# Patient Record
Sex: FEMALE | NOT HISPANIC OR LATINO
[De-identification: names, ages, dates, MRNs, and addresses within clinical notes are randomized per-mention and may not be internally consistent; named-entity substitution may affect disease eponyms.]

---

## 2019-06-06 PROBLEM — Z00.00 ENCOUNTER FOR PREVENTIVE HEALTH EXAMINATION: Status: ACTIVE | Noted: 2019-06-06

## 2019-06-26 ENCOUNTER — APPOINTMENT (OUTPATIENT)
Dept: INTERVENTIONAL RADIOLOGY/VASCULAR | Facility: HOSPITAL | Age: 39
End: 2019-06-26
Payer: COMMERCIAL

## 2019-06-26 VITALS
DIASTOLIC BLOOD PRESSURE: 97 MMHG | OXYGEN SATURATION: 95 % | SYSTOLIC BLOOD PRESSURE: 157 MMHG | HEART RATE: 88 BPM | RESPIRATION RATE: 18 BRPM

## 2019-06-26 DIAGNOSIS — Z86.39 PERSONAL HISTORY OF OTHER ENDOCRINE, NUTRITIONAL AND METABOLIC DISEASE: ICD-10-CM

## 2019-06-26 DIAGNOSIS — Z86.79 PERSONAL HISTORY OF OTHER DISEASES OF THE CIRCULATORY SYSTEM: ICD-10-CM

## 2019-06-26 DIAGNOSIS — Z87.09 PERSONAL HISTORY OF OTHER DISEASES OF THE RESPIRATORY SYSTEM: ICD-10-CM

## 2019-06-26 DIAGNOSIS — F17.200 NICOTINE DEPENDENCE, UNSPECIFIED, UNCOMPLICATED: ICD-10-CM

## 2019-06-26 PROCEDURE — 99204 OFFICE O/P NEW MOD 45 MIN: CPT

## 2019-06-26 RX ORDER — METFORMIN HYDROCHLORIDE 625 MG/1
TABLET ORAL
Refills: 0 | Status: ACTIVE | COMMUNITY

## 2019-06-26 RX ORDER — MONTELUKAST SODIUM 10 MG/1
TABLET, FILM COATED ORAL
Refills: 0 | Status: ACTIVE | COMMUNITY

## 2019-06-26 RX ORDER — ATORVASTATIN CALCIUM 80 MG/1
TABLET, FILM COATED ORAL
Refills: 0 | Status: ACTIVE | COMMUNITY

## 2019-06-26 RX ORDER — ALBUTEROL 90 MCG
AEROSOL (GRAM) INHALATION
Refills: 0 | Status: ACTIVE | COMMUNITY

## 2019-06-26 RX ORDER — IRON/IRON ASP GLY/FA/MV-MIN 38 125-25-1MG
TABLET ORAL
Refills: 0 | Status: ACTIVE | COMMUNITY

## 2019-06-26 RX ORDER — METOPROLOL TARTRATE 75 MG/1
TABLET, FILM COATED ORAL
Refills: 0 | Status: ACTIVE | COMMUNITY

## 2019-06-26 NOTE — ASSESSMENT
[FreeTextEntry1] : 39F with menorrhagia and bulk symptoms due to large uterine fibroids, interested in having UAE procedure, which was explained to her in detail, including risks, benefits, and alternatives. The patient will undergo an MRI of the pelvis in July to see if she is a good candidate for the procedure, after which we can schedule her for the UAE. All questions were answered.

## 2019-06-26 NOTE — HISTORY OF PRESENT ILLNESS
[FreeTextEntry1] : 39 year old woman with menorrhagia and pelvic pain due to enlarging uterine fibroids. Patient reports having heavy menstrual bleeding on and off for years with significant increase in heaviness of bleeding in the past few months. Her periods last 7-15 days and are otherwise regular. She occasionally has bleeding/spotting between periods. She reports having pelvic pain 2 days before period starts, which started happening a few months ago. She also reports frequency of urination and abdominal fullness. She has anemia for which she takes iron. She does not have children but wants to have kids in the future. She has been pregnant twice in the past and had abortions both times.  [Menorrhagia] : ~T menorrhagia [Pelvic Pain] : pelvic pain [Urinary Frequency] : urinary frequency [Pressure] : pressure [Monthly Cycles Regular] : monthly cycles are regular [Bleeding In Between Periods] : bleeding in between periods [G ___] : G [unfilled] [P___] : P [unfilled] [M___] : M [unfilled] [A___] : A [unfilled]

## 2019-06-26 NOTE — CONSULT LETTER
[Dear  ___] : Dear  [unfilled], [Consult Letter:] : I had the pleasure of evaluating your patient, [unfilled]. [Please see my note below.] : Please see my note below. [Consult Closing:] : Thank you very much for allowing me to participate in the care of this patient.  If you have any questions, please do not hesitate to contact me. [Sincerely,] : Sincerely, [FreeTextEntry3] : Shailesh Parks MD\par Interventional Radiology\par [facility]

## 2019-06-26 NOTE — PHYSICAL EXAM
[Alert] : alert [No Acute Distress] : no acute distress [No Neck Mass] : no neck mass was observed [No Respiratory Distress] : no respiratory distress [Normal Rate and Effort] : normal respiratory rhythm and effort [Clear to Auscultation] : lungs were clear to auscultation bilaterally [Normal Rate] : heart rate was normal  [Regular Rhythm] : with a regular rhythm [Not Tender] : non-tender [Soft] : abdomen soft [Not Distended] : not distended [Oriented x3] : oriented to person, place, and time [de-identified] : palpable fibroid upper pelvis/lower abdomen [Fully active, able to carry on all pre-disease performance without restriction] : Fully active, able to carry on all pre-disease performance without restriction

## 2019-06-26 NOTE — DATA REVIEWED
[FreeTextEntry1] : Pap smear (2/19/19) - negative for intraepithelial lesion or malignancy, HPV mRNA not detected\par \par Pelvic ultrasound (2/26/29) - enlarged uterus with multiple fibroids, largest 10.1 cm

## 2019-07-17 ENCOUNTER — FORM ENCOUNTER (OUTPATIENT)
Age: 39
End: 2019-07-17

## 2019-07-18 ENCOUNTER — APPOINTMENT (OUTPATIENT)
Dept: MRI IMAGING | Facility: HOSPITAL | Age: 39
End: 2019-07-18
Payer: COMMERCIAL

## 2019-07-18 ENCOUNTER — OUTPATIENT (OUTPATIENT)
Dept: OUTPATIENT SERVICES | Facility: HOSPITAL | Age: 39
LOS: 1 days | End: 2019-07-18
Payer: COMMERCIAL

## 2019-07-18 PROCEDURE — 72197 MRI PELVIS W/O & W/DYE: CPT | Mod: 26

## 2019-07-18 PROCEDURE — A9585: CPT

## 2019-07-18 PROCEDURE — 72197 MRI PELVIS W/O & W/DYE: CPT

## 2019-08-22 ENCOUNTER — FORM ENCOUNTER (OUTPATIENT)
Age: 39
End: 2019-08-22

## 2019-08-23 ENCOUNTER — APPOINTMENT (OUTPATIENT)
Dept: INTERVENTIONAL RADIOLOGY/VASCULAR | Facility: HOSPITAL | Age: 39
End: 2019-08-23
Payer: COMMERCIAL

## 2019-08-23 ENCOUNTER — OUTPATIENT (OUTPATIENT)
Dept: OUTPATIENT SERVICES | Facility: HOSPITAL | Age: 39
LOS: 1 days | End: 2019-08-23
Payer: MEDICAID

## 2019-08-23 LAB — GLUCOSE BLDC GLUCOMTR-MCNC: 176 MG/DL — HIGH (ref 70–99)

## 2019-08-23 PROCEDURE — 37243 VASC EMBOLIZE/OCCLUDE ORGAN: CPT

## 2019-08-23 PROCEDURE — C1769: CPT

## 2019-08-23 PROCEDURE — C1889: CPT

## 2019-08-23 PROCEDURE — 36247 INS CATH ABD/L-EXT ART 3RD: CPT | Mod: 59

## 2019-08-23 PROCEDURE — 71045 X-RAY EXAM CHEST 1 VIEW: CPT | Mod: 26

## 2019-08-23 PROCEDURE — 82962 GLUCOSE BLOOD TEST: CPT

## 2019-08-23 PROCEDURE — 36247 INS CATH ABD/L-EXT ART 3RD: CPT

## 2019-08-23 PROCEDURE — C1887: CPT

## 2019-08-23 PROCEDURE — C1894: CPT

## 2019-08-23 PROCEDURE — 71045 X-RAY EXAM CHEST 1 VIEW: CPT

## 2019-08-23 RX ORDER — IBUPROFEN 200 MG
1 TABLET ORAL
Qty: 28 | Refills: 0
Start: 2019-08-23 | End: 2019-08-29

## 2019-08-23 RX ORDER — OXYCODONE HYDROCHLORIDE 5 MG/1
2 TABLET ORAL
Qty: 20 | Refills: 0
Start: 2019-08-23

## 2019-09-13 ENCOUNTER — APPOINTMENT (OUTPATIENT)
Dept: INTERVENTIONAL RADIOLOGY/VASCULAR | Facility: HOSPITAL | Age: 39
End: 2019-09-13
Payer: COMMERCIAL

## 2019-09-13 VITALS — HEART RATE: 86 BPM | SYSTOLIC BLOOD PRESSURE: 140 MMHG | DIASTOLIC BLOOD PRESSURE: 90 MMHG | RESPIRATION RATE: 16 BRPM

## 2019-09-13 DIAGNOSIS — D21.9 BENIGN NEOPLASM OF CONNECTIVE AND OTHER SOFT TISSUE, UNSPECIFIED: ICD-10-CM

## 2019-09-13 PROCEDURE — 99213 OFFICE O/P EST LOW 20 MIN: CPT

## 2019-09-13 NOTE — ASSESSMENT
[FreeTextEntry1] : 39F 3 weeks s/p UAE for symptomatic fibroids, doing very well. Pt to follow up with me on 12/23/19 following a repeat MRI on the same day. She will return to her regular gyn follow-ups with Dr. Dee.

## 2019-09-13 NOTE — PHYSICAL EXAM
[Alert] : alert [No Acute Distress] : no acute distress [Soft] : abdomen soft [Not Tender] : non-tender [Not Distended] : not distended [Fully active, able to carry on all pre-disease performance without restriction] : Fully active, able to carry on all pre-disease performance without restriction

## 2019-09-13 NOTE — HISTORY OF PRESENT ILLNESS
[FreeTextEntry1] : 39F 3 weeks s/p UAE for symptomatic fibroids. Feels very well, reports occasional spotting but resolution of post-procedural pain and nausea. Denies fever/chills.

## 2019-10-14 ENCOUNTER — EMERGENCY (EMERGENCY)
Facility: HOSPITAL | Age: 39
LOS: 1 days | Discharge: HOME | End: 2019-10-14
Attending: EMERGENCY MEDICINE | Admitting: EMERGENCY MEDICINE
Payer: COMMERCIAL

## 2019-10-14 VITALS
WEIGHT: 220.02 LBS | RESPIRATION RATE: 18 BRPM | OXYGEN SATURATION: 98 % | TEMPERATURE: 99 F | HEIGHT: 64 IN | SYSTOLIC BLOOD PRESSURE: 116 MMHG | DIASTOLIC BLOOD PRESSURE: 84 MMHG | HEART RATE: 104 BPM

## 2019-10-14 VITALS
TEMPERATURE: 99 F | DIASTOLIC BLOOD PRESSURE: 90 MMHG | RESPIRATION RATE: 16 BRPM | HEART RATE: 89 BPM | OXYGEN SATURATION: 100 % | SYSTOLIC BLOOD PRESSURE: 139 MMHG

## 2019-10-14 DIAGNOSIS — Z79.899 OTHER LONG TERM (CURRENT) DRUG THERAPY: ICD-10-CM

## 2019-10-14 DIAGNOSIS — R79.9 ABNORMAL FINDING OF BLOOD CHEMISTRY, UNSPECIFIED: ICD-10-CM

## 2019-10-14 DIAGNOSIS — Z79.84 LONG TERM (CURRENT) USE OF ORAL HYPOGLYCEMIC DRUGS: ICD-10-CM

## 2019-10-14 DIAGNOSIS — R53.83 OTHER FATIGUE: ICD-10-CM

## 2019-10-14 DIAGNOSIS — E11.9 TYPE 2 DIABETES MELLITUS WITHOUT COMPLICATIONS: ICD-10-CM

## 2019-10-14 DIAGNOSIS — I10 ESSENTIAL (PRIMARY) HYPERTENSION: ICD-10-CM

## 2019-10-14 DIAGNOSIS — R42 DIZZINESS AND GIDDINESS: ICD-10-CM

## 2019-10-14 DIAGNOSIS — R53.1 WEAKNESS: ICD-10-CM

## 2019-10-14 LAB
ALBUMIN SERPL ELPH-MCNC: 4.2 G/DL — SIGNIFICANT CHANGE UP (ref 3.3–5)
ALP SERPL-CCNC: 103 U/L — SIGNIFICANT CHANGE UP (ref 40–120)
ALT FLD-CCNC: 17 U/L — SIGNIFICANT CHANGE UP (ref 10–45)
ANION GAP SERPL CALC-SCNC: 13 MMOL/L — SIGNIFICANT CHANGE UP (ref 5–17)
APTT BLD: 32.1 SEC — SIGNIFICANT CHANGE UP (ref 27.5–36.3)
AST SERPL-CCNC: 14 U/L — SIGNIFICANT CHANGE UP (ref 10–40)
BASOPHILS # BLD AUTO: 0.09 K/UL — SIGNIFICANT CHANGE UP (ref 0–0.2)
BASOPHILS NFR BLD AUTO: 1.2 % — SIGNIFICANT CHANGE UP (ref 0–2)
BILIRUB SERPL-MCNC: 0.4 MG/DL — SIGNIFICANT CHANGE UP (ref 0.2–1.2)
BLD GP AB SCN SERPL QL: NEGATIVE — SIGNIFICANT CHANGE UP
BUN SERPL-MCNC: 11 MG/DL — SIGNIFICANT CHANGE UP (ref 7–23)
CALCIUM SERPL-MCNC: 9.8 MG/DL — SIGNIFICANT CHANGE UP (ref 8.4–10.5)
CHLORIDE SERPL-SCNC: 101 MMOL/L — SIGNIFICANT CHANGE UP (ref 96–108)
CO2 SERPL-SCNC: 28 MMOL/L — SIGNIFICANT CHANGE UP (ref 22–31)
CREAT SERPL-MCNC: 0.71 MG/DL — SIGNIFICANT CHANGE UP (ref 0.5–1.3)
EOSINOPHIL # BLD AUTO: 0.13 K/UL — SIGNIFICANT CHANGE UP (ref 0–0.5)
EOSINOPHIL NFR BLD AUTO: 1.7 % — SIGNIFICANT CHANGE UP (ref 0–6)
GLUCOSE SERPL-MCNC: 163 MG/DL — HIGH (ref 70–99)
HCT VFR BLD CALC: 30 % — LOW (ref 34.5–45)
HGB BLD-MCNC: 9.1 G/DL — LOW (ref 11.5–15.5)
IMM GRANULOCYTES NFR BLD AUTO: 0.3 % — SIGNIFICANT CHANGE UP (ref 0–1.5)
INR BLD: 1.2 — HIGH (ref 0.88–1.16)
LYMPHOCYTES # BLD AUTO: 2.08 K/UL — SIGNIFICANT CHANGE UP (ref 1–3.3)
LYMPHOCYTES # BLD AUTO: 27.4 % — SIGNIFICANT CHANGE UP (ref 13–44)
MCHC RBC-ENTMCNC: 21.5 PG — LOW (ref 27–34)
MCHC RBC-ENTMCNC: 30.3 GM/DL — LOW (ref 32–36)
MCV RBC AUTO: 70.9 FL — LOW (ref 80–100)
MONOCYTES # BLD AUTO: 0.51 K/UL — SIGNIFICANT CHANGE UP (ref 0–0.9)
MONOCYTES NFR BLD AUTO: 6.7 % — SIGNIFICANT CHANGE UP (ref 2–14)
NEUTROPHILS # BLD AUTO: 4.75 K/UL — SIGNIFICANT CHANGE UP (ref 1.8–7.4)
NEUTROPHILS NFR BLD AUTO: 62.7 % — SIGNIFICANT CHANGE UP (ref 43–77)
NRBC # BLD: 0 /100 WBCS — SIGNIFICANT CHANGE UP (ref 0–0)
PLATELET # BLD AUTO: 393 K/UL — SIGNIFICANT CHANGE UP (ref 150–400)
POTASSIUM SERPL-MCNC: 3.6 MMOL/L — SIGNIFICANT CHANGE UP (ref 3.5–5.3)
POTASSIUM SERPL-SCNC: 3.6 MMOL/L — SIGNIFICANT CHANGE UP (ref 3.5–5.3)
PROT SERPL-MCNC: 8.1 G/DL — SIGNIFICANT CHANGE UP (ref 6–8.3)
PROTHROM AB SERPL-ACNC: 13.6 SEC — HIGH (ref 10–12.9)
RBC # BLD: 4.23 M/UL — SIGNIFICANT CHANGE UP (ref 3.8–5.2)
RBC # FLD: 16.2 % — HIGH (ref 10.3–14.5)
RH IG SCN BLD-IMP: POSITIVE — SIGNIFICANT CHANGE UP
SODIUM SERPL-SCNC: 142 MMOL/L — SIGNIFICANT CHANGE UP (ref 135–145)
WBC # BLD: 7.58 K/UL — SIGNIFICANT CHANGE UP (ref 3.8–10.5)
WBC # FLD AUTO: 7.58 K/UL — SIGNIFICANT CHANGE UP (ref 3.8–10.5)

## 2019-10-14 PROCEDURE — 85730 THROMBOPLASTIN TIME PARTIAL: CPT

## 2019-10-14 PROCEDURE — 80053 COMPREHEN METABOLIC PANEL: CPT

## 2019-10-14 PROCEDURE — 85025 COMPLETE CBC W/AUTO DIFF WBC: CPT

## 2019-10-14 PROCEDURE — 85610 PROTHROMBIN TIME: CPT

## 2019-10-14 PROCEDURE — 86850 RBC ANTIBODY SCREEN: CPT

## 2019-10-14 PROCEDURE — 99283 EMERGENCY DEPT VISIT LOW MDM: CPT

## 2019-10-14 PROCEDURE — 82728 ASSAY OF FERRITIN: CPT

## 2019-10-14 PROCEDURE — 84443 ASSAY THYROID STIM HORMONE: CPT

## 2019-10-14 PROCEDURE — 93005 ELECTROCARDIOGRAM TRACING: CPT

## 2019-10-14 PROCEDURE — 93010 ELECTROCARDIOGRAM REPORT: CPT

## 2019-10-14 PROCEDURE — 99284 EMERGENCY DEPT VISIT MOD MDM: CPT

## 2019-10-14 PROCEDURE — 86900 BLOOD TYPING SEROLOGIC ABO: CPT

## 2019-10-14 PROCEDURE — 86901 BLOOD TYPING SEROLOGIC RH(D): CPT

## 2019-10-14 PROCEDURE — 36415 COLL VENOUS BLD VENIPUNCTURE: CPT

## 2019-10-14 NOTE — ED PROVIDER NOTE - CLINICAL SUMMARY MEDICAL DECISION MAKING FREE TEXT BOX
avss. nontoxic. NAD. no active cp. no acute resp distress. no active vaginal bleeding. found to have hb 8.3 (outpatient >2w ago) now 9.2. after d/w pt, given no comorbidities or cardiac risk factors, will defer prbc transfusion at this time. will dc home w/ outpatient pcp fu, continued iron supp, strict return precautions. pt/family agrees w/ plan. questions answered. avss. nontoxic. NAD. no active cp. no acute resp distress. no active vaginal bleeding. found to have hb 8.3 (outpatient >2w ago) now 9.1. no coagulopathy. ekg w/o acute abnl. after d/w pt, given no comorbidities or cardiac risk factors, will defer prbc transfusion at this time. will dc home w/ outpatient pcp fu, continued iron supp, strict return precautions. pt/family agrees w/ plan. questions answered.

## 2019-10-14 NOTE — ED PROVIDER NOTE - PHYSICAL EXAMINATION
CONST: overweight nontoxic NAD speaking in full sentences  HEAD: atraumatic  EYES: conjunctivae clear  ENT: mmm  NECK: supple  CARD: rrr no murmurs  CHEST: ctab no r/r/w, no stridor/retractions/tripoding  ABD: soft, nd, nttp, no rebound/guarding  EXT: FROM, symmetric distal pulses intact  SKIN: warm, dry, no rash, no pedal edema, cap refill <2sec  NEURO: a+ox3, 5/5 strength x4, gross sensation intact x4, normal gait

## 2019-10-14 NOTE — ED ADULT NURSE NOTE - NSIMPLEMENTINTERV_GEN_ALL_ED
Implemented All Universal Safety Interventions:  [place] to call system. Call bell, personal items and telephone within reach. Instruct patient to call for assistance. Room bathroom lighting operational. Non-slip footwear when patient is off stretcher. Physically safe environment: no spills, clutter or unnecessary equipment. Stretcher in lowest position, wheels locked, appropriate side rails in place.

## 2019-10-14 NOTE — ED PROVIDER NOTE - OBJECTIVE STATEMENT
39F iron-def anemia 2/2 fibroids/vaginal bleeding since 7/2019 (no prior transfusions) only minimally improved s/p uterine ablation (8/2019), otherwise healthy, reportedly referred in by pcp for prbc transfusion given hb 8.3 on routine blood work >2w ago, however pt did not want to come to ED or get transfusion at that time and now requesting it given persistent fatigue and intermittent positional lightheadedness. +vaginal spotting only when wiping since 8/2019. no blood clots or significant vaginal bleeding. has been taking iron supplements. no fever/chills, no cp/sob, no n/v, no phx/fhx coagulopathy, no trauma.

## 2019-10-14 NOTE — ED ADULT NURSE NOTE - OBJECTIVE STATEMENT
Pt presents to ED c/o SOB and abnormal labs. Pt reports vaginal spotting since July, had uterine artery ablation end of august which temporarily stopped bleeding per pt but has now returned, mostly spotting when she wipes, no associated abd cramping. Pt reports she saw her PMD for regular appt, had bloodwork showing hgb 8.3 per pt but pt did not want transfusion at that time. Pt reports for the last two weeks exertional SOB can't walk more than a block, also with dizziness and lightheadedness on exertion. Pt denies CP associated with SOB. No syncope, no abd pain, no blood in urine/stool. Pt presents in NAD speaking full sentences ambulatory through triage.

## 2019-10-14 NOTE — ED PROVIDER NOTE - PATIENT PORTAL LINK FT
You can access the FollowMyHealth Patient Portal offered by [facility] by registering at the following website: [email or web address]. By joining [facility]’s FollowMyHealth portal, you will also be able to view your health information using other applications (apps) compatible with our system.

## 2019-10-14 NOTE — ED PROVIDER NOTE - NSFOLLOWUPINSTRUCTIONS_ED_ALL_ED_FT
PLEASE CONTINUE IRON SUPPLEMENTS. PLEASE FOLLOW-UP WITH YOUR PCP FOR FURTHER EVALUATION    PLEASE RETURN IF LIGHTHEADEDNESS, CHEST PAIN, SHORTNESS OF BREATH, OTHER CONCERNING SYMPTOMS.            Weakness  Weakness is a lack of strength. You may feel weak all over your body (generalized), or you may feel weak in one specific part of your body (focal). Common causes of weakness include:  Infection and immune system disorders.Physical exhaustion.Internal bleeding or other blood loss that results in a lack of red blood cells (anemia).Dehydration.An imbalance in mineral (electrolyte) levels, such as potassium.Heart disease, circulation problems, or stroke.Other causes include:  Some medicines or cancer treatment.Stress, anxiety, or depression.Nervous system disorders.Thyroid disorders.Loss of muscle strength because of age or inactivity.Poor sleep quality or sleep disorders.The cause of your weakness may not be known. Some causes of weakness can be serious, so it is important to see your health care provider.  Follow these instructions at home:  Activity     Rest as needed.Try to get enough sleep. Most adults need 7–8 hours of quality sleep each night. Talk to your health care provider about how much sleep you need each night.Do exercises, such as arm curls and leg raises, for 30 minutes at least 2 days a week or as told by your health care provider. This helps build muscle strength.Consider working with a physical therapist or  who can develop an exercise plan to help you gain muscle strength.General instructions     Image   Take over-the-counter and prescription medicines only as told by your health care provider.Eat a healthy, well-balanced diet. This includes:  Proteins to build muscles, such as lean meats and fish.Fresh fruits and vegetables.Carbohydrates to boost energy, such as whole grains.Drink enough fluid to keep your urine pale yellow.Keep all follow-up visits as told by your health care provider. This is important.Contact a health care provider if your weakness:  Does not improve or gets worse.Affects your ability to think clearly.Affects your ability to do your normal daily activities.Get help right away if you:  Develop sudden weakness, especially on one side of your face or body.Have chest pain.Have trouble breathing or shortness of breath.Have problems with your vision.Have trouble talking or swallowing.Have trouble standing or walking.Are light-headed or lose consciousness.Summary  Weakness is a lack of strength. You may feel weak all over your body or just in one specific part of your body.Weakness can be caused by a variety of things. In some cases, the cause may be unknown.Rest as needed, and try to get enough sleep. Most adults need 7–8 hours of quality sleep each night.Eat a healthy, well-balanced diet.This information is not intended to replace advice given to you by your health care provider. Make sure you discuss any questions you have with your health care provider.    Document Released: 12/18/2006 Document Revised: 07/24/2019 Document Reviewed: 07/24/2019  Elsevier Interactive Patient Education © 2019 [employer]

## 2019-10-14 NOTE — ED ADULT TRIAGE NOTE - CHIEF COMPLAINT QUOTE
pt c/o low iron and low hemoglobin two weeks ago, was told she needed a transfusion but did not want one. pt states she is now more SOB, tired, dizzy on standing and wants a transfusion.

## 2019-12-01 ENCOUNTER — OUTPATIENT (OUTPATIENT)
Dept: OUTPATIENT SERVICES | Facility: HOSPITAL | Age: 39
LOS: 1 days | End: 2019-12-01
Payer: MEDICAID

## 2019-12-01 PROCEDURE — G9001: CPT

## 2019-12-03 VITALS
OXYGEN SATURATION: 100 % | DIASTOLIC BLOOD PRESSURE: 94 MMHG | HEART RATE: 107 BPM | HEIGHT: 65 IN | TEMPERATURE: 98 F | RESPIRATION RATE: 19 BRPM | WEIGHT: 220.02 LBS | SYSTOLIC BLOOD PRESSURE: 134 MMHG

## 2019-12-03 RX ORDER — KETOROLAC TROMETHAMINE 30 MG/ML
30 SYRINGE (ML) INJECTION ONCE
Refills: 0 | Status: DISCONTINUED | OUTPATIENT
Start: 2019-12-03 | End: 2019-12-03

## 2019-12-03 RX ORDER — CYCLOBENZAPRINE HYDROCHLORIDE 10 MG/1
5 TABLET, FILM COATED ORAL ONCE
Refills: 0 | Status: COMPLETED | OUTPATIENT
Start: 2019-12-03 | End: 2019-12-03

## 2019-12-03 RX ADMIN — CYCLOBENZAPRINE HYDROCHLORIDE 5 MILLIGRAM(S): 10 TABLET, FILM COATED ORAL at 23:36

## 2019-12-03 RX ADMIN — Medication 30 MILLIGRAM(S): at 23:37

## 2019-12-03 RX ADMIN — Medication 30 MILLIGRAM(S): at 23:50

## 2019-12-03 NOTE — ED ADULT NURSE NOTE - OBJECTIVE STATEMENT
Pt. w/ Hx of asthma, HTN, HLD, DM, uterine fibroids c/o constant SOB and on/off CP x one week. CP is L-sided when pt. lies on L side, other times, pain occurs under R breast. Speaking in clear complete sentences on RA, breath sounds clear bilaterally. Daily smoker, 1-10 cigarettes/day. Also c/o on/off R flank pain x 3 days, denies any urinary Sx. Last took ibuprofen at 1800 w/ some pain relief. Hx of uterine fibroids w/ vaginal bleeding, reports lightheadedness and fatigue x months, had blood transfusion at [facility] on 11/8 for hgb 7.8 (8.1 after transfusion).

## 2019-12-03 NOTE — ED ADULT TRIAGE NOTE - CHIEF COMPLAINT QUOTE
Pt states "I have SOB when I walk and when I lay down, like something is sitting on my chest. I don't know if it's because my hbg is low. I have fibroids that have been bleeding since july, even when I am not having a period. I am currently bleeding now. I had a blood transfusion on 11/8/19. I also have right flank pain".

## 2019-12-04 ENCOUNTER — INPATIENT (INPATIENT)
Facility: HOSPITAL | Age: 39
LOS: 7 days | Discharge: HOME | DRG: 356 | End: 2019-12-12
Attending: SURGERY | Admitting: SURGERY
Payer: MEDICAID

## 2019-12-04 ENCOUNTER — RESULT REVIEW (OUTPATIENT)
Age: 39
End: 2019-12-04

## 2019-12-04 DIAGNOSIS — R19.01 RIGHT UPPER QUADRANT ABDOMINAL SWELLING, MASS AND LUMP: ICD-10-CM

## 2019-12-04 DIAGNOSIS — E11.9 TYPE 2 DIABETES MELLITUS WITHOUT COMPLICATIONS: ICD-10-CM

## 2019-12-04 DIAGNOSIS — E78.5 HYPERLIPIDEMIA, UNSPECIFIED: ICD-10-CM

## 2019-12-04 DIAGNOSIS — Z91.89 OTHER SPECIFIED PERSONAL RISK FACTORS, NOT ELSEWHERE CLASSIFIED: ICD-10-CM

## 2019-12-04 DIAGNOSIS — I10 ESSENTIAL (PRIMARY) HYPERTENSION: ICD-10-CM

## 2019-12-04 DIAGNOSIS — R63.8 OTHER SYMPTOMS AND SIGNS CONCERNING FOOD AND FLUID INTAKE: ICD-10-CM

## 2019-12-04 DIAGNOSIS — R06.02 SHORTNESS OF BREATH: ICD-10-CM

## 2019-12-04 DIAGNOSIS — R74.8 ABNORMAL LEVELS OF OTHER SERUM ENZYMES: ICD-10-CM

## 2019-12-04 DIAGNOSIS — J98.11 ATELECTASIS: ICD-10-CM

## 2019-12-04 DIAGNOSIS — J45.909 UNSPECIFIED ASTHMA, UNCOMPLICATED: ICD-10-CM

## 2019-12-04 DIAGNOSIS — K86.89 OTHER SPECIFIED DISEASES OF PANCREAS: ICD-10-CM

## 2019-12-04 DIAGNOSIS — D25.9 LEIOMYOMA OF UTERUS, UNSPECIFIED: ICD-10-CM

## 2019-12-04 DIAGNOSIS — J90 PLEURAL EFFUSION, NOT ELSEWHERE CLASSIFIED: ICD-10-CM

## 2019-12-04 LAB
AFP-TM SERPL-MCNC: 1.9 NG/ML — SIGNIFICANT CHANGE UP
ALBUMIN SERPL ELPH-MCNC: 3.1 G/DL — LOW (ref 3.3–5)
ALP SERPL-CCNC: 159 U/L — HIGH (ref 40–120)
ALT FLD-CCNC: 241 U/L — HIGH (ref 10–45)
ANION GAP SERPL CALC-SCNC: 11 MMOL/L — SIGNIFICANT CHANGE UP (ref 5–17)
APPEARANCE UR: CLEAR — SIGNIFICANT CHANGE UP
APTT BLD: 32.5 SEC — SIGNIFICANT CHANGE UP (ref 27.5–36.3)
AST SERPL-CCNC: 213 U/L — HIGH (ref 10–40)
BCA 255 TISS QL IMSTN: 7.4 U/ML — SIGNIFICANT CHANGE UP
BILIRUB DIRECT SERPL-MCNC: 0.2 MG/DL — SIGNIFICANT CHANGE UP (ref 0–0.2)
BILIRUB INDIRECT FLD-MCNC: 0.5 MG/DL — SIGNIFICANT CHANGE UP (ref 0.2–1)
BILIRUB SERPL-MCNC: 0.7 MG/DL — SIGNIFICANT CHANGE UP (ref 0.2–1.2)
BILIRUB UR-MCNC: NEGATIVE — SIGNIFICANT CHANGE UP
BUN SERPL-MCNC: 15 MG/DL — SIGNIFICANT CHANGE UP (ref 7–23)
CALCIUM SERPL-MCNC: 8.9 MG/DL — SIGNIFICANT CHANGE UP (ref 8.4–10.5)
CANCER AG19-9 SERPL-ACNC: <2 U/ML — SIGNIFICANT CHANGE UP
CEA SERPL-MCNC: 1.5 NG/ML — SIGNIFICANT CHANGE UP (ref 0–3.8)
CHLORIDE SERPL-SCNC: 104 MMOL/L — SIGNIFICANT CHANGE UP (ref 96–108)
CO2 SERPL-SCNC: 23 MMOL/L — SIGNIFICANT CHANGE UP (ref 22–31)
COLOR SPEC: YELLOW — SIGNIFICANT CHANGE UP
CREAT SERPL-MCNC: 0.87 MG/DL — SIGNIFICANT CHANGE UP (ref 0.5–1.3)
DIFF PNL FLD: NEGATIVE — SIGNIFICANT CHANGE UP
FERRITIN SERPL-MCNC: 137 NG/ML — SIGNIFICANT CHANGE UP (ref 15–150)
GLUCOSE SERPL-MCNC: 85 MG/DL — SIGNIFICANT CHANGE UP (ref 70–99)
GLUCOSE UR QL: NEGATIVE — SIGNIFICANT CHANGE UP
HAV IGM SER-ACNC: SIGNIFICANT CHANGE UP
HBV CORE IGM SER-ACNC: SIGNIFICANT CHANGE UP
HBV SURFACE AG SER-ACNC: SIGNIFICANT CHANGE UP
HCG UR QL: NEGATIVE — SIGNIFICANT CHANGE UP
HCT VFR BLD CALC: 35.2 % — SIGNIFICANT CHANGE UP (ref 34.5–45)
HCV AB S/CO SERPL IA: 0.09 S/CO — SIGNIFICANT CHANGE UP
HCV AB SERPL-IMP: SIGNIFICANT CHANGE UP
HGB BLD-MCNC: 10.1 G/DL — LOW (ref 11.5–15.5)
HIV 1+2 AB+HIV1 P24 AG SERPL QL IA: SIGNIFICANT CHANGE UP
INR BLD: 1.81 — HIGH (ref 0.88–1.16)
IRON SATN MFR SERPL: 23 UG/DL — LOW (ref 30–160)
IRON SATN MFR SERPL: 8 % — LOW (ref 14–50)
KETONES UR-MCNC: NEGATIVE — SIGNIFICANT CHANGE UP
LDH SERPL L TO P-CCNC: 753 U/L — HIGH (ref 50–242)
LEUKOCYTE ESTERASE UR-ACNC: NEGATIVE — SIGNIFICANT CHANGE UP
MAGNESIUM SERPL-MCNC: 1.7 MG/DL — SIGNIFICANT CHANGE UP (ref 1.6–2.6)
MCHC RBC-ENTMCNC: 20.1 PG — LOW (ref 27–34)
MCHC RBC-ENTMCNC: 28.7 GM/DL — LOW (ref 32–36)
MCV RBC AUTO: 70 FL — LOW (ref 80–100)
NITRITE UR-MCNC: NEGATIVE — SIGNIFICANT CHANGE UP
NRBC # BLD: 0 /100 WBCS — SIGNIFICANT CHANGE UP (ref 0–0)
PH UR: 6 — SIGNIFICANT CHANGE UP (ref 5–8)
PHOSPHATE SERPL-MCNC: 3.6 MG/DL — SIGNIFICANT CHANGE UP (ref 2.5–4.5)
PLATELET # BLD AUTO: 479 K/UL — HIGH (ref 150–400)
POTASSIUM SERPL-MCNC: 3.6 MMOL/L — SIGNIFICANT CHANGE UP (ref 3.5–5.3)
POTASSIUM SERPL-SCNC: 3.6 MMOL/L — SIGNIFICANT CHANGE UP (ref 3.5–5.3)
PROT SERPL-MCNC: 6.1 G/DL — SIGNIFICANT CHANGE UP (ref 6–8.3)
PROT UR-MCNC: 100 MG/DL
PROTHROM AB SERPL-ACNC: 20.8 SEC — HIGH (ref 10–12.9)
RBC # BLD: 5.03 M/UL — SIGNIFICANT CHANGE UP (ref 3.8–5.2)
RBC # FLD: 19.3 % — HIGH (ref 10.3–14.5)
RHEUMATOID FACT SERPL-ACNC: <10 IU/ML — SIGNIFICANT CHANGE UP (ref 0–13)
SODIUM SERPL-SCNC: 138 MMOL/L — SIGNIFICANT CHANGE UP (ref 135–145)
SP GR SPEC: >=1.03 — SIGNIFICANT CHANGE UP (ref 1–1.03)
TIBC SERPL-MCNC: 303 UG/DL — SIGNIFICANT CHANGE UP (ref 220–430)
TRANSFERRIN SERPL-MCNC: 230 MG/DL — SIGNIFICANT CHANGE UP (ref 200–360)
UIBC SERPL-MCNC: 280 UG/DL — SIGNIFICANT CHANGE UP (ref 110–370)
UROBILINOGEN FLD QL: 0.2 E.U./DL — SIGNIFICANT CHANGE UP
WBC # BLD: 11.5 K/UL — HIGH (ref 3.8–10.5)
WBC # FLD AUTO: 11.5 K/UL — HIGH (ref 3.8–10.5)

## 2019-12-04 PROCEDURE — 99223 1ST HOSP IP/OBS HIGH 75: CPT | Mod: GC

## 2019-12-04 PROCEDURE — 74177 CT ABD & PELVIS W/CONTRAST: CPT | Mod: 26

## 2019-12-04 PROCEDURE — 71045 X-RAY EXAM CHEST 1 VIEW: CPT | Mod: 26

## 2019-12-04 PROCEDURE — 88112 CYTOPATH CELL ENHANCE TECH: CPT | Mod: 26

## 2019-12-04 PROCEDURE — 86077 PHYS BLOOD BANK SERV XMATCH: CPT

## 2019-12-04 PROCEDURE — 71250 CT THORAX DX C-: CPT | Mod: 26

## 2019-12-04 PROCEDURE — 99285 EMERGENCY DEPT VISIT HI MDM: CPT

## 2019-12-04 PROCEDURE — 99223 1ST HOSP IP/OBS HIGH 75: CPT

## 2019-12-04 PROCEDURE — 74176 CT ABD & PELVIS W/O CONTRAST: CPT | Mod: 26,59

## 2019-12-04 PROCEDURE — 88305 TISSUE EXAM BY PATHOLOGIST: CPT | Mod: 26

## 2019-12-04 RX ORDER — NICOTINE POLACRILEX 2 MG
1 GUM BUCCAL DAILY
Refills: 0 | Status: DISCONTINUED | OUTPATIENT
Start: 2019-12-04 | End: 2019-12-10

## 2019-12-04 RX ORDER — MONTELUKAST 4 MG/1
1 TABLET, CHEWABLE ORAL
Qty: 0 | Refills: 0 | DISCHARGE

## 2019-12-04 RX ORDER — ALBUTEROL 90 UG/1
0 AEROSOL, METERED ORAL
Qty: 0 | Refills: 0 | DISCHARGE

## 2019-12-04 RX ORDER — MAGNESIUM SULFATE 500 MG/ML
1 VIAL (ML) INJECTION ONCE
Refills: 0 | Status: COMPLETED | OUTPATIENT
Start: 2019-12-04 | End: 2019-12-04

## 2019-12-04 RX ORDER — MONTELUKAST 4 MG/1
10 TABLET, CHEWABLE ORAL DAILY
Refills: 0 | Status: DISCONTINUED | OUTPATIENT
Start: 2019-12-04 | End: 2019-12-10

## 2019-12-04 RX ORDER — METOPROLOL TARTRATE 50 MG
25 TABLET ORAL
Refills: 0 | Status: DISCONTINUED | OUTPATIENT
Start: 2019-12-04 | End: 2019-12-05

## 2019-12-04 RX ORDER — HEPARIN SODIUM 5000 [USP'U]/ML
INJECTION INTRAVENOUS; SUBCUTANEOUS
Qty: 25000 | Refills: 0 | Status: DISCONTINUED | OUTPATIENT
Start: 2019-12-04 | End: 2019-12-05

## 2019-12-04 RX ORDER — ACETAMINOPHEN 500 MG
650 TABLET ORAL EVERY 4 HOURS
Refills: 0 | Status: DISCONTINUED | OUTPATIENT
Start: 2019-12-04 | End: 2019-12-08

## 2019-12-04 RX ORDER — ONDANSETRON 8 MG/1
4 TABLET, FILM COATED ORAL EVERY 8 HOURS
Refills: 0 | Status: DISCONTINUED | OUTPATIENT
Start: 2019-12-04 | End: 2019-12-09

## 2019-12-04 RX ORDER — ATORVASTATIN CALCIUM 80 MG/1
20 TABLET, FILM COATED ORAL AT BEDTIME
Refills: 0 | Status: DISCONTINUED | OUTPATIENT
Start: 2019-12-04 | End: 2019-12-04

## 2019-12-04 RX ORDER — IOHEXOL 300 MG/ML
30 INJECTION, SOLUTION INTRAVENOUS ONCE
Refills: 0 | Status: COMPLETED | OUTPATIENT
Start: 2019-12-04 | End: 2019-12-04

## 2019-12-04 RX ORDER — KETOROLAC TROMETHAMINE 30 MG/ML
30 SYRINGE (ML) INJECTION EVERY 6 HOURS
Refills: 0 | Status: DISCONTINUED | OUTPATIENT
Start: 2019-12-04 | End: 2019-12-09

## 2019-12-04 RX ORDER — MORPHINE SULFATE 50 MG/1
2 CAPSULE, EXTENDED RELEASE ORAL ONCE
Refills: 0 | Status: DISCONTINUED | OUTPATIENT
Start: 2019-12-04 | End: 2019-12-04

## 2019-12-04 RX ORDER — OXYCODONE HYDROCHLORIDE 5 MG/1
10 TABLET ORAL EVERY 4 HOURS
Refills: 0 | Status: DISCONTINUED | OUTPATIENT
Start: 2019-12-04 | End: 2019-12-04

## 2019-12-04 RX ORDER — OXYCODONE HYDROCHLORIDE 5 MG/1
5 TABLET ORAL EVERY 4 HOURS
Refills: 0 | Status: DISCONTINUED | OUTPATIENT
Start: 2019-12-04 | End: 2019-12-09

## 2019-12-04 RX ORDER — IPRATROPIUM/ALBUTEROL SULFATE 18-103MCG
3 AEROSOL WITH ADAPTER (GRAM) INHALATION EVERY 6 HOURS
Refills: 0 | Status: DISCONTINUED | OUTPATIENT
Start: 2019-12-04 | End: 2019-12-10

## 2019-12-04 RX ORDER — LIDOCAINE 4 G/100G
1 CREAM TOPICAL DAILY
Refills: 0 | Status: DISCONTINUED | OUTPATIENT
Start: 2019-12-04 | End: 2019-12-10

## 2019-12-04 RX ORDER — AMLODIPINE BESYLATE 2.5 MG/1
1 TABLET ORAL
Qty: 0 | Refills: 0 | DISCHARGE

## 2019-12-04 RX ORDER — METOPROLOL TARTRATE 50 MG
25 TABLET ORAL
Refills: 0 | Status: DISCONTINUED | OUTPATIENT
Start: 2019-12-04 | End: 2019-12-04

## 2019-12-04 RX ORDER — POTASSIUM CHLORIDE 20 MEQ
40 PACKET (EA) ORAL ONCE
Refills: 0 | Status: COMPLETED | OUTPATIENT
Start: 2019-12-04 | End: 2019-12-04

## 2019-12-04 RX ORDER — BUDESONIDE AND FORMOTEROL FUMARATE DIHYDRATE 160; 4.5 UG/1; UG/1
2 AEROSOL RESPIRATORY (INHALATION)
Refills: 0 | Status: DISCONTINUED | OUTPATIENT
Start: 2019-12-04 | End: 2019-12-10

## 2019-12-04 RX ORDER — LANOLIN ALCOHOL/MO/W.PET/CERES
5 CREAM (GRAM) TOPICAL AT BEDTIME
Refills: 0 | Status: DISCONTINUED | OUTPATIENT
Start: 2019-12-04 | End: 2019-12-05

## 2019-12-04 RX ADMIN — LIDOCAINE 1 PATCH: 4 CREAM TOPICAL at 22:00

## 2019-12-04 RX ADMIN — Medication 30 MILLIGRAM(S): at 16:50

## 2019-12-04 RX ADMIN — OXYCODONE HYDROCHLORIDE 5 MILLIGRAM(S): 5 TABLET ORAL at 22:20

## 2019-12-04 RX ADMIN — MONTELUKAST 10 MILLIGRAM(S): 4 TABLET, CHEWABLE ORAL at 10:51

## 2019-12-04 RX ADMIN — OXYCODONE HYDROCHLORIDE 5 MILLIGRAM(S): 5 TABLET ORAL at 23:20

## 2019-12-04 RX ADMIN — Medication 1 PATCH: at 16:35

## 2019-12-04 RX ADMIN — IOHEXOL 30 MILLILITER(S): 300 INJECTION, SOLUTION INTRAVENOUS at 14:07

## 2019-12-04 RX ADMIN — Medication 25 MILLIGRAM(S): at 16:36

## 2019-12-04 RX ADMIN — OXYCODONE HYDROCHLORIDE 10 MILLIGRAM(S): 5 TABLET ORAL at 08:15

## 2019-12-04 RX ADMIN — Medication 40 MILLIEQUIVALENT(S): at 07:17

## 2019-12-04 RX ADMIN — LIDOCAINE 1 PATCH: 4 CREAM TOPICAL at 10:51

## 2019-12-04 RX ADMIN — Medication 30 MILLIGRAM(S): at 16:35

## 2019-12-04 RX ADMIN — BUDESONIDE AND FORMOTEROL FUMARATE DIHYDRATE 2 PUFF(S): 160; 4.5 AEROSOL RESPIRATORY (INHALATION) at 22:41

## 2019-12-04 RX ADMIN — Medication 100 GRAM(S): at 09:42

## 2019-12-04 RX ADMIN — BUDESONIDE AND FORMOTEROL FUMARATE DIHYDRATE 2 PUFF(S): 160; 4.5 AEROSOL RESPIRATORY (INHALATION) at 09:42

## 2019-12-04 RX ADMIN — Medication 1 PATCH: at 19:13

## 2019-12-04 RX ADMIN — OXYCODONE HYDROCHLORIDE 10 MILLIGRAM(S): 5 TABLET ORAL at 07:19

## 2019-12-04 RX ADMIN — MORPHINE SULFATE 2 MILLIGRAM(S): 50 CAPSULE, EXTENDED RELEASE ORAL at 03:23

## 2019-12-04 RX ADMIN — LIDOCAINE 1 PATCH: 4 CREAM TOPICAL at 19:13

## 2019-12-04 NOTE — ED PROVIDER NOTE - CLINICAL SUMMARY MEDICAL DECISION MAKING FREE TEXT BOX
38 y/o F with PMHx of uterine fibroids s/p b/l uterine artery embolization on Aug 2019, DM, HTN, and HLD p/w intermittent SOB, L chest pain, and intermittent R flank pain. EKG results show NSR with borderline tachycardia and nonspecific T wave inversion no stemi. Will check labs including troponin and consider CT scan to r/o stones or hydronephrosis and eval known fibroids uterus. Give Toradol for pain and dispo pending. 38 y/o F with PMHx of uterine fibroids s/p b/l uterine artery embolization on Aug 2019, DM, HTN, and HLD p/w intermittent SOB, L chest pain, and intermittent R flank pain. EKG results show NSR with borderline tachycardia and nonspecific T wave inversion no stemi. Will check labs including troponin and consider CT scan to r/o stones or hydronephrosis and eval known fibroids uterus. Give Toradol for pain and dispo pending.    CT chest added as CT a/p shows large R-sided effusion, CT a/p shows pancreatic mass, unclear etiology of findings. Patient was informed of findings and admitted for further mgmt. Small dose of morphine added for pain control. Pt remain with Stable VS, no resp distress.

## 2019-12-04 NOTE — H&P ADULT - PROBLEM SELECTOR PLAN 8
Currently stable, not in exacerbation, not wheezing.  - no active intervention Pt with Hx uterine fibroids, s/p uterine artery embolization with intermittent bleeding since embolization. Not complaining of active bleeding.  - consider ob/gyn consult

## 2019-12-04 NOTE — PROGRESS NOTE ADULT - PROBLEM SELECTOR PLAN 7
On home metformin 500 BID  - hold home metformin  - mISS  - monitor FSG Pt with Hx uterine fibroids, s/p uterine artery embolization with intermittent bleeding since embolization. Currently with active bleed, but like menstrual   - consider ob/gyn consult if worsens

## 2019-12-04 NOTE — ED PROVIDER NOTE - CARE PLAN
Principal Discharge DX:	Pleural effusion  Secondary Diagnosis:	Pancreatic mass  Secondary Diagnosis:	Shortness of breath

## 2019-12-04 NOTE — PROGRESS NOTE ADULT - PROBLEM SELECTOR PLAN 4
Pt with elevated alk phos, AST, ALT. Possible etiologies include malignancy, infection, hepatitis, autoimmune condition. CT A/P showing hepatic steatosis without focal lesion, normal gallbladder, no biliary ductal dilatation.  - f/u hepatitis panel  - Given abdominal pain, enzyme abnormalities, CT findings - GI consult in AM  - pain control    #Leukocytosis  Mildly elevated WBC at 12.18. May be reactive in setting of acute active process causing pleural effusion vs malignancy. Pt afebrile and non-toxic appearing  - trend WBC    #Thrombocytosis  Pt with elevated plts. Likely reactive in setting of acute process vs bleed vs cancer vs autoimmune process  - trend CBC Home Meds: Lisinopril 20mg, Metoprolol tartrate 50mg BID  -will hold Lisinopril in setting of normotension and upcoming IV contrast  -restart Meto @ 25mg BID will increase if patient tolerates

## 2019-12-04 NOTE — PROGRESS NOTE ADULT - PROBLEM SELECTOR PROBLEM 7
Type 2 diabetes mellitus without complication, without long-term current use of insulin Uterine leiomyoma, unspecified location

## 2019-12-04 NOTE — PROGRESS NOTE ADULT - SUBJECTIVE AND OBJECTIVE BOX
OVERNIGHT EVENTS: No acute events overnight reported by patient or staff    SUBJECTIVE / INTERVAL HPI: Patient seen and examined at bedside. Feeling anxious with all of the unknowns, but otherwise feeling well. Still with R. flank pain that is well controlled with the medications. Patient denies headache, fever, chills, abdominal pain, Nausea/Vomiting, or numbness/tingling.    Hx Update: 3 months progressive SOB and orthopnea.   .  VITAL SIGNS:  T(C): 36.6 (12-04-19 @ 09:43), Max: 36.8 (12-04-19 @ 06:13)  T(F): 97.8 (12-04-19 @ 09:43), Max: 98.2 (12-04-19 @ 06:13)  HR: 80 (12-04-19 @ 09:43) (80 - 107)  BP: 112/75 (12-04-19 @ 09:43) (111/80 - 139/94)  BP(mean): --  RR: 18 (12-04-19 @ 09:43) (16 - 19)  SpO2: 98% (12-04-19 @ 09:43) (97% - 100%)  Wt(kg): --    PHYSICAL EXAM:    Constitutional: WDWN resting comfortably in bed; NAD  Head: NC/AT  Eyes: EOMI, anicteric sclera  ENT: no nasal discharge  Neck: supple  Respiratory: CTA b/l, no increased work of breathing  Cardiac: +S1/S2, regular rate  Gastrointestinal: soft, NT/ND; no rebound or guarding; +BS  Extremities: WWP, no peripheral edema, 2+ pulses Radial and DP  Musculoskeletal: NROM x4  Dermatologic: skin warm, dry  Neurologic: Alert and oriented, no focal deficits appreciated  Psychiatric: affect and characteristics of appearance, verbalizations, behaviors are appropriate        MEDICATIONS:  MEDICATIONS  (STANDING):  budesonide  80 MICROgram(s)/formoterol 4.5 MICROgram(s) Inhaler 2 Puff(s) Inhalation two times a day  lidocaine   Patch 1 Patch Transdermal daily  montelukast 10 milliGRAM(s) Oral daily    MEDICATIONS  (PRN):  acetaminophen  Suppository .. 650 milliGRAM(s) Rectal every 4 hours PRN Mild Pain (1 - 3)  albuterol/ipratropium for Nebulization 3 milliLiter(s) Nebulizer every 6 hours PRN Shortness of Breath and/or Wheezing  ketorolac   Injectable 30 milliGRAM(s) IV Push every 6 hours PRN Moderate Pain (4 - 6)  oxyCODONE    IR 5 milliGRAM(s) Oral every 4 hours PRN Severe Pain (7 - 10)      ALLERGIES:  Allergies    No Known Drug Allergies  shellfish (Hives)    Intolerances        Pertinent LABS, RADIOLOGY, MICROBIOLOGY, and ADDITIONAL TESTS reviewed:   .  LABS:                         10.1   11.50 )-----------( 479      ( 04 Dec 2019 06:17 )             35.2     12-04    138  |  104  |  15  ----------------------------<  85  3.6   |  23  |  0.87    Ca    8.9      04 Dec 2019 06:17  Phos  3.6     12-04  Mg     1.7     12-04    TPro  6.1  /  Alb  3.1<L>  /  TBili  0.7  /  DBili  0.2  /  AST  213<H>  /  ALT  241<H>  /  AlkPhos  159<H>  12-04    PT/INR - ( 04 Dec 2019 10:48 )   PT: 20.8 sec;   INR: 1.81          PTT - ( 04 Dec 2019 10:48 )  PTT:32.5 sec    CARDIAC MARKERS ( 03 Dec 2019 22:56 )  x     / <0.01 ng/mL / x     / x     / x                RADIOLOGY, EKG & ADDITIONAL TESTS:   No New Imaging OVERNIGHT EVENTS: No acute events overnight reported by patient or staff    SUBJECTIVE / INTERVAL HPI: Patient seen and examined at bedside. Feeling anxious with all of the unknowns, but otherwise feeling well. Still with R. flank pain that is well controlled with the medications. Patient denies headache, fever, chills, abdominal pain, Nausea/Vomiting, or numbness/tingling.    Hx Update: 3 months progressive SOB and orthopnea.   .  VITAL SIGNS:  T(C): 36.6 (12-04-19 @ 09:43), Max: 36.8 (12-04-19 @ 06:13)  T(F): 97.8 (12-04-19 @ 09:43), Max: 98.2 (12-04-19 @ 06:13)  HR: 80 (12-04-19 @ 09:43) (80 - 107)  BP: 112/75 (12-04-19 @ 09:43) (111/80 - 139/94)  BP(mean): --  RR: 18 (12-04-19 @ 09:43) (16 - 19)  SpO2: 98% (12-04-19 @ 09:43) (97% - 100%)  Wt(kg): --    PHYSICAL EXAM:    Constitutional: WDWN resting comfortably in bed- sitting up right; NAD  Head: NC/AT  Eyes: EOMI, anicteric sclera  ENT: no nasal discharge  Neck: supple  Respiratory: Left side CTA. R side with absent breath sounds and decreased fremitus, no increased work of breathing  Cardiac: +S1/S2, regular rate  Gastrointestinal: soft, NT/ND; no rebound or guarding; +BS; Epigastric region with mild guarding and abdominal tightness  Extremities: WWP, no peripheral edema, 2+ pulses Radial and DP, bandages on BLE to cover sites of herpes breakouts  Musculoskeletal: NROM x4  Dermatologic: skin warm, dry  Neurologic: Alert and oriented, no focal deficits appreciated  Psychiatric: affect and characteristics of appearance, verbalizations, behaviors are appropriate- mildly anxious        MEDICATIONS:  MEDICATIONS  (STANDING):  budesonide  80 MICROgram(s)/formoterol 4.5 MICROgram(s) Inhaler 2 Puff(s) Inhalation two times a day  lidocaine   Patch 1 Patch Transdermal daily  montelukast 10 milliGRAM(s) Oral daily    MEDICATIONS  (PRN):  acetaminophen  Suppository .. 650 milliGRAM(s) Rectal every 4 hours PRN Mild Pain (1 - 3)  albuterol/ipratropium for Nebulization 3 milliLiter(s) Nebulizer every 6 hours PRN Shortness of Breath and/or Wheezing  ketorolac   Injectable 30 milliGRAM(s) IV Push every 6 hours PRN Moderate Pain (4 - 6)  oxyCODONE    IR 5 milliGRAM(s) Oral every 4 hours PRN Severe Pain (7 - 10)      ALLERGIES:  Allergies    No Known Drug Allergies  shellfish (Hives)    Intolerances        Pertinent LABS, RADIOLOGY, MICROBIOLOGY, and ADDITIONAL TESTS reviewed:   .  LABS:                         10.1   11.50 )-----------( 479      ( 04 Dec 2019 06:17 )             35.2     12-04    138  |  104  |  15  ----------------------------<  85  3.6   |  23  |  0.87    Ca    8.9      04 Dec 2019 06:17  Phos  3.6     12-04  Mg     1.7     12-04    TPro  6.1  /  Alb  3.1<L>  /  TBili  0.7  /  DBili  0.2  /  AST  213<H>  /  ALT  241<H>  /  AlkPhos  159<H>  12-04    PT/INR - ( 04 Dec 2019 10:48 )   PT: 20.8 sec;   INR: 1.81          PTT - ( 04 Dec 2019 10:48 )  PTT:32.5 sec    CARDIAC MARKERS ( 03 Dec 2019 22:56 )  x     / <0.01 ng/mL / x     / x     / x                RADIOLOGY, EKG & ADDITIONAL TESTS:   < from: CT Chest No Cont (12.04.19 @ 00:52) >  Impression: 1. Since 8/23/2019, there is a new large right pleural   effusion causing compressive atelectasis of the entire right lower lobe   and mild shift of the heart and mediastinum to the left.    2. There are a few nodules in the left lung, the largest measuring 5 mm   in the left upper lobe. These nodules have nonspecific appearances. They   could be benign or malignant.    3. Large right upper quadrant mass identified on CT scan of abdomen and   pelvis of 12/4/2019 is partially visualized. It is consistent with   neoplasm.    4. There is a 1.2 cm thyroid nodule.    < from: CT Abdomen and Pelvis No Cont (12.04.19 @ 00:31) >  IMPRESSION:   1. There is a 9.4 cm mass in the right upper quadrant, consistent with   neoplasm. The exact origin of this mass is uncertain. It could be arising   from the inferior vena cava, such as a sarcoma. Alternatively, it could   be arising from the liver or duodenum. Recommend CT scan with oral and   intravenous contrast forfurther evaluation.    2. New large right pleural effusion.    3. Small to moderate ascites.    < end of copied text >

## 2019-12-04 NOTE — ED ADULT NURSE REASSESSMENT NOTE - NS ED NURSE REASSESS COMMENT FT1
pt. remains in the ED, axox3, pt. was informed by MD that she is pending admission for further follow up.  Pt. is waiting for an admitted bed.  Pt. states that the pain to the right flank area has returned, MD notified, medication to be ordered.

## 2019-12-04 NOTE — CONSULT NOTE ADULT - SUBJECTIVE AND OBJECTIVE BOX
INCOMPLETE NOTE  Patient is a 39y old  Female who presents with a chief complaint of Shortness of breath (04 Dec 2019 03:54)      HPI:  Ms. Etienne 39F with a PMHx of DM, HTN, HLD, uterine fibroids s/p bilateral uterine artery embolization in 8/2019, pityriasis rosea, herpes who presents with shortness of breath. Pt states her SOB has been progressive, and she can now only takes a few steps before she needs to rest. She has had associated substernal chest pressure that is non-radiating and varies in intensity. The chest pressure is worst when she lays on her left side. She has also had intermittent R sided flank/lower back pain that is intermittent, pounding in nature, and worse with food. She has experienced intermittent vaginal bleeding since her uterine artery embolization, and received 1 unit of blood at [facility] last month, but denies current heavy bleeding. She has experienced some mild night sweats and decreased appetite along with constipation, increased dizziness and lightheadedness, especially with ambulation, but denies fevers, chills, abdominal pain, n/v/d. She has a family history of bullous pemphigoid and colon cancer in her mother, and pancreatic and breast cancer on her father's side. She drinks alcohol socially, is an active tobacco user, and denies illicit drug use. She reports that she had a pap smear done earlier this year that was normal. States she had a mammogram done earlier this year, but is unaware of the results.  Her SOB at rest has been progressively worsening since Sept but became acutely worse 1 week prior to admission. The pulmonary team was consulted after large, R sided pleural effusion was found on CT chest 12/3 on evening of admission.     On arrival to ED, VS: T 97.9, , /94, RR 19, SpO2 100% on room air  Labs significant for: WBC 12.18, Hb 10.3, Plt 562, Alk phos 186, ,   CT A/P negative for renal/ureteral calculi/hydronephrosis. Positive for large soft tissue density in area of pancreatic head/duodenum. May represent pancreatitis vs mass lesion.  CT chest showing large Right pleural effusion with adjacent compressive atelectasis.  In ED, pt received: Flexeril 5mg PO x1, Toradol 30mg IVP x1, morphine 2mg IVP x1 (04 Dec 2019 03:54)      PAST MEDICAL & SURGICAL HISTORY:  Uterine fibroid  Hyperlipidemia  Asthma  Diabetes  Hypertension  No significant past surgical history      FAMILY HISTORY:  Mother: bullous pemphigoid and colon cancer  Father:  pancreatic and breast cancer     SOCIAL HISTORY:  Smoking Status: [ x] Current, currently smoking fewer than 10 cigarettes since Sept 2019, had smoked 1 pack daily prior  Pack Years: 320    MEDICATIONS:  Pulmonary:  budesonide  80 MICROgram(s)/formoterol 4.5 MICROgram(s) Inhaler 2 Puff(s) Inhalation two times a day  montelukast 10 milliGRAM(s) Oral daily    Antimicrobials:    Anticoagulants:    Onc:    GI/:    Endocrine:    Cardiac:    Other Medications:  acetaminophen  Suppository .. 650 milliGRAM(s) Rectal every 4 hours PRN  ketorolac   Injectable 30 milliGRAM(s) IV Push every 6 hours PRN  lidocaine   Patch 1 Patch Transdermal daily  oxyCODONE    IR 10 milliGRAM(s) Oral every 4 hours PRN      Allergies    No Known Drug Allergies  shellfish (Hives)    Intolerances        Vital Signs Last 24 Hrs  T(C): 36.6 (04 Dec 2019 09:43), Max: 36.8 (04 Dec 2019 06:13)  T(F): 97.8 (04 Dec 2019 09:43), Max: 98.2 (04 Dec 2019 06:13)  HR: 80 (04 Dec 2019 09:43) (80 - 107)  BP: 112/75 (04 Dec 2019 09:43) (111/80 - 139/94)  BP(mean): --  RR: 18 (04 Dec 2019 09:43) (16 - 19)  SpO2: 98% (04 Dec 2019 09:43) (97% - 100%)      .  VITAL SIGNS:  T(C): 36.6 (12-04-19 @ 09:43), Max: 36.8 (12-04-19 @ 06:13)  T(F): 97.8 (12-04-19 @ 09:43), Max: 98.2 (12-04-19 @ 06:13)  HR: 80 (12-04-19 @ 09:43) (80 - 107)  BP: 112/75 (12-04-19 @ 09:43) (111/80 - 139/94)  BP(mean): --  RR: 18 (12-04-19 @ 09:43) (16 - 19)  SpO2: 98% (12-04-19 @ 09:43) (97% - 100%)  Wt(kg): --    PHYSICAL EXAM:    Constitutional: young, pleasnt woman, WDWN resting comfortably in bed; NAD  Head: NC/AT  Eyes: PERRL, EOMI, anicteric sclera  ENT: no nasal discharge; uvula midline, no oropharyngeal erythema or exudates; MMM  Neck: supple; no JVD or thyromegaly  Respiratory: decreased breath sounds throughout R side, L side clear without rhonchi, rales; speaking in full sentences without use of accessory muscles  Cardiac: +S1/S2; RRR; no M/R/G; PMI non-displaced  Gastrointestinal: soft, NT/ND; no rebound or guarding; +BSx4  Extremities: WWP, no clubbing or cyanosis; no peripheral edema; RLE healed herpetic lesion covered with bandage  Musculoskeletal: NROM x4; no joint swelling, tenderness or erythema  Dermatologic: skin warm, dry and intact; no rashes, wounds, or scars  Neurologic: AAOx3; CNII-XII grossly intact; no focal deficits    LABS:      CBC Full  -  ( 04 Dec 2019 06:17 )  WBC Count : 11.50 K/uL  RBC Count : 5.03 M/uL  Hemoglobin : 10.1 g/dL  Hematocrit : 35.2 %  Platelet Count - Automated : 479 K/uL  Mean Cell Volume : 70.0 fl  Mean Cell Hemoglobin : 20.1 pg  Mean Cell Hemoglobin Concentration : 28.7 gm/dL  Auto Neutrophil # : x  Auto Lymphocyte # : x  Auto Monocyte # : x  Auto Eosinophil # : x  Auto Basophil # : x  Auto Neutrophil % : x  Auto Lymphocyte % : x  Auto Monocyte % : x  Auto Eosinophil % : x  Auto Basophil % : x    12-04    138  |  104  |  15  ----------------------------<  85  3.6   |  23  |  0.87    Ca    8.9      04 Dec 2019 06:17  Phos  3.6     12-04  Mg     1.7     12-04    TPro  6.1  /  Alb  3.1<L>  /  TBili  0.7  /  DBili  0.2  /  AST  213<H>  /  ALT  241<H>  /  AlkPhos  159<H>  12-04                      RADIOLOGY & ADDITIONAL STUDIES (The following images were personally reviewed): CR chest 12/4, CT chest 12/4, CT abdomen 12/4 PULMONARY CONSULT SERVICE INITIAL EVALUATION    Patient is a 39y old  Female who presents with a chief complaint of Shortness of breath (04 Dec 2019 03:54)    HPI:  Ms. Etienne 39F with a PMHx of DM, HTN, HLD, uterine fibroids s/p bilateral uterine artery embolization in 8/2019, pityriasis rosea, herpes who presents with shortness of breath. Pt states her SOB has been progressive, and she can now only takes a few steps before she needs to rest. She has had associated substernal chest pressure that is non-radiating and varies in intensity. The chest pressure is worst when she lays on her left side. She has also had intermittent R sided flank/lower back pain that is intermittent, pounding in nature, and worse with food. She has experienced intermittent vaginal bleeding since her uterine artery embolization, and received 1 unit of blood at [facility] last month, but denies current heavy bleeding. She has experienced some mild night sweats and decreased appetite along with constipation, increased dizziness and lightheadedness, especially with ambulation, but denies fevers, chills, abdominal pain, n/v/d. She has a family history of bullous pemphigoid and colon cancer in her mother, and pancreatic and breast cancer on her father's side. She drinks alcohol socially, is an active tobacco user, and denies illicit drug use. She reports that she had a pap smear done earlier this year that was normal. States she had a mammogram done earlier this year, but is unaware of the results.  Her SOB at rest has been progressively worsening since Sept but became acutely worse 1 week prior to admission. The pulmonary team was consulted after large, R sided pleural effusion was found on CT chest 12/3 on evening of admission.     On arrival to ED, VS: T 97.9, , /94, RR 19, SpO2 100% on room air  Labs significant for: WBC 12.18, Hb 10.3, Plt 562, Alk phos 186, ,   CT A/P negative for renal/ureteral calculi/hydronephrosis. Positive for large soft tissue density in area of pancreatic head/duodenum. May represent pancreatitis vs mass lesion.  CT chest showing large Right pleural effusion with adjacent compressive atelectasis.  In ED, pt received: Flexeril 5mg PO x1, Toradol 30mg IVP x1, morphine 2mg IVP x1 (04 Dec 2019 03:54)    PAST MEDICAL & SURGICAL HISTORY:  Uterine fibroid  Hyperlipidemia  Asthma  Diabetes  Hypertension  No significant past surgical history    FAMILY HISTORY:  Mother: bullous pemphigoid and colon cancer  Father:  pancreatic and breast cancer     SOCIAL HISTORY:  Smoking Status: [ x] Current, currently smoking fewer than 10 cigarettes since Sept 2019, had smoked 1 pack daily prior  Pack Years:     MEDICATIONS:  Pulmonary:  budesonide  80 MICROgram(s)/formoterol 4.5 MICROgram(s) Inhaler 2 Puff(s) Inhalation two times a day  montelukast 10 milliGRAM(s) Oral daily    Antimicrobials:    Anticoagulants:    Onc:    GI/:    Endocrine:    Cardiac:    Other Medications:  acetaminophen  Suppository .. 650 milliGRAM(s) Rectal every 4 hours PRN  ketorolac   Injectable 30 milliGRAM(s) IV Push every 6 hours PRN  lidocaine   Patch 1 Patch Transdermal daily  oxyCODONE    IR 10 milliGRAM(s) Oral every 4 hours PRN    Allergies    No Known Drug Allergies  shellfish (Hives)    Intolerances    Vital Signs Last 24 Hrs  T(C): 36.6 (04 Dec 2019 09:43), Max: 36.8 (04 Dec 2019 06:13)  T(F): 97.8 (04 Dec 2019 09:43), Max: 98.2 (04 Dec 2019 06:13)  HR: 80 (04 Dec 2019 09:43) (80 - 107)  BP: 112/75 (04 Dec 2019 09:43) (111/80 - 139/94)  BP(mean): --  RR: 18 (04 Dec 2019 09:43) (16 - 19)  SpO2: 98% (04 Dec 2019 09:43) (97% - 100%)    VITAL SIGNS:  T(C): 36.6 (12-04-19 @ 09:43), Max: 36.8 (12-04-19 @ 06:13)  T(F): 97.8 (12-04-19 @ 09:43), Max: 98.2 (12-04-19 @ 06:13)  HR: 80 (12-04-19 @ 09:43) (80 - 107)  BP: 112/75 (12-04-19 @ 09:43) (111/80 - 139/94)  BP(mean): --  RR: 18 (12-04-19 @ 09:43) (16 - 19)  SpO2: 98% (12-04-19 @ 09:43) (97% - 100%)  Wt(kg): --    PHYSICAL EXAM:  Constitutional: young, pleasant woman, WDWN resting comfortably in bed; NAD  Head: NC/AT  Eyes: PERRL, EOMI, anicteric sclera  ENT: no nasal discharge; uvula midline, no oropharyngeal erythema or exudates; MMM  Neck: supple; no JVD or thyromegaly  Respiratory: decreased breath sounds throughout R side, L side clear without rhonchi, rales; speaking in full sentences without use of accessory muscles  Cardiac: +S1/S2; RRR  Gastrointestinal: soft, NT/ND  Extremities: WWP, no clubbing or cyanosis; no peripheral edema; RLE healed herpetic lesion covered with bandage  Neurologic: awake and alert; SWANSON    LABS:  CBC Full  -  ( 04 Dec 2019 06:17 )  WBC Count : 11.50 K/uL  RBC Count : 5.03 M/uL  Hemoglobin : 10.1 g/dL  Hematocrit : 35.2 %  Platelet Count - Automated : 479 K/uL  Mean Cell Volume : 70.0 fl  Mean Cell Hemoglobin : 20.1 pg  Mean Cell Hemoglobin Concentration : 28.7 gm/dL  Auto Neutrophil # : x  Auto Lymphocyte # : x  Auto Monocyte # : x  Auto Eosinophil # : x  Auto Basophil # : x  Auto Neutrophil % : x  Auto Lymphocyte % : x  Auto Monocyte % : x  Auto Eosinophil % : x  Auto Basophil % : x    12-04    138  |  104  |  15  ----------------------------<  85  3.6   |  23  |  0.87    Ca    8.9      04 Dec 2019 06:17  Phos  3.6     12-04  Mg     1.7     12-04    TPro  6.1  /  Alb  3.1<L>  /  TBili  0.7  /  DBili  0.2  /  AST  213<H>  /  ALT  241<H>  /  AlkPhos  159<H>  12-04    RADIOLOGY & ADDITIONAL STUDIES (The following images were personally reviewed): CXR chest 12/4, CT chest 12/4, CT abdomen 12/4 PULMONARY CONSULT SERVICE INITIAL EVALUATION    Patient is a 39y old  Female who presents with a chief complaint of Shortness of breath (04 Dec 2019 03:54)    HPI:  Ms. Etienne 39F with a PMHx of DM, HTN, HLD, uterine fibroids s/p bilateral uterine artery embolization in 8/2019, pityriasis rosea, herpes who presents with shortness of breath. Pt states her SOB has been progressive, and she can now only takes a few steps before she needs to rest. She has had associated substernal chest pressure that is non-radiating and varies in intensity. The chest pressure is worst when she lays on her left side. She has also had intermittent R sided flank/lower back pain that is intermittent, pounding in nature, and worse with food. She has experienced intermittent vaginal bleeding since her uterine artery embolization, and received 1 unit of blood at [facility] last month, but denies current heavy bleeding. She has experienced some mild night sweats and decreased appetite along with constipation, increased dizziness and lightheadedness, especially with ambulation, but denies fevers, chills, abdominal pain, n/v/d. She has a family history of bullous pemphigoid and colon cancer in her mother, and pancreatic and breast cancer on her father's side. She drinks alcohol socially, is an active tobacco user, and denies illicit drug use. She reports that she had a pap smear done earlier this year that was normal. States she had a mammogram done earlier this year, but is unaware of the results.  Her SOB at rest has been progressively worsening since Sept but became acutely worse 1 week prior to admission. The pulmonary team was consulted after large, R sided pleural effusion was found on CT chest 12/3 on evening of admission.     On arrival to ED, VS: T 97.9, , /94, RR 19, SpO2 100% on room air  Labs significant for: WBC 12.18, Hb 10.3, Plt 562, Alk phos 186, ,   CT A/P negative for renal/ureteral calculi/hydronephrosis. Positive for large soft tissue density in area of pancreatic head/duodenum. May represent pancreatitis vs mass lesion.  CT chest showing large Right pleural effusion with adjacent compressive atelectasis.  In ED, pt received: Flexeril 5mg PO x1, Toradol 30mg IVP x1, morphine 2mg IVP x1 (04 Dec 2019 03:54)    ROS negative except as noted in the HPI    PAST MEDICAL & SURGICAL HISTORY:  Uterine fibroid  Hyperlipidemia  Asthma  Diabetes  Hypertension  No significant past surgical history    FAMILY HISTORY:  Mother: bullous pemphigoid and colon cancer  Father:  pancreatic and breast cancer     SOCIAL HISTORY:  Smoking Status: [ x] Current, currently smoking fewer than 10 cigarettes since Sept 2019, had smoked 1 pack daily prior  Pack Years:     MEDICATIONS:  Pulmonary:  budesonide  80 MICROgram(s)/formoterol 4.5 MICROgram(s) Inhaler 2 Puff(s) Inhalation two times a day  montelukast 10 milliGRAM(s) Oral daily    Antimicrobials:    Anticoagulants:    Onc:    GI/:    Endocrine:    Cardiac:    Other Medications:  acetaminophen  Suppository .. 650 milliGRAM(s) Rectal every 4 hours PRN  ketorolac   Injectable 30 milliGRAM(s) IV Push every 6 hours PRN  lidocaine   Patch 1 Patch Transdermal daily  oxyCODONE    IR 10 milliGRAM(s) Oral every 4 hours PRN    Allergies    No Known Drug Allergies  shellfish (Hives)    Intolerances    Vital Signs Last 24 Hrs  T(C): 36.6 (04 Dec 2019 09:43), Max: 36.8 (04 Dec 2019 06:13)  T(F): 97.8 (04 Dec 2019 09:43), Max: 98.2 (04 Dec 2019 06:13)  HR: 80 (04 Dec 2019 09:43) (80 - 107)  BP: 112/75 (04 Dec 2019 09:43) (111/80 - 139/94)  BP(mean): --  RR: 18 (04 Dec 2019 09:43) (16 - 19)  SpO2: 98% (04 Dec 2019 09:43) (97% - 100%)    VITAL SIGNS:  T(C): 36.6 (12-04-19 @ 09:43), Max: 36.8 (12-04-19 @ 06:13)  T(F): 97.8 (12-04-19 @ 09:43), Max: 98.2 (12-04-19 @ 06:13)  HR: 80 (12-04-19 @ 09:43) (80 - 107)  BP: 112/75 (12-04-19 @ 09:43) (111/80 - 139/94)  BP(mean): --  RR: 18 (12-04-19 @ 09:43) (16 - 19)  SpO2: 98% (12-04-19 @ 09:43) (97% - 100%)  Wt(kg): --    PHYSICAL EXAM:  Constitutional: young, pleasant woman, WDWN resting comfortably in bed; NAD  Head: NC/AT  Eyes: PERRL, EOMI, anicteric sclera  ENT: no nasal discharge; uvula midline, no oropharyngeal erythema or exudates; MMM  Neck: supple; no JVD or thyromegaly  Respiratory: decreased breath sounds throughout R side, L side clear without rhonchi, rales; speaking in full sentences without use of accessory muscles  Cardiac: +S1/S2; RRR  Gastrointestinal: soft, NT/ND  Extremities: WWP, no clubbing or cyanosis; no peripheral edema; RLE healed herpetic lesion covered with bandage  Neurologic: awake and alert; SWANSON    LABS:  CBC Full  -  ( 04 Dec 2019 06:17 )  WBC Count : 11.50 K/uL  RBC Count : 5.03 M/uL  Hemoglobin : 10.1 g/dL  Hematocrit : 35.2 %  Platelet Count - Automated : 479 K/uL  Mean Cell Volume : 70.0 fl  Mean Cell Hemoglobin : 20.1 pg  Mean Cell Hemoglobin Concentration : 28.7 gm/dL  Auto Neutrophil # : x  Auto Lymphocyte # : x  Auto Monocyte # : x  Auto Eosinophil # : x  Auto Basophil # : x  Auto Neutrophil % : x  Auto Lymphocyte % : x  Auto Monocyte % : x  Auto Eosinophil % : x  Auto Basophil % : x    12-04    138  |  104  |  15  ----------------------------<  85  3.6   |  23  |  0.87    Ca    8.9      04 Dec 2019 06:17  Phos  3.6     12-04  Mg     1.7     12-04    TPro  6.1  /  Alb  3.1<L>  /  TBili  0.7  /  DBili  0.2  /  AST  213<H>  /  ALT  241<H>  /  AlkPhos  159<H>  12-04    RADIOLOGY & ADDITIONAL STUDIES (The following images were personally reviewed): CXR chest 12/4, CT chest 12/4, CT abdomen 12/4

## 2019-12-04 NOTE — PROGRESS NOTE ADULT - ASSESSMENT
Ms. Etienne 39F with a PMHx of DM, HTN, HLD, uterine fibroids s/p bilateral uterine artery embolization in 8/2019 who presents with shortness of breath, found to have large R pleural effusion and incidental pancreatic/duodenal soft tissue mass. Ms. Etienne 39F with DM, HTN, HLD MHx of uterine fibroids s/p bilateral uterine artery embolization (8/2019) who presented with 3 months progressive shortness of breath, found to have large R pleural effusion and incidental abdominal mass being admitted for evaluation.

## 2019-12-04 NOTE — PROGRESS NOTE ADULT - PROBLEM SELECTOR PLAN 3
Pancreatic mass seen on CT abdomen with read pancreatic head/duodenal mass vs pancreatitis. Lipase sent in ED, still pending at the time of this writing. Pancreatitis a possibility given epigastric firmness and tenderness on exam, though lipase negative. Pancreatic mass may be a cancer.  - f/u lipase  - f/u   - consult GI in AM Worsening: Pt with elevated alk phos, AST, ALT. Most likely mass effect from abdominal mass other possible etiologies include malignancy, infection, hepatitis, autoimmune condition.   - Hep A, B, C negative  -GI Consult- likely mass effect, continue to trend CMP    #Leukocytosis  Improved: Mildly elevated WBC at 12.18. Likely reactive in setting of acute active process causing pleural effusion vs malignancy. Pt afebrile and non-toxic appearing  - trend WBC    #Thrombocytosis  Improved: Pt with elevated plts and large platelets on smear. Likely reactive in setting of acute process vs cancer   - trend CBC    #Anemia  Hb 10.1, MCV 70, RDW 19.3. Ferritin 137, Iron 23, %Sat 8 suggestive of SUSANNA likely 2/2 chronic blood loss. Patient on Ferrous sulfate supplement at home  -trend CBC  -Active type and screen 12/4  -Complaints of constipation, will hold iron at this time

## 2019-12-04 NOTE — H&P ADULT - NSHPPHYSICALEXAM_GEN_ALL_CORE
VITAL SIGNS:  T(C): 36.7 (12-04-19 @ 03:09), Max: 36.7 (12-04-19 @ 03:09)  T(F): 98.1 (12-04-19 @ 03:09), Max: 98.1 (12-04-19 @ 03:09)  HR: 93 (12-04-19 @ 03:09) (93 - 107)  BP: 111/80 (12-04-19 @ 03:09) (111/80 - 139/94)  BP(mean): --  RR: 16 (12-04-19 @ 03:09) (16 - 19)  SpO2: 100% (12-04-19 @ 03:09) (100% - 100%)  Wt(kg): --    PHYSICAL EXAM:    Constitutional: WDWN resting comfortably in bed; NAD  Head: NC/AT  Eyes: PERRL, EOMI, anicteric sclera  ENT: no nasal discharge; uvula midline, no oropharyngeal erythema or exudates; MMM  Neck: supple; no JVD or thyromegaly  Respiratory: CTA B/L; no W/R/R, no retractions  Cardiac: +S1/S2; RRR; no M/R/G; PMI non-displaced  Gastrointestinal: abdomen soft, NT/ND; no rebound or guarding; +BSx4  Genitourinary: normal external genitalia  Back: spine midline, no bony tenderness or step-offs; no CVAT B/L  Extremities: WWP, no clubbing or cyanosis; no peripheral edema  Musculoskeletal: NROM x4; no joint swelling, tenderness or erythema  Vascular: 2+ radial, femoral, DP/PT pulses B/L  Dermatologic: skin warm, dry and intact; no rashes, wounds, or scars  Lymphatic: no submandibular or cervical LAD  Neurologic: AAOx3; CNII-XII grossly intact; no focal deficits  Psychiatric: affect and characteristics of appearance, verbalizations, behaviors are appropriate VITAL SIGNS:  T(C): 36.7 (12-04-19 @ 03:09), Max: 36.7 (12-04-19 @ 03:09)  T(F): 98.1 (12-04-19 @ 03:09), Max: 98.1 (12-04-19 @ 03:09)  HR: 93 (12-04-19 @ 03:09) (93 - 107)  BP: 111/80 (12-04-19 @ 03:09) (111/80 - 139/94)  BP(mean): --  RR: 16 (12-04-19 @ 03:09) (16 - 19)  SpO2: 100% (12-04-19 @ 03:09) (100% - 100%)  Wt(kg): --    PHYSICAL EXAM:    Constitutional: Adult female, overweight, resting comfortably in bed; NAD  Head: NC/AT  Eyes: PERRL, EOMI, anicteric sclera, hyperpigmented spots on sclera of both eyes (chronic per patient)  ENT: no nasal discharge; uvula midline, no oropharyngeal erythema or exudates; dry MM  Neck: supple; no JVD or thyromegaly  Respiratory: Absent breath sounds R lung field below apex, left lung field clear to auscultation  Cardiac: +S1/S2; RRR; no M/R/G; PMI non-displaced  Gastrointestinal: abdomen obese, firm and tender in epigastric region, no rebound or guarding; +BSx4  Genitourinary: normal external genitalia  Back: spine midline, no bony tenderness or step-offs; no CVAT B/L  Extremities: Lower extremities with scattered bandages, no pitting edema  Musculoskeletal: NROM x4; no joint swelling, tenderness or erythema  Vascular: 2+ radial, femoral, DP/PT pulses B/L  Dermatologic: skin warm, dry and intact; skin on lower extremities noted to be lighter than that on upper extremities, scattered hyperpigmented patches in various areas of body including R arm and L back  Lymphatic: no submandibular or cervical LAD  Neurologic: AAOx3; CNII-XII grossly intact; no focal deficits  Psychiatric: affect and characteristics of appearance, verbalizations, behaviors are appropriate

## 2019-12-04 NOTE — ED PROVIDER NOTE - OBJECTIVE STATEMENT
40 y/o F with PMHx of uterine fibroids s/p b/l uterine artery embolization on Aug 2019, DM, HTN, and HLD p/w intermittent vaginal bleeding since procedure associated with exertion,  intermittent L chest pain for weeks, and intermittent R flank pain (currently does not have). Denies fever, chills, trauma/fall, dizziness, syncope. Pt missed her appointment with PCP last week so decided to go to ED. Denies Hx of CAD. 40 y/o F with PMHx of uterine fibroids s/p b/l uterine artery embolization on Aug 2019, DM, HTN, and HLD p/w NIETO, SOB, L chest pain/pressure and intermittent R flank pain x weeks. Pt states she has had small amount of bleeding "like a period off and on" since her embolization and was concerned she might be anemic. She denies heaving bleeding or pelvic pain currently. No fever, chills, trauma/fall, dizziness, syncope. Pt missed her appointment with PCP last week so decided to go to ED. Denies Hx of CAD.

## 2019-12-04 NOTE — H&P ADULT - ATTENDING COMMENTS
Pt. seen and examined this morning; I have read Dr. Soria's H&P, I agree w/ his findings and plan of care as documented; CT findings d/w Pt.; cont. supportive care, work-up and mgmt per Pulm, GI, Heme-Onc recs

## 2019-12-04 NOTE — H&P ADULT - PROBLEM SELECTOR PROBLEM 7
Uterine leiomyoma, unspecified location Type 2 diabetes mellitus without complication, without long-term current use of insulin

## 2019-12-04 NOTE — H&P ADULT - PROBLEM SELECTOR PLAN 7
Pt with Hx uterine fibroids, s/p uterine artery embolization with intermittent bleeding since embolization.   - consider ob/gyn consult On home metformin 500 BID  - hold home metformin  - mISS  - monitor FSG

## 2019-12-04 NOTE — PROGRESS NOTE ADULT - PROBLEM SELECTOR PLAN 8
Pt with Hx uterine fibroids, s/p uterine artery embolization with intermittent bleeding since embolization. Not complaining of active bleeding.  - consider ob/gyn consult Currently stable, not in exacerbation, not wheezing.  - c/w home advair and montelukast    #Smoking Cessation  16 pack year Hx.   -Counseled on cessation  -nicotine patch 14mg qd    #HIV Screening  -negative

## 2019-12-04 NOTE — PROGRESS NOTE ADULT - PROBLEM SELECTOR PLAN 5
Pt currently with softer pressures - 111/80 at the time of this writing  - will hold off restarting home antihypertensives  - can restart if BP becomes elevated - hold home lipitor 20mg QHS in setting of transaminitis

## 2019-12-04 NOTE — H&P ADULT - PROBLEM SELECTOR PLAN 1
Pt presenting with shortness of breath XXX. CT chest noted with large R sided pleural effusion. Etiology of effusion unclear at this time, will likely require thoracentesis for diagnosis. Possible etiologies include HF (BNP wnl), hepatic hydrothorax (no overt ascites), malignancy (given new unknown pancreatic mass), PE, infection (nontoxic appearing, no fever, pancreatitis. Pt currently oxygenating well, satting well on room air.  - consult pulm in AM  - monitor respiratory status  - f/u lipase Pt presenting with progressive shortness of breath x2 months. SOB worsened with exertion, now present at rest, is associated with substernal chest pressure and R sided flank pain. CT chest noted with large R sided pleural effusion. Etiology of effusion unclear at this time, will likely require thoracentesis for diagnosis. Possible etiologies include HF (BNP wnl), hepatic hydrothorax (no overt ascites), malignancy (given new unknown pancreatic mass), PE, infection (nontoxic appearing, no fever), pancreatitis, autoimmune condition. Pt currently oxygenating well, saturating well on room air.  - consult pulm in AM  - monitor respiratory status  - f/u lipase  - f/u MARGUERITE and RF  - f/u , CEA, CA-125

## 2019-12-04 NOTE — H&P ADULT - PROBLEM SELECTOR PLAN 3
Pancreatic mass seen on CT abdomen with read pancreatic head/duodenal mass vs pancreatitis. Lipase sent in ED, still pending at the time of this writing. Pancreatitis less likely given intermittent nature of pain, location of the pain (not epigastric).  - f/u lipase Pancreatic mass seen on CT abdomen with read pancreatic head/duodenal mass vs pancreatitis. Lipase sent in ED, still pending at the time of this writing. Pancreatitis a possibility given epigastric firmness and tenderness on exam, though lipase negative. Pancreatic mass may be a cancer.  - f/u lipase  - f/u   - consult GI in AM

## 2019-12-04 NOTE — PROGRESS NOTE ADULT - PROBLEM SELECTOR PLAN 9
Currently stable, not in exacerbation, not wheezing.  - c/w home advair and montelukast F: none  E: replete K <4, Mg <2  N: DASH diet consistent carb, NPO for imaging  Last BM: 12/3  VTE: Hold Lovenox 40mg qd before procedures, SCDs ordered  GI PPx: not indicated  Sleep Aid: melatonin 5mg PRN  Dispo: [facility]  Code: Full

## 2019-12-04 NOTE — CONSULT NOTE ADULT - PROBLEM SELECTOR RECOMMENDATION 2
Plan as above Likely as a consequence of problem #1 above; plan as above, as well as encourage use of incentive spirometry 10x/hr and ensure out of bed to chair daily

## 2019-12-04 NOTE — CONSULT NOTE ADULT - PROBLEM SELECTOR RECOMMENDATION 9
Pt presenting with 3 months of progressive SOB which became acutely worse 1 week prior to admission. 12/3 CT chest non contrast showed large, R sided pleural effusion. SpO2 97% on RA, HR 80s at rest. Upon ambulation on 15 feet, SpO2 95%, HR mid 120s. Pt unable to ambulated greater than 15 feet 2/2 palpitations at subjective SOB. HR returned to baseline at rest.  SOB likely 2/2 pleural effusion.  - POCUS showed large, R sided pleural effusion. No effusion present on L side.    Recommend:  - thoracentesis Pt presenting with 3 months of progressive SOB which became acutely worse 1 week prior to admission. 12/3 CT chest non contrast showed large, R sided pleural effusion. SpO2 97% on RA, HR 80s at rest. Upon ambulation on 15 feet, SpO2 95%, HR mid 120s. Pt unable to ambulated greater than 15 feet 2/2 palpitations at subjective SOB. HR returned to baseline at rest.  SOB likely 2/2 pleural effusion.  - POCUS showed large, R sided pleural effusion. No effusion present on L side. Of significant concern is the concomitant finding of pancreatic head mass on CT scan, and her effusion may be malignant in nature and/or represent consequence of underlying ascites due to the mass    Recommendations:  - patient would benefit from therapeutic thoracentesis on the right and tentatively plan to do this tomorrow afternoon as she is currently comfortable and not hypoxemic at rest/not requiring supplemental O2; pleural fluid appears simple on ultrasound as well. Our plan for thoracentesis drainage was discussed with the patient and she is agreeable.  (**patient does NOT need to be NPO for this procedure**)  - will send cytology in addition to usual pleural fluid studies given finding of pancreatic head mass

## 2019-12-04 NOTE — CONSULT NOTE ADULT - SUBJECTIVE AND OBJECTIVE BOX
Vascular Attending:        HPI:  39F smoker with a Hx of DM, HTN, HLD, uterine fibroids s/p bilateral uterine artery embolization in 8/2019, who presents with shortness of breath and back pain which started in September but has worsened in past few weeks exacerbated by laying flat.  She also noted early satiety, decrease appetite, night sweat.  Denies unintentional weight loss, fever, abd pain.  She has experienced some mild night sweats and decreased appetite along with constipation, increased dizziness and lightheadedness, especially with ambulation, but denies fevers, chills, abdominal pain, n/v/d. She has a family history of maternal colon cancer, and paternal pancreatic and breast cancer.  She drinks alcohol socially, is an active tobacco user, and denies illicit drug use.       On arrival to ED, VS: T 97.9, , /94, RR 19, SpO2 100% on room air    Labs significant for: WBC 12.18, Hb 10.3, Plt 562, Alk phos 186, ,     CT A/P negative for renal/ureteral calculi/hydronephrosis. Positive for large soft tissue density in area of pancreatic head/duodenum. May represent pancreatitis vs mass lesion.    CT chest showing large Right pleural effusion with adjacent compressive atelectasis.    In ED, pt received: Flexeril 5mg PO x1, Toradol 30mg IVP x1, morphine 2mg IVP x1 (04 Dec 2019 03:54)      PAST MEDICAL & SURGICAL HISTORY:  Uterine fibroid  Hyperlipidemia  Asthma  Diabetes  Hypertension  No significant past surgical history      REVIEW OF SYSTEMS  Neg except as in HPI    Allergic/Immunologic:	    MEDICATIONS  (STANDING):  budesonide  80 MICROgram(s)/formoterol 4.5 MICROgram(s) Inhaler 2 Puff(s) Inhalation two times a day  heparin  Infusion.  Unit(s)/Hr (18 mL/Hr) IV Continuous <Continuous>  lidocaine   Patch 1 Patch Transdermal daily  metoprolol tartrate 25 milliGRAM(s) Oral two times a day  montelukast 10 milliGRAM(s) Oral daily  nicotine -  14 mG/24Hr(s) Patch 1 patch Transdermal daily    MEDICATIONS  (PRN):  acetaminophen  Suppository .. 650 milliGRAM(s) Rectal every 4 hours PRN Mild Pain (1 - 3)  albuterol/ipratropium for Nebulization 3 milliLiter(s) Nebulizer every 6 hours PRN Shortness of Breath and/or Wheezing  ketorolac   Injectable 30 milliGRAM(s) IV Push every 6 hours PRN Moderate Pain (4 - 6)  melatonin 5 milliGRAM(s) Oral at bedtime PRN Sleep  ondansetron   Disintegrating Tablet 4 milliGRAM(s) Oral every 8 hours PRN Nausea and/or Vomiting  oxyCODONE    IR 5 milliGRAM(s) Oral every 4 hours PRN Severe Pain (7 - 10)      Allergies  No Known Drug Allergies  shellfish (Hives)    Vital Signs Last 24 Hrs  T(C): 36.8 (04 Dec 2019 20:00), Max: 36.9 (04 Dec 2019 19:10)  T(F): 98.2 (04 Dec 2019 20:00), Max: 98.5 (04 Dec 2019 19:10)  HR: 93 (04 Dec 2019 20:00) (80 - 113)  BP: 127/90 (04 Dec 2019 20:00) (111/80 - 128/87)  BP(mean): --  RR: 17 (04 Dec 2019 20:00) (16 - 18)  SpO2: 96% (04 Dec 2019 20:00) (96% - 100%)    PHYSICAL EXAM:  Constitutional: Pt AXOX3 in NAD  Respiratory: Unlabored on RA  Cardiovascular: S1S2  Gastrointestinal: soft, obese, nontender  Extremities: no edema or discoloration  Vascular: 1+ DP    LABS:                        10.1   11.50 )-----------( 479      ( 04 Dec 2019 06:17 )             35.2     12-04    138  |  104  |  15  ----------------------------<  85  3.6   |  23  |  0.87    Ca    8.9      04 Dec 2019 06:17  Phos  3.6     12-04  Mg     1.7     12-04    TPro  6.1  /  Alb  3.1<L>  /  TBili  0.7  /  DBili  0.2  /  AST  213<H>  /  ALT  241<H>  /  AlkPhos  159<H>  12-04    PT/INR - ( 04 Dec 2019 10:48 )   PT: 20.8 sec;   INR: 1.81          PTT - ( 04 Dec 2019 10:48 )  PTT:32.5 sec  Urinalysis Basic - ( 04 Dec 2019 16:04 )    Color: Yellow / Appearance: Clear / SG: >=1.030 / pH: x  Gluc: x / Ketone: NEGATIVE  / Bili: Negative / Urobili: 0.2 E.U./dL   Blood: x / Protein: 100 mg/dL / Nitrite: NEGATIVE   Leuk Esterase: NEGATIVE / RBC: < 5 /HPF / WBC 5-10 /HPF   Sq Epi: x / Non Sq Epi: 5-10 /HPF / Bacteria: Present /HPF        RADIOLOGY & ADDITIONAL STUDIES Vascular Attending:  ALDA Shea    HPI:  39F smoker with a Hx of DM, HTN, HLD, uterine fibroids s/p bilateral uterine artery embolization in 8/2019, who presents with shortness of breath and back pain which started in September but has worsened in past few weeks exacerbated by laying flat.    Denies unintentional weight loss, fever, abd pain.  She has experienced some mild night sweats and decreased appetite along with constipation, increased dizziness and lightheadedness, especially with ambulation, but denies fevers, chills, abdominal pain, n/v/d. She has a family history of maternal colon cancer, and paternal pancreatic and breast cancer.  She drinks alcohol socially, is an active tobacco user, and denies illicit drug use.         PAST MEDICAL & SURGICAL HISTORY:  Uterine fibroid  Hyperlipidemia  Asthma  Diabetes  Hypertension  No significant past surgical history      REVIEW OF SYSTEMS  Neg except as in HPI    Allergic/Immunologic:	    MEDICATIONS  (STANDING):  budesonide  80 MICROgram(s)/formoterol 4.5 MICROgram(s) Inhaler 2 Puff(s) Inhalation two times a day  heparin  Infusion.  Unit(s)/Hr (18 mL/Hr) IV Continuous <Continuous>  lidocaine   Patch 1 Patch Transdermal daily  metoprolol tartrate 25 milliGRAM(s) Oral two times a day  montelukast 10 milliGRAM(s) Oral daily  nicotine -  14 mG/24Hr(s) Patch 1 patch Transdermal daily    MEDICATIONS  (PRN):  acetaminophen  Suppository .. 650 milliGRAM(s) Rectal every 4 hours PRN Mild Pain (1 - 3)  albuterol/ipratropium for Nebulization 3 milliLiter(s) Nebulizer every 6 hours PRN Shortness of Breath and/or Wheezing  ketorolac   Injectable 30 milliGRAM(s) IV Push every 6 hours PRN Moderate Pain (4 - 6)  melatonin 5 milliGRAM(s) Oral at bedtime PRN Sleep  ondansetron   Disintegrating Tablet 4 milliGRAM(s) Oral every 8 hours PRN Nausea and/or Vomiting  oxyCODONE    IR 5 milliGRAM(s) Oral every 4 hours PRN Severe Pain (7 - 10)      Allergies  No Known Drug Allergies  shellfish (Hives)    Vital Signs Last 24 Hrs  T(C): 36.8 (04 Dec 2019 20:00), Max: 36.9 (04 Dec 2019 19:10)  T(F): 98.2 (04 Dec 2019 20:00), Max: 98.5 (04 Dec 2019 19:10)  HR: 93 (04 Dec 2019 20:00) (80 - 113)  BP: 127/90 (04 Dec 2019 20:00) (111/80 - 128/87)  BP(mean): --  RR: 17 (04 Dec 2019 20:00) (16 - 18)  SpO2: 96% (04 Dec 2019 20:00) (96% - 100%)    PHYSICAL EXAM:  Constitutional: Pt AXOX3 in NAD  Respiratory: Unlabored on RA  Cardiovascular: S1S2  Gastrointestinal: soft, obese, nontender  Extremities: no edema or discoloration  Vascular: 1+ DP    LABS:                        10.1   11.50 )-----------( 479      ( 04 Dec 2019 06:17 )             35.2     12-04    138  |  104  |  15  ----------------------------<  85  3.6   |  23  |  0.87    Ca    8.9      04 Dec 2019 06:17  Phos  3.6     12-04  Mg     1.7     12-04    TPro  6.1  /  Alb  3.1<L>  /  TBili  0.7  /  DBili  0.2  /  AST  213<H>  /  ALT  241<H>  /  AlkPhos  159<H>  12-04    PT/INR - ( 04 Dec 2019 10:48 )   PT: 20.8 sec;   INR: 1.81          PTT - ( 04 Dec 2019 10:48 )  PTT:32.5 sec  Urinalysis Basic - ( 04 Dec 2019 16:04 )    Color: Yellow / Appearance: Clear / SG: >=1.030 / pH: x  Gluc: x / Ketone: NEGATIVE  / Bili: Negative / Urobili: 0.2 E.U./dL   Blood: x / Protein: 100 mg/dL / Nitrite: NEGATIVE   Leuk Esterase: NEGATIVE / RBC: < 5 /HPF / WBC 5-10 /HPF   Sq Epi: x / Non Sq Epi: 5-10 /HPF / Bacteria: Present /HPF        RADIOLOGY & ADDITIONAL STUDIES

## 2019-12-04 NOTE — CONSULT NOTE ADULT - ASSESSMENT
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    -Continue Hepgtt  -WIll review scans with Attending in am for potential surgical intervention

## 2019-12-04 NOTE — H&P ADULT - NSHPLABSRESULTS_GEN_ALL_CORE
LABS:                         10.3   12.18 )-----------( 562      ( 03 Dec 2019 22:56 )             34.9     12-03    141  |  105  |  13  ----------------------------<  98  3.9   |  23  |  0.80    Ca    9.3      03 Dec 2019 22:56    TPro  6.3  /  Alb  3.4  /  TBili  0.8  /  DBili  x   /  AST  131<H>  /  ALT  191<H>  /  AlkPhos  186<H>  12-03        CARDIAC MARKERS ( 03 Dec 2019 22:56 )  x     / <0.01 ng/mL / x     / x     / x          Serum Pro-Brain Natriuretic Peptide: 93 pg/mL (12-03 @ 22:56)        RADIOLOGY, EKG & ADDITIONAL TESTS: Reviewed. LABS:                         10.3   12.18 )-----------( 562      ( 03 Dec 2019 22:56 )             34.9     12-03    141  |  105  |  13  ----------------------------<  98  3.9   |  23  |  0.80    Ca    9.3      03 Dec 2019 22:56    TPro  6.3  /  Alb  3.4  /  TBili  0.8  /  DBili  x   /  AST  131<H>  /  ALT  191<H>  /  AlkPhos  186<H>  12-03        CARDIAC MARKERS ( 03 Dec 2019 22:56 )  x     / <0.01 ng/mL / x     / x     / x          Serum Pro-Brain Natriuretic Peptide: 93 pg/mL (12-03 @ 22:56)        RADIOLOGY, EKG & ADDITIONAL TESTS:     < from: CT Chest No Cont (12.04.19 @ 00:52) >    Impression:   1.  Large right pleural effusion with adjacent compressive atelectasis.    2.  Large soft tissue density in the region of the pancreatic   head/duodenum. Please see dedicated CT the abdomen and pelvis.    < end of copied text >    < from: CT Abdomen and Pelvis No Cont (12.04.19 @ 00:31) >    IMPRESSION:   1.  No renal or ureteral calculi.No hydronephrosis.    2.   Evaluation is limited due to noncontrast technique. Large soft   tissue density in the region of the pancreatic head/duodenum.   Mild-moderate ascites. May represent pancreatitis, however mass lesion is   not excluded.    < end of copied text >

## 2019-12-04 NOTE — H&P ADULT - ASSESSMENT
Ms. Lowell 39F with a PMHx of DM, HTN, HLD, uterine fibroids s/p bilateral uterine artery embolization in 8/2019 who presents with shortness of breath. Ms. Etienne 39F with a PMHx of DM, HTN, HLD, uterine fibroids s/p bilateral uterine artery embolization in 8/2019 who presents with shortness of breath, found to have large R pleural effusion and incidental pancreatic/duodenal soft tissue mass.

## 2019-12-04 NOTE — CONSULT NOTE ADULT - ASSESSMENT
Ms. Etienne 39F with a PMHx of DM, HTN, HLD, uterine fibroids s/p bilateral uterine artery embolization in 8/2019 who presents with shortness of breath, found to have large R pleural effusion and incidental pancreatic/duodenal soft tissue mass. 38yo F w/ PMH DM, HTN, HLD, uterine fibroids s/p bilateral uterine artery embolization in 8/2019 who presents with shortness of breath, found to have large R pleural effusion and incidental pancreatic/duodenal soft tissue mass. Pulmonary consulted for R effusion.

## 2019-12-04 NOTE — PROGRESS NOTE ADULT - PROBLEM SELECTOR PLAN 1
Pt presenting with progressive shortness of breath x2 months. SOB worsened with exertion, now present at rest, is associated with substernal chest pressure and R sided flank pain. CT chest noted with large R sided pleural effusion. Etiology of effusion unclear at this time, will likely require thoracentesis for diagnosis. Possible etiologies include HF (BNP wnl), hepatic hydrothorax (no overt ascites), malignancy (given new unknown pancreatic mass), PE, infection (nontoxic appearing, no fever), pancreatitis, autoimmune condition. Pt currently oxygenating well, saturating well on room air.  - consult pulm in AM  - monitor respiratory status  - f/u lipase  - f/u MARGUERITE and RF  - f/u , CEA, CA-125 Stable: Pt presenting with progressive shortness of breath x3 months. SOB worsened with exertion, now present at rest, is associated with substernal chest pressure and R sided flank pain. CT chest noted with large R sided pleural effusion. Etiology of effusion unclear at this time, will likely require thoracentesis for diagnosis. Most likely 2/2 to malignancy (given new unknown mass), but differential also includes HF (BNP wnl and unilateral), Rheumatic, hepatic hydrothorax (ascites present), MI (trop WNL)   -Pt currently oxygenating well, saturating well on room air.  -Pulm Consulted f/u recs- thoracentesis planned  -monitor respiratory status  - f/u MARGUERITE and RF WNL  -, Protein 6  -Duonebs PRN q6    #Lung Lesion  CT Chest with 5mm eccentric calcification  -continue to monitor  -evaluate further pending mass diagnostics    #Right flank pain  Likely referred pain from pleural effusion/Acites  - management as above  - tylenol 650mg q4 prn for mild pain, toradol 30mg q6 for mod pain, oxycodone 5mg q4 PRN for severe pain and Lidocaine patch q24

## 2019-12-04 NOTE — CONSULT NOTE ADULT - ASSESSMENT
39F smoker with a PMHx of DM, HTN, HLD, uterine fibroids s/p bilateral uterine artery embolization in 8/2019, pityriasis rosea, herpes who presents with worsening shortness of breath found to have pleural effusion with incidental image finding of RUQ abdominal mass.      #Abdominal Mass  Large 9 cm abdominal mass that extends from IVC with possible extension of the duodenum and liver.  Imaging without contrast and limited evaluation.  Patient otherwise with fam hx of cancer and concerning for neoplasm.   -Obtain CT abd/pel w/ iv and po contrast  -Obtain AFP, CEA, and   -Will continue to evaluate for role of endoscopic evaluation when clinically optimized    #Elevated liver biochemistries  LFT uptrending since admission with examination notable for anterior displacement of liver by abdominal mass.  Suspect mass effect and would continue to trend closely.  Patient is alert and oriented, lowering concern for acute liver failure.    -Trend LFT and Coag  -Obtain GGT    Case d/w svc attg

## 2019-12-04 NOTE — H&P ADULT - PROBLEM SELECTOR PLAN 2
Management as above in problem #1 Management as above in problem #1    #Right flank pain  Likely referred pain from pleural effusion  - management as above  - tylenol prn for mild pain, toradol for mod pain, percocet for severe pain Management as above in problem #1    #Right flank pain  Likely referred pain from pleural effusion  - management as above  - tylenol prn for mild pain, toradol for mod pain, oxycodone for severe pain

## 2019-12-04 NOTE — CONSULT NOTE ADULT - PROBLEM SELECTOR RECOMMENDATION 3
Pt with PMH of asthma. Home meds include advair and montelukast. Pt with PMH of asthma. Home meds include advair and montelukast. Not in exacerbation; can continue home inhalers.

## 2019-12-04 NOTE — H&P ADULT - PROBLEM SELECTOR PLAN 5
- c/w home lipitor 20mg QHS Pt currently with softer pressures - 111/80 at the time of this writing  - will hold off restarting home antihypertensives  - can restart if BP becomes elevated

## 2019-12-04 NOTE — H&P ADULT - HISTORY OF PRESENT ILLNESS
Ms. Etienne 39F with a PMHx of DM, HTN, HLD, uterine fibroids s/p bilateral uterine artery embolization in 8/2019 who presents with shortness of breath. Pt states she has had shortness of breath x2 weeks associated with intermittent L sided chest pain and intermittent R flank pain. Pt endorses intermittent vaginal bleeding intermittently since her embolization, however not currently endorsing significant bleeding or pelvic pain. Pt denies recent fevers, chills, abdominal pain, n/v/d/c, numbness, weakness, tingling.    On arrival to ED, VS: T 97.9, , /94, RR 19, SpO2 100% on room air    Labs significant for: WBC 12.18, Hb 10.3, Plt 562, Alk phos 186, ,     CT A/P negative for renal/ureteral calculi/hydronephrosis. Positive for large soft tissue density in area of pancreatic head/duodenum. May represent pancreatitis vs mass lesion.    CT chest showing large Right pleural effusion with adjacent compressive atelectasis.    In ED, pt received: Flexeril 5mg PO x1, Toradol 30mg IVP x1, morphine 2mg IVP x1 Ms. Etienne 39F with a PMHx of DM, HTN, HLD, uterine fibroids s/p bilateral uterine artery embolization in 8/2019, pityriasis rosea, herpes who presents with shortness of breath.  Pt states her SOB has been progressive, and she can now only takes a few steps before she needs to rest. She has had associated substernal chest pressure that is non-radiating and varies in intensity. The chest pressure is worst when she lays on her left side. She has also had intermittent R sided flank/lower back pain that is intermittent, pounding in nature, and worse with food. She has experienced intermittent vaginal bleeding since her uterine artery embolization, and received 1 unit of blood at [facility] last month, but denies current heavy bleeding. She has experienced some mild night sweats and decreased appetite along with constipation, increased dizziness and lightheadedness, especially with ambulation, but denies fevers, chills, abdominal pain, n/v/d. She has a family history of bullous pemphigoid and colon cancer in her mother, and pancreatic and breast cancer on her father's side. She drinks alcohol socially, is an active tobacco user, and denies illicit drug use. She reports that she had a pap smear done earlier this year that was normal. States she had a mammogram done earlier this year, but is unaware of the results.     On arrival to ED, VS: T 97.9, , /94, RR 19, SpO2 100% on room air    Labs significant for: WBC 12.18, Hb 10.3, Plt 562, Alk phos 186, ,     CT A/P negative for renal/ureteral calculi/hydronephrosis. Positive for large soft tissue density in area of pancreatic head/duodenum. May represent pancreatitis vs mass lesion.    CT chest showing large Right pleural effusion with adjacent compressive atelectasis.    In ED, pt received: Flexeril 5mg PO x1, Toradol 30mg IVP x1, morphine 2mg IVP x1

## 2019-12-04 NOTE — H&P ADULT - PROBLEM SELECTOR PLAN 9
Pt with elevated alk phos, AST, ALT. Possible etiologies include malignancy, infection, hepatitis, autoimmune condition. CT A/P showing hepatic steatosis without focal lesion.  - f/u hepatitis panel  - Given abdominal pain, enzyme abnormalities, CT findings, consider GI consult in AM  - pain control Currently stable, not in exacerbation, not wheezing.  - c/w home advair and montelukast

## 2019-12-04 NOTE — PROGRESS NOTE ADULT - PROBLEM SELECTOR PLAN 2
Management as above in problem #1    #Right flank pain  Likely referred pain from pleural effusion  - management as above  - tylenol prn for mild pain, toradol for mod pain, oxycodone for severe pain 9.4 cm mass RUQ involving liver, pancreas, duodenum, and IVC concerning for malignancy (Pancreatitis less likely Lipase WNL)  - f/u , CEA, CA-125, AFP, Breast 15.3  -Direct Nicolette negative, Antibody ID [id number]- Anti E  -f/u CT abdomen pelvis w/ oral and IV contrast  -f/u GI Recs- IR guided Ascites drainage  -INR 1.8, PTT 32.5  -zofran 4mg q8 PRN- EKG if persistent use

## 2019-12-04 NOTE — CONSULT NOTE ADULT - SUBJECTIVE AND OBJECTIVE BOX
HPI:  39F smoker with a PMHx of DM, HTN, HLD, uterine fibroids s/p bilateral uterine artery embolization in 8/2019, pityriasis rosea, herpes who presents with worsening shortness of breath.  Patient reported SOB since the summer but noted worsening symptoms in the past few weeks with decrease exercise tolerance and associated positional substernal CP.  Had an episode of nausea and NBNB vomiting.  She also noted early satiety, decrease appetite, night sweat.  Denies unintentional weight loss, fever, abd pain.  Denies hx of endoscopy or colonoscopy.      Allergies    No Known Drug Allergies  shellfish (Hives)    Intolerances    Home Medications:  Advair HFA 45 mcg-21 mcg/inh inhalation aerosol: 2 puff(s) inhaled 2 times a day (04 Dec 2019 05:41)  atorvastatin 20 mg oral tablet: 1 tab(s) orally once a day (14 Oct 2019 16:20)  ferrous sulfate 325 mg (65 mg elemental iron) oral tablet: 1 tab(s) orally once a day (14 Oct 2019 16:20)  lisinopril 20 mg oral tablet: 1 tab(s) orally 2 times a day (04 Dec 2019 05:40)  metFORMIN 500 mg oral tablet: 1 tab(s) orally 2 times a day (14 Oct 2019 16:20)  metoprolol tartrate 50 mg oral tablet: 1 tab(s) orally 2 times a day (14 Oct 2019 16:20)  Vitamin D3 1000 intl units oral tablet: 1 tab(s) orally once a day (14 Oct 2019 16:20)    MEDICATIONS:  MEDICATIONS  (STANDING):  budesonide  80 MICROgram(s)/formoterol 4.5 MICROgram(s) Inhaler 2 Puff(s) Inhalation two times a day  lidocaine   Patch 1 Patch Transdermal daily  metoprolol tartrate 25 milliGRAM(s) Oral two times a day  montelukast 10 milliGRAM(s) Oral daily  nicotine -  14 mG/24Hr(s) Patch 1 patch Transdermal daily    MEDICATIONS  (PRN):  acetaminophen  Suppository .. 650 milliGRAM(s) Rectal every 4 hours PRN Mild Pain (1 - 3)  albuterol/ipratropium for Nebulization 3 milliLiter(s) Nebulizer every 6 hours PRN Shortness of Breath and/or Wheezing  ketorolac   Injectable 30 milliGRAM(s) IV Push every 6 hours PRN Moderate Pain (4 - 6)  melatonin 5 milliGRAM(s) Oral at bedtime PRN Sleep  ondansetron   Disintegrating Tablet 4 milliGRAM(s) Oral every 8 hours PRN Nausea and/or Vomiting  oxyCODONE    IR 5 milliGRAM(s) Oral every 4 hours PRN Severe Pain (7 - 10)    PAST MEDICAL & SURGICAL HISTORY:  Uterine fibroid  Hyperlipidemia  Asthma  Diabetes  Hypertension  No significant past surgical history    FAMILY HISTORY:  Mother with CRC  Father with pancreatic ca    SOCIAL HISTORY:  Tobacco: current smoker  Alcohol: occasional  Illicit Drugs:denies    REVIEW OF SYSTEMS:  All other 10 review of systems is negative except as indicated in HPI    Vital Signs Last 24 Hrs  T(C): 36.1 (04 Dec 2019 14:43), Max: 36.8 (04 Dec 2019 06:13)  T(F): 97 (04 Dec 2019 14:43), Max: 98.2 (04 Dec 2019 06:13)  HR: 113 (04 Dec 2019 14:43) (80 - 113)  BP: 125/89 (04 Dec 2019 14:43) (111/80 - 139/94)  BP(mean): --  RR: 18 (04 Dec 2019 14:43) (16 - 19)  SpO2: 97% (04 Dec 2019 14:43) (97% - 100%)      PHYSICAL EXAM:    General: Well developed; well nourished; in no acute distress  Eyes: moist conjunctivae, sclerae anicteric  HENT: Moist mucous membranes, tongue midline  Neck: Trachea midline, supple  Lungs: Diminished BS on the right  Cardiovascular: RRR, S1S2  Abdomen: Soft, non-tender non-distended; Normal bowel sounds; No rebound or guarding  Extremities: Normal range of motion, No clubbing, cyanosis or edema  Neurological: Alert and oriented x3, nonfocal exam  Skin: Warm and dry. No obvious rash    LABS:                        10.1   11.50 )-----------( 479      ( 04 Dec 2019 06:17 )             35.2     12-04    138  |  104  |  15  ----------------------------<  85  3.6   |  23  |  0.87    Ca    8.9      04 Dec 2019 06:17  Phos  3.6     12-04  Mg     1.7     12-04    TPro  6.1  /  Alb  3.1<L>  /  TBili  0.7  /  DBili  0.2  /  AST  213<H>  /  ALT  241<H>  /  AlkPhos  159<H>  12-04        PT/INR - ( 04 Dec 2019 10:48 )   PT: 20.8 sec;   INR: 1.81          PTT - ( 04 Dec 2019 10:48 )  PTT:32.5 sec    RADIOLOGY & ADDITIONAL STUDIES:   CT abd reviewed

## 2019-12-04 NOTE — PROGRESS NOTE ADULT - PROBLEM SELECTOR PLAN 10
F: Not currently indicated  E: Replete PRN  N: DASH/TLC    FULL CODE    Dispo: [facility] 1) PCP   2) Date of Contact with PCP:  3) PCP Contacted at Discharge: TBD  4) Summary of Handoff Given to PCP: TBD  5) Post-Discharge Appointment Date and Location: TBD 1) PCP- [facility] Cho- need number from patient, unable to google   2) Date of Contact with PCP: TBD  3) PCP Contacted at Discharge: TBD  4) Summary of Handoff Given to PCP: TBD  5) Post-Discharge Appointment Date and Location: TBD

## 2019-12-04 NOTE — H&P ADULT - PROBLEM SELECTOR PLAN 4
Pt currently with softer pressures - 111/80 at the time of this writing  - will hold off restarting home antihypertensives  - can restart if BP becomes elevated Pt with elevated alk phos, AST, ALT. Possible etiologies include malignancy, infection, hepatitis, autoimmune condition. CT A/P showing hepatic steatosis without focal lesion, normal gallbladder, no biliary ductal dilatation.  - f/u hepatitis panel  - Given abdominal pain, enzyme abnormalities, CT findings - GI consult in AM  - pain control Pt with elevated alk phos, AST, ALT. Possible etiologies include malignancy, infection, hepatitis, autoimmune condition. CT A/P showing hepatic steatosis without focal lesion, normal gallbladder, no biliary ductal dilatation.  - f/u hepatitis panel  - Given abdominal pain, enzyme abnormalities, CT findings - GI consult in AM  - pain control    #Leukocytosis  Mildly elevated WBC at 12.18. May be reactive in setting of acute active process causing pleural effusion vs malignancy. Pt afebrile and non-toxic appearing  - trend WBC    #Thrombocytosis  Pt with elevated plts. Likely reactive in setting of acute process vs bleed vs cancer vs autoimmune process  - trend CBC

## 2019-12-04 NOTE — ED PROVIDER NOTE - PHYSICAL EXAMINATION
GEN: Well appearing, well nourished, awake, alert, oriented to person, place, time/situation and in no apparent distress.  ENT: Airway patent, Nasal mucosa clear. Mouth with normal mucosa.  EYES: Clear bilaterally.  RESPIRATORY: Breathing comfortably with normal RR.  CARDIAC: Regular rate and rhythm  ABDOMEN: Soft, nontender, +bowel sounds, no rebound, rigidity, or guarding.  MSK: Range of motion is not limited, no deformities noted.  NEURO: Alert and oriented, no focal deficits.  SKIN: Skin normal color for race, warm, dry and intact. No evidence of rash.  PSYCH: Alert and oriented to person, place, time/situation. normal mood and affect. no apparent risk to self or others. GEN: Well appearing, obese, awake, alert, oriented to person, place, time/situation and in no apparent distress. NTAF  ENT: Airway patent, Nasal mucosa clear. Mouth with normal mucosa.  EYES: Clear bilaterally. pale conjunctiva  RESPIRATORY: Breathing comfortably with normal RR.   CARDIAC: Regular rate and rhythm, No m/r/g  ABDOMEN: Soft, nontender, +bowel sounds, no rebound, rigidity, or guarding. No CVAT  MSK: Range of motion is not limited, no deformities noted.  NEURO: Alert and oriented x 3. Cn 2-12 intact. Strength 5/5 and sensation intact in all 4 extremities.  Gait normal.   SKIN: Skin normal color for race, warm, dry and intact. No evidence of rash.  PSYCH: Alert and oriented to person, place, time/situation. normal mood and affect. no apparent risk to self or others. GEN: Well appearing, obese, awake, alert, oriented to person, place, time/situation and in no apparent distress. NTAF  ENT: Airway patent, Nasal mucosa clear. Mouth with normal mucosa.  EYES: Clear bilaterally. pale conjunctiva  RESPIRATORY: Breathing comfortably with normal RR.  Diminished BS right side, no w/c/r, no hypoxia.  CARDIAC: Regular rate and rhythm, No m/r/g  ABDOMEN: Soft, nontender, +bowel sounds, no rebound, rigidity, or guarding. No CVAT  MSK: Range of motion is not limited, no deformities noted.  NEURO: Alert and oriented x 3. Cn 2-12 intact. Strength 5/5 and sensation intact in all 4 extremities.  Gait normal.   SKIN: Skin normal color for race, warm, dry and intact. No evidence of rash.  PSYCH: Alert and oriented to person, place, time/situation. normal mood and affect. no apparent risk to self or others.

## 2019-12-05 DIAGNOSIS — R19.09 OTHER INTRA-ABDOMINAL AND PELVIC SWELLING, MASS AND LUMP: ICD-10-CM

## 2019-12-05 LAB
ALBUMIN FLD-MCNC: 2 G/DL — SIGNIFICANT CHANGE UP
ALBUMIN SERPL ELPH-MCNC: 3.4 G/DL — SIGNIFICANT CHANGE UP (ref 3.3–5)
ALP SERPL-CCNC: 169 U/L — HIGH (ref 40–120)
ALT FLD-CCNC: 325 U/L — HIGH (ref 10–45)
ANA TITR SER: NEGATIVE — SIGNIFICANT CHANGE UP
ANION GAP SERPL CALC-SCNC: 14 MMOL/L — SIGNIFICANT CHANGE UP (ref 5–17)
APTT BLD: 130.3 SEC — CRITICAL HIGH (ref 27.5–36.3)
APTT BLD: 31 SEC — SIGNIFICANT CHANGE UP (ref 27.5–36.3)
AST SERPL-CCNC: 230 U/L — HIGH (ref 10–40)
BILIRUB SERPL-MCNC: 0.9 MG/DL — SIGNIFICANT CHANGE UP (ref 0.2–1.2)
BUN SERPL-MCNC: 21 MG/DL — SIGNIFICANT CHANGE UP (ref 7–23)
CALCIUM SERPL-MCNC: 9 MG/DL — SIGNIFICANT CHANGE UP (ref 8.4–10.5)
CHLORIDE SERPL-SCNC: 101 MMOL/L — SIGNIFICANT CHANGE UP (ref 96–108)
CO2 SERPL-SCNC: 21 MMOL/L — LOW (ref 22–31)
CREAT FLD-MCNC: <0.06 MG/DL — SIGNIFICANT CHANGE UP
CREAT SERPL-MCNC: 0.86 MG/DL — SIGNIFICANT CHANGE UP (ref 0.5–1.3)
GLUCOSE BLDC GLUCOMTR-MCNC: 174 MG/DL — HIGH (ref 70–99)
GLUCOSE FLD-MCNC: 123 MG/DL — SIGNIFICANT CHANGE UP
GLUCOSE SERPL-MCNC: 126 MG/DL — HIGH (ref 70–99)
HCT VFR BLD CALC: 33.6 % — LOW (ref 34.5–45)
HGB BLD-MCNC: 9.8 G/DL — LOW (ref 11.5–15.5)
INR BLD: 1.9 — HIGH (ref 0.88–1.16)
LDH SERPL L TO P-CCNC: 12 U/L — SIGNIFICANT CHANGE UP
MAGNESIUM SERPL-MCNC: 2 MG/DL — SIGNIFICANT CHANGE UP (ref 1.6–2.6)
MCHC RBC-ENTMCNC: 20.2 PG — LOW (ref 27–34)
MCHC RBC-ENTMCNC: 29.2 GM/DL — LOW (ref 32–36)
MCV RBC AUTO: 69.3 FL — LOW (ref 80–100)
NRBC # BLD: 0 /100 WBCS — SIGNIFICANT CHANGE UP (ref 0–0)
PH FLD: 7.57 — SIGNIFICANT CHANGE UP
PLATELET # BLD AUTO: 536 K/UL — HIGH (ref 150–400)
POTASSIUM SERPL-MCNC: 3.9 MMOL/L — SIGNIFICANT CHANGE UP (ref 3.5–5.3)
POTASSIUM SERPL-SCNC: 3.9 MMOL/L — SIGNIFICANT CHANGE UP (ref 3.5–5.3)
PROT FLD-MCNC: 3.3 G/DL — SIGNIFICANT CHANGE UP
PROT SERPL-MCNC: 6.4 G/DL — SIGNIFICANT CHANGE UP (ref 6–8.3)
PROTHROM AB SERPL-ACNC: 21.9 SEC — HIGH (ref 10–12.9)
RBC # BLD: 4.85 M/UL — SIGNIFICANT CHANGE UP (ref 3.8–5.2)
RBC # FLD: 19.2 % — HIGH (ref 10.3–14.5)
SODIUM SERPL-SCNC: 136 MMOL/L — SIGNIFICANT CHANGE UP (ref 135–145)
SPECIMEN SOURCE FLD: SIGNIFICANT CHANGE UP
WBC # BLD: 11.7 K/UL — HIGH (ref 3.8–10.5)
WBC # FLD AUTO: 11.7 K/UL — HIGH (ref 3.8–10.5)

## 2019-12-05 PROCEDURE — 93306 TTE W/DOPPLER COMPLETE: CPT | Mod: 26

## 2019-12-05 PROCEDURE — 99233 SBSQ HOSP IP/OBS HIGH 50: CPT | Mod: GC,25

## 2019-12-05 PROCEDURE — 32555 ASPIRATE PLEURA W/ IMAGING: CPT | Mod: GC

## 2019-12-05 PROCEDURE — 99233 SBSQ HOSP IP/OBS HIGH 50: CPT | Mod: GC

## 2019-12-05 PROCEDURE — 71045 X-RAY EXAM CHEST 1 VIEW: CPT | Mod: 26

## 2019-12-05 PROCEDURE — 99223 1ST HOSP IP/OBS HIGH 75: CPT

## 2019-12-05 PROCEDURE — 93880 EXTRACRANIAL BILAT STUDY: CPT | Mod: 26

## 2019-12-05 PROCEDURE — 99223 1ST HOSP IP/OBS HIGH 75: CPT | Mod: GC

## 2019-12-05 RX ORDER — LANOLIN ALCOHOL/MO/W.PET/CERES
10 CREAM (GRAM) TOPICAL AT BEDTIME
Refills: 0 | Status: DISCONTINUED | OUTPATIENT
Start: 2019-12-05 | End: 2019-12-10

## 2019-12-05 RX ORDER — HEPARIN SODIUM 5000 [USP'U]/ML
1600 INJECTION INTRAVENOUS; SUBCUTANEOUS
Qty: 25000 | Refills: 0 | Status: DISCONTINUED | OUTPATIENT
Start: 2019-12-05 | End: 2019-12-06

## 2019-12-05 RX ORDER — ALPRAZOLAM 0.25 MG
0.25 TABLET ORAL EVERY 12 HOURS
Refills: 0 | Status: DISCONTINUED | OUTPATIENT
Start: 2019-12-05 | End: 2019-12-09

## 2019-12-05 RX ORDER — DIAZEPAM 5 MG
2 TABLET ORAL ONCE
Refills: 0 | Status: DISCONTINUED | OUTPATIENT
Start: 2019-12-05 | End: 2019-12-05

## 2019-12-05 RX ORDER — METOPROLOL TARTRATE 50 MG
25 TABLET ORAL DAILY
Refills: 0 | Status: DISCONTINUED | OUTPATIENT
Start: 2019-12-05 | End: 2019-12-05

## 2019-12-05 RX ORDER — SODIUM CHLORIDE 9 MG/ML
1000 INJECTION INTRAMUSCULAR; INTRAVENOUS; SUBCUTANEOUS
Refills: 0 | Status: DISCONTINUED | OUTPATIENT
Start: 2019-12-05 | End: 2019-12-06

## 2019-12-05 RX ORDER — HEPARIN SODIUM 5000 [USP'U]/ML
1600 INJECTION INTRAVENOUS; SUBCUTANEOUS
Qty: 25000 | Refills: 0 | Status: DISCONTINUED | OUTPATIENT
Start: 2019-12-05 | End: 2019-12-05

## 2019-12-05 RX ORDER — METOPROLOL TARTRATE 50 MG
50 TABLET ORAL EVERY 12 HOURS
Refills: 0 | Status: DISCONTINUED | OUTPATIENT
Start: 2019-12-05 | End: 2019-12-08

## 2019-12-05 RX ADMIN — Medication 1 PATCH: at 19:02

## 2019-12-05 RX ADMIN — Medication 25 MILLIGRAM(S): at 09:14

## 2019-12-05 RX ADMIN — Medication 5 MILLIGRAM(S): at 01:00

## 2019-12-05 RX ADMIN — OXYCODONE HYDROCHLORIDE 5 MILLIGRAM(S): 5 TABLET ORAL at 06:26

## 2019-12-05 RX ADMIN — BUDESONIDE AND FORMOTEROL FUMARATE DIHYDRATE 2 PUFF(S): 160; 4.5 AEROSOL RESPIRATORY (INHALATION) at 22:52

## 2019-12-05 RX ADMIN — HEPARIN SODIUM 1800 UNIT(S)/HR: 5000 INJECTION INTRAVENOUS; SUBCUTANEOUS at 00:03

## 2019-12-05 RX ADMIN — MONTELUKAST 10 MILLIGRAM(S): 4 TABLET, CHEWABLE ORAL at 12:16

## 2019-12-05 RX ADMIN — Medication 30 MILLIGRAM(S): at 09:13

## 2019-12-05 RX ADMIN — Medication 1 PATCH: at 12:16

## 2019-12-05 RX ADMIN — OXYCODONE HYDROCHLORIDE 5 MILLIGRAM(S): 5 TABLET ORAL at 07:26

## 2019-12-05 RX ADMIN — Medication 1 PATCH: at 17:14

## 2019-12-05 RX ADMIN — BUDESONIDE AND FORMOTEROL FUMARATE DIHYDRATE 2 PUFF(S): 160; 4.5 AEROSOL RESPIRATORY (INHALATION) at 09:13

## 2019-12-05 RX ADMIN — HEPARIN SODIUM 16 UNIT(S)/HR: 5000 INJECTION INTRAVENOUS; SUBCUTANEOUS at 21:34

## 2019-12-05 RX ADMIN — Medication 30 MILLIGRAM(S): at 01:15

## 2019-12-05 RX ADMIN — Medication 25 MILLIGRAM(S): at 06:21

## 2019-12-05 RX ADMIN — Medication 1 PATCH: at 05:16

## 2019-12-05 RX ADMIN — Medication 2 MILLIGRAM(S): at 14:05

## 2019-12-05 RX ADMIN — Medication 30 MILLIGRAM(S): at 01:00

## 2019-12-05 RX ADMIN — Medication 50 MILLIGRAM(S): at 18:08

## 2019-12-05 NOTE — PROGRESS NOTE ADULT - ASSESSMENT
39F smoker with a PMHx of DM, HTN, HLD, uterine fibroids s/p bilateral uterine artery embolization in 8/2019, pityriasis rosea, herpes who presents with worsening shortness of breath found to have pleural effusion with incidental image finding of RUQ abdominal mass.      #Abdominal Mass  Large 9 cm abdominal mass that extends from IVC with possible extension of the duodenum and liver.  Imaging without contrast and limited evaluation.  Patient otherwise with fam hx of cancer and concerning for neoplasm. Repeat imaging now demonstrates mass within intrahepatic IVC extending proximally to the right atrial junction and distally to the renal vein, w/ suspicion of thrombus. Discussion with Susi Hatch and Dominic with plan for further evaluation for surgical intervention.   Tumor marker otherwise unremarkable.  -Continue management per vascular and surg onc    #Elevated liver biochemistries  LFT uptrending since admission with examination notable for anterior displacement of liver by abdominal mass.  Suspect mass effect and would continue to trend closely.  Patient is alert and oriented, lowering concern for acute liver failure.    -Trend LFT and Coag  -Obtain GGT    Case d/w svc attg

## 2019-12-05 NOTE — PROGRESS NOTE ADULT - PROBLEM SELECTOR PLAN 1
Large, R sided pleural effusion. No effusion present on L side. Of significant concern is the concomitant finding of abdominal/IVC mass on CT scan.     Now s/p thoracentesis with removal of ~1900cc serous simple appearing fluid.     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 However it's unusual for sarcoma metastasis to present with only pleural effusion.      Recommendations:  - F/u pleural fluid studies  - F/u pleural fluid cytology  - would recommend repeat chest CT to evaluate for intra-parenchymal pulmonary mets of the re-expanded lung.

## 2019-12-05 NOTE — PROGRESS NOTE ADULT - ASSESSMENT
40yo F w/ PMH DM, HTN, HLD, uterine fibroids s/p bilateral uterine artery embolization in 8/2019 who presents with shortness of breath, found to have large R pleural effusion and incidental pancreatic/duodenal soft tissue mass. Pulmonary consulted for R effusion.

## 2019-12-05 NOTE — PROGRESS NOTE ADULT - PROBLEM SELECTOR PLAN 2
9.4 cm mass RUQ involving liver, pancreas, duodenum, and IVC concerning for malignancy (Pancreatitis less likely Lipase WNL)  - , CEA, CA-125, and AFP WNL, Breast 15.3  -Direct Nicolette negative, Antibody ID [id number]- Anti E  -f/u CT abdomen pelvis w/ oral and IV contrast  -f/u GI Recs- IR guided Ascites drainage  -INR 1.9>1.8  -zofran 4mg q8 PRN,

## 2019-12-05 NOTE — PROCEDURE NOTE - NSPROCDETAILS_GEN_ALL_CORE
ultrasound assessment of effusion (localization)/catheter inserted over needle/location identified, draped/prepped, sterile technique used, needle inserted/introduced

## 2019-12-05 NOTE — PROGRESS NOTE ADULT - SUBJECTIVE AND OBJECTIVE BOX
Pt seen and examined at bedside.  Patient reports heartburn like symptoms overnight.  Denies fever, chill, chest pain, shortness of breath, abdominal or epigastric pain, nausea, vomiting, hematemesis, diarrhea, constipation, melena, or hematochezia.     Allergies    No Known Drug Allergies  shellfish (Hives)    Intolerances      MEDICATIONS:  MEDICATIONS  (STANDING):  budesonide  80 MICROgram(s)/formoterol 4.5 MICROgram(s) Inhaler 2 Puff(s) Inhalation two times a day  heparin  Infusion. 1600 Unit(s)/Hr (16 mL/Hr) IV Continuous <Continuous>  lidocaine   Patch 1 Patch Transdermal daily  metoprolol tartrate 50 milliGRAM(s) Oral every 12 hours  montelukast 10 milliGRAM(s) Oral daily  nicotine -  14 mG/24Hr(s) Patch 1 patch Transdermal daily  sodium chloride 0.9%. 1000 milliLiter(s) (100 mL/Hr) IV Continuous <Continuous>    MEDICATIONS  (PRN):  acetaminophen  Suppository .. 650 milliGRAM(s) Rectal every 4 hours PRN Mild Pain (1 - 3)  albuterol/ipratropium for Nebulization 3 milliLiter(s) Nebulizer every 6 hours PRN Shortness of Breath and/or Wheezing  ketorolac   Injectable 30 milliGRAM(s) IV Push every 6 hours PRN Moderate Pain (4 - 6)  melatonin 5 milliGRAM(s) Oral at bedtime PRN Sleep  ondansetron   Disintegrating Tablet 4 milliGRAM(s) Oral every 8 hours PRN Nausea and/or Vomiting  oxyCODONE    IR 5 milliGRAM(s) Oral every 4 hours PRN Severe Pain (7 - 10)    Vital Signs Last 24 Hrs  T(C): 36.4 (05 Dec 2019 05:59), Max: 36.9 (04 Dec 2019 19:10)  T(F): 97.6 (05 Dec 2019 05:59), Max: 98.5 (04 Dec 2019 19:10)  HR: 118 (05 Dec 2019 05:59) (83 - 118)  BP: 130/88 (05 Dec 2019 05:59) (127/90 - 130/88)  BP(mean): --  RR: 17 (05 Dec 2019 05:59) (17 - 18)  SpO2: 99% (05 Dec 2019 05:59) (96% - 99%)    PHYSICAL EXAM:    General: Well developed; well nourished; in no acute distress  HEENT: MMM, conjunctiva and sclera clear  Gastrointestinal: Soft non-tender, distended; Normal bowel sounds; No rebound or guarding  Skin: Warm and dry. No obvious rash    LABS:                        9.8    11.70 )-----------( 536      ( 05 Dec 2019 06:38 )             33.6     12-05    136  |  101  |  21  ----------------------------<  126<H>  3.9   |  21<L>  |  0.86    Ca    9.0      05 Dec 2019 06:38  Phos  3.6     12-04  Mg     2.0     12-05    TPro  6.4  /  Alb  3.4  /  TBili  0.9  /  DBili  x   /  AST  230<H>  /  ALT  325<H>  /  AlkPhos  169<H>  12-05    PT/INR - ( 05 Dec 2019 06:38 )   PT: 21.9 sec;   INR: 1.90          PTT - ( 05 Dec 2019 15:47 )  PTT:31.0 sec      Urinalysis Basic - ( 04 Dec 2019 16:04 )    Color: Yellow / Appearance: Clear / SG: >=1.030 / pH: x  Gluc: x / Ketone: NEGATIVE  / Bili: Negative / Urobili: 0.2 E.U./dL   Blood: x / Protein: 100 mg/dL / Nitrite: NEGATIVE   Leuk Esterase: NEGATIVE / RBC: < 5 /HPF / WBC 5-10 /HPF   Sq Epi: x / Non Sq Epi: 5-10 /HPF / Bacteria: Present /HPF    RADIOLOGY & ADDITIONAL STUDIES: CT Reviewed

## 2019-12-05 NOTE — PROGRESS NOTE ADULT - PROBLEM SELECTOR PLAN 3
Worsening: Pt with elevated alk phos, AST, ALT in line w/cholestatic pattern. Most likely mass effect from abdominal mass other possible etiologies include malignancy, infection, hepatitis, autoimmune condition.   - Hep A, B, C negative  -GI Consult- likely mass effect, continue to trend CMP    #Leukocytosis  Improved: Mildly elevated WBC at 12.18. Likely reactive in setting of acute active process causing pleural effusion vs malignancy. Pt afebrile and non-toxic appearing  - trend WBC    #Thrombocytosis  Improved: Pt with elevated plts and large platelets on smear. Likely reactive in setting of acute process vs cancer   - trend CBC    #Anemia  Hb 10.1, MCV 70, RDW 19.3. Ferritin 137, Iron 23, %Sat 8 suggestive of SUSANNA likely 2/2 chronic blood loss. Patient on Ferrous sulfate supplement at home  -trend CBC  -Active type and screen 12/4  -Complaints of constipation, will hold iron at this time

## 2019-12-05 NOTE — CONSULT NOTE ADULT - SUBJECTIVE AND OBJECTIVE BOX
39F with PMH of DM, HTN, HLD, uterine fibroids s/p b/l uterine artery embolisation August 2019, pityriasis rosea, herpes presents with 4 month history of worsening SOB with decreased exercise tolerance associated with substernal pressure, early satiety, poor appetite and night sweats. Symptoms first started in the summer but worsened over the last few weeks, now only takes a few steps before needing to rest due to SOB. Substernal chest pressure described as non-radiating, varies in intensity, worst when laying on left side. Pt also report intermittent Right flank/lower back pain that is intermittent, pounding, worsens with food. Pt denies abd pain but reports 1 episode of nausea with vomiting x1 NBNB. She complained of intermittent vaginal bleeding since her uterine artery embolisation s/p 1 unit of PRBC at [facility] last month. Pt also reports increasing dizziness/lightheadedness especially with ambulation. Denies fever, chills, diarrhoea. Reports constipation but passing flatus and having bowel movements.     Denies prior EGD or colonoscopy  Last pap smear earlier this year - normal  Last mammogram done earlier this year - unclear of results.         PAST MEDICAL & SURGICAL HISTORY:  Uterine fibroid  Hyperlipidemia  Asthma  Diabetes  Hypertension  No significant past surgical history      MEDICATIONS  (STANDING):  budesonide  80 MICROgram(s)/formoterol 4.5 MICROgram(s) Inhaler 2 Puff(s) Inhalation two times a day  heparin  Infusion. 1600 Unit(s)/Hr (16 mL/Hr) IV Continuous <Continuous>  lidocaine   Patch 1 Patch Transdermal daily  metoprolol tartrate 50 milliGRAM(s) Oral every 12 hours  montelukast 10 milliGRAM(s) Oral daily  nicotine -  14 mG/24Hr(s) Patch 1 patch Transdermal daily    MEDICATIONS  (PRN):  acetaminophen  Suppository .. 650 milliGRAM(s) Rectal every 4 hours PRN Mild Pain (1 - 3)  albuterol/ipratropium for Nebulization 3 milliLiter(s) Nebulizer every 6 hours PRN Shortness of Breath and/or Wheezing  ketorolac   Injectable 30 milliGRAM(s) IV Push every 6 hours PRN Moderate Pain (4 - 6)  melatonin 5 milliGRAM(s) Oral at bedtime PRN Sleep  ondansetron   Disintegrating Tablet 4 milliGRAM(s) Oral every 8 hours PRN Nausea and/or Vomiting  oxyCODONE    IR 5 milliGRAM(s) Oral every 4 hours PRN Severe Pain (7 - 10)      Allergies    No Known Drug Allergies  shellfish (Hives)          FAMILY HISTORY:  Mother: Bullous pemphigoid, colon cancer  Father's side: Breast cancer and pancreas cancer    ROS: otherwise negative.    SH: Current smoker, consumes ETOH, denies recreational drug use.     Vital Signs Last 24 Hrs  T(C): 36.4 (05 Dec 2019 05:59), Max: 36.9 (04 Dec 2019 19:10)  T(F): 97.6 (05 Dec 2019 05:59), Max: 98.5 (04 Dec 2019 19:10)  HR: 118 (05 Dec 2019 05:59) (83 - 118)  BP: 130/88 (05 Dec 2019 05:59) (115/88 - 130/88)  BP(mean): --  RR: 17 (05 Dec 2019 05:59) (17 - 18)  SpO2: 99% (05 Dec 2019 05:59) (96% - 99%)    I&O's Summary      Physical Exam:  General: NAD, resting comfortably in bed  HEENT: MMM  Pulmonary: nonlabored breathing, normal resp effort. Absent breath sounds right lung, left lung vesicular  Cardiovascular: RRR  Abdominal: soft, nontender, nondistended  Extremities: WWP, no edema, no calf tenderness  Neuro: no focal deficits  Psych: pleasant    LABS:                        9.8    11.70 )-----------( 536      ( 05 Dec 2019 06:38 )             33.6     12-05    136  |  101  |  21  ----------------------------<  126<H>  3.9   |  21<L>  |  0.86    Ca    9.0      05 Dec 2019 06:38  Phos  3.6     12-04  Mg     2.0     12-05    TPro  6.4  /  Alb  3.4  /  TBili  0.9  /  DBili  x   /  AST  230<H>  /  ALT  325<H>  /  AlkPhos  169<H>  12-05    PT/INR - ( 05 Dec 2019 06:38 )   PT: 21.9 sec;   INR: 1.90          PTT - ( 05 Dec 2019 06:38 )  PTT:130.3 sec  Urinalysis Basic - ( 04 Dec 2019 16:04 )    Color: Yellow / Appearance: Clear / SG: >=1.030 / pH: x  Gluc: x / Ketone: NEGATIVE  / Bili: Negative / Urobili: 0.2 E.U./dL   Blood: x / Protein: 100 mg/dL / Nitrite: NEGATIVE   Leuk Esterase: NEGATIVE / RBC: < 5 /HPF / WBC 5-10 /HPF   Sq Epi: x / Non Sq Epi: 5-10 /HPF / Bacteria: Present /HPF      CAPILLARY BLOOD GLUCOSE  CEA 1.5  CA 19-9 <2  AFP 1.9  CA 15.3: 7.4      LIVER FUNCTIONS - ( 05 Dec 2019 06:38 )  Alb: 3.4 g/dL / Pro: 6.4 g/dL / ALK PHOS: 169 U/L / ALT: 325 U/L / AST: 230 U/L / GGT: x             Cultures:      RADIOLOGY & ADDITIONAL STUDIES:    < from: CT Abdomen and Pelvis w/ Oral Cont and w/ IV Cont (12.04.19 @ 17:00) >  IMPRESSION:     Large enhancing mass within the intrahepatic IVC 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    Large right pleural effusion.      < from: Echocardiogram (12.05.19 @ 10:46) >  CONCLUSIONS:     1. Normalleft and right ventricular size and function.   2. No significant valvular disease.   3. No evidence of pulmonary hypertension.   4. No pericardial effusion.    < end of copied text >

## 2019-12-05 NOTE — PROGRESS NOTE ADULT - SUBJECTIVE AND OBJECTIVE BOX
Interval Events:  Patient seen and examined at bedside.    Patient underwent thoracentesis today. Repeat CT shows large IVC mass with thrombus. Currently being evaluated by a multidisciplinary team for surgical resection. She feels overwhelmed.     MEDICATIONS:  Pulmonary:  albuterol/ipratropium for Nebulization 3 milliLiter(s) Nebulizer every 6 hours PRN  budesonide  80 MICROgram(s)/formoterol 4.5 MICROgram(s) Inhaler 2 Puff(s) Inhalation two times a day  montelukast 10 milliGRAM(s) Oral daily    Antimicrobials:    Anticoagulants:  heparin  Infusion. 1600 Unit(s)/Hr IV Continuous <Continuous>    Cardiac:  metoprolol tartrate 50 milliGRAM(s) Oral every 12 hours    Endocrine:    Allergies    No Known Drug Allergies  shellfish (Hives)    Intolerances    Vital Signs Last 24 Hrs  T(C): 36.4 (05 Dec 2019 05:59), Max: 36.9 (04 Dec 2019 19:10)  T(F): 97.6 (05 Dec 2019 05:59), Max: 98.5 (04 Dec 2019 19:10)  HR: 118 (05 Dec 2019 05:59) (83 - 118)  BP: 130/88 (05 Dec 2019 05:59) (127/90 - 130/88)  BP(mean): --  RR: 17 (05 Dec 2019 05:59) (17 - 18)  SpO2: 99% (05 Dec 2019 05:59) (96% - 99%)    Physical Exam:  Constitutional: young, pleasant woman, WDWN resting comfortably in bed; NAD  Head: NC/AT  Eyes: PERRL, EOMI, anicteric sclera  ENT: no nasal discharge; uvula midline, no oropharyngeal erythema or exudates; MMM  Neck: supple; no JVD or thyromegaly  Respiratory: decreased breath sounds throughout R side, L side clear without rhonchi, rales; speaking in full sentences without use of accessory muscles  Cardiac: +S1/S2; RRR  Gastrointestinal: soft, NT/ND  Extremities: WWP, no clubbing or cyanosis; no peripheral edema; RLE healed herpetic lesion covered with bandage  Neurologic: awake and alert; SWANSON    LABS:  CBC Full  -  ( 05 Dec 2019 06:38 )  WBC Count : 11.70 K/uL  RBC Count : 4.85 M/uL  Hemoglobin : 9.8 g/dL  Hematocrit : 33.6 %  Platelet Count - Automated : 536 K/uL  Mean Cell Volume : 69.3 fl  Mean Cell Hemoglobin : 20.2 pg  Mean Cell Hemoglobin Concentration : 29.2 gm/dL    12-05    136  |  101  |  21  ----------------------------<  126<H>  3.9   |  21<L>  |  0.86    Ca    9.0      05 Dec 2019 06:38  Phos  3.6     12-04  Mg     2.0     12-05    TPro  6.4  /  Alb  3.4  /  TBili  0.9  /  DBili  x   /  AST  230<H>  /  ALT  325<H>  /  AlkPhos  169<H>  12-05    PT/INR - ( 05 Dec 2019 06:38 )   PT: 21.9 sec;   INR: 1.90        PTT - ( 05 Dec 2019 15:47 )  PTT:31.0 sec    Urinalysis Basic - ( 04 Dec 2019 16:04 )    Color: Yellow / Appearance: Clear / SG: >=1.030 / pH: x  Gluc: x / Ketone: NEGATIVE  / Bili: Negative / Urobili: 0.2 E.U./dL   Blood: x / Protein: 100 mg/dL / Nitrite: NEGATIVE   Leuk Esterase: NEGATIVE / RBC: < 5 /HPF / WBC 5-10 /HPF   Sq Epi: x / Non Sq Epi: 5-10 /HPF / Bacteria: Present /HPF    RADIOLOGY & ADDITIONAL STUDIES (The following images were personally reviewed):  Reviewed.

## 2019-12-05 NOTE — CONSULT NOTE ADULT - SUBJECTIVE AND OBJECTIVE BOX
Surgeon: Dr. Lindsay    Requesting Physician: Dr. Wolf    HISTORY OF PRESENT ILLNESS (Need 4):    38 y/o F active smoker with a PMHx of DM, HTN, HLD, uterine fibroids s/p bilateral uterine artery embolization in 8/2019, pityriasis rosea, and herpes who presented on 12/4/19 with progressive shortness of breath over the past _______. It has now progressed to the point that she can only takes a few steps before she becomes SOB. SOB is associated substernal chest pressure that is described as non-radiating, varying in intensity, and exacerbated by lying on her left side. She has also reports intermittent throbbing R sided flank/lower back pain that is exacerbated by eating, but not associated with her exertional SOB.  She has experienced some mild night sweats, decreased appetite, constipation, and dizziness/lightheadedness with ambulation, but denies fevers, chills, abdominal pain, n/v/d.     PAST MEDICAL & SURGICAL HISTORY:  Uterine fibroid  Hyperlipidemia  Asthma  Diabetes  Hypertension  No significant past surgical history      MEDICATIONS  (STANDING):  budesonide  80 MICROgram(s)/formoterol 4.5 MICROgram(s) Inhaler 2 Puff(s) Inhalation two times a day  heparin  Infusion. 1600 Unit(s)/Hr (16 mL/Hr) IV Continuous <Continuous>  lidocaine   Patch 1 Patch Transdermal daily  metoprolol tartrate 50 milliGRAM(s) Oral every 12 hours  montelukast 10 milliGRAM(s) Oral daily  nicotine -  14 mG/24Hr(s) Patch 1 patch Transdermal daily    MEDICATIONS  (PRN):  acetaminophen  Suppository .. 650 milliGRAM(s) Rectal every 4 hours PRN Mild Pain (1 - 3)  albuterol/ipratropium for Nebulization 3 milliLiter(s) Nebulizer every 6 hours PRN Shortness of Breath and/or Wheezing  ketorolac   Injectable 30 milliGRAM(s) IV Push every 6 hours PRN Moderate Pain (4 - 6)  melatonin 5 milliGRAM(s) Oral at bedtime PRN Sleep  ondansetron   Disintegrating Tablet 4 milliGRAM(s) Oral every 8 hours PRN Nausea and/or Vomiting  oxyCODONE    IR 5 milliGRAM(s) Oral every 4 hours PRN Severe Pain (7 - 10)      Allergies    No Known Drug Allergies  shellfish (Hives)    Intolerances    SOCIAL HISTORY:  Smoker:  YES, active 1ppd smoker        PACK YEARS: 20   ETOH use:  YES             FREQUENCY / QUANTITY: social ETOH   Ilicit Drug use:  NO  Occupation: currently unemployed  Assisted device use (Cane / Walker): none  Live with:    FAMILY HISTORY:    Mother - bullous pemphigoid and colon cancer  Father - pancreatic and breast cancer on her father's side.    Review of Systems (Need 10):  CONSTITUTIONAL: mild night sweats and decreased appetite as per HPI, but denies fevers / chills, weight loss, weight gain                                       NEURO:  dizziness/lightheadedness as per HPI but denies paresthesias, seizures, syncope, confusion                                                                                  EYES:  Denies blurry vision, discharge, pain, loss of vision                                                                                    ENMT:  Denies difficulty hearing, vertigo, dysphagia, epistaxis, recent dental work                                       CV:  chest pain and NIETO as per HPI but denies palpitations, orthopnea                                                                                           RESPIRATORY:  SOB as per HPI but denies wheezing, cough / sputum, hemoptysis                                                               GI:  Denies nausea, vomiting, diarrhea, constipation, melena                                                                          : Denies hematuria, dysuria, urgency, incontinence                                                                                          MUSKULOSKELETAL:  Denies arthritis, joint swelling, muscle weakness                                                             SKIN/BREAST:  Denies rash, itching, hair loss, masses                                                                                              PSYCH:  Denies depression, anxiety, suicidal ideation                                                                                                HEME/LYMPH:  Denies bruises easily, enlarged lymph nodes, tender lymph nodes                                          ENDOCRINE:  Denies cold intolerance, heat intolerance, polydipsia                                                                      Vital Signs Last 24 Hrs  T(C): 36.4 (05 Dec 2019 05:59), Max: 36.9 (04 Dec 2019 19:10)  T(F): 97.6 (05 Dec 2019 05:59), Max: 98.5 (04 Dec 2019 19:10)  HR: 118 (05 Dec 2019 05:59) (83 - 118)  BP: 130/88 (05 Dec 2019 05:59) (115/88 - 130/88)  BP(mean): --  RR: 17 (05 Dec 2019 05:59) (17 - 18)  SpO2: 99% (05 Dec 2019 05:59) (96% - 99%)    Physical Exam (Need 8)    CONSTITUTIONAL: WN/WD, NAD  NEURO: A&Ox3                   EYES: EOMI sclera anicteric  ENMT: MMM  CV: S1S2 RRR  RESPIRATORY: CTA b/l no W/R/R  GI: soft NT/ND +BS  : no meeks  MUSKULOSKELETAL: no kyphosis/scoliosis  SKIN / BREAST: no rashes/lesions                                                            LABS:                        9.8    11.70 )-----------( 536      ( 05 Dec 2019 06:38 )             33.6     12-05    136  |  101  |  21  ----------------------------<  126<H>  3.9   |  21<L>  |  0.86    Ca    9.0      05 Dec 2019 06:38  Phos  3.6     12-04  Mg     2.0     12-05    TPro  6.4  /  Alb  3.4  /  TBili  0.9  /  DBili  x   /  AST  230<H>  /  ALT  325<H>  /  AlkPhos  169<H>  12-05    PT/INR - ( 05 Dec 2019 06:38 )   PT: 21.9 sec;   INR: 1.90          PTT - ( 05 Dec 2019 06:38 )  PTT:130.3 sec  Urinalysis Basic - ( 04 Dec 2019 16:04 )    Color: Yellow / Appearance: Clear / SG: >=1.030 / pH: x  Gluc: x / Ketone: NEGATIVE  / Bili: Negative / Urobili: 0.2 E.U./dL   Blood: x / Protein: 100 mg/dL / Nitrite: NEGATIVE   Leuk Esterase: NEGATIVE / RBC: < 5 /HPF / WBC 5-10 /HPF   Sq Epi: x / Non Sq Epi: 5-10 /HPF / Bacteria: Present /HPF      CARDIAC MARKERS ( 03 Dec 2019 22:56 )  x     / <0.01 ng/mL / x     / x     / x        RADIOLOGY & ADDITIONAL STUDIES:  CAROTID U/S: pending    CXR: large right pleural effusion    CT Scan:  Large enhancing mass within the intrahepatic IVC 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    Large right pleural effusion.    EKG: NSR @ 87 bpm    TTE / DAY: pending    Cardiac Cath: pending Surgeon: Dr. Lindsay    Requesting Physician: Dr. Wolf    HISTORY OF PRESENT ILLNESS (Need 4):    38 y/o F active smoker with a PMHx of DM, HTN, HLD, uterine fibroids s/p bilateral uterine artery embolization in 8/2019, pityriasis rosea, and herpes who presented on 12/4/19 with progressive shortness of breath over the past 3-4 months. It has now progressed to the point that she can only takes a few steps before she becomes SOB. SOB is associated with mild substernal chest pressure that is described as non-radiating, sharp pinch with deep breathing. She also reports 2 pillow orthopnea in the past few weeks, but denies PND.  She has also had mild ankle edema off and on for the past few years, but noticed it was slightly worse over the last summer.  She has experienced some mild night sweats, decreased appetite, constipation, and dizziness/lightheadedness with ambulation, but denies fevers, chills, abdominal pain, n/v/d.     PAST MEDICAL & SURGICAL HISTORY:  Uterine fibroid  Hyperlipidemia  Asthma  Diabetes  Hypertension  No significant past surgical history      MEDICATIONS  (STANDING):  budesonide  80 MICROgram(s)/formoterol 4.5 MICROgram(s) Inhaler 2 Puff(s) Inhalation two times a day  heparin  Infusion. 1600 Unit(s)/Hr (16 mL/Hr) IV Continuous <Continuous>  lidocaine   Patch 1 Patch Transdermal daily  metoprolol tartrate 50 milliGRAM(s) Oral every 12 hours  montelukast 10 milliGRAM(s) Oral daily  nicotine -  14 mG/24Hr(s) Patch 1 patch Transdermal daily    MEDICATIONS  (PRN):  acetaminophen  Suppository .. 650 milliGRAM(s) Rectal every 4 hours PRN Mild Pain (1 - 3)  albuterol/ipratropium for Nebulization 3 milliLiter(s) Nebulizer every 6 hours PRN Shortness of Breath and/or Wheezing  ketorolac   Injectable 30 milliGRAM(s) IV Push every 6 hours PRN Moderate Pain (4 - 6)  melatonin 5 milliGRAM(s) Oral at bedtime PRN Sleep  ondansetron   Disintegrating Tablet 4 milliGRAM(s) Oral every 8 hours PRN Nausea and/or Vomiting  oxyCODONE    IR 5 milliGRAM(s) Oral every 4 hours PRN Severe Pain (7 - 10)      Allergies    No Known Drug Allergies  shellfish (Hives)    Intolerances    SOCIAL HISTORY:  Smoker:  YES, active 1ppd smoker        PACK YEARS: 20   ETOH use:  rarely             FREQUENCY / QUANTITY: 1-2x/month  Ilicit Drug use:  NO  Occupation: currently unemployed  Assisted device use (Cane / Walker): none  Live with: significant other    FAMILY HISTORY:    Mother - bullous pemphigoid and colon cancer  Father - pancreatic and breast cancer on her father's side.    Review of Systems (Need 10):  CONSTITUTIONAL: mild night sweats and decreased appetite as per HPI, but denies fevers / chills, weight loss, weight gain                                       NEURO:  dizziness/lightheadedness as per HPI but denies paresthesias, seizures, syncope, confusion                                                                                  EYES:  Denies blurry vision, discharge, pain, loss of vision                                                                                    ENMT:  Denies difficulty hearing, vertigo, dysphagia, epistaxis, recent dental work                                       CV:  chest pain and NIETO as per HPI but denies palpitations, orthopnea                                                                                           RESPIRATORY:  SOB as per HPI but denies wheezing, cough / sputum, hemoptysis                                                               GI:  Denies nausea, vomiting, diarrhea, constipation, melena                                                                          : Denies hematuria, dysuria, urgency, incontinence                                                                                          MUSKULOSKELETAL:  Denies arthritis, joint swelling, muscle weakness                                                             SKIN/BREAST:  Denies rash, itching, hair loss, masses                                                                                              PSYCH:  Denies depression, anxiety, suicidal ideation                                                                                                HEME/LYMPH:  Denies bruises easily, enlarged lymph nodes, tender lymph nodes                                          ENDOCRINE:  Denies cold intolerance, heat intolerance, polydipsia                                                                      Vital Signs Last 24 Hrs  T(C): 36.4 (05 Dec 2019 05:59), Max: 36.9 (04 Dec 2019 19:10)  T(F): 97.6 (05 Dec 2019 05:59), Max: 98.5 (04 Dec 2019 19:10)  HR: 118 (05 Dec 2019 05:59) (83 - 118)  BP: 130/88 (05 Dec 2019 05:59) (115/88 - 130/88)  BP(mean): --  RR: 17 (05 Dec 2019 05:59) (17 - 18)  SpO2: 99% (05 Dec 2019 05:59) (96% - 99%)    Physical Exam (Need 8)    CONSTITUTIONAL: WN/WD, NAD  NEURO: A&Ox3                   EYES: EOMI sclera anicteric  ENMT: MMM  CV: S1S2 RRR  RESPIRATORY: CTA b/l no W/R/R  GI: soft NT/ND +BS  : no meeks  MUSKULOSKELETAL: no kyphosis/scoliosis  SKIN / BREAST: no rashes/lesions                                                            LABS:                        9.8    11.70 )-----------( 536      ( 05 Dec 2019 06:38 )             33.6     12-05    136  |  101  |  21  ----------------------------<  126<H>  3.9   |  21<L>  |  0.86    Ca    9.0      05 Dec 2019 06:38  Phos  3.6     12-04  Mg     2.0     12-05    TPro  6.4  /  Alb  3.4  /  TBili  0.9  /  DBili  x   /  AST  230<H>  /  ALT  325<H>  /  AlkPhos  169<H>  12-05    PT/INR - ( 05 Dec 2019 06:38 )   PT: 21.9 sec;   INR: 1.90          PTT - ( 05 Dec 2019 06:38 )  PTT:130.3 sec  Urinalysis Basic - ( 04 Dec 2019 16:04 )    Color: Yellow / Appearance: Clear / SG: >=1.030 / pH: x  Gluc: x / Ketone: NEGATIVE  / Bili: Negative / Urobili: 0.2 E.U./dL   Blood: x / Protein: 100 mg/dL / Nitrite: NEGATIVE   Leuk Esterase: NEGATIVE / RBC: < 5 /HPF / WBC 5-10 /HPF   Sq Epi: x / Non Sq Epi: 5-10 /HPF / Bacteria: Present /HPF      CARDIAC MARKERS ( 03 Dec 2019 22:56 )  x     / <0.01 ng/mL / x     / x     / x        RADIOLOGY & ADDITIONAL STUDIES:  CAROTID U/S: pending    CXR: large right pleural effusion    CT Scan:  Large enhancing mass within the intrahepatic IVC 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    Large right pleural effusion.    EKG: NSR @ 87 bpm    TTE / DAY: pending    Cardiac Cath: pending

## 2019-12-05 NOTE — CONSULT NOTE ADULT - ASSESSMENT
39F with PMH of DM, HTN, HLD, uterine fibroids s/p b/l uterine artery embolisation August 2019, pityriasis rosea, herpes presents with 4 month history of worsening SOB with decreased exercise tolerance associated with substernal pressure, early satiety, poor appetite and night sweats with radiographic evidence of large enhancing mass in the intrahepatic IVC extending to the RA junction query IVC sarcoma.     Plan  Recommend draining pleural effusion and send for cytology  Transhepatic biopsy of mass as oppose to EUS  Continue heparin gtt  Rest of care as per primary team  Surgery team 1C will continue to follow  Discussed with Dr. Howe 39F with PMH of DM, HTN, HLD, uterine fibroids s/p b/l uterine artery embolisation August 2019, pityriasis rosea, herpes presents with 4 month history of worsening SOB with decreased exercise tolerance associated with substernal pressure, early satiety, poor appetite and night sweats with radiographic evidence of large enhancing mass in the intrahepatic IVC extending to the RA junction query IVC sarcoma.     Plan  Recommend draining pleural effusion and send for cytology  IR consult for Transhepatic biopsy of mass as oppose to EUS  Continue heparin gtt  Rest of care as per primary team  Surgery team 1C will continue to follow  Discussed with Dr. Howe 39F with PMH of DM, HTN, HLD, uterine fibroids s/p b/l uterine artery embolisation August 2019, pityriasis rosea, herpes presents with 4 month history of worsening SOB with decreased exercise tolerance associated with substernal pressure, early satiety, poor appetite and night sweats with radiographic evidence of large enhancing mass in the intrahepatic IVC extending to the RA junction query IVC sarcoma.     Plan  OR for exploratory laparoscopy with biopsy tomorrow  Continue heparin gtt, d/c 1hr pre-op  Rest of care as per primary team  Surgery team 1C will continue to follow  Discussed with Dr. Howe

## 2019-12-05 NOTE — CONSULT NOTE ADULT - ASSESSMENT
39F with hx of iron def anemia, uterine fibroids s/p bilateral uterine artery embolization (2019), with fatigue/, found to have large R pleural effusion and incidental large intrahepatic IVC mass, currently being worked up.       #Intrahepatic IVC mass w/ extension   #Large R pleural effusion   #SUSANNA     -awaiting pleural fluid cytology, concern for malignancy including sarcoma, will send for molecular profiling, NGS, concern for stage IV disease   -reviewed tumor markers, which remain WNL, LDH of 753 noted, obtain uric acid  -per vascular remains on hep gtt, vascular/CTS consulted consideration for resection, would obtain radiation oncology opinion  -awaiting pathologic confirmation, in interim will reach out sarcoma specialist for f/u   -iron supplementation   -dvt ppx on hep gtt    to d/w Dr. Stevens

## 2019-12-05 NOTE — PROGRESS NOTE ADULT - PROBLEM SELECTOR PLAN 1
Stable: Pt presenting with progressive shortness of breath x3 months. SOB worsened with exertion, now present at rest, is associated with substernal chest pressure and R sided flank pain. CT chest noted with large R sided pleural effusion. Etiology of effusion unclear at this time, will likely require thoracentesis for diagnosis. Most likely 2/2 to malignancy (given new unknown mass), but differential also includes HF (BNP wnl and unilateral), Rheumatic, hepatic hydrothorax (ascites present), MI (trop WNL)   -Pt currently oxygenating well, saturating well on room air.  -Pulm Consulted f/u recs- thoracentesis planned for today, fluid sending for cytology  -monitor respiratory status  - f/u MARGUERITE and RF WNL  -, Protein 6  -Duonebs PRN q6    #Lung Lesion  CT Chest with 5mm eccentric calcification  -continue to monitor  -evaluate further pending mass diagnostics    #Right flank pain  Likely referred pain from pleural effusion/Acites  - management as above  - tylenol 650mg q4 prn for mild pain, toradol 30mg q6 for mod pain, oxycodone 5mg q4 PRN for severe pain and Lidocaine patch q24

## 2019-12-05 NOTE — PROGRESS NOTE ADULT - SUBJECTIVE AND OBJECTIVE BOX
INTERVAL HPI/OVERNIGHT EVENTS:  Patient was seen and examined at bedside. As per nurse and patient, no o/n events, patient resting comfortably. No complaints at this time. Patient denies: fever, chills, dizziness, weakness, HA, Changes in vision, CP, palpitations, SOB, cough, N/V/D/C, dysuria, changes in bowel movements, LE edema. ROS otherwise negative.    VITAL SIGNS:  T(F): 97.6 (12-05-19 @ 05:59)  HR: 118 (12-05-19 @ 05:59)  BP: 130/88 (12-05-19 @ 05:59)  RR: 17 (12-05-19 @ 05:59)  SpO2: 99% (12-05-19 @ 05:59)  Wt(kg): --        PHYSICAL EXAM:    Constitutional: WDWN resting comfortably in bed; NAD  HEENT: NC/AT, PERRL, EOMI, anicteric sclera, no nasal discharge; uvula midline, no oropharyngeal erythema or exudates; MMM  Neck: supple; no JVD or thyromegaly  Respiratory: Complete absence of breath sounds in right lung, decrease tactile fremitus right lung base, CTA Left lung; no W/R/R, no retractions  Cardiac: +S1/S2; RRR; no M/R/G; PMI non-displaced  Gastrointestinal: obese abdomen, hepatomegaly, NT/ND; no rebound or guarding; +BSx4, no fluid wave   Genitourinary: normal external genitalia  Back: spine midline, no bony tenderness or step-offs; no CVAT B/L  Musculoskeletal: NROM x4; no joint swelling, tenderness or erythema  Vascular: 2+ radial, DP/PT pulses B/L  Dermatologic: skin warm, dry and intact; no rashes, wounds, or scars  Lymphatic: no submandibular or cervical LAD  Neurologic: AAOx3; CNII-XII grossly intact; no focal deficits    MEDICATIONS  (STANDING):  budesonide  80 MICROgram(s)/formoterol 4.5 MICROgram(s) Inhaler 2 Puff(s) Inhalation two times a day  heparin  Infusion. 1600 Unit(s)/Hr (16 mL/Hr) IV Continuous <Continuous>  lidocaine   Patch 1 Patch Transdermal daily  metoprolol tartrate 25 milliGRAM(s) Oral two times a day  montelukast 10 milliGRAM(s) Oral daily  nicotine -  14 mG/24Hr(s) Patch 1 patch Transdermal daily    MEDICATIONS  (PRN):  acetaminophen  Suppository .. 650 milliGRAM(s) Rectal every 4 hours PRN Mild Pain (1 - 3)  albuterol/ipratropium for Nebulization 3 milliLiter(s) Nebulizer every 6 hours PRN Shortness of Breath and/or Wheezing  ketorolac   Injectable 30 milliGRAM(s) IV Push every 6 hours PRN Moderate Pain (4 - 6)  melatonin 5 milliGRAM(s) Oral at bedtime PRN Sleep  ondansetron   Disintegrating Tablet 4 milliGRAM(s) Oral every 8 hours PRN Nausea and/or Vomiting  oxyCODONE    IR 5 milliGRAM(s) Oral every 4 hours PRN Severe Pain (7 - 10)      Allergies    No Known Drug Allergies  shellfish (Hives)    Intolerances        LABS:                        9.8    11.70 )-----------( 536      ( 05 Dec 2019 06:38 )             33.6     12-05    136  |  101  |  21  ----------------------------<  126<H>  3.9   |  21<L>  |  0.86    Ca    9.0      05 Dec 2019 06:38  Phos  3.6     12-04  Mg     2.0     12-05    TPro  6.4  /  Alb  3.4  /  TBili  0.9  /  DBili  x   /  AST  230<H>  /  ALT  325<H>  /  AlkPhos  169<H>  12-05    PT/INR - ( 05 Dec 2019 06:38 )   PT: 21.9 sec;   INR: 1.90          PTT - ( 05 Dec 2019 06:38 )  PTT:130.3 sec  Urinalysis Basic - ( 04 Dec 2019 16:04 )    Color: Yellow / Appearance: Clear / SG: >=1.030 / pH: x  Gluc: x / Ketone: NEGATIVE  / Bili: Negative / Urobili: 0.2 E.U./dL   Blood: x / Protein: 100 mg/dL / Nitrite: NEGATIVE   Leuk Esterase: NEGATIVE / RBC: < 5 /HPF / WBC 5-10 /HPF   Sq Epi: x / Non Sq Epi: 5-10 /HPF / Bacteria: Present /HPF        RADIOLOGY & ADDITIONAL TESTS:  Reviewed

## 2019-12-05 NOTE — PROGRESS NOTE ADULT - PROBLEM SELECTOR PLAN 7
Pt with Hx uterine fibroids, s/p uterine artery embolization with intermittent bleeding since embolization. Currently with active bleed, but like menstrual   - consider ob/gyn consult if worsens

## 2019-12-05 NOTE — PROGRESS NOTE ADULT - PROBLEM SELECTOR PLAN 8
Currently stable, not in exacerbation, not wheezing.  - c/w home advair and montelukast    #Smoking Cessation  16 pack year Hx.   -Counseled on cessation  -nicotine patch 14mg qd    #HIV Screening  -negative

## 2019-12-05 NOTE — PROGRESS NOTE ADULT - PROBLEM SELECTOR PLAN 9
F: none  E: replete K <4, Mg <2  N: DASH diet consistent carb, NPO for imaging  Last BM: 12/3  VTE: Hold Lovenox 40mg qd before procedures, SCDs ordered  GI PPx: not indicated  Sleep Aid: melatonin 5mg PRN  Dispo: [facility]  Code: Full

## 2019-12-05 NOTE — PROGRESS NOTE ADULT - PROBLEM SELECTOR PLAN 3
Pt with PMH of asthma. Home meds include advair and montelukast. Not in exacerbation; can continue home inhalers.

## 2019-12-05 NOTE — PROGRESS NOTE ADULT - ASSESSMENT
Ms. Etienne 39F with DM, HTN, HLD MHx of uterine fibroids s/p bilateral uterine artery embolization (8/2019) who presented with 3 months progressive shortness of breath, found to have large R pleural effusion and incidental abdominal mass being admitted for evaluation.

## 2019-12-05 NOTE — CONSULT NOTE ADULT - ASSESSMENT
Assesment:    40 y/o F active smoker with a PMHx of DM, HTN, HLD, uterine fibroids s/p bilateral uterine artery embolization in 8/2019, pityriasis rosea, and herpes who presented on 12/4/19 with progressive shortness of breath over the past 3-4 months and was found to have a large mass within the IVC extending to the right atrial junction.    Plan:  Problem 1: IVC mass   - resection will require coordinated multidisciplinary approach with general surgery, vascular surgery and cardiac surgery   - resection may require cardiopulmonary bypass   - will need PFTS, cardiac cath, and carotid ultrasound to complete pre-op workup    Problem 2: right pleural effusion   - pulm recs appreciated, plan on throacentesis today   - please send pleural fluid for cytology    Problem 3: infrarenal IVC thrombus   - likely provoked by obstruction of flow from IVC mass   - no clinical or radiographic evidence of central PE   - c/w heparin gtt    Problem 4: Active smoker   - will need pre-op PFTs and room air abg to assess baseline lung function pre-op   - c/w nicotine patch   - provide smoking cessation resources on discharge    I have reviewed clinical labs tests and reports, radiology tests and reports, as well as old patient medical records, and discussed with the refering physician.

## 2019-12-05 NOTE — PROGRESS NOTE ADULT - PROBLEM SELECTOR PLAN 4
Home Meds: Lisinopril 20mg, Metoprolol tartrate 50mg BID  -will hold Lisinopril in setting of normotension and upcoming IV contrast  -resume toprol xl 50mg in setting of tachycardia

## 2019-12-05 NOTE — CONSULT NOTE ADULT - SUBJECTIVE AND OBJECTIVE BOX
Hematology Oncology Consult Note (Dr. Stevens )    39F with a PMHx of DM, HTN, HLD, uterine fibroids s/p bilateral uterine artery embolization in 8/2019, pityriasis rosea, herpes who presents with shortness of breath. Pt states her SOB has been progressive, and she can now only takes a few steps before she needs to rest. She has had associated substernal chest pressure that is non-radiating and varies in intensity. The chest pressure is worst when she lays on her left side. She has also had intermittent R sided flank/lower back pain that is intermittent, pounding in nature, and worse with food. She has experienced intermittent vaginal bleeding since her uterine artery embolization, and received 1 unit of blood at [facility] last month, but denies current heavy bleeding. She has experienced some mild night sweats and decreased appetite along with constipation, increased dizziness and lightheadedness, especially with ambulation, but denies fevers, chills, abdominal pain, n/v/d. She has a family history of bullous pemphigoid and colon cancer in her mother, and pancreatic and breast cancer on her father's side. She drinks alcohol socially, is an active tobacco user, and denies illicit drug use. She reports that she had a pap smear done earlier this year that was normal. States she had a mammogram done earlier this year, but is unaware of the results.     CT A/P negative for renal/ureteral calculi/hydronephrosis. Positive for large soft tissue density in area of pancreatic head/duodenum. May represent pancreatitis vs mass lesion.    CT chest showing large Right pleural effusion with adjacent compressive atelectasis.      PAST MEDICAL & SURGICAL HISTORY:  Uterine fibroid  Hyperlipidemia  Asthma  Diabetes  Hypertension  No significant past surgical history      Allergies: NKDA       Medications:  heparin  Infusion. 1600 Unit(s)/Hr IV Continuous <Continuous>  MEDICATIONS  (STANDING):  budesonide  80 MICROgram(s)/formoterol 4.5 MICROgram(s) Inhaler 2 Puff(s) Inhalation two times a day  diazepam    Tablet 2 milliGRAM(s) Oral once  heparin  Infusion. 1600 Unit(s)/Hr (16 mL/Hr) IV Continuous <Continuous>  lidocaine   Patch 1 Patch Transdermal daily  metoprolol tartrate 50 milliGRAM(s) Oral every 12 hours  montelukast 10 milliGRAM(s) Oral daily  nicotine -  14 mG/24Hr(s) Patch 1 patch Transdermal daily    MEDICATIONS  (PRN):  acetaminophen  Suppository .. 650 milliGRAM(s) Rectal every 4 hours PRN Mild Pain (1 - 3)  albuterol/ipratropium for Nebulization 3 milliLiter(s) Nebulizer every 6 hours PRN Shortness of Breath and/or Wheezing  ketorolac   Injectable 30 milliGRAM(s) IV Push every 6 hours PRN Moderate Pain (4 - 6)  melatonin 5 milliGRAM(s) Oral at bedtime PRN Sleep  ondansetron   Disintegrating Tablet 4 milliGRAM(s) Oral every 8 hours PRN Nausea and/or Vomiting  oxyCODONE    IR 5 milliGRAM(s) Oral every 4 hours PRN Severe Pain (7 - 10)      Social History: Social alcohol, 1PPD tobacco user (20 pack years), former marijuana user. Currently not employed.    FAMILY HISTORY: hx of colon ca in mother, breast ca on fathers side, no hx of pE/DVT       PHYSICAL EXAM:    T(F): 97.6 (12-05-19 @ 05:59), Max: 98.5 (12-04-19 @ 19:10)  HR: 118 (12-05-19 @ 05:59) (83 - 118)  BP: 130/88 (12-05-19 @ 05:59) (115/88 - 130/88)  RR: 17 (12-05-19 @ 05:59) (17 - 18)  SpO2: 99% (12-05-19 @ 05:59) (96% - 99%)  Wt(kg): --    Daily Height in cm: 165.1 (04 Dec 2019 20:00)    Daily     GEN: NAD  HEENT: AT/NC, EOMI  NECK: Supple  CVS: +S1S2  LUNG: CTA /Bl  ABD: +BS, NIH  EXT: no c/c/e  NEURO: aaox3       Labs:                          9.8    11.70 )-----------( 536      ( 05 Dec 2019 06:38 )             33.6     CBC Full  -  ( 05 Dec 2019 06:38 )  WBC Count : 11.70 K/uL  RBC Count : 4.85 M/uL  Hemoglobin : 9.8 g/dL  Hematocrit : 33.6 %  Platelet Count - Automated : 536 K/uL  Mean Cell Volume : 69.3 fl  Mean Cell Hemoglobin : 20.2 pg  Mean Cell Hemoglobin Concentration : 29.2 gm/dL  Auto Neutrophil # : x  Auto Lymphocyte # : x  Auto Monocyte # : x  Auto Eosinophil # : x  Auto Basophil # : x  Auto Neutrophil % : x  Auto Lymphocyte % : x  Auto Monocyte % : x  Auto Eosinophil % : x  Auto Basophil % : x    PT/INR - ( 05 Dec 2019 06:38 )   PT: 21.9 sec;   INR: 1.90          PTT - ( 05 Dec 2019 06:38 )  PTT:130.3 sec    12-05    136  |  101  |  21  ----------------------------<  126<H>  3.9   |  21<L>  |  0.86    Ca    9.0      05 Dec 2019 06:38  Phos  3.6     12-04  Mg     2.0     12-05    TPro  6.4  /  Alb  3.4  /  TBili  0.9  /  DBili  x   /  AST  230<H>  /  ALT  325<H>  /  AlkPhos  169<H>  12-05      Urinalysis Basic - ( 04 Dec 2019 16:04 )    Color: Yellow / Appearance: Clear / SG: >=1.030 / pH: x  Gluc: x / Ketone: NEGATIVE  / Bili: Negative / Urobili: 0.2 E.U./dL   Blood: x / Protein: 100 mg/dL / Nitrite: NEGATIVE   Leuk Esterase: NEGATIVE / RBC: < 5 /HPF / WBC 5-10 /HPF   Sq Epi: x / Non Sq Epi: 5-10 /HPF / Bacteria: Present /HPF    12/4 CT A/P   LOWER CHEST: Large right pleural effusion and mediastinal shift.    ABDOMEN:  LIVER: Within normal limits  BILE DUCTS: Normal caliber  GALLBLADDER: No calcified gallstones. Normal caliber wall  PANCREAS: Within normal limits  SPLEEN: Within normal limits  ADRENALS: Within normal limits  KIDNEYS: Within normal limits  PELVIS:  REPRODUCTIVE ORGANS: Multi fibroid uterus. Left ovarian cysts.  BLADDER: Within normal limits  PERITONEUM:No ascites, no free air.  BOWEL: Within normal limits.  VESSELS: Expansile enhancing mass within the intrahepatic IVC extending   proximally to the right atrial junction and distally to the renal veins.   Diminished opacification of the infrarenal IVC and left common iliac   vein, suspect combination of bland thrombus and mixing artifact.  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  Large right pleural effusion.        CT Chest non Con 12/4  Impression: 1. Since 8/23/2019, there is a new large right pleural   effusion causing compressive atelectasis of the entire right lower lobe   and mild shift of the heart and mediastinum to the left.    2. There are a few nodules in the left lung, the largest measuring 5 mm   in the left upper lobe. These nodules have nonspecific appearances. They   could be benign or malignant.    3. Large right upper quadrant mass identified on CT scan of abdomen and   pelvis of 12/4/2019 is partially visualized. It is consistent with   neoplasm.

## 2019-12-05 NOTE — PROGRESS NOTE ADULT - PROBLEM SELECTOR PLAN 10
1) PCP- [facility]- need number from patient, unable to google   2) Date of Contact with PCP: TBD  3) PCP Contacted at Discharge: TBD  4) Summary of Handoff Given to PCP: TBD  5) Post-Discharge Appointment Date and Location: TBD

## 2019-12-06 LAB
ALBUMIN SERPL ELPH-MCNC: 3.1 G/DL — LOW (ref 3.3–5)
ALP SERPL-CCNC: 152 U/L — HIGH (ref 40–120)
ALT FLD-CCNC: 284 U/L — HIGH (ref 10–45)
ANION GAP SERPL CALC-SCNC: 12 MMOL/L — SIGNIFICANT CHANGE UP (ref 5–17)
APTT BLD: 71.7 SEC — HIGH (ref 27.5–36.3)
APTT BLD: 88.2 SEC — HIGH (ref 27.5–36.3)
APTT BLD: 88.3 SEC — HIGH (ref 27.5–36.3)
AST SERPL-CCNC: 141 U/L — HIGH (ref 10–40)
B PERT IGG+IGM PNL SER: CLEAR — SIGNIFICANT CHANGE UP
BILIRUB SERPL-MCNC: 0.9 MG/DL — SIGNIFICANT CHANGE UP (ref 0.2–1.2)
BUN SERPL-MCNC: 19 MG/DL — SIGNIFICANT CHANGE UP (ref 7–23)
CALCIUM SERPL-MCNC: 8.7 MG/DL — SIGNIFICANT CHANGE UP (ref 8.4–10.5)
CHLORIDE SERPL-SCNC: 102 MMOL/L — SIGNIFICANT CHANGE UP (ref 96–108)
CHOLEST FLD-MCNC: 37 MG/DL — SIGNIFICANT CHANGE UP
CO2 SERPL-SCNC: 23 MMOL/L — SIGNIFICANT CHANGE UP (ref 22–31)
COLOR FLD: YELLOW — SIGNIFICANT CHANGE UP
CREAT SERPL-MCNC: 0.72 MG/DL — SIGNIFICANT CHANGE UP (ref 0.5–1.3)
FLUID INTAKE SUBSTANCE CLASS: SIGNIFICANT CHANGE UP
FLUID SEGMENTED GRANULOCYTES: 23 % — SIGNIFICANT CHANGE UP
GLUCOSE BLDC GLUCOMTR-MCNC: 136 MG/DL — HIGH (ref 70–99)
GLUCOSE BLDC GLUCOMTR-MCNC: 159 MG/DL — HIGH (ref 70–99)
GLUCOSE BLDC GLUCOMTR-MCNC: 160 MG/DL — HIGH (ref 70–99)
GLUCOSE SERPL-MCNC: 172 MG/DL — HIGH (ref 70–99)
GRAM STN FLD: SIGNIFICANT CHANGE UP
HCT VFR BLD CALC: 29.2 % — LOW (ref 34.5–45)
HGB BLD-MCNC: 9 G/DL — LOW (ref 11.5–15.5)
INR BLD: 1.7 — HIGH (ref 0.88–1.16)
LYMPHOCYTES # FLD: 44 % — SIGNIFICANT CHANGE UP
MAGNESIUM SERPL-MCNC: 1.7 MG/DL — SIGNIFICANT CHANGE UP (ref 1.6–2.6)
MCHC RBC-ENTMCNC: 20.5 PG — LOW (ref 27–34)
MCHC RBC-ENTMCNC: 30.8 GM/DL — LOW (ref 32–36)
MCV RBC AUTO: 66.5 FL — LOW (ref 80–100)
MESOTHL CELL # FLD: 3 % — SIGNIFICANT CHANGE UP
MONOS+MACROS # FLD: 30 % — SIGNIFICANT CHANGE UP
NRBC # BLD: 0 /100 WBCS — SIGNIFICANT CHANGE UP (ref 0–0)
PLATELET # BLD AUTO: 420 K/UL — HIGH (ref 150–400)
POTASSIUM SERPL-MCNC: 3.5 MMOL/L — SIGNIFICANT CHANGE UP (ref 3.5–5.3)
POTASSIUM SERPL-SCNC: 3.5 MMOL/L — SIGNIFICANT CHANGE UP (ref 3.5–5.3)
PROT SERPL-MCNC: 5.9 G/DL — LOW (ref 6–8.3)
PROTHROM AB SERPL-ACNC: 19.5 SEC — HIGH (ref 10–12.9)
RBC # BLD: 4.39 M/UL — SIGNIFICANT CHANGE UP (ref 3.8–5.2)
RBC # FLD: 18.9 % — HIGH (ref 10.3–14.5)
RCV VOL RI: 4 /UL — HIGH (ref 0–0)
SODIUM SERPL-SCNC: 137 MMOL/L — SIGNIFICANT CHANGE UP (ref 135–145)
SPECIMEN SOURCE FLD: SIGNIFICANT CHANGE UP
SPECIMEN SOURCE: SIGNIFICANT CHANGE UP
TOTAL NUCLEATED CELL COUNT, BODY FLUID: 104 /UL — SIGNIFICANT CHANGE UP
TUBE TYPE: SIGNIFICANT CHANGE UP
WBC # BLD: 12.86 K/UL — HIGH (ref 3.8–10.5)
WBC # FLD AUTO: 12.86 K/UL — HIGH (ref 3.8–10.5)

## 2019-12-06 PROCEDURE — 99233 SBSQ HOSP IP/OBS HIGH 50: CPT | Mod: GC

## 2019-12-06 PROCEDURE — 99231 SBSQ HOSP IP/OBS SF/LOW 25: CPT

## 2019-12-06 PROCEDURE — 99232 SBSQ HOSP IP/OBS MODERATE 35: CPT

## 2019-12-06 RX ORDER — DEXTROSE 50 % IN WATER 50 %
12.5 SYRINGE (ML) INTRAVENOUS ONCE
Refills: 0 | Status: DISCONTINUED | OUTPATIENT
Start: 2019-12-06 | End: 2019-12-10

## 2019-12-06 RX ORDER — INSULIN LISPRO 100/ML
VIAL (ML) SUBCUTANEOUS
Refills: 0 | Status: DISCONTINUED | OUTPATIENT
Start: 2019-12-06 | End: 2019-12-08

## 2019-12-06 RX ORDER — HEPARIN SODIUM 5000 [USP'U]/ML
1600 INJECTION INTRAVENOUS; SUBCUTANEOUS
Qty: 25000 | Refills: 0 | Status: DISCONTINUED | OUTPATIENT
Start: 2019-12-06 | End: 2019-12-07

## 2019-12-06 RX ORDER — DEXTROSE 50 % IN WATER 50 %
15 SYRINGE (ML) INTRAVENOUS ONCE
Refills: 0 | Status: DISCONTINUED | OUTPATIENT
Start: 2019-12-06 | End: 2019-12-10

## 2019-12-06 RX ORDER — SENNA PLUS 8.6 MG/1
1 TABLET ORAL AT BEDTIME
Refills: 0 | Status: DISCONTINUED | OUTPATIENT
Start: 2019-12-06 | End: 2019-12-10

## 2019-12-06 RX ORDER — GLUCAGON INJECTION, SOLUTION 0.5 MG/.1ML
1 INJECTION, SOLUTION SUBCUTANEOUS ONCE
Refills: 0 | Status: DISCONTINUED | OUTPATIENT
Start: 2019-12-06 | End: 2019-12-10

## 2019-12-06 RX ORDER — FERROUS SULFATE 325(65) MG
325 TABLET ORAL DAILY
Refills: 0 | Status: DISCONTINUED | OUTPATIENT
Start: 2019-12-06 | End: 2019-12-10

## 2019-12-06 RX ORDER — DEXTROSE 50 % IN WATER 50 %
25 SYRINGE (ML) INTRAVENOUS ONCE
Refills: 0 | Status: DISCONTINUED | OUTPATIENT
Start: 2019-12-06 | End: 2019-12-10

## 2019-12-06 RX ORDER — SODIUM CHLORIDE 9 MG/ML
1000 INJECTION, SOLUTION INTRAVENOUS
Refills: 0 | Status: DISCONTINUED | OUTPATIENT
Start: 2019-12-06 | End: 2019-12-10

## 2019-12-06 RX ORDER — POLYETHYLENE GLYCOL 3350 17 G/17G
17 POWDER, FOR SOLUTION ORAL DAILY
Refills: 0 | Status: DISCONTINUED | OUTPATIENT
Start: 2019-12-06 | End: 2019-12-10

## 2019-12-06 RX ORDER — SODIUM CHLORIDE 9 MG/ML
1000 INJECTION INTRAMUSCULAR; INTRAVENOUS; SUBCUTANEOUS
Refills: 0 | Status: DISCONTINUED | OUTPATIENT
Start: 2019-12-06 | End: 2019-12-06

## 2019-12-06 RX ORDER — HEPARIN SODIUM 5000 [USP'U]/ML
1600 INJECTION INTRAVENOUS; SUBCUTANEOUS
Qty: 25000 | Refills: 0 | Status: DISCONTINUED | OUTPATIENT
Start: 2019-12-06 | End: 2019-12-06

## 2019-12-06 RX ORDER — POTASSIUM CHLORIDE 20 MEQ
40 PACKET (EA) ORAL ONCE
Refills: 0 | Status: COMPLETED | OUTPATIENT
Start: 2019-12-06 | End: 2019-12-06

## 2019-12-06 RX ADMIN — Medication 325 MILLIGRAM(S): at 17:39

## 2019-12-06 RX ADMIN — HEPARIN SODIUM 16 UNIT(S)/HR: 5000 INJECTION INTRAVENOUS; SUBCUTANEOUS at 17:41

## 2019-12-06 RX ADMIN — HEPARIN SODIUM 16 UNIT(S)/HR: 5000 INJECTION INTRAVENOUS; SUBCUTANEOUS at 09:30

## 2019-12-06 RX ADMIN — SODIUM CHLORIDE 100 MILLILITER(S): 9 INJECTION INTRAMUSCULAR; INTRAVENOUS; SUBCUTANEOUS at 00:30

## 2019-12-06 RX ADMIN — Medication 1 PATCH: at 07:00

## 2019-12-06 RX ADMIN — Medication 1 PATCH: at 12:10

## 2019-12-06 RX ADMIN — Medication 50 MILLIGRAM(S): at 17:39

## 2019-12-06 RX ADMIN — MONTELUKAST 10 MILLIGRAM(S): 4 TABLET, CHEWABLE ORAL at 12:08

## 2019-12-06 RX ADMIN — Medication 0.25 MILLIGRAM(S): at 16:04

## 2019-12-06 RX ADMIN — Medication 1 PATCH: at 20:41

## 2019-12-06 RX ADMIN — Medication 0.25 MILLIGRAM(S): at 00:30

## 2019-12-06 RX ADMIN — Medication 50 MILLIGRAM(S): at 05:58

## 2019-12-06 RX ADMIN — Medication 10 MILLIGRAM(S): at 00:30

## 2019-12-06 RX ADMIN — HEPARIN SODIUM 16 UNIT(S)/HR: 5000 INJECTION INTRAVENOUS; SUBCUTANEOUS at 21:19

## 2019-12-06 RX ADMIN — BUDESONIDE AND FORMOTEROL FUMARATE DIHYDRATE 2 PUFF(S): 160; 4.5 AEROSOL RESPIRATORY (INHALATION) at 22:49

## 2019-12-06 RX ADMIN — Medication 40 MILLIEQUIVALENT(S): at 09:29

## 2019-12-06 RX ADMIN — BUDESONIDE AND FORMOTEROL FUMARATE DIHYDRATE 2 PUFF(S): 160; 4.5 AEROSOL RESPIRATORY (INHALATION) at 12:07

## 2019-12-06 RX ADMIN — Medication 1 PATCH: at 12:07

## 2019-12-06 NOTE — PROVIDER CONTACT NOTE (OTHER) - ACTION/TREATMENT ORDERED:
Md Narvaez aware.will adjust heparin drip
Heparin drip restarted @ 16ml/hr. Will cont to monitor closely .

## 2019-12-06 NOTE — PROGRESS NOTE ADULT - ASSESSMENT
39F with hx of iron def anemia, uterine fibroids s/p bilateral uterine artery embolization (2019), with fatigue/, found to have large R pleural effusion and incidental large intrahepatic IVC mass, currently being worked up.       #Intrahepatic IVC mass w/ extension   #Large R pleural effusion   #ID Anemia     -awaiting pleural fluid cytology, concern for malignancy including sarcoma, will send for molecular profiling, NGS, concern for stage IV disease   -per vascular patient tentatively planned for OR Monday for Biopsy, plan for further surgical management between hepatobiliary and vascular service   -reviewed tumor markers, which remain WNL, LDH of 753 noted, obtain uric acid  -per vascular remains on hep gtt  -awaiting pathologic confirmation, in interim will reach out sarcoma specialist for f/u   -start iron supplementation   -dvt ppx on hep gtt    d/w Dr. Stevens

## 2019-12-06 NOTE — PROGRESS NOTE ADULT - ASSESSMENT
39F smoker with a PMHx of DM, HTN, HLD, uterine fibroids s/p bilateral uterine artery embolization in 8/2019, pityriasis rosea, herpes who presents with worsening shortness of breath found to have pleural effusion with incidental image finding of RUQ abdominal mass.      #Abdominal Mass  Large 9 cm abdominal mass that extends from IVC with possible extension of the duodenum and liver.  Imaging without contrast and limited evaluation.  Patient otherwise with fam hx of cancer and concerning for neoplasm. Repeat imaging now demonstrates mass within intrahepatic IVC extending proximally to the right atrial junction and distally to the renal vein, w/ suspicion of thrombus.  Tumor marker otherwise unremarkable.  -Continue management per primary team    #Elevated liver biochemistries  LFT uptrending since admission with examination notable for anterior displacement of liver by abdominal mass.  Suspect mass effect and would continue to trend closely.  Patient is alert and oriented, lowering concern for acute liver failure.    -Trend LFT and Coag    #Fam hx of CRC  Reported fam hx of CRC in mother around 40s, would benefit from earlier CRC screening  -Outpatient followup    GI will sign off, please reconsult if lab is persistently elevated or if patient develops new GI symptoms or change in mentation    At discharge, please schedule patient for follow-up at the [facility] on the fourth floor of [address] on Monday afternoon with Dr. Lynch at [phone]    [place] [facility] [place] 39[place] smoker with a PMHx of DM, HTN, HLD, uterine fibroids s/p bilateral uterine artery embolization in 8/2019, pityriasis rosea, herpes who presents with worsening shortness of breath found to have pleural effusion with incidental image finding of RUQ abdominal mass.      #Abdominal Mass  Large 9 cm abdominal mass that extends from IVC with possible extension of the duodenum and liver.  Imaging without contrast and limited evaluation.  Patient otherwise with fam hx of cancer and concerning for neoplasm. Repeat imaging now demonstrates mass within intrahepatic IVC extending proximally to the right atrial junction and distally to the renal vein, w/ suspicion of thrombus.  Tumor marker otherwise unremarkable.  -Continue management per primary team    #Elevated liver biochemistries  LFT now downtrending.  Suspect mass effect given prior normal lft in August with notable anterior displacement of the liver by abdominal mass.  Patient is alert and oriented, lowering concern for acute liver failure.    -Trend LFT and Coag    #Fam hx of CRC  Reported fam hx of CRC in mother around 40s, would benefit from earlier CRC screening  -Outpatient followup    GI will sign off, please reconsult if lab is persistently elevated or if patient develops new GI symptoms or change in mentation    At discharge, please schedule patient for follow-up at the [facility] on the fourth floor of [address] on Monday afternoon with Dr. Lynch at [phone]    Case d/w [facility] [place] [facility]

## 2019-12-06 NOTE — PROGRESS NOTE ADULT - SUBJECTIVE AND OBJECTIVE BOX
O/n: URBAN, ptt 71 dose unchanged, preopped for OR  Hep Gtt, stopped at 5am                                39F smoker with a PMHx of DM, HTN, HLD, s/p bilateral uterine artery embolization in 8/2019 due to fibroids now with 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    -NPO  - On call to OR for Bx of Mass O/n: URBAN, ptt 71 dose unchanged, preopped for OR  Hep Gtt, stopped at 5am    SUBJECTIVE: Patient seen and examined by chief resident on morning rounds. Resting comfortably in bed. Still feels short of breath at times but it has improved from before. Does not wish to go to the OR today for biopsy. Wants to speak with the team further about the plan.       Vital Signs Last 24 Hrs  T(C): 36.8 (06 Dec 2019 05:40), Max: 36.8 (06 Dec 2019 05:40)  T(F): 98.3 (06 Dec 2019 05:40), Max: 98.3 (06 Dec 2019 05:40)  HR: 92 (06 Dec 2019 05:57) (90 - 94)  BP: 130/74 (06 Dec 2019 05:57) (130/74 - 158/85)  BP(mean): --  RR: 16 (06 Dec 2019 05:57) (16 - 16)  SpO2: 96% (06 Dec 2019 05:57) (96% - 98%)    Physical Exam:  General: NAD  Pulmonary: Nonlabored breathing, no respiratory distress, absent breath sounds on the right   Abdominal: soft, nondistended, nontender with no rebound or guarding  Extremities: WWP, normal strength, no clubbing/cyanosis/edema  Neuro: A/O x3    Lines/drains/tubes:    I&O's Summary    05 Dec 2019 07:01  -  06 Dec 2019 06:49  --------------------------------------------------------  IN: 248 mL / OUT: 0 mL / NET: 248 mL        LABS:                        9.0    12.86 )-----------( 420      ( 06 Dec 2019 04:01 )             29.2     12-06    137  |  102  |  19  ----------------------------<  172<H>  3.5   |  23  |  0.72    Ca    8.7      06 Dec 2019 04:01  Mg     1.7     12-06    TPro  5.9<L>  /  Alb  3.1<L>  /  TBili  0.9  /  DBili  x   /  AST  141<H>  /  ALT  284<H>  /  AlkPhos  152<H>  12-06    PT/INR - ( 05 Dec 2019 06:38 )   PT: 21.9 sec;   INR: 1.90          PTT - ( 06 Dec 2019 04:01 )  PTT:71.7 sec  Urinalysis Basic - ( 04 Dec 2019 16:04 )    Color: Yellow / Appearance: Clear / SG: >=1.030 / pH: x  Gluc: x / Ketone: NEGATIVE  / Bili: Negative / Urobili: 0.2 E.U./dL   Blood: x / Protein: 100 mg/dL / Nitrite: NEGATIVE   Leuk Esterase: NEGATIVE / RBC: < 5 /HPF / WBC 5-10 /HPF   Sq Epi: x / Non Sq Epi: 5-10 /HPF / Bacteria: Present /HPF      CAPILLARY BLOOD GLUCOSE      POCT Blood Glucose.: 174 mg/dL (05 Dec 2019 22:25)    LIVER FUNCTIONS - ( 06 Dec 2019 04:01 )  Alb: 3.1 g/dL / Pro: 5.9 g/dL / ALK PHOS: 152 U/L / ALT: 284 U/L / AST: 141 U/L / GGT: x             RADIOLOGY & ADDITIONAL STUDIES:      39F smoker with a PMHx of DM, HTN, HLD, s/p bilateral uterine artery embolization in 8/2019 due to fibroids now with 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    -NPO  -heparin drip stopped in preparation for OR, will need to be restarted if patient wishes not to go to the OR   -On call to OR for Bx of Mass, unsure if she will go   -preopped

## 2019-12-06 NOTE — PROGRESS NOTE ADULT - PROBLEM SELECTOR PLAN 1
Large, R sided pleural effusion now s/p thoracentesis 12/5 with removal of 1800cc of serous appearing fluid. No effusion present on L side.   Of significant concern is the concomitant finding of abdominal/IVC mass on CT scan.     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 However it's unusual for sarcoma metastasis to present with only pleural effusion.      Fluid analysis shows a exudate, but only slightly by protien criterea ( as ratio is .51 ). Cholesterol and LDH are rather low for a malignant effusion. It is however lymphocyte predominant. Awaiting cytology results. Suspicion remains high for malignant effusion.     Recommendations:  - F/u pleural fluid cytology  - would recommend repeat chest CT to evaluate for intra-parenchymal pulmonary mets of the re-expanded lung. Large, R sided pleural effusion now s/p thoracentesis 12/5 with removal of 1800cc of serous appearing fluid. No effusion present on L side.   Of significant concern is the concomitant finding of abdominal/IVC mass on CT scan.     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 However it's unusual for sarcoma metastasis to present with only pleural effusion.      Fluid analysis shows a exudate, but only slightly by protein criteria ( as ratio is .51 ). Cholesterol and LDH are rather low for a malignant effusion. It is however lymphocyte predominant. Awaiting cytology results. Suspicion remains high for malignant effusion.     Recommendations:  - F/u pleural fluid cytology  - would recommend repeat chest CT to evaluate for intra-parenchymal pulmonary mets of the re-expanded lung.

## 2019-12-06 NOTE — PROGRESS NOTE ADULT - SUBJECTIVE AND OBJECTIVE BOX
Patient discussed on morning rounds with Dr. Lindsay    Consultation: IVC mass extending into right atrium junction     SUBJECTIVE ASSESSMENT:  39y Female with PMH significant for DM, HTN, HLD, uterine fibrosis with s/p bilateral uterine artery embolization in 8/2019, pityriasis rosea, and herpes presented to CT surgery with recent findings of mass extending from IVC into right atrium with associated worsening symptoms.  At visit, patient is comfortable at visit.  She was scheduled for biopsy today and remain NPO for now.  She denies further SOB, chest pain, palpitation or LE edema.      Vital Signs Last 24 Hrs  T(C): 36.3 (06 Dec 2019 22:34), Max: 37.1 (06 Dec 2019 19:07)  T(F): 97.3 (06 Dec 2019 22:34), Max: 98.7 (06 Dec 2019 19:07)  HR: 98 (07 Dec 2019 00:14) (92 - 102)  BP: 142/82 (07 Dec 2019 00:14) (112/73 - 142/88)  BP(mean): 104 (06 Dec 2019 17:31) (104 - 109)  RR: 18 (07 Dec 2019 00:14) (16 - 18)  SpO2: 98% (07 Dec 2019 00:14) (95% - 98%)  I&O's Detail    05 Dec 2019 07:01  -  06 Dec 2019 07:00  --------------------------------------------------------  IN:    heparin Infusion: 48 mL    sodium chloride 0.9%: 200 mL  Total IN: 248 mL    OUT:  Total OUT: 0 mL    Total NET: 248 mL      06 Dec 2019 07:01  -  07 Dec 2019 01:02  --------------------------------------------------------  IN:    heparin Infusion: 16 mL  Total IN: 16 mL    OUT:  Total OUT: 0 mL    Total NET: 16 mL      CHEST TUBE:  no  RADHA DRAIN:  no  EPICARDIAL WIRES: no  TIE DOWNS: no  NICKERSON: no    PHYSICAL EXAM:    General: NAD    Neurological: grossly intact     Cardiovascular: RRR without murmur     Respiratory: no wheezing and no rhonchi     Gastrointestinal: BM and BS are intact     Extremities: slight increase of edema bilaterally     Vascular: pulses are intact bilaterally     Incision Sites: n/a    LABS:                        9.0    12.86 )-----------( 420      ( 06 Dec 2019 04:01 )             29.2       COUMADIN: no    PT/INR - ( 06 Dec 2019 20:36 )   PT: 19.5 sec;   INR: 1.70     PTT - ( 06 Dec 2019 20:36 )  PTT:88.3 sec    12-06    137  |  102  |  19  ----------------------------<  172<H>  3.5   |  23  |  0.72    Ca    8.7      06 Dec 2019 04:01  Mg     1.7     12-06    TPro  5.9<L>  /  Alb  3.1<L>  /  TBili  0.9  /  DBili  x   /  AST  141<H>  /  ALT  284<H>  /  AlkPhos  152<H>  12-06    MEDICATIONS  (STANDING):  budesonide  80 MICROgram(s)/formoterol 4.5 MICROgram(s) Inhaler 2 Puff(s) Inhalation two times a day  dextrose 5%. 1000 milliLiter(s) (50 mL/Hr) IV Continuous <Continuous>  dextrose 50% Injectable 12.5 Gram(s) IV Push once  dextrose 50% Injectable 25 Gram(s) IV Push once  dextrose 50% Injectable 25 Gram(s) IV Push once  ferrous    sulfate 325 milliGRAM(s) Oral daily  heparin  Infusion 1600 Unit(s)/Hr (16 mL/Hr) IV Continuous <Continuous>  insulin lispro (HumaLOG) corrective regimen sliding scale   SubCutaneous Before meals and at bedtime  lidocaine   Patch 1 Patch Transdermal daily  melatonin 10 milliGRAM(s) Oral at bedtime  metoprolol tartrate 50 milliGRAM(s) Oral every 12 hours  montelukast 10 milliGRAM(s) Oral daily  nicotine -  14 mG/24Hr(s) Patch 1 patch Transdermal daily  senna 1 Tablet(s) Oral at bedtime    MEDICATIONS  (PRN):  acetaminophen  Suppository .. 650 milliGRAM(s) Rectal every 4 hours PRN Mild Pain (1 - 3)  albuterol/ipratropium for Nebulization 3 milliLiter(s) Nebulizer every 6 hours PRN Shortness of Breath and/or Wheezing  ALPRAZolam 0.25 milliGRAM(s) Oral every 12 hours PRN anxiety/insomnia  dextrose 40% Gel 15 Gram(s) Oral once PRN Blood Glucose LESS THAN 70 milliGRAM(s)/deciliter  glucagon  Injectable 1 milliGRAM(s) IntraMuscular once PRN Glucose LESS THAN 70 milligrams/deciliter  ketorolac   Injectable 30 milliGRAM(s) IV Push every 6 hours PRN Moderate Pain (4 - 6)  ondansetron   Disintegrating Tablet 4 milliGRAM(s) Oral every 8 hours PRN Nausea and/or Vomiting  oxyCODONE    IR 5 milliGRAM(s) Oral every 4 hours PRN Severe Pain (7 - 10)  polyethylene glycol 3350 17 Gram(s) Oral daily PRN Constipation        RADIOLOGY & ADDITIONAL TESTS:  CXR  n/a  carotid   < from: US Duplex Carotid Arteries Complete, Bilateral (12.05.19 @ 20:05) >  Low PSV in the bilateral mid ICA with spectral broadening, without any   evidence of luminal narrowing or plaque. These findings may be spurious   and due to technique, would consider repeating the study if there is   further clinical concern.     Echocardiogram   < from: Echocardiogram (12.05.19 @ 10:46) >   1. Normalleft and right ventricular size and function.   2. No significant valvular disease.   3. No evidence of pulmonary hypertension.   4. No pericardial effusion.    CT  < from: CT Abdomen and Pelvis w/ Oral Cont and w/ IV Cont (12.04.19 @ 17:00) >  Large enhancing mass within the intrahepatic IVC 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    Large right pleural effusion.

## 2019-12-06 NOTE — PROGRESS NOTE ADULT - SUBJECTIVE AND OBJECTIVE BOX
Pt seen and examined at bedside.  Afebrile and no acute event overnight.  Resolution of "heartburn" like symptoms.  Tolerated thoracentesis.  Denies fever, chill, chest pain, shortness of breath, abdominal or epigastric pain, nausea, vomiting, hematemesis, diarrhea, constipation, melena, or hematochezia.     Allergies    No Known Drug Allergies  shellfish (Hives)    Intolerances      MEDICATIONS:  MEDICATIONS  (STANDING):  budesonide  80 MICROgram(s)/formoterol 4.5 MICROgram(s) Inhaler 2 Puff(s) Inhalation two times a day  heparin  Infusion 1600 Unit(s)/Hr (16 mL/Hr) IV Continuous <Continuous>  lidocaine   Patch 1 Patch Transdermal daily  melatonin 10 milliGRAM(s) Oral at bedtime  metoprolol tartrate 50 milliGRAM(s) Oral every 12 hours  montelukast 10 milliGRAM(s) Oral daily  nicotine -  14 mG/24Hr(s) Patch 1 patch Transdermal daily    MEDICATIONS  (PRN):  acetaminophen  Suppository .. 650 milliGRAM(s) Rectal every 4 hours PRN Mild Pain (1 - 3)  albuterol/ipratropium for Nebulization 3 milliLiter(s) Nebulizer every 6 hours PRN Shortness of Breath and/or Wheezing  ALPRAZolam 0.25 milliGRAM(s) Oral every 12 hours PRN anxiety/insomnia  ketorolac   Injectable 30 milliGRAM(s) IV Push every 6 hours PRN Moderate Pain (4 - 6)  ondansetron   Disintegrating Tablet 4 milliGRAM(s) Oral every 8 hours PRN Nausea and/or Vomiting  oxyCODONE    IR 5 milliGRAM(s) Oral every 4 hours PRN Severe Pain (7 - 10)    Vital Signs Last 24 Hrs  T(C): 36.6 (06 Dec 2019 08:41), Max: 36.8 (06 Dec 2019 05:40)  T(F): 97.8 (06 Dec 2019 08:41), Max: 98.3 (06 Dec 2019 05:40)  HR: 92 (06 Dec 2019 05:57) (90 - 94)  BP: 130/74 (06 Dec 2019 05:57) (130/74 - 158/85)  BP(mean): --  RR: 16 (06 Dec 2019 05:57) (16 - 16)  SpO2: 96% (06 Dec 2019 05:57) (96% - 98%)    12-05 @ 07:01  -  12-06 @ 07:00  --------------------------------------------------------  IN: 248 mL / OUT: 0 mL / NET: 248 mL      PHYSICAL EXAM:    General: Well developed; well nourished; in no acute distress  HEENT: MMM, conjunctiva and sclera clear  Gastrointestinal: Soft non-tender, fullness around the epigastrium; Normal bowel sounds; No rebound or guarding  Skin: Warm and dry. No obvious rash    LABS:                        9.0    12.86 )-----------( 420      ( 06 Dec 2019 04:01 )             29.2     12-06    137  |  102  |  19  ----------------------------<  172<H>  3.5   |  23  |  0.72    Ca    8.7      06 Dec 2019 04:01  Mg     1.7     12-06    TPro  5.9<L>  /  Alb  3.1<L>  /  TBili  0.9  /  DBili  x   /  AST  141<H>  /  ALT  284<H>  /  AlkPhos  152<H>  12-06    PT/INR - ( 05 Dec 2019 06:38 )   PT: 21.9 sec;   INR: 1.90          PTT - ( 06 Dec 2019 04:01 )  PTT:71.7 sec      Urinalysis Basic - ( 04 Dec 2019 16:04 )    Color: Yellow / Appearance: Clear / SG: >=1.030 / pH: x  Gluc: x / Ketone: NEGATIVE  / Bili: Negative / Urobili: 0.2 E.U./dL   Blood: x / Protein: 100 mg/dL / Nitrite: NEGATIVE   Leuk Esterase: NEGATIVE / RBC: < 5 /HPF / WBC 5-10 /HPF   Sq Epi: x / Non Sq Epi: 5-10 /HPF / Bacteria: Present /HPF                Culture - Fungal, Body Fluid (collected 06 Dec 2019 01:19)  Source: .Body Fluid Pleural Fluid  Preliminary Report (06 Dec 2019 06:19):    Testing in progress    Culture - Body Fluid with Gram Stain (collected 05 Dec 2019 22:05)  Source: .Body Fluid Pleural Fluid  Gram Stain (06 Dec 2019 09:37):    No organisms seen    Rare White blood cells  Preliminary Report (06 Dec 2019 09:37):    No growth to date

## 2019-12-06 NOTE — PROGRESS NOTE ADULT - SUBJECTIVE AND OBJECTIVE BOX
Interval Events:  Patient seen and examined at bedside.    MEDICATIONS:  Pulmonary:  albuterol/ipratropium for Nebulization 3 milliLiter(s) Nebulizer every 6 hours PRN  budesonide  80 MICROgram(s)/formoterol 4.5 MICROgram(s) Inhaler 2 Puff(s) Inhalation two times a day  montelukast 10 milliGRAM(s) Oral daily    Antimicrobials:    Anticoagulants:  heparin  Infusion 1600 Unit(s)/Hr IV Continuous <Continuous>    Cardiac:  metoprolol tartrate 50 milliGRAM(s) Oral every 12 hours    Endocrine:    Allergies    No Known Drug Allergies  shellfish (Hives)    Intolerances        Vital Signs Last 24 Hrs  T(C): 36.6 (06 Dec 2019 08:41), Max: 36.8 (06 Dec 2019 05:40)  T(F): 97.8 (06 Dec 2019 08:41), Max: 98.3 (06 Dec 2019 05:40)  HR: 92 (06 Dec 2019 05:57) (90 - 94)  BP: 130/74 (06 Dec 2019 05:57) (130/74 - 158/85)  BP(mean): --  RR: 16 (06 Dec 2019 05:57) (16 - 16)  SpO2: 96% (06 Dec 2019 05:57) (96% - 98%)    12-05 @ 07:01  -  12-06 @ 07:00  --------------------------------------------------------  IN: 248 mL / OUT: 0 mL / NET: 248 mL          LABS:      CBC Full  -  ( 06 Dec 2019 04:01 )  WBC Count : 12.86 K/uL  RBC Count : 4.39 M/uL  Hemoglobin : 9.0 g/dL  Hematocrit : 29.2 %  Platelet Count - Automated : 420 K/uL  Mean Cell Volume : 66.5 fl  Mean Cell Hemoglobin : 20.5 pg  Mean Cell Hemoglobin Concentration : 30.8 gm/dL  Auto Neutrophil # : x  Auto Lymphocyte # : x  Auto Monocyte # : x  Auto Eosinophil # : x  Auto Basophil # : x  Auto Neutrophil % : x  Auto Lymphocyte % : x  Auto Monocyte % : x  Auto Eosinophil % : x  Auto Basophil % : x    12-06    137  |  102  |  19  ----------------------------<  172<H>  3.5   |  23  |  0.72    Ca    8.7      06 Dec 2019 04:01  Mg     1.7     12-06    TPro  5.9<L>  /  Alb  3.1<L>  /  TBili  0.9  /  DBili  x   /  AST  141<H>  /  ALT  284<H>  /  AlkPhos  152<H>  12-06    PT/INR - ( 05 Dec 2019 06:38 )   PT: 21.9 sec;   INR: 1.90          PTT - ( 06 Dec 2019 04:01 )  PTT:71.7 sec      Urinalysis Basic - ( 04 Dec 2019 16:04 )    Color: Yellow / Appearance: Clear / SG: >=1.030 / pH: x  Gluc: x / Ketone: NEGATIVE  / Bili: Negative / Urobili: 0.2 E.U./dL   Blood: x / Protein: 100 mg/dL / Nitrite: NEGATIVE   Leuk Esterase: NEGATIVE / RBC: < 5 /HPF / WBC 5-10 /HPF   Sq Epi: x / Non Sq Epi: 5-10 /HPF / Bacteria: Present /HPF                RADIOLOGY & ADDITIONAL STUDIES (The following images were personally reviewed): Interval Events:  Patient seen and examined at bedside.    Patient underwent thoracentesis yesterday. Exam improved and SOB improved. She does report occasional SOB still but overall better. Post procedure X-ray with complete reexpansion.     She is upset this morning due to slight change in biopsy approach. Wanted to wait until fluid cytology is back from pleural effusion and I informed her that this would delay her diagnosis and advised her to get the biopsy sooner rather than later. She agreed to think about it further.     MEDICATIONS:  Pulmonary:  albuterol/ipratropium for Nebulization 3 milliLiter(s) Nebulizer every 6 hours PRN  budesonide  80 MICROgram(s)/formoterol 4.5 MICROgram(s) Inhaler 2 Puff(s) Inhalation two times a day  montelukast 10 milliGRAM(s) Oral daily    Antimicrobials:    Anticoagulants:  heparin  Infusion 1600 Unit(s)/Hr IV Continuous <Continuous>    Cardiac:  metoprolol tartrate 50 milliGRAM(s) Oral every 12 hours    Endocrine:    Allergies    No Known Drug Allergies  shellfish (Hives)    Intolerances    Vital Signs Last 24 Hrs  T(C): 36.6 (06 Dec 2019 08:41), Max: 36.8 (06 Dec 2019 05:40)  T(F): 97.8 (06 Dec 2019 08:41), Max: 98.3 (06 Dec 2019 05:40)  HR: 92 (06 Dec 2019 05:57) (90 - 94)  BP: 130/74 (06 Dec 2019 05:57) (130/74 - 158/85)  BP(mean): --  RR: 16 (06 Dec 2019 05:57) (16 - 16)  SpO2: 96% (06 Dec 2019 05:57) (96% - 98%)    12-05 @ 07:01  -  12-06 @ 07:00  --------------------------------------------------------  IN: 248 mL / OUT: 0 mL / NET: 248 mL    Physical Exam  Constitutional: comfortable in bed   Head: NC/AT  Eyes: PERRL, EOMI, anicteric sclera  ENT: no nasal discharge; uvula midline, no oropharyngeal erythema or exudates; MMM  Neck: supple; no JVD or thyromegaly  Respiratory: trace crackles at the R base, otherwise CTA. improved from prior exam.   Cardiac: +S1/S2; RRR  Gastrointestinal: soft, NT/ND  Extremities: WWP, no clubbing or cyanosis; no peripheral edema; RLE healed herpetic lesion covered with bandage  Neurologic: awake and alert; SWANSON    LABS:  CBC Full  -  ( 06 Dec 2019 04:01 )  WBC Count : 12.86 K/uL  RBC Count : 4.39 M/uL  Hemoglobin : 9.0 g/dL  Hematocrit : 29.2 %  Platelet Count - Automated : 420 K/uL  Mean Cell Volume : 66.5 fl  Mean Cell Hemoglobin : 20.5 pg  Mean Cell Hemoglobin Concentration : 30.8 gm/dL    12-06    137  |  102  |  19  ----------------------------<  172<H>  3.5   |  23  |  0.72    Ca    8.7      06 Dec 2019 04:01  Mg     1.7     12-06    TPro  5.9<L>  /  Alb  3.1<L>  /  TBili  0.9  /  DBili  x   /  AST  141<H>  /  ALT  284<H>  /  AlkPhos  152<H>  12-06    PT/INR - ( 05 Dec 2019 06:38 )   PT: 21.9 sec;   INR: 1.90        PTT - ( 06 Dec 2019 04:01 )  PTT:71.7 sec    Urinalysis Basic - ( 04 Dec 2019 16:04 )    Color: Yellow / Appearance: Clear / SG: >=1.030 / pH: x  Gluc: x / Ketone: NEGATIVE  / Bili: Negative / Urobili: 0.2 E.U./dL   Blood: x / Protein: 100 mg/dL / Nitrite: NEGATIVE   Leuk Esterase: NEGATIVE / RBC: < 5 /HPF / WBC 5-10 /HPF   Sq Epi: x / Non Sq Epi: 5-10 /HPF / Bacteria: Present /HPF    RADIOLOGY & ADDITIONAL STUDIES (The following images were personally reviewed):  reviewed.

## 2019-12-06 NOTE — PROGRESS NOTE ADULT - ASSESSMENT
38yo F w/ PMH DM, HTN, HLD, uterine fibroids s/p bilateral uterine artery embolization in 8/2019 who presents with shortness of breath, found to have large R pleural effusion and incidental pancreatic/duodenal soft tissue mass. Pulmonary consulted for R effusion.

## 2019-12-06 NOTE — PROGRESS NOTE ADULT - SUBJECTIVE AND OBJECTIVE BOX
Heme/Onc Progress Note (Dr. Stevens)    Interval History: Pt seen and examined. Pt not to go to OR for biopsy today. No fevers overnight. No new bleeding or bruising. Pt remains on Hep gtt.     Allergies    No Known Drug Allergies  shellfish (Hives)    Intolerances      Medications:  MEDICATIONS  (STANDING):  budesonide  80 MICROgram(s)/formoterol 4.5 MICROgram(s) Inhaler 2 Puff(s) Inhalation two times a day  dextrose 5%. 1000 milliLiter(s) (50 mL/Hr) IV Continuous <Continuous>  dextrose 50% Injectable 12.5 Gram(s) IV Push once  dextrose 50% Injectable 25 Gram(s) IV Push once  dextrose 50% Injectable 25 Gram(s) IV Push once  heparin  Infusion 1600 Unit(s)/Hr (16 mL/Hr) IV Continuous <Continuous>  insulin lispro (HumaLOG) corrective regimen sliding scale   SubCutaneous Before meals and at bedtime  lidocaine   Patch 1 Patch Transdermal daily  melatonin 10 milliGRAM(s) Oral at bedtime  metoprolol tartrate 50 milliGRAM(s) Oral every 12 hours  montelukast 10 milliGRAM(s) Oral daily  nicotine -  14 mG/24Hr(s) Patch 1 patch Transdermal daily    MEDICATIONS  (PRN):  acetaminophen  Suppository .. 650 milliGRAM(s) Rectal every 4 hours PRN Mild Pain (1 - 3)  albuterol/ipratropium for Nebulization 3 milliLiter(s) Nebulizer every 6 hours PRN Shortness of Breath and/or Wheezing  ALPRAZolam 0.25 milliGRAM(s) Oral every 12 hours PRN anxiety/insomnia  dextrose 40% Gel 15 Gram(s) Oral once PRN Blood Glucose LESS THAN 70 milliGRAM(s)/deciliter  glucagon  Injectable 1 milliGRAM(s) IntraMuscular once PRN Glucose LESS THAN 70 milligrams/deciliter  ketorolac   Injectable 30 milliGRAM(s) IV Push every 6 hours PRN Moderate Pain (4 - 6)  ondansetron   Disintegrating Tablet 4 milliGRAM(s) Oral every 8 hours PRN Nausea and/or Vomiting  oxyCODONE    IR 5 milliGRAM(s) Oral every 4 hours PRN Severe Pain (7 - 10)    heparin  Infusion 1600 Unit(s)/Hr IV Continuous <Continuous>      PHYSICAL EXAM:    T(F): 97.8 (12-06-19 @ 08:41), Max: 98.3 (12-06-19 @ 05:40)  HR: 100 (12-06-19 @ 12:02) (90 - 100)  BP: 127/92 (12-06-19 @ 12:02) (127/92 - 158/85)  RR: 16 (12-06-19 @ 12:02) (16 - 16)  SpO2: 95% (12-06-19 @ 12:02) (95% - 98%)  Wt(kg): --    Daily Height in cm: 165.1 (06 Dec 2019 05:43)    Daily     HEENT: AT/NC, EOMI  NECK: Supple  CVS: +S1S2  LUNG: CTA /Bl  ABD: +BS  EXT: no c/c/e  NEURO: aaox3       Labs:                          9.0    12.86 )-----------( 420      ( 06 Dec 2019 04:01 )             29.2     CBC Full  -  ( 06 Dec 2019 04:01 )  WBC Count : 12.86 K/uL  RBC Count : 4.39 M/uL  Hemoglobin : 9.0 g/dL  Hematocrit : 29.2 %  Platelet Count - Automated : 420 K/uL  Mean Cell Volume : 66.5 fl  Mean Cell Hemoglobin : 20.5 pg  Mean Cell Hemoglobin Concentration : 30.8 gm/dL  Auto Neutrophil # : x  Auto Lymphocyte # : x  Auto Monocyte # : x  Auto Eosinophil # : x  Auto Basophil # : x  Auto Neutrophil % : x  Auto Lymphocyte % : x  Auto Monocyte % : x  Auto Eosinophil % : x  Auto Basophil % : x    PT/INR - ( 05 Dec 2019 06:38 )   PT: 21.9 sec;   INR: 1.90          PTT - ( 06 Dec 2019 04:01 )  PTT:71.7 sec    12-06    137  |  102  |  19  ----------------------------<  172<H>  3.5   |  23  |  0.72    Ca    8.7      06 Dec 2019 04:01  Mg     1.7     12-06    TPro  5.9<L>  /  Alb  3.1<L>  /  TBili  0.9  /  DBili  x   /  AST  141<H>  /  ALT  284<H>  /  AlkPhos  152<H>  12-06      Urinalysis Basic - ( 04 Dec 2019 16:04 )    Color: Yellow / Appearance: Clear / SG: >=1.030 / pH: x  Gluc: x / Ketone: NEGATIVE  / Bili: Negative / Urobili: 0.2 E.U./dL   Blood: x / Protein: 100 mg/dL / Nitrite: NEGATIVE   Leuk Esterase: NEGATIVE / RBC: < 5 /HPF / WBC 5-10 /HPF   Sq Epi: x / Non Sq Epi: 5-10 /HPF / Bacteria: Present /HPF        12/4 CT A/P   LOWER CHEST: Large right pleural effusion and mediastinal shift.    ABDOMEN:  LIVER: Within normal limits  BILE DUCTS: Normal caliber  GALLBLADDER: No calcified gallstones. Normal caliber wall  PANCREAS: Within normal limits  SPLEEN: Within normal limits  ADRENALS: Within normal limits  KIDNEYS: Within normal limits  PELVIS:  REPRODUCTIVE ORGANS: Multi fibroid uterus. Left ovarian cysts.  BLADDER: Within normal limits  PERITONEUM:No ascites, no free air.  BOWEL: Within normal limits.  VESSELS: Expansile enhancing mass within the intrahepatic IVC extending   proximally to the right atrial junction and distally to the renal veins.   Diminished opacification of the infrarenal IVC and left common iliac   vein, suspect combination of bland thrombus and mixing artifact.  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  Large right pleural effusion.        CT Chest non Con 12/4  Impression: 1. Since 8/23/2019, there is a new large right pleural   effusion causing compressive atelectasis of the entire right lower lobe   and mild shift of the heart and mediastinum to the left.    2. There are a few nodules in the left lung, the largest measuring 5 mm   in the left upper lobe. These nodules have nonspecific appearances. They   could be benign or malignant.    3. Large right upper quadrant mass identified on CT scan of abdomen and   pelvis of 12/4/2019 is partially visualized. It is consistent with   neoplasm.

## 2019-12-06 NOTE — PROVIDER CONTACT NOTE (OTHER) - RECOMMENDATIONS
Restart heparin drip @ 16ml/hr on pt specific protocol. PTT results reviewed with PA, recent PTT was 71.4.

## 2019-12-06 NOTE — PROVIDER CONTACT NOTE (OTHER) - BACKGROUND
Per night RN the pt specific heparin drip held and fluids d/c r/t pt indecisive re: procedure. V-Y Flap Text: The defect edges were debeveled with a #15 scalpel blade.  Given the location of the defect, shape of the defect and the proximity to free margins a V-Y flap was deemed most appropriate.  Using a sterile surgical marker, an appropriate advancement flap was drawn incorporating the defect and placing the expected incisions within the relaxed skin tension lines where possible.    The area thus outlined was incised deep to adipose tissue with a #15 scalpel blade.  The skin margins were undermined to an appropriate distance in all directions utilizing iris scissors.

## 2019-12-06 NOTE — PROGRESS NOTE ADULT - ASSESSMENT
39y Female with PMH significant for DM, HTN, HLD, uterine fibrosis with s/p bilateral uterine artery embolization in 8/2019, pityriasis rosea, and herpes presented to CT surgery with recent findings of mass extending from IVC into right atrium with associated worsening symptoms.  At visit, patient is comfortable at visit.  She was scheduled for biopsy today and remain NPO for now.  She denies further SOB, chest pain, palpitation or LE edema.     Problem #1 IVC mass with thrombose    - resection will require coordinated multidisciplinary approach with general surgery, vascular surgery and cardiac surgery  - heparin drip continue and titrate to meet therapeutic level by monitoring PTT    Problem 2: right pleural effusion   - pleural drain an biopsy  - continue to follow up with results.

## 2019-12-07 LAB
ALBUMIN SERPL ELPH-MCNC: 3.1 G/DL — LOW (ref 3.3–5)
ALP SERPL-CCNC: 172 U/L — HIGH (ref 40–120)
ALT FLD-CCNC: 233 U/L — HIGH (ref 10–45)
ANION GAP SERPL CALC-SCNC: 10 MMOL/L — SIGNIFICANT CHANGE UP (ref 5–17)
APTT BLD: 108.3 SEC — HIGH (ref 27.5–36.3)
APTT BLD: 79.2 SEC — HIGH (ref 27.5–36.3)
APTT BLD: 91.6 SEC — HIGH (ref 27.5–36.3)
AST SERPL-CCNC: 96 U/L — HIGH (ref 10–40)
BILIRUB SERPL-MCNC: 0.6 MG/DL — SIGNIFICANT CHANGE UP (ref 0.2–1.2)
BUN SERPL-MCNC: 12 MG/DL — SIGNIFICANT CHANGE UP (ref 7–23)
CALCIUM SERPL-MCNC: 8.7 MG/DL — SIGNIFICANT CHANGE UP (ref 8.4–10.5)
CHLORIDE SERPL-SCNC: 106 MMOL/L — SIGNIFICANT CHANGE UP (ref 96–108)
CO2 SERPL-SCNC: 23 MMOL/L — SIGNIFICANT CHANGE UP (ref 22–31)
CREAT SERPL-MCNC: 0.7 MG/DL — SIGNIFICANT CHANGE UP (ref 0.5–1.3)
GLUCOSE BLDC GLUCOMTR-MCNC: 131 MG/DL — HIGH (ref 70–99)
GLUCOSE BLDC GLUCOMTR-MCNC: 160 MG/DL — HIGH (ref 70–99)
GLUCOSE BLDC GLUCOMTR-MCNC: 182 MG/DL — HIGH (ref 70–99)
GLUCOSE BLDC GLUCOMTR-MCNC: 186 MG/DL — HIGH (ref 70–99)
GLUCOSE SERPL-MCNC: 144 MG/DL — HIGH (ref 70–99)
HBA1C BLD-MCNC: 6.1 % — HIGH (ref 4–5.6)
HCT VFR BLD CALC: 33.4 % — LOW (ref 34.5–45)
HGB BLD-MCNC: 9.7 G/DL — LOW (ref 11.5–15.5)
MAGNESIUM SERPL-MCNC: 1.9 MG/DL — SIGNIFICANT CHANGE UP (ref 1.6–2.6)
MCHC RBC-ENTMCNC: 20.3 PG — LOW (ref 27–34)
MCHC RBC-ENTMCNC: 29 GM/DL — LOW (ref 32–36)
MCV RBC AUTO: 69.9 FL — LOW (ref 80–100)
NRBC # BLD: 0 /100 WBCS — SIGNIFICANT CHANGE UP (ref 0–0)
PHOSPHATE SERPL-MCNC: 2.9 MG/DL — SIGNIFICANT CHANGE UP (ref 2.5–4.5)
PLATELET # BLD AUTO: 439 K/UL — HIGH (ref 150–400)
POTASSIUM SERPL-MCNC: 3.9 MMOL/L — SIGNIFICANT CHANGE UP (ref 3.5–5.3)
POTASSIUM SERPL-SCNC: 3.9 MMOL/L — SIGNIFICANT CHANGE UP (ref 3.5–5.3)
PROT SERPL-MCNC: 6.3 G/DL — SIGNIFICANT CHANGE UP (ref 6–8.3)
RBC # BLD: 4.78 M/UL — SIGNIFICANT CHANGE UP (ref 3.8–5.2)
RBC # FLD: 19.5 % — HIGH (ref 10.3–14.5)
SODIUM SERPL-SCNC: 139 MMOL/L — SIGNIFICANT CHANGE UP (ref 135–145)
WBC # BLD: 11.73 K/UL — HIGH (ref 3.8–10.5)
WBC # FLD AUTO: 11.73 K/UL — HIGH (ref 3.8–10.5)

## 2019-12-07 PROCEDURE — 99233 SBSQ HOSP IP/OBS HIGH 50: CPT | Mod: GC

## 2019-12-07 PROCEDURE — 71260 CT THORAX DX C+: CPT | Mod: 26

## 2019-12-07 RX ORDER — HEPARIN SODIUM 5000 [USP'U]/ML
1350 INJECTION INTRAVENOUS; SUBCUTANEOUS
Qty: 25000 | Refills: 0 | Status: DISCONTINUED | OUTPATIENT
Start: 2019-12-07 | End: 2019-12-10

## 2019-12-07 RX ORDER — HEPARIN SODIUM 5000 [USP'U]/ML
1400 INJECTION INTRAVENOUS; SUBCUTANEOUS
Qty: 25000 | Refills: 0 | Status: DISCONTINUED | OUTPATIENT
Start: 2019-12-07 | End: 2019-12-07

## 2019-12-07 RX ADMIN — Medication 50 MILLIGRAM(S): at 19:13

## 2019-12-07 RX ADMIN — Medication 50 MILLIGRAM(S): at 07:09

## 2019-12-07 RX ADMIN — Medication 1 PATCH: at 13:35

## 2019-12-07 RX ADMIN — MONTELUKAST 10 MILLIGRAM(S): 4 TABLET, CHEWABLE ORAL at 13:35

## 2019-12-07 RX ADMIN — SENNA PLUS 1 TABLET(S): 8.6 TABLET ORAL at 22:05

## 2019-12-07 RX ADMIN — Medication 1 PATCH: at 07:00

## 2019-12-07 RX ADMIN — Medication 10 MILLIGRAM(S): at 00:13

## 2019-12-07 RX ADMIN — HEPARIN SODIUM 13.5 UNIT(S)/HR: 5000 INJECTION INTRAVENOUS; SUBCUTANEOUS at 23:35

## 2019-12-07 RX ADMIN — Medication 0.25 MILLIGRAM(S): at 08:11

## 2019-12-07 RX ADMIN — Medication 1: at 22:14

## 2019-12-07 RX ADMIN — BUDESONIDE AND FORMOTEROL FUMARATE DIHYDRATE 2 PUFF(S): 160; 4.5 AEROSOL RESPIRATORY (INHALATION) at 22:05

## 2019-12-07 RX ADMIN — Medication 1 PATCH: at 13:36

## 2019-12-07 RX ADMIN — Medication 325 MILLIGRAM(S): at 13:35

## 2019-12-07 RX ADMIN — BUDESONIDE AND FORMOTEROL FUMARATE DIHYDRATE 2 PUFF(S): 160; 4.5 AEROSOL RESPIRATORY (INHALATION) at 13:34

## 2019-12-07 RX ADMIN — HEPARIN SODIUM 14 UNIT(S)/HR: 5000 INJECTION INTRAVENOUS; SUBCUTANEOUS at 08:07

## 2019-12-07 RX ADMIN — HEPARIN SODIUM 14 UNIT(S)/HR: 5000 INJECTION INTRAVENOUS; SUBCUTANEOUS at 19:26

## 2019-12-07 NOTE — PROGRESS NOTE ADULT - ASSESSMENT
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    - prelim read of CT read as IVC mass w/ extension to right atrium, similar to previous study  - f/u final read   - Regular Diet  - Rest of care as per vascular

## 2019-12-07 NOTE — PROGRESS NOTE ADULT - SUBJECTIVE AND OBJECTIVE BOX
INTERVAL HPI: Patient seen and examined at bedside. Denies abdominal pain, distention/bloating, N/V. Tolerating regular diet. No chest pain, palpitations, SOB.    heparin  Infusion 1400  metoprolol tartrate 50        Vital Signs Last 24 Hrs  T(C): 36.9 (07 Dec 2019 18:12), Max: 37.1 (06 Dec 2019 19:07)  T(F): 98.4 (07 Dec 2019 18:12), Max: 98.7 (06 Dec 2019 19:07)  HR: 90 (07 Dec 2019 08:55) (90 - 98)  BP: 123/658 (07 Dec 2019 08:55) (112/73 - 142/82)  BP(mean): --  RR: 17 (07 Dec 2019 08:55) (16 - 18)  SpO2: 98% (07 Dec 2019 08:55) (98% - 98%)  I&O's Summary    06 Dec 2019 07:01  -  07 Dec 2019 07:00  --------------------------------------------------------  IN: 16 mL / OUT: 0 mL / NET: 16 mL    07 Dec 2019 07:01  -  07 Dec 2019 19:05  --------------------------------------------------------  IN: 154 mL / OUT: 0 mL / NET: 154 mL        Physical Exam:  General: NAD  Pulmonary: Nonlabored breathing, no respiratory distress  Cardiovascular: RRR  Abdominal: Soft, nontender, nondistended  Extremities: WWP, no edema        LABS:                        9.7    11.73 )-----------( 439      ( 07 Dec 2019 06:50 )             33.4     12-07    139  |  106  |  12  ----------------------------<  144<H>  3.9   |  23  |  0.70    Ca    8.7      07 Dec 2019 06:50  Phos  2.9     12-07  Mg     1.9     12-07    TPro  6.3  /  Alb  3.1<L>  /  TBili  0.6  /  DBili  x   /  AST  96<H>  /  ALT  233<H>  /  AlkPhos  172<H>  12-07    PT/INR - ( 06 Dec 2019 20:36 )   PT: 19.5 sec;   INR: 1.70          PTT - ( 07 Dec 2019 15:22 )  PTT:79.2 sec      Assessment and Plan:

## 2019-12-07 NOTE — PROGRESS NOTE ADULT - SUBJECTIVE AND OBJECTIVE BOX
O/N: URBAN, VSS ptt88 therapeutic x3                                           39F smoker with a PMHx of DM, HTN, HLD, s/p bilateral uterine artery embolization in 8/2019 due to fibroids now with 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    -HepGtt  -daily ptt  -Reg diet  -f/u Chest CT O/N: URBAN, VSS ptt88 therapeutic x3     SUBJECTIVE: Patient seen and examined by chief resident on morning rounds.  Resting comfortably in bed with no complaints. No n/v, CP, SOB, dizziness, lightheadedness      Vital Signs Last 24 Hrs  T(C): 36.6 (07 Dec 2019 13:55), Max: 37.1 (06 Dec 2019 19:07)  T(F): 97.8 (07 Dec 2019 13:55), Max: 98.7 (06 Dec 2019 19:07)  HR: 90 (07 Dec 2019 08:55) (90 - 102)  BP: 123/658 (07 Dec 2019 08:55) (112/73 - 142/82)  BP(mean): 104 (06 Dec 2019 17:31) (104 - 104)  RR: 17 (07 Dec 2019 08:55) (16 - 18)  SpO2: 98% (07 Dec 2019 08:55) (98% - 98%)    Physical Exam:  General: NAD  Pulmonary: Nonlabored breathing, no respiratory distress  Abdominal: soft, nondistended, nontender with no rebound or guarding  Extremities: WWP, normal strength, no clubbing/cyanosis/edema  Neuro: A/O x3    Lines/drains/tubes:    I&O's Summary    06 Dec 2019 07:01  -  07 Dec 2019 07:00  --------------------------------------------------------  IN: 16 mL / OUT: 0 mL / NET: 16 mL    07 Dec 2019 07:01  -  07 Dec 2019 15:08  --------------------------------------------------------  IN: 70 mL / OUT: 0 mL / NET: 70 mL        LABS:                        9.7    11.73 )-----------( 439      ( 07 Dec 2019 06:50 )             33.4     12-07    139  |  106  |  12  ----------------------------<  144<H>  3.9   |  23  |  0.70    Ca    8.7      07 Dec 2019 06:50  Phos  2.9     12-07  Mg     1.9     12-07    TPro  6.3  /  Alb  3.1<L>  /  TBili  0.6  /  DBili  x   /  AST  96<H>  /  ALT  233<H>  /  AlkPhos  172<H>  12-07    PT/INR - ( 06 Dec 2019 20:36 )   PT: 19.5 sec;   INR: 1.70          PTT - ( 07 Dec 2019 06:50 )  PTT:108.3 sec    CAPILLARY BLOOD GLUCOSE      POCT Blood Glucose.: 182 mg/dL (07 Dec 2019 13:12)  POCT Blood Glucose.: 131 mg/dL (07 Dec 2019 09:36)  POCT Blood Glucose.: 159 mg/dL (06 Dec 2019 21:51)  POCT Blood Glucose.: 141 mg/dL (06 Dec 2019 17:12)    LIVER FUNCTIONS - ( 07 Dec 2019 06:50 )  Alb: 3.1 g/dL / Pro: 6.3 g/dL / ALK PHOS: 172 U/L / ALT: 233 U/L / AST: 96 U/L / GGT: x             RADIOLOGY & ADDITIONAL STUDIES:        39F smoker with a PMHx of DM, HTN, HLD, s/p bilateral uterine artery embolization in 8/2019 due to fibroids now with 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    -HepGtt  -daily ptt  -Reg diet  -f/u Chest CT

## 2019-12-08 LAB
ALBUMIN SERPL ELPH-MCNC: 2.9 G/DL — LOW (ref 3.3–5)
ALP SERPL-CCNC: 165 U/L — HIGH (ref 40–120)
ALT FLD-CCNC: 179 U/L — HIGH (ref 10–45)
ANION GAP SERPL CALC-SCNC: 11 MMOL/L — SIGNIFICANT CHANGE UP (ref 5–17)
ANION GAP SERPL CALC-SCNC: 12 MMOL/L — SIGNIFICANT CHANGE UP (ref 5–17)
ANION GAP SERPL CALC-SCNC: 9 MMOL/L — SIGNIFICANT CHANGE UP (ref 5–17)
APTT BLD: 48.3 SEC — HIGH (ref 27.5–36.3)
APTT BLD: 66.7 SEC — HIGH (ref 27.5–36.3)
APTT BLD: 81.8 SEC — HIGH (ref 27.5–36.3)
AST SERPL-CCNC: 81 U/L — HIGH (ref 10–40)
BILIRUB SERPL-MCNC: 0.4 MG/DL — SIGNIFICANT CHANGE UP (ref 0.2–1.2)
BUN SERPL-MCNC: 6 MG/DL — LOW (ref 7–23)
BUN SERPL-MCNC: 7 MG/DL — SIGNIFICANT CHANGE UP (ref 7–23)
BUN SERPL-MCNC: 9 MG/DL — SIGNIFICANT CHANGE UP (ref 7–23)
CALCIUM SERPL-MCNC: 7.6 MG/DL — LOW (ref 8.4–10.5)
CALCIUM SERPL-MCNC: 8.6 MG/DL — SIGNIFICANT CHANGE UP (ref 8.4–10.5)
CALCIUM SERPL-MCNC: 9 MG/DL — SIGNIFICANT CHANGE UP (ref 8.4–10.5)
CHLORIDE SERPL-SCNC: 106 MMOL/L — SIGNIFICANT CHANGE UP (ref 96–108)
CHLORIDE SERPL-SCNC: 109 MMOL/L — HIGH (ref 96–108)
CHLORIDE SERPL-SCNC: 87 MMOL/L — LOW (ref 96–108)
CO2 SERPL-SCNC: 20 MMOL/L — LOW (ref 22–31)
CO2 SERPL-SCNC: 21 MMOL/L — LOW (ref 22–31)
CO2 SERPL-SCNC: 23 MMOL/L — SIGNIFICANT CHANGE UP (ref 22–31)
CREAT SERPL-MCNC: 0.62 MG/DL — SIGNIFICANT CHANGE UP (ref 0.5–1.3)
CREAT SERPL-MCNC: 0.62 MG/DL — SIGNIFICANT CHANGE UP (ref 0.5–1.3)
CREAT SERPL-MCNC: 0.73 MG/DL — SIGNIFICANT CHANGE UP (ref 0.5–1.3)
GLUCOSE BLDC GLUCOMTR-MCNC: 110 MG/DL — HIGH (ref 70–99)
GLUCOSE BLDC GLUCOMTR-MCNC: 132 MG/DL — HIGH (ref 70–99)
GLUCOSE BLDC GLUCOMTR-MCNC: 142 MG/DL — HIGH (ref 70–99)
GLUCOSE BLDC GLUCOMTR-MCNC: 143 MG/DL — HIGH (ref 70–99)
GLUCOSE SERPL-MCNC: 108 MG/DL — HIGH (ref 70–99)
GLUCOSE SERPL-MCNC: 143 MG/DL — HIGH (ref 70–99)
GLUCOSE SERPL-MCNC: 155 MG/DL — HIGH (ref 70–99)
HCT VFR BLD CALC: 29.3 % — LOW (ref 34.5–45)
HCT VFR BLD CALC: 30 % — LOW (ref 34.5–45)
HCT VFR BLD CALC: 30.6 % — LOW (ref 34.5–45)
HGB BLD-MCNC: 8.5 G/DL — LOW (ref 11.5–15.5)
HGB BLD-MCNC: 8.7 G/DL — LOW (ref 11.5–15.5)
HGB BLD-MCNC: 9 G/DL — LOW (ref 11.5–15.5)
INR BLD: 1.34 — HIGH (ref 0.88–1.16)
INR BLD: 1.38 — HIGH (ref 0.88–1.16)
LACTATE SERPL-SCNC: 2 MMOL/L — SIGNIFICANT CHANGE UP (ref 0.5–2)
MAGNESIUM SERPL-MCNC: 1.6 MG/DL — SIGNIFICANT CHANGE UP (ref 1.6–2.6)
MAGNESIUM SERPL-MCNC: 2.2 MG/DL — SIGNIFICANT CHANGE UP (ref 1.6–2.6)
MCHC RBC-ENTMCNC: 20.1 PG — LOW (ref 27–34)
MCHC RBC-ENTMCNC: 20.2 PG — LOW (ref 27–34)
MCHC RBC-ENTMCNC: 20.4 PG — LOW (ref 27–34)
MCHC RBC-ENTMCNC: 28.4 GM/DL — LOW (ref 32–36)
MCHC RBC-ENTMCNC: 29 GM/DL — LOW (ref 32–36)
MCHC RBC-ENTMCNC: 30 GM/DL — LOW (ref 32–36)
MCV RBC AUTO: 67.1 FL — LOW (ref 80–100)
MCV RBC AUTO: 70.3 FL — LOW (ref 80–100)
MCV RBC AUTO: 71 FL — LOW (ref 80–100)
NRBC # BLD: 0 /100 WBCS — SIGNIFICANT CHANGE UP (ref 0–0)
PHOSPHATE SERPL-MCNC: 3 MG/DL — SIGNIFICANT CHANGE UP (ref 2.5–4.5)
PLATELET # BLD AUTO: 354 K/UL — SIGNIFICANT CHANGE UP (ref 150–400)
PLATELET # BLD AUTO: 354 K/UL — SIGNIFICANT CHANGE UP (ref 150–400)
PLATELET # BLD AUTO: 361 K/UL — SIGNIFICANT CHANGE UP (ref 150–400)
POTASSIUM SERPL-MCNC: 3.4 MMOL/L — LOW (ref 3.5–5.3)
POTASSIUM SERPL-MCNC: 3.6 MMOL/L — SIGNIFICANT CHANGE UP (ref 3.5–5.3)
POTASSIUM SERPL-MCNC: 4.4 MMOL/L — SIGNIFICANT CHANGE UP (ref 3.5–5.3)
POTASSIUM SERPL-SCNC: 3.4 MMOL/L — LOW (ref 3.5–5.3)
POTASSIUM SERPL-SCNC: 3.6 MMOL/L — SIGNIFICANT CHANGE UP (ref 3.5–5.3)
POTASSIUM SERPL-SCNC: 4.4 MMOL/L — SIGNIFICANT CHANGE UP (ref 3.5–5.3)
PROT SERPL-MCNC: 5.6 G/DL — LOW (ref 6–8.3)
PROTHROM AB SERPL-ACNC: 15.3 SEC — HIGH (ref 10–12.9)
PROTHROM AB SERPL-ACNC: 15.8 SEC — HIGH (ref 10–12.9)
RBC # BLD: 4.17 M/UL — SIGNIFICANT CHANGE UP (ref 3.8–5.2)
RBC # BLD: 4.31 M/UL — SIGNIFICANT CHANGE UP (ref 3.8–5.2)
RBC # BLD: 4.47 M/UL — SIGNIFICANT CHANGE UP (ref 3.8–5.2)
RBC # FLD: 19.4 % — HIGH (ref 10.3–14.5)
RBC # FLD: 19.6 % — HIGH (ref 10.3–14.5)
RBC # FLD: 19.7 % — HIGH (ref 10.3–14.5)
SODIUM SERPL-SCNC: 118 MMOL/L — CRITICAL LOW (ref 135–145)
SODIUM SERPL-SCNC: 138 MMOL/L — SIGNIFICANT CHANGE UP (ref 135–145)
SODIUM SERPL-SCNC: 142 MMOL/L — SIGNIFICANT CHANGE UP (ref 135–145)
TROPONIN T SERPL-MCNC: <0.01 NG/ML — SIGNIFICANT CHANGE UP (ref 0–0.01)
WBC # BLD: 8.63 K/UL — SIGNIFICANT CHANGE UP (ref 3.8–10.5)
WBC # BLD: 9.3 K/UL — SIGNIFICANT CHANGE UP (ref 3.8–10.5)
WBC # BLD: 9.88 K/UL — SIGNIFICANT CHANGE UP (ref 3.8–10.5)
WBC # FLD AUTO: 8.63 K/UL — SIGNIFICANT CHANGE UP (ref 3.8–10.5)
WBC # FLD AUTO: 9.3 K/UL — SIGNIFICANT CHANGE UP (ref 3.8–10.5)
WBC # FLD AUTO: 9.88 K/UL — SIGNIFICANT CHANGE UP (ref 3.8–10.5)

## 2019-12-08 PROCEDURE — 99223 1ST HOSP IP/OBS HIGH 75: CPT | Mod: GC

## 2019-12-08 PROCEDURE — 71275 CT ANGIOGRAPHY CHEST: CPT | Mod: 26

## 2019-12-08 PROCEDURE — 99233 SBSQ HOSP IP/OBS HIGH 50: CPT | Mod: GC

## 2019-12-08 RX ORDER — METOPROLOL TARTRATE 50 MG
50 TABLET ORAL EVERY 8 HOURS
Refills: 0 | Status: DISCONTINUED | OUTPATIENT
Start: 2019-12-08 | End: 2019-12-10

## 2019-12-08 RX ORDER — INSULIN LISPRO 100/ML
VIAL (ML) SUBCUTANEOUS EVERY 6 HOURS
Refills: 0 | Status: DISCONTINUED | OUTPATIENT
Start: 2019-12-08 | End: 2019-12-10

## 2019-12-08 RX ORDER — METOPROLOL TARTRATE 50 MG
5 TABLET ORAL ONCE
Refills: 0 | Status: COMPLETED | OUTPATIENT
Start: 2019-12-08 | End: 2019-12-08

## 2019-12-08 RX ORDER — ADENOSINE 3 MG/ML
6 INJECTION INTRAVENOUS ONCE
Refills: 0 | Status: DISCONTINUED | OUTPATIENT
Start: 2019-12-08 | End: 2019-12-09

## 2019-12-08 RX ORDER — POTASSIUM CHLORIDE 20 MEQ
40 PACKET (EA) ORAL EVERY 4 HOURS
Refills: 0 | Status: COMPLETED | OUTPATIENT
Start: 2019-12-08 | End: 2019-12-08

## 2019-12-08 RX ORDER — MAGNESIUM SULFATE 500 MG/ML
2 VIAL (ML) INJECTION ONCE
Refills: 0 | Status: COMPLETED | OUTPATIENT
Start: 2019-12-08 | End: 2019-12-08

## 2019-12-08 RX ORDER — ACETAMINOPHEN 500 MG
650 TABLET ORAL EVERY 6 HOURS
Refills: 0 | Status: DISCONTINUED | OUTPATIENT
Start: 2019-12-08 | End: 2019-12-10

## 2019-12-08 RX ADMIN — Medication 50 MILLIGRAM(S): at 05:49

## 2019-12-08 RX ADMIN — Medication 40 MILLIEQUIVALENT(S): at 17:41

## 2019-12-08 RX ADMIN — Medication 5 MILLIGRAM(S): at 14:00

## 2019-12-08 RX ADMIN — Medication 650 MILLIGRAM(S): at 07:00

## 2019-12-08 RX ADMIN — MONTELUKAST 10 MILLIGRAM(S): 4 TABLET, CHEWABLE ORAL at 11:54

## 2019-12-08 RX ADMIN — Medication 30 MILLIGRAM(S): at 15:34

## 2019-12-08 RX ADMIN — Medication 50 MILLIGRAM(S): at 17:41

## 2019-12-08 RX ADMIN — Medication 10 MILLIGRAM(S): at 00:36

## 2019-12-08 RX ADMIN — SENNA PLUS 1 TABLET(S): 8.6 TABLET ORAL at 22:17

## 2019-12-08 RX ADMIN — Medication 650 MILLIGRAM(S): at 18:49

## 2019-12-08 RX ADMIN — Medication 0.25 MILLIGRAM(S): at 00:36

## 2019-12-08 RX ADMIN — HEPARIN SODIUM 13.5 UNIT(S)/HR: 5000 INJECTION INTRAVENOUS; SUBCUTANEOUS at 20:25

## 2019-12-08 RX ADMIN — Medication 10 MILLIGRAM(S): at 22:32

## 2019-12-08 RX ADMIN — Medication 1 PATCH: at 11:54

## 2019-12-08 RX ADMIN — Medication 1 PATCH: at 12:47

## 2019-12-08 RX ADMIN — Medication 1 PATCH: at 17:35

## 2019-12-08 RX ADMIN — Medication 30 MILLIGRAM(S): at 16:00

## 2019-12-08 RX ADMIN — Medication 50 GRAM(S): at 14:14

## 2019-12-08 RX ADMIN — Medication 1 PATCH: at 07:01

## 2019-12-08 RX ADMIN — Medication 40 MILLIEQUIVALENT(S): at 11:19

## 2019-12-08 RX ADMIN — BUDESONIDE AND FORMOTEROL FUMARATE DIHYDRATE 2 PUFF(S): 160; 4.5 AEROSOL RESPIRATORY (INHALATION) at 08:52

## 2019-12-08 RX ADMIN — Medication 0.25 MILLIGRAM(S): at 13:33

## 2019-12-08 RX ADMIN — Medication 325 MILLIGRAM(S): at 11:54

## 2019-12-08 RX ADMIN — Medication 650 MILLIGRAM(S): at 19:00

## 2019-12-08 RX ADMIN — Medication 50 MILLIGRAM(S): at 22:17

## 2019-12-08 NOTE — PROGRESS NOTE ADULT - SUBJECTIVE AND OBJECTIVE BOX
Interval Events:  Patient seen and examined at bedside. The patient denied any symptoms of SOB, cough, or wheeze. She noted that she was comfortable in bed. The patient had no acute complaints.     MEDICATIONS:  Pulmonary:  albuterol/ipratropium for Nebulization 3 milliLiter(s) Nebulizer every 6 hours PRN  budesonide  80 MICROgram(s)/formoterol 4.5 MICROgram(s) Inhaler 2 Puff(s) Inhalation two times a day  montelukast 10 milliGRAM(s) Oral daily    Antimicrobials:    Anticoagulants:  heparin  Infusion 1350 Unit(s)/Hr IV Continuous <Continuous>    Cardiac:  aDENosine Injectable (ADENOCARD) 6 milliGRAM(s) IV Push once  metoprolol tartrate 50 milliGRAM(s) Oral every 8 hours    Endocrine:  dextrose 40% Gel 15 Gram(s) Oral once PRN  dextrose 50% Injectable 12.5 Gram(s) IV Push once  dextrose 50% Injectable 25 Gram(s) IV Push once  dextrose 50% Injectable 25 Gram(s) IV Push once  glucagon  Injectable 1 milliGRAM(s) IntraMuscular once PRN  insulin lispro (HumaLOG) corrective regimen sliding scale   SubCutaneous every 6 hours    Allergies    No Known Drug Allergies  shellfish (Hives)    Intolerances        Vital Signs Last 24 Hrs  T(C): 36.3 (08 Dec 2019 15:00), Max: 37 (07 Dec 2019 22:06)  T(F): 97.3 (08 Dec 2019 15:00), Max: 98.6 (07 Dec 2019 22:06)  HR: 104 (08 Dec 2019 16:00) (82 - 184)  BP: 119/81 (08 Dec 2019 16:00) (108/68 - 179/80)  BP(mean): 101 (08 Dec 2019 16:00) (82 - 115)  RR: 23 (08 Dec 2019 16:00) (16 - 23)  SpO2: 100% (08 Dec 2019 16:00) (96% - 100%)    General: NAD, AAO x3  HEENT: No icterus,. Moist mucous membranes  Neck: No JVD noted. Supple, no meningismus  Cardio: S1, S2 noted, RRR. No murmurs, rubs or gallops  Resp: Slightly decreased breath sounds at the right base, otherwise clear. Good respiratory effort.   Abdo: Soft, NT, bowel sounds present. No organomegaly  Extremities: No edema noted. Pulses present b/l  Neuro: AAO x3, grossly normal motor strength.  Lymphnodes: no lymphadenopathy identified.  Skin: Dry, no rashes    12-07 @ 07:01  -  12-08 @ 07:00  --------------------------------------------------------  IN: 334 mL / OUT: 0 mL / NET: 334 mL    12-08 @ 07:01  -  12-08 @ 18:32  --------------------------------------------------------  IN: 207 mL / OUT: 0 mL / NET: 207 mL          LABS:      CBC Full  -  ( 08 Dec 2019 15:34 )  WBC Count : 9.30 K/uL  RBC Count : 4.47 M/uL  Hemoglobin : 9.0 g/dL  Hematocrit : 30.0 %  Platelet Count - Automated : 361 K/uL  Mean Cell Volume : 67.1 fl  Mean Cell Hemoglobin : 20.1 pg  Mean Cell Hemoglobin Concentration : 30.0 gm/dL  Auto Neutrophil # : x  Auto Lymphocyte # : x  Auto Monocyte # : x  Auto Eosinophil # : x  Auto Basophil # : x  Auto Neutrophil % : x  Auto Lymphocyte % : x  Auto Monocyte % : x  Auto Eosinophil % : x  Auto Basophil % : x    12-08    142  |  109<H>  |  7   ----------------------------<  108<H>  4.4   |  21<L>  |  0.62    Ca    9.0      08 Dec 2019 15:34  Phos  3.0     12-08  Mg     2.2     12-08    TPro  5.6<L>  /  Alb  2.9<L>  /  TBili  0.4  /  DBili  x   /  AST  81<H>  /  ALT  179<H>  /  AlkPhos  165<H>  12-08    PT/INR - ( 08 Dec 2019 13:44 )   PT: 15.3 sec;   INR: 1.34          PTT - ( 08 Dec 2019 13:44 )  PTT:48.3 sec                  RADIOLOGY & ADDITIONAL STUDIES (The following images were personally reviewed):  CT chest

## 2019-12-08 NOTE — CHART NOTE - NSCHARTNOTEFT_GEN_A_CORE
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 The EKG revealed an AVRT vs atypical AVRNT vs atrial fibrillation with hypertensive blood pressures to the 170s/80s. Patient was otherwise asymptomatic. Patient received 5 mg IV metoprolol prior to my assessment with no effect on the rate or rhythm.    Patient was given 6 mg IV adenosine x 1 which slowed down his rate rhythm and the underlying rhythm was noted to be atrial fibrillation. Patient then spontaneously converted to normal sinus rhythm.    A/P: SVT likely atrial fibrillation. Could be triggered due to her right atrial mass.  - Increase Metoprolol from 50 BID to 50 TID  - Replete electrolytes to maintain k>4 and Mg >2  - Can check TSH  - TTE is normal from 12/5/2019  - Remaining plan for primary  - Plan discussed with primary resident, who was at the bedside    Thank you for allowing to participate in the care of this patient  IOANA Stephenson  Cardiology Fellow

## 2019-12-08 NOTE — CONSULT NOTE ADULT - ATTENDING COMMENTS
1yo F w/ PMH DM, HTN, HLD, uterine fibroids s/p bilateral uterine artery embolization in 8/2019 who was found to have an IVC mass extending from the left iliac to the right atrium now transferred to SICU for HD monitoring   I reviewed the CT scan of the chest. The inferior vena cava or is compressed and documented for filter. The mass could do to [place] from the fever negative and consistent with sarcoma. The patient is scheduled for biopsy. The mass was extending into the right atrium. The patient is hemodynamically stable continue IV anticoagulation.  Patient seen and examined with house-staff during bedside rounds.  Resident note read, including vitals, physical findings, laboratory data, and radiological reports.   Revisions included below.  Direct personal management at bed side and extensive interpretation of the data.  Plan was outlined and discussed in details with the housestaff.  Decision making of high complexity  Action taken for acute disease activity to reflect the level of care provided:  - medication reconciliation  - review laboratory data
Thank you.  History, exam and radiology reviewed.  Will continue to follow and help facilitate surgical resection of abdominal mass lesion should this become an option.
Progressive shortness of breath with new diagnosis of abdominal mass and right sided pleural effusion. High suspicion that the effusion is malignant. Bedside POCUS shows effusion to be simple. Indicated for thoracentesis; will arrange for 12/5/2019.

## 2019-12-08 NOTE — CONSULT NOTE ADULT - ASSESSMENT
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     ·	 SVT likely atrial fibrillation. Could be triggered due to her right atrial mass.  - Increase Metoprolol from 50 BID to 50 TID  - Replete electrolytes to maintain k>4 and Mg >2  - Can check TSH  - TTE is normal from 12/5/2019  - c/w IV heparin for the mass which could represent a thrombus, On going work up per CT surgery and Heme/Onc  - Remaining plan for primary  - Plan discussed with primary resident, who was at the bedside    Please refer to Cardiology attending addendum section for final recs   Will continue to follow  Thank you for allowing to participate in the care of this patient    IOANA Stephenson  Cardiology fellow, PGY 6

## 2019-12-08 NOTE — CONSULT NOTE ADULT - ASSESSMENT
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 s/p SVT on 12/8, no sinus tachycardia. c/w metoprolol 50q8. Will obtain STAT CTA and ECHO to r/o tumor thrombus in pulmonary artery  Pulm: s/p drainage of right pleural effusion, cytology pending. symbicort, singulair  FENGI: NPO while awaiting CTA. miralaax  : voids  Endo: ISS  ID: no issues.  Heme: heparin drip  Ppx: no SCDs, nicotine patch  Lines: PIVs  Wounds: none    Pt pending Bx on Tuesday with Dr. Howe, general surgery

## 2019-12-08 NOTE — PROGRESS NOTE ADULT - PROBLEM SELECTOR PLAN 1
-Large, R sided pleural effusion now s/p thoracentesis 12/5 with removal of 1800cc of serous appearing fluid.  -Exudative based on lights criteria, cytology currently pending  -Repeat POCUS done today at bedside which shows a small right sided simple effusion with adjacent consolidation pattern, no effusion on the left, other wise   A-line predominant pattern.   -CT scan of chest reviewed which shows a small pleural effusion, the lung parenchyma overall looks clear with the exception of some ground glass opacities in the right lung which likely represents re-expansion pulmonary edema. The pleura did not show obvious abnormality.    Recommendations:  - F/u pleural fluid cytology  - No role for repeat thora at this time, will continue to monitor with serial US.

## 2019-12-08 NOTE — CONSULT NOTE ADULT - SUBJECTIVE AND OBJECTIVE BOX
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 The EKG revealed an AVRT vs atypical AVRNT vs atrial fibrillation with hypertensive blood pressures to the 170s/80s. Patient was otherwise asymptomatic. Patient received 5 mg IV metoprolol prior to my assessment with no effect on the rate or rhythm.    Patient was given 6 mg IV adenosine x 1 which slowed down his rate rhythm and the underlying rhythm was noted to be atrial fibrillation. Patient then spontaneously converted to normal sinus rhythm.    ROS: A 10-point review of systems was otherwise negative.    PAST MEDICAL & SURGICAL HISTORY:  Uterine fibroid  Hyperlipidemia  Asthma  Diabetes  Hypertension  No significant past surgical history      SOCIAL HISTORY:  FAMILY HISTORY:      ALLERGIES: 	  No Known Drug Allergies  shellfish (Hives)    MEDICATIONS:  acetaminophen   Tablet .. 650 Oral every 6 hours PRN  aDENosine Injectable (ADENOCARD) 6 IV Push once  albuterol/ipratropium for Nebulization 3 Nebulizer every 6 hours PRN  ALPRAZolam 0.25 Oral every 12 hours PRN  budesonide  80 MICROgram(s)/formoterol 4.5 MICROgram(s) Inhaler 2 Inhalation two times a day  dextrose 40% Gel 15 Oral once PRN  dextrose 5%. 1000 IV Continuous <Continuous>  dextrose 50% Injectable 12.5 IV Push once  dextrose 50% Injectable 25 IV Push once  dextrose 50% Injectable 25 IV Push once  ferrous    sulfate 325 Oral daily  glucagon  Injectable 1 IntraMuscular once PRN  heparin  Infusion 1350 IV Continuous <Continuous>  insulin lispro (HumaLOG) corrective regimen sliding scale  SubCutaneous every 6 hours  ketorolac   Injectable 30 IV Push every 6 hours PRN  lidocaine   Patch 1 Transdermal daily  melatonin 10 Oral at bedtime  metoprolol tartrate 50 Oral every 8 hours  montelukast 10 Oral daily  nicotine -  14 mG/24Hr(s) Patch 1 Transdermal daily  ondansetron   Disintegrating Tablet 4 Oral every 8 hours PRN  oxyCODONE    IR 5 Oral every 4 hours PRN  polyethylene glycol 3350 17 Oral daily PRN  potassium chloride    Tablet ER 40 Oral every 4 hours  senna 1 Oral at bedtime      PHYSICAL EXAM:  T(C): 36.3 (12-08-19 @ 15:00), Max: 37 (12-07-19 @ 22:06)  T(F): 97.3 (12-08-19 @ 15:00), Max: 98.6 (12-07-19 @ 22:06)  HR: 104 (12-08-19 @ 16:00) (82 - 184)  BP: 119/81 (12-08-19 @ 16:00) (108/68 - 179/80)  BP(mean): 101 (12-08-19 @ 16:00) (82 - 115)  RR: 23 (12-08-19 @ 16:00) (16 - 23)  SpO2: 100% (12-08-19 @ 16:00) (96% - 100%)  CVP(cm H2O): --    GEN: Awake, comfortable. NAD. Obese  HEENT: Mucosa moist. No JVD.   RESP: CTA b/l  CV: RRR, normal s1/s2. No m/r/g.  ABD: Soft, NTND. BS+  EXT: Warm. No edema, PP 2+  NEURO: AAOx3. No focal deficits.    I&O's Summary    07 Dec 2019 07:01  -  08 Dec 2019 07:00  --------------------------------------------------------  IN: 334 mL / OUT: 0 mL / NET: 334 mL    08 Dec 2019 07:01  -  08 Dec 2019 16:56  --------------------------------------------------------  IN: 207 mL / OUT: 0 mL / NET: 207 mL        Weight (kg): 103.5 (12-08 @ 15:00)  	  LABS:	 	                          9.0    9.30  )-----------( 361      ( 08 Dec 2019 15:34 )             30.0     12-08    142  |  109<H>  |  7   ----------------------------<  108<H>  4.4   |  21<L>  |  0.62    Ca    9.0      08 Dec 2019 15:34  Phos  3.0     12-08  Mg     2.2     12-08    TPro  5.6<L>  /  Alb  2.9<L>  /  TBili  0.4  /  DBili  x   /  AST  81<H>  /  ALT  179<H>  /  AlkPhos  165<H>  12-08    CARDIAC MARKERS ( 08 Dec 2019 14:47 )  x     / <0.01 ng/mL / x     / x     / x          PT/INR - ( 08 Dec 2019 13:44 )   PT: 15.3 sec;   INR: 1.34          PTT - ( 08 Dec 2019 13:44 )  PTT:48.3 sec  proBNP: Serum Pro-Brain Natriuretic Peptide: 93 pg/mL [0 - 300] (12-03 @ 22:56)    Lipid Profile:   HgA1c: Hemoglobin A1C, Whole Blood: 6.1 % <H> [4.0 - 5.6] (12-07 @ 06:50)    TELEMETRY: Atrial fibrillation with rapid ventricular response, currently in normal sinus rhythm     ECG: NSR  Echocardiogram (12.05.19 @ 10:46)   1. Normalleft and right ventricular size and function.   2. No significant valvular disease.   3. No evidence of pulmonary hypertension.   4. No pericardial effusion.    CT Abdomen and Pelvis w/ Oral Cont and w/ IV Cont (12.04.19 @ 17:00)  Large enhancing mass within the intrahepatic IVC 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  Large right pleural effusion.

## 2019-12-08 NOTE — CHART NOTE - NSCHARTNOTEFT_GEN_A_CORE
Around 145 pm, rapid response called for pt for SVT on monitor and EKG with HR up to 180. No CP, palpitations, or dyspnea, but was anxious. Given metoprolol 5 mg IV push. HR didn't respond. Then given adenosine 6 mg IV push with cardiology fellow at bedside. HR decreased to 110. Rhythm concerning initially for AVNRT, then temporary a fib, then sinus tachycardia. Recent CBC showed stable Hb. AM K was 3.6, had gotten 40 mEq of po KCl. Around 145 pm, rapid response called for pt for SVT on monitor and EKG with HR up to 180. No CP, palpitations, or dyspnea, but was anxious. Given metoprolol 5 mg IV push. HR didn't respond. Then given adenosine 6 mg IV push with cardiology fellow at bedside. HR decreased to 110. Rhythm concerning initially for AVNRT, then temporary a fib, then sinus tachycardia. Recent CBC showed stable Hb (8.5 to 8.7). AM K was 3.6, had gotten 40 mEq of po KCl. AM mag was 1.6; this was repleted during rapid with 2 g of IV mag sulfate. New labs sent: lactate 2.0, Hb 9.0, K 4.4, mag 2.2, trop < 0.01. Pt to be transferred to SICU for closer monitoring. Get CTA chest to r/o tumor embolism to pulm artery. Get new echo. Increase metoprolol to 50 mg po TID per cardiology fellow. Continue heparin drip, goal PTT 60-80. F/u pleural effusion cytology results.  Discussed with attending, Dr. Shea.

## 2019-12-08 NOTE — PROVIDER CONTACT NOTE (MEDICATION) - ASSESSMENT
Vitals WNL  No c/o SOB.  "A little bit of pain" to right flank area but does not wish for pain meds.

## 2019-12-08 NOTE — PROVIDER CONTACT NOTE (MEDICATION) - BACKGROUND
39F here for Pleural effusion and RUQ mass. Here for HLD, asthma, DM, HTN, B/L uterine artery embolization. On pt specific heparin, 13.5.

## 2019-12-08 NOTE — PROGRESS NOTE ADULT - SUBJECTIVE AND OBJECTIVE BOX
O/N: awaiting final read on chest CT, f/u am PTT, URBAN, AVSS  12/7: PTT decreased to 14, 2pm PTT therapeutic, 8pm PTT 92 so decreased to 13.5, chest CT obtained            39F smoker with a PMHx of DM, HTN, HLD, s/p bilateral uterine artery embolization in 8/2019 due to fibroids now with 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    -HepGtt, next PTT with AM labs  -Reg diet  -f/u Chest CT final read  -possible NPO tn for biopsy tomorrow O/N: awaiting final read on chest CT, f/u am PTT, URBAN, AVSS  12/7: PTT decreased to 14, 2pm PTT therapeutic, 8pm PTT 92 so decreased to 13.5, chest CT obtained      Patient is a 39y old  Female who presents with a chief complaint of Shortness of breath (08 Dec 2019 05:48)      INTERVAL HPI/OVERNIGHT EVENTS:    Review of Systems: 12 point review of systems otherwise negative    (Admit Diagnosis) PLEURAL EFFUSION  (PMH) Uterine fibroid  (PMH) Hyperlipidemia  (PMH) Asthma  (PMH) Diabetes  (PMH) Hypertension  (Principal DC/DX) Pleural effusion  (Problem/DX) Abdominal mass of other site  (Problem/DX) Atelectasis of right lung  (Problem/DX) Transition of care performed with sharing of clinical summary  (Problem/DX) Abdominal mass, RUQ (right upper quadrant)  (Problem/DX) Nutrition, metabolism, and development symptoms  (Problem/DX) Abnormal transaminases  (Problem/DX) Asthma  (Problem/DX) Uterine leiomyoma, unspecified location  (Problem/DX) Type 2 diabetes mellitus without complication, without long-term current use of insulin  (Problem/DX) Hyperlipidemia, unspecified hyperlipidemia type  (Problem/DX) Hypertension, unspecified type  (Problem/DX) Pleural effusion  (Problem/DX) Shortness of breath  (PSH) No significant past surgical history  (R/O Problem/DX) Pancreatic mass  (Secondary DC/DX) Shortness of breath  (Secondary DC/DX) Pancreatic mass    Allergies  No Known Drug Allergies  shellfish (Hives)    Vital Signs Last 24 Hrs  T(C): 36.5 (08 Dec 2019 10:35), Max: 37 (07 Dec 2019 22:06)  T(F): 97.7 (08 Dec 2019 10:35), Max: 98.6 (07 Dec 2019 22:06)  HR: 88 (08 Dec 2019 11:58) (82 - 90)  BP: 118/92 (08 Dec 2019 11:58) (108/68 - 144/93)  BP(mean): 102 (08 Dec 2019 11:58) (82 - 102)  RR: 16 (08 Dec 2019 11:58) (16 - 17)  SpO2: 97% (08 Dec 2019 11:58) (96% - 100%)  CAPILLARY BLOOD GLUCOSE      POCT Blood Glucose.: 142 mg/dL (08 Dec 2019 06:40)  POCT Blood Glucose.: 186 mg/dL (07 Dec 2019 21:56)  POCT Blood Glucose.: 160 mg/dL (07 Dec 2019 19:14)  POCT Blood Glucose.: 182 mg/dL (07 Dec 2019 13:12)      12-07 @ 07:01  -  12-08 @ 07:00  --------------------------------------------------------  IN: 334 mL / OUT: 0 mL / NET: 334 mL    12-08 @ 07:01  -  12-08 @ 13:08  --------------------------------------------------------  IN: 180 mL / OUT: 0 mL / NET: 180 mL        Physical Exam:    General:  Well appearing, NAD, not cachetic  HEENT:  Nonicteric, PERRLA  CV:  RRR, no murmur, no JVD  Lungs:  CTA B/L, no wheezes, rales, rhonchi  Abdomen:  Obese, Soft, non-tender, no distended,   Extremities:  2+ pulses, no c/c, no edema  Skin:  Warm and dry, no rashes  Neuro:  AAOx3, non-focal, CN II-XII grossly intact  No Restraints    LABS:                        8.7    8.63  )-----------( 354      ( 08 Dec 2019 12:38 )             30.6     12-08    138  |  106  |  9   ----------------------------<  143<H>  3.6   |  23  |  0.73    Ca    8.6      08 Dec 2019 07:48  Phos  3.0     12-08  Mg     1.6     12-08    TPro  5.6<L>  /  Alb  2.9<L>  /  TBili  0.4  /  DBili  x   /  AST  81<H>  /  ALT  179<H>  /  AlkPhos  165<H>  12-08  PT/INR - ( 08 Dec 2019 07:48 )   PT: 15.8 sec;   INR: 1.38     PTT - ( 08 Dec 2019 07:48 )  PTT:81.8 sec    39F smoker with a PMHx of DM, HTN, HLD, s/p bilateral uterine artery embolization in 8/2019 due to fibroids now with 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    -HepGtt,   -Reg diet  -Possible Dx Lap w/ Gen Surg on Tu

## 2019-12-08 NOTE — CONSULT NOTE ADULT - SUBJECTIVE AND OBJECTIVE BOX
HPI:  Ms. Etienne 39F with a PMHx of DM, HTN, HLD, uterine fibroids s/p bilateral uterine artery embolization in 8/2019, pityriasis rosea, herpes who presents with shortness of breath.  Pt states her SOB has been progressive, and she can now only takes a few steps before she needs to rest. She has had associated substernal chest pressure that is non-radiating and varies in intensity. The chest pressure is worst when she lays on her left side. She has also had intermittent R sided flank/lower back pain that is intermittent, pounding in nature, and worse with food. She has experienced intermittent vaginal bleeding since her uterine artery embolization, and received 1 unit of blood at [facility] last month, but denies current heavy bleeding. She has experienced some mild night sweats and decreased appetite along with constipation, increased dizziness and lightheadedness, especially with ambulation, but denies fevers, chills, abdominal pain, n/v/d. She has a family history of bullous pemphigoid and colon cancer in her mother, and pancreatic and breast cancer on her father's side. She drinks alcohol socially, is an active tobacco user, and denies illicit drug use. She reports that she had a pap smear done earlier this year that was normal. States she had a mammogram done earlier this year, but is unaware of the results.     On arrival to ED, VS: T 97.9, , /94, RR 19, SpO2 100% on room air  Labs significant for: WBC 12.18, Hb 10.3, Plt 562, Alk phos 186, ,   CT A/P negative for renal/ureteral calculi/hydronephrosis. Positive for large soft tissue density in area of pancreatic head/duodenum. May represent pancreatitis vs mass lesion.  CT chest showing large Right pleural effusion with adjacent compressive atelectasis.  In ED, pt received: Flexeril 5mg PO x1, Toradol 30mg IVP x1, morphine 2mg IVP x1 (04 Dec 2019 03:54)    SICU ADDENDUM: Pt went into SVT with HR to 180s while on 5uris. Rapid response was called. Pt was given metoprolol 5 mg without improvement followed by adenosine 6mg with conversion to NSR. Pt HD stable throughout event. Cardiology was consulted. Pt transferred to SICU for HD monitoring.       PAST MEDICAL & SURGICAL HISTORY:  Uterine fibroid  Hyperlipidemia  Asthma  Diabetes  Hypertension  No significant past surgical history      ROS: See HPI    MEDICATIONS  (STANDING):  aDENosine Injectable (ADENOCARD) 6 milliGRAM(s) IV Push once  budesonide  80 MICROgram(s)/formoterol 4.5 MICROgram(s) Inhaler 2 Puff(s) Inhalation two times a day  dextrose 5%. 1000 milliLiter(s) (50 mL/Hr) IV Continuous <Continuous>  dextrose 50% Injectable 12.5 Gram(s) IV Push once  dextrose 50% Injectable 25 Gram(s) IV Push once  dextrose 50% Injectable 25 Gram(s) IV Push once  ferrous    sulfate 325 milliGRAM(s) Oral daily  heparin  Infusion 1350 Unit(s)/Hr (13.5 mL/Hr) IV Continuous <Continuous>  insulin lispro (HumaLOG) corrective regimen sliding scale   SubCutaneous Before meals and at bedtime  insulin lispro (HumaLOG) corrective regimen sliding scale   SubCutaneous every 6 hours  lidocaine   Patch 1 Patch Transdermal daily  melatonin 10 milliGRAM(s) Oral at bedtime  metoprolol tartrate 50 milliGRAM(s) Oral every 8 hours  montelukast 10 milliGRAM(s) Oral daily  nicotine -  14 mG/24Hr(s) Patch 1 patch Transdermal daily  potassium chloride    Tablet ER 40 milliEquivalent(s) Oral every 4 hours  senna 1 Tablet(s) Oral at bedtime    MEDICATIONS  (PRN):  acetaminophen   Tablet .. 650 milliGRAM(s) Oral every 6 hours PRN Temp greater or equal to 38C (100.4F), Mild Pain (1 - 3), Moderate Pain (4 - 6)  albuterol/ipratropium for Nebulization 3 milliLiter(s) Nebulizer every 6 hours PRN Shortness of Breath and/or Wheezing  ALPRAZolam 0.25 milliGRAM(s) Oral every 12 hours PRN anxiety/insomnia  dextrose 40% Gel 15 Gram(s) Oral once PRN Blood Glucose LESS THAN 70 milliGRAM(s)/deciliter  glucagon  Injectable 1 milliGRAM(s) IntraMuscular once PRN Glucose LESS THAN 70 milligrams/deciliter  ketorolac   Injectable 30 milliGRAM(s) IV Push every 6 hours PRN Moderate Pain (4 - 6)  ondansetron   Disintegrating Tablet 4 milliGRAM(s) Oral every 8 hours PRN Nausea and/or Vomiting  oxyCODONE    IR 5 milliGRAM(s) Oral every 4 hours PRN Severe Pain (7 - 10)  polyethylene glycol 3350 17 Gram(s) Oral daily PRN Constipation      Allergies    No Known Drug Allergies  shellfish (Hives)    Intolerances        SOCIAL HISTORY:  Smoke: Never Smoker  EtOH: occasional    FAMILY HISTORY:      Vital Signs Last 24 Hrs  T(C): 36.3 (08 Dec 2019 15:00), Max: 37 (07 Dec 2019 22:06)  T(F): 97.3 (08 Dec 2019 15:00), Max: 98.6 (07 Dec 2019 22:06)  HR: 110 (08 Dec 2019 15:00) (82 - 184)  BP: 128/79 (08 Dec 2019 15:00) (108/68 - 179/80)  BP(mean): 98 (08 Dec 2019 15:00) (82 - 115)  RR: 22 (08 Dec 2019 15:00) (16 - 22)  SpO2: 100% (08 Dec 2019 15:00) (96% - 100%)    PHYSICAL EXAM  Gen: NAD, resting comfortably in bed   Neuro: A&Ox3, no focal deficits  HEENT: MMM  Neck: no JVD  CV: tachycardic, regular.   Pulm: CTAx2  Abd: Soft, NT/ND, no guarding  Ext: trace b/l LE edema  Skin: no rash  Psych: anxious    LABS:                        9.0    9.30  )-----------( 361      ( 08 Dec 2019 15:34 )             30.0     12-08    142  |  109<H>  |  7   ----------------------------<  108<H>  4.4   |  21<L>  |  0.62    Ca    9.0      08 Dec 2019 15:34  Phos  3.0     12-08  Mg     1.6     12-08    TPro  5.6<L>  /  Alb  2.9<L>  /  TBili  0.4  /  DBili  x   /  AST  81<H>  /  ALT  179<H>  /  AlkPhos  165<H>  12-08    PT/INR - ( 08 Dec 2019 13:44 )   PT: 15.3 sec;   INR: 1.34          PTT - ( 08 Dec 2019 13:44 )  PTT:48.3 sec

## 2019-12-09 LAB
ALBUMIN SERPL ELPH-MCNC: 2.7 G/DL — LOW (ref 3.3–5)
ALP SERPL-CCNC: 118 U/L — SIGNIFICANT CHANGE UP (ref 40–120)
ALT FLD-CCNC: 139 U/L — HIGH (ref 10–45)
ANION GAP SERPL CALC-SCNC: 9 MMOL/L — SIGNIFICANT CHANGE UP (ref 5–17)
APTT BLD: 58.2 SEC — HIGH (ref 27.5–36.3)
APTT BLD: 63.5 SEC — HIGH (ref 27.5–36.3)
APTT BLD: 71.2 SEC — HIGH (ref 27.5–36.3)
APTT BLD: 85.4 SEC — HIGH (ref 27.5–36.3)
AST SERPL-CCNC: 54 U/L — HIGH (ref 10–40)
BASE EXCESS BLDA CALC-SCNC: -0.7 MMOL/L — SIGNIFICANT CHANGE UP (ref -2–3)
BILIRUB DIRECT SERPL-MCNC: <0.2 MG/DL — SIGNIFICANT CHANGE UP (ref 0–0.2)
BILIRUB INDIRECT FLD-MCNC: >0.4 MG/DL — SIGNIFICANT CHANGE UP (ref 0.2–1)
BILIRUB SERPL-MCNC: 0.6 MG/DL — SIGNIFICANT CHANGE UP (ref 0.2–1.2)
BUN SERPL-MCNC: 8 MG/DL — SIGNIFICANT CHANGE UP (ref 7–23)
CA-I BLDA-SCNC: 1.16 MMOL/L — SIGNIFICANT CHANGE UP (ref 1.12–1.3)
CALCIUM SERPL-MCNC: 8.5 MG/DL — SIGNIFICANT CHANGE UP (ref 8.4–10.5)
CHLORIDE SERPL-SCNC: 107 MMOL/L — SIGNIFICANT CHANGE UP (ref 96–108)
CO2 SERPL-SCNC: 23 MMOL/L — SIGNIFICANT CHANGE UP (ref 22–31)
COHGB MFR BLDA: 0.7 % — SIGNIFICANT CHANGE UP
CREAT SERPL-MCNC: 0.69 MG/DL — SIGNIFICANT CHANGE UP (ref 0.5–1.3)
GLUCOSE BLDC GLUCOMTR-MCNC: 105 MG/DL — HIGH (ref 70–99)
GLUCOSE BLDC GLUCOMTR-MCNC: 171 MG/DL — HIGH (ref 70–99)
GLUCOSE BLDC GLUCOMTR-MCNC: 96 MG/DL — SIGNIFICANT CHANGE UP (ref 70–99)
GLUCOSE SERPL-MCNC: 94 MG/DL — SIGNIFICANT CHANGE UP (ref 70–99)
HCO3 BLDA-SCNC: 23 MMOL/L — SIGNIFICANT CHANGE UP (ref 21–28)
HCT VFR BLD CALC: 27.3 % — LOW (ref 34.5–45)
HCT VFR BLD CALC: 28.8 % — LOW (ref 34.5–45)
HGB BLD-MCNC: 8 G/DL — LOW (ref 11.5–15.5)
HGB BLD-MCNC: 8.7 G/DL — LOW (ref 11.5–15.5)
HGB BLDA-MCNC: 9.3 G/DL — LOW (ref 11.5–15.5)
INR BLD: 1.55 — HIGH (ref 0.88–1.16)
MAGNESIUM SERPL-MCNC: 1.9 MG/DL — SIGNIFICANT CHANGE UP (ref 1.6–2.6)
MCHC RBC-ENTMCNC: 20.1 PG — LOW (ref 27–34)
MCHC RBC-ENTMCNC: 20.3 PG — LOW (ref 27–34)
MCHC RBC-ENTMCNC: 29.3 GM/DL — LOW (ref 32–36)
MCHC RBC-ENTMCNC: 30.2 GM/DL — LOW (ref 32–36)
MCV RBC AUTO: 67.3 FL — LOW (ref 80–100)
MCV RBC AUTO: 68.4 FL — LOW (ref 80–100)
METHGB MFR BLDA: 0.2 % — SIGNIFICANT CHANGE UP
NON-GYNECOLOGICAL CYTOLOGY STUDY: SIGNIFICANT CHANGE UP
NRBC # BLD: 0 /100 WBCS — SIGNIFICANT CHANGE UP (ref 0–0)
NRBC # BLD: 0 /100 WBCS — SIGNIFICANT CHANGE UP (ref 0–0)
O2 CT VFR BLDA CALC: SIGNIFICANT CHANGE UP (ref 15–23)
OXYHGB MFR BLDA: 95 % — SIGNIFICANT CHANGE UP (ref 94–100)
PCO2 BLDA: 34 MMHG — SIGNIFICANT CHANGE UP (ref 32–45)
PH BLDA: 7.45 — SIGNIFICANT CHANGE UP (ref 7.35–7.45)
PHOSPHATE SERPL-MCNC: 3.5 MG/DL — SIGNIFICANT CHANGE UP (ref 2.5–4.5)
PLATELET # BLD AUTO: 348 K/UL — SIGNIFICANT CHANGE UP (ref 150–400)
PLATELET # BLD AUTO: 419 K/UL — HIGH (ref 150–400)
PO2 BLDA: 81 MMHG — LOW (ref 83–108)
POTASSIUM BLDA-SCNC: 4 MMOL/L — SIGNIFICANT CHANGE UP (ref 3.5–4.9)
POTASSIUM SERPL-MCNC: 4.4 MMOL/L — SIGNIFICANT CHANGE UP (ref 3.5–5.3)
POTASSIUM SERPL-SCNC: 4.4 MMOL/L — SIGNIFICANT CHANGE UP (ref 3.5–5.3)
PROT SERPL-MCNC: 5.4 G/DL — LOW (ref 6–8.3)
PROTHROM AB SERPL-ACNC: 17.7 SEC — HIGH (ref 10–12.9)
RBC # BLD: 3.99 M/UL — SIGNIFICANT CHANGE UP (ref 3.8–5.2)
RBC # BLD: 4.28 M/UL — SIGNIFICANT CHANGE UP (ref 3.8–5.2)
RBC # FLD: 18.6 % — HIGH (ref 10.3–14.5)
RBC # FLD: 19.4 % — HIGH (ref 10.3–14.5)
SAO2 % BLDA: 96 % — SIGNIFICANT CHANGE UP (ref 95–100)
SODIUM BLDA-SCNC: 136 MMOL/L — LOW (ref 138–146)
SODIUM SERPL-SCNC: 139 MMOL/L — SIGNIFICANT CHANGE UP (ref 135–145)
WBC # BLD: 8.13 K/UL — SIGNIFICANT CHANGE UP (ref 3.8–10.5)
WBC # BLD: 8.37 K/UL — SIGNIFICANT CHANGE UP (ref 3.8–10.5)
WBC # FLD AUTO: 8.13 K/UL — SIGNIFICANT CHANGE UP (ref 3.8–10.5)
WBC # FLD AUTO: 8.37 K/UL — SIGNIFICANT CHANGE UP (ref 3.8–10.5)

## 2019-12-09 PROCEDURE — 99233 SBSQ HOSP IP/OBS HIGH 50: CPT | Mod: GC

## 2019-12-09 PROCEDURE — 99221 1ST HOSP IP/OBS SF/LOW 40: CPT | Mod: GC

## 2019-12-09 PROCEDURE — 71045 X-RAY EXAM CHEST 1 VIEW: CPT | Mod: 26

## 2019-12-09 PROCEDURE — 99232 SBSQ HOSP IP/OBS MODERATE 35: CPT

## 2019-12-09 PROCEDURE — 93308 TTE F-UP OR LMTD: CPT | Mod: 26

## 2019-12-09 PROCEDURE — 93321 DOPPLER ECHO F-UP/LMTD STD: CPT | Mod: 26

## 2019-12-09 RX ORDER — OXYCODONE HYDROCHLORIDE 5 MG/1
10 TABLET ORAL EVERY 6 HOURS
Refills: 0 | Status: DISCONTINUED | OUTPATIENT
Start: 2019-12-09 | End: 2019-12-10

## 2019-12-09 RX ORDER — OXYCODONE HYDROCHLORIDE 5 MG/1
5 TABLET ORAL EVERY 6 HOURS
Refills: 0 | Status: DISCONTINUED | OUTPATIENT
Start: 2019-12-09 | End: 2019-12-10

## 2019-12-09 RX ORDER — ALPRAZOLAM 0.25 MG
0.5 TABLET ORAL EVERY 12 HOURS
Refills: 0 | Status: DISCONTINUED | OUTPATIENT
Start: 2019-12-09 | End: 2019-12-10

## 2019-12-09 RX ORDER — SODIUM CHLORIDE 9 MG/ML
1000 INJECTION INTRAMUSCULAR; INTRAVENOUS; SUBCUTANEOUS
Refills: 0 | Status: DISCONTINUED | OUTPATIENT
Start: 2019-12-09 | End: 2019-12-10

## 2019-12-09 RX ADMIN — Medication 1 PATCH: at 22:24

## 2019-12-09 RX ADMIN — Medication 0.5 MILLIGRAM(S): at 10:35

## 2019-12-09 RX ADMIN — Medication 50 MILLIGRAM(S): at 13:41

## 2019-12-09 RX ADMIN — Medication 1 PATCH: at 11:13

## 2019-12-09 RX ADMIN — MONTELUKAST 10 MILLIGRAM(S): 4 TABLET, CHEWABLE ORAL at 11:13

## 2019-12-09 RX ADMIN — BUDESONIDE AND FORMOTEROL FUMARATE DIHYDRATE 2 PUFF(S): 160; 4.5 AEROSOL RESPIRATORY (INHALATION) at 22:23

## 2019-12-09 RX ADMIN — Medication 650 MILLIGRAM(S): at 09:10

## 2019-12-09 RX ADMIN — Medication 50 MILLIGRAM(S): at 06:01

## 2019-12-09 RX ADMIN — Medication 1 PATCH: at 10:36

## 2019-12-09 RX ADMIN — Medication 1 PATCH: at 05:52

## 2019-12-09 RX ADMIN — Medication 50 MILLIGRAM(S): at 22:19

## 2019-12-09 RX ADMIN — Medication 650 MILLIGRAM(S): at 09:30

## 2019-12-09 RX ADMIN — BUDESONIDE AND FORMOTEROL FUMARATE DIHYDRATE 2 PUFF(S): 160; 4.5 AEROSOL RESPIRATORY (INHALATION) at 09:10

## 2019-12-09 RX ADMIN — HEPARIN SODIUM 13 UNIT(S)/HR: 5000 INJECTION INTRAVENOUS; SUBCUTANEOUS at 22:26

## 2019-12-09 RX ADMIN — Medication 325 MILLIGRAM(S): at 11:13

## 2019-12-09 RX ADMIN — HEPARIN SODIUM 13 UNIT(S)/HR: 5000 INJECTION INTRAVENOUS; SUBCUTANEOUS at 17:24

## 2019-12-09 NOTE — DIETITIAN INITIAL EVALUATION ADULT. - OTHER INFO
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 Pt resting in chair, eating breakfast at time of assessment. Pain improved on pain medications, pt denies N/V, last BM 12/8, abdominal soreness last night (resolved on its own). Pt reports appetite decreased for ~1month PTA, pt attributes decreased appetite to fluid on lung. Pt follows regular diet, allergy/intolerance to shellfish per pt. Despite poor appetite PTA and fluid on lung, weight has been stable PTA between 217lb-222lb per pt. Pt states appetite now improved s/p thoracentesis on 12/5, now consuming >/=75% of meals. No significant findings on NFPE. Encouraged healthy, adequate PO intake with pt. Provided in depth diet education regarding CSTCHO, DASH diet with pt. Pt appeared receptive but states she feels like making changes are hard. Worked on goal setting and making small changes to lead to significant results. RD to monitor and f/u per protocol.

## 2019-12-09 NOTE — PROGRESS NOTE ADULT - SUBJECTIVE AND OBJECTIVE BOX
Heme/Onc Progress Note (Dr. Stevens)    Interval History: Pt seen and examined. Pt without new symptoms, no bleeding/ bruising. No fevers noted.     Allergies    No Known Drug Allergies  shellfish (Hives)    Intolerances        Medications:  MEDICATIONS  (STANDING):  ALPRAZolam 0.5 milliGRAM(s) Oral every 12 hours  budesonide  80 MICROgram(s)/formoterol 4.5 MICROgram(s) Inhaler 2 Puff(s) Inhalation two times a day  dextrose 5%. 1000 milliLiter(s) (50 mL/Hr) IV Continuous <Continuous>  dextrose 50% Injectable 12.5 Gram(s) IV Push once  dextrose 50% Injectable 25 Gram(s) IV Push once  dextrose 50% Injectable 25 Gram(s) IV Push once  ferrous    sulfate 325 milliGRAM(s) Oral daily  heparin  Infusion 1350 Unit(s)/Hr (13 mL/Hr) IV Continuous <Continuous>  insulin lispro (HumaLOG) corrective regimen sliding scale   SubCutaneous every 6 hours  lidocaine   Patch 1 Patch Transdermal daily  melatonin 10 milliGRAM(s) Oral at bedtime  metoprolol tartrate 50 milliGRAM(s) Oral every 8 hours  montelukast 10 milliGRAM(s) Oral daily  nicotine -  14 mG/24Hr(s) Patch 1 patch Transdermal daily  senna 1 Tablet(s) Oral at bedtime    MEDICATIONS  (PRN):  acetaminophen   Tablet .. 650 milliGRAM(s) Oral every 6 hours PRN Temp greater or equal to 38C (100.4F), Mild Pain (1 - 3), Moderate Pain (4 - 6)  albuterol/ipratropium for Nebulization 3 milliLiter(s) Nebulizer every 6 hours PRN Shortness of Breath and/or Wheezing  dextrose 40% Gel 15 Gram(s) Oral once PRN Blood Glucose LESS THAN 70 milliGRAM(s)/deciliter  glucagon  Injectable 1 milliGRAM(s) IntraMuscular once PRN Glucose LESS THAN 70 milligrams/deciliter  oxyCODONE    IR 5 milliGRAM(s) Oral every 6 hours PRN Moderate Pain (4 - 6)  oxyCODONE    IR 10 milliGRAM(s) Oral every 6 hours PRN Severe Pain (7 - 10)  polyethylene glycol 3350 17 Gram(s) Oral daily PRN Constipation    heparin  Infusion 1350 Unit(s)/Hr IV Continuous <Continuous>          PHYSICAL EXAM:    T(F): 98.2 (19 @ 09:04), Max: 98.3 (19 @ 21:01)  HR: 83 (19 @ 14:17) (72 - 110)  BP: 140/81 (19 @ 14:17) (100/72 - 140/81)  RR: 16 (19 @ 14:17) (14 - 28)  SpO2: 99% (19 @ 14:17) (95% - 100%)  Wt(kg): --    Daily     Daily Weight in k.8 (09 Dec 2019 12:11)    HEENT: AT/NC, EOMI  NECK: Supple  CVS: +S1S2  LUNG: CTA /Bl  ABD: +BS  EXT: no c/c/e  NEURO: aaox3       Labs:                          8.7    8.37  )-----------( 419      ( 09 Dec 2019 12:01 )             28.8     CBC Full  -  ( 09 Dec 2019 12:01 )  WBC Count : 8.37 K/uL  RBC Count : 4.28 M/uL  Hemoglobin : 8.7 g/dL  Hematocrit : 28.8 %  Platelet Count - Automated : 419 K/uL  Mean Cell Volume : 67.3 fl  Mean Cell Hemoglobin : 20.3 pg  Mean Cell Hemoglobin Concentration : 30.2 gm/dL  Auto Neutrophil # : x  Auto Lymphocyte # : x  Auto Monocyte # : x  Auto Eosinophil # : x  Auto Basophil # : x  Auto Neutrophil % : x  Auto Lymphocyte % : x  Auto Monocyte % : x  Auto Eosinophil % : x  Auto Basophil % : x    PT/INR - ( 09 Dec 2019 05:42 )   PT: 17.7 sec;   INR: 1.55          PTT - ( 09 Dec 2019 12:01 )  PTT:58.2 sec        139  |  107  |  8   ----------------------------<  94  4.4   |  23  |  0.69    Ca    8.5      09 Dec 2019 05:42  Phos  3.5       Mg     1.9         TPro  5.4<L>  /  Alb  2.7<L>  /  TBili  0.6  /  DBili  <0.2  /  AST  54<H>  /  ALT  139<H>  /  AlkPhos  118   CT A/P   LOWER CHEST: Large right pleural effusion and mediastinal shift.    ABDOMEN:  LIVER: Within normal limits  BILE DUCTS: Normal caliber  GALLBLADDER: No calcified gallstones. Normal caliber wall  PANCREAS: Within normal limits  SPLEEN: Within normal limits  ADRENALS: Within normal limits  KIDNEYS: Within normal limits  PELVIS:  REPRODUCTIVE ORGANS: Multi fibroid uterus. Left ovarian cysts.  BLADDER: Within normal limits  PERITONEUM:No ascites, no free air.  BOWEL: Within normal limits.  VESSELS: Expansile enhancing mass within the intrahepatic IVC extending   proximally to the right atrial junction and distally to the renal veins.   Diminished opacification of the infrarenal IVC and left common iliac   vein, suspect combination of bland thrombus and mixing artifact.  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  Large right pleural effusion.        CT Chest non Con   Impression: 1. Since 2019, there is a new large right pleural   effusion causing compressive atelectasis of the entire right lower lobe   and mild shift of the heart and mediastinum to the left.    2. There are a few nodules in the left lung, the largest measuring 5 mm   in the left upper lobe. These nodules have nonspecific appearances. They   could be benign or malignant.    3. Large right upper quadrant mass identified on CT scan of abdomen and   pelvis of 2019 is partially visualized. It is consistent with   neoplasm.         CT Chest PE Protocol  IMPRESSION:   1. Since 2019, there is no evidence of pulmonary embolism.    2. No significant change in large mass involving the inferior vena cava   with extension into the right atrium. Differential includes IVC sarcoma.     3. There are multiple small lung nodules bilaterally. These are   suspicious for metastatic disease.    4. Increase in size of moderate right pleural effusion.           Cytopathology     Final Diagnosis  PLEURAL FLUID, RIGHT:    NEGATIVE FOR MALIGNANT CELLS    Mesothelial cells, histiocytes, and lymphocytes  Cell block reviewed

## 2019-12-09 NOTE — PROGRESS NOTE ADULT - ASSESSMENT
39F with morbidly obese AA female, with HTN, HLD, DM, Uterine fibroids s/p B/L uterine artery embolization in 8/2019with a known IVC mass extending in to the right atrium undergoing work up is being consulted with for hemodynamically stable SVT which is likely atrial fibrillation. Patient remains in NSR.     Paroxysmal Afib: Currently in NSR. Unclear cause of AFib. May be due to mass and also other process causing irritation. [id number].   -Continue with Heparin gtt.   -Patient can be transitioned to NOAC when surgically feasible.   -Continue with Metoprolol 50mg TID.     HTN: Well controlled.  -Continue with BB.       Discussed with attending on rounds.

## 2019-12-09 NOTE — CONSULT NOTE ADULT - ASSESSMENT
39F  PMHx of DM, HTN, HLD, uterine fibroids s/p bilateral uterine artery embolization in 2019 currently admitted for SOB found to have pleural effusion and IVC mass. GYN consulted as differential diagnosis of IVC mass includes sarcoma of from possible uterine dissemination given hx of large fibroids and recent [place].   -recommend transvaginal ultrasound  -will make recommendations based on tissue biopsy  -will hold on consulting GYN oncology until tissue biopsy results     Discussed with Dr. Dee.

## 2019-12-09 NOTE — DIETITIAN INITIAL EVALUATION ADULT. - PROBLEM SELECTOR PLAN 2
Management as above in problem #1    #Right flank pain  Likely referred pain from pleural effusion  - management as above  - tylenol prn for mild pain, toradol for mod pain, oxycodone for severe pain

## 2019-12-09 NOTE — PROGRESS NOTE ADULT - PROBLEM SELECTOR PLAN 1
Large, R sided pleural effusion now s/p thoracentesis 12/5 with removal of 1800cc of serous appearing fluid.    Exudative based on lights criteria though cytology negative.     Repeat POCUS shows reaccumulation of small right sided simple effusion  no effusion on the left.  CT scan of chest reviewed which shows a small pleural effusion, the lung parenchyma overall looks clear with the exception of some ground glass opacities in the right lung which likely represents re-expansion pulmonary edema. The pleura did not show obvious abnormality.    Recommendations:  - No role for repeat thora at this time, will continue to monitor with serial US. Will consider thora Vs chest tube Vs pleurX if fluid causes symptoms or reaccumulation becomes significant.

## 2019-12-09 NOTE — DIETITIAN INITIAL EVALUATION ADULT. - ENERGY NEEDS
Ht (12/6): 165.1cm, Wt (12/8): 103.5kg, IBW: 56.8kg+/-10%, %IBW: 182%, BMI: 38   IBW used to calculate energy needs due to pt's current body weight exceeding 120% of IBW. Needs adjusted for overweight status. Fluid needs per team.

## 2019-12-09 NOTE — PROGRESS NOTE ADULT - SUBJECTIVE AND OBJECTIVE BOX
ID: 39F PMH DMII, HTN, HLD, tobacco smoker, s/p bilateral uterine artery embolization in 8/2019 due to fibroids now with large enhancing mass within the intrahepatic IVC extending from the left iliac vein to right atrium, s/p thoracentesis 12/05.    SUBJECTIVE: Denies chest pain, SOB, lightheadedness, or headache. Patient still has abdominal soreness.    24HR EVENTS: SVT with conversion to NSR with adenosine, patient remained hemodynamically stable, transferred to SICU. CTA preliminary read showed no significant change or PE. Heparin drip started.      OBJECTIVE:    Vital Signs:  Vital Signs Last 24 Hrs  T(C): 36.7 (09 Dec 2019 04:38), Max: 36.8 (08 Dec 2019 21:01)  T(F): 98.1 (09 Dec 2019 04:38), Max: 98.3 (08 Dec 2019 21:01)  HR: 84 (09 Dec 2019 07:00) (72 - 184)  BP: 123/94 (09 Dec 2019 07:00) (100/72 - 179/80)  BP(mean): 112 (09 Dec 2019 07:00) (82 - 115)  RR: 20 (09 Dec 2019 07:00) (14 - 25)  SpO2: 96% (09 Dec 2019 07:00) (96% - 100%)    Physical Exam:  General: NAD  Pulmonary: Nonlabored breathing, no respiratory distress  Abdominal: Soft, NT/ND  Neuro: AAO x3, no focal deficits    Ins and Outs:  I&O's Summary    08 Dec 2019 07:01  -  09 Dec 2019 07:00  --------------------------------------------------------  IN: 409 mL / OUT: 400 mL / NET: 9 mL    09 Dec 2019 07:01  -  09 Dec 2019 08:00  --------------------------------------------------------  IN: 13 mL / OUT: 0 mL / NET: 13 mL        Labs:                        8.0    8.13  )-----------( 348      ( 09 Dec 2019 05:42 )             27.3     12-09    139  |  107  |  8   ----------------------------<  94  4.4   |  23  |  0.69    Ca    8.5      09 Dec 2019 05:42  Phos  3.5     12-09  Mg     1.9     12-09    TPro  5.4<L>  /  Alb  2.7<L>  /  TBili  0.6  /  DBili  <0.2  /  AST  54<H>  /  ALT  139<H>  /  AlkPhos  118  12-09    PT/INR - ( 09 Dec 2019 05:42 )   PT: 17.7 sec;   INR: 1.55          PTT - ( 09 Dec 2019 05:42 )  PTT:85.4 sec    CAPILLARY BLOOD GLUCOSE      POCT Blood Glucose.: 96 mg/dL (09 Dec 2019 05:46)  POCT Blood Glucose.: 143 mg/dL (08 Dec 2019 23:05)  POCT Blood Glucose.: 110 mg/dL (08 Dec 2019 17:19)  POCT Blood Glucose.: 132 mg/dL (08 Dec 2019 13:49)    LIVER FUNCTIONS - ( 09 Dec 2019 05:42 )  Alb: 2.7 g/dL / Pro: 5.4 g/dL / ALK PHOS: 118 U/L / ALT: 139 U/L / AST: 54 U/L / GGT: x

## 2019-12-09 NOTE — CONSULT NOTE ADULT - SUBJECTIVE AND OBJECTIVE BOX
Late entry     38yo  currently admitted to the ICU in the setting of shortness of breath found to have large pleural effusion and mass extending into the IVC. Patient has known uterine fibroids since her late teens early 20s when she had an ultrasound during the time of an elective . She is unsure how large fibroids are. She has not had issues with pelvic pain or heavy vaginal bleeding from the fibroids. She reports that her and her partner desire future pregnancy and that she wanted to get treatment for her fibroids. Her partner is known azospermia and was planning on doing IUI with donor sperm. In August of this year, she underwent uterine artery embolization in attempt to shrink fibroids. Since the procedure, she developed heavy menstrual bleeding requiring 1u PRBC transfusion. She was placed on 30 days of progesterone and has cessation of bleeding. After medication was finished a few weeks ago patient had interval increase in bleeding but has now tapered off and only reports vaginal spotting at the moment. GYN was consulted as on differential for IVC mass is sarcoma that is disseminated from uterus.      Initial HPI  Ms. Etienne 39F with a PMHx of DM, HTN, HLD, uterine fibroids s/p bilateral uterine artery embolization in 2019, pityriasis rosea, herpes who presents with shortness of breath.  Pt states her SOB has been progressive, and she can now only takes a few steps before she needs to rest. She has had associated substernal chest pressure that is non-radiating and varies in intensity. The chest pressure is worst when she lays on her left side. She has also had intermittent R sided flank/lower back pain that is intermittent, pounding in nature, and worse with food. She has experienced intermittent vaginal bleeding since her uterine artery embolization, and received 1 unit of blood at [facility] last month, but denies current heavy bleeding. She has experienced some mild night sweats and decreased appetite along with constipation, increased dizziness and lightheadedness, especially with ambulation, but denies fevers, chills, abdominal pain, n/v/d. She has a family history of bullous pemphigoid and colon cancer in her mother, and pancreatic and breast cancer on her father's side. She drinks alcohol socially, is an active tobacco user, and denies illicit drug use. She reports that she had a pap smear done earlier this year that was normal. States she had a mammogram done earlier this year, but is unaware of the results.    PAST MEDICAL & SURGICAL HISTORY:  Uterine fibroid  Hyperlipidemia  Asthma  Diabetes  Hypertension  No significant past surgical history    Home Medications:  Advair HFA 45 mcg-21 mcg/inh inhalation aerosol: 2 puff(s) inhaled 2 times a day (04 Dec 2019 05:41)  atorvastatin 20 mg oral tablet: 1 tab(s) orally once a day (14 Oct 2019 16:20)  ferrous sulfate 325 mg (65 mg elemental iron) oral tablet: 1 tab(s) orally once a day (14 Oct 2019 16:20)  lisinopril 20 mg oral tablet: 1 tab(s) orally 2 times a day (04 Dec 2019 05:40)  metFORMIN 500 mg oral tablet: 1 tab(s) orally 2 times a day (14 Oct 2019 16:20)  metoprolol tartrate 50 mg oral tablet: 1 tab(s) orally 2 times a day (14 Oct 2019 16:20)  Vitamin D3 1000 intl units oral tablet: 1 tab(s) orally once a day (14 Oct 2019 16:20)    Allergies    No Known Drug Allergies  shellfish (Hives)    Intolerances        Physical Exam  Vital Signs Last 24 Hrs  T(C): 36.9 (11 Dec 2019 10:32), Max: 36.9 (11 Dec 2019 10:32)  T(F): 98.5 (11 Dec 2019 10:32), Max: 98.5 (11 Dec 2019 10:32)  HR: 88 (11 Dec 2019 08:35) (78 - 91)  BP: 153/98 (11 Dec 2019 08:35) (127/78 - 183/105)  BP(mean): 120 (11 Dec 2019 08:35) (120 - 120)  RR: 16 (11 Dec 2019 08:35) (15 - 18)  SpO2: 99% (11 Dec 2019 08:35) (94% - 100%)    Gen: sitting in chair, in no acute distress  Chest: no increased work of breathing  Abd: soft, nontender, nondisteneded  Pelvic: unable to perform, deferred     EXAM:  CT ABDOMEN AND PELVIS OC IC                          PROCEDURE DATE:  2019          INTERPRETATION:  Clinical information: Liver mass    Comparison: 19    PROCEDURE:   CT of the Abdomen and Pelvis was performed with intravenous contrast.  90ml of Omnipaque 350 was injected intravenously. 10cc was discarded.    FINDINGS:    LOWER CHEST: Large right pleural effusion and mediastinal shift.    ABDOMEN:  LIVER: Within normal limits  BILE DUCTS: Normal caliber  GALLBLADDER: No calcified gallstones. Normal caliber wall  PANCREAS: Within normal limits  SPLEEN: Within normal limits  ADRENALS: Within normal limits  KIDNEYS: Within normal limits    PELVIS:  REPRODUCTIVE ORGANS: Multi fibroid uterus. Left ovarian cysts.  BLADDER: Within normal limits    PERITONEUM:No ascites, no free air.  BOWEL: Within normal limits.  VESSELS: Expansile enhancing mass within the intrahepatic IVC extending   proximally to the right atrial junction and distally to the renal veins.   Diminished opacification of the infrarenal IVC and left common iliac   vein, suspect combination of bland thrombus and mixing artifact.  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    Large right pleural effusion.

## 2019-12-09 NOTE — PROGRESS NOTE ADULT - ASSESSMENT
39F with hx of iron def anemia, uterine fibroids s/p bilateral uterine artery embolization (2019), with fatigue/, found to have large R pleural effusion and incidental large intrahepatic IVC mass, currently being worked up.     #Intrahepatic IVC mass w/ extension   #Large R pleural effusion   #ID Anemia     -initial pleural fluid cytology sample negative for malignant cells, however lower diagnostic yield in fluid, -based on imaging, still a concern for malignancy including IVC sarcoma, with possible pulm mets  -awaiting biopsy in which tissue should be sent for molecular profiling, NGS, concern for stage IV disease   -reviewed tumor markers, which remain WNL, LDH of 753 noted, obtain uric acid  -per vascular remains on hep gtt  -awaiting pathologic confirmation, in interim will reach out sarcoma specialist for f/u   -start iron supplementation   -dvt ppx on hep gtt    d/w Dr. Stevens

## 2019-12-09 NOTE — PROGRESS NOTE ADULT - SUBJECTIVE AND OBJECTIVE BOX
Interval Events:  Patient seen and examined at bedside.    MEDICATIONS:  Pulmonary:  albuterol/ipratropium for Nebulization 3 milliLiter(s) Nebulizer every 6 hours PRN  budesonide  80 MICROgram(s)/formoterol 4.5 MICROgram(s) Inhaler 2 Puff(s) Inhalation two times a day  montelukast 10 milliGRAM(s) Oral daily    Antimicrobials:    Anticoagulants:  heparin  Infusion 1350 Unit(s)/Hr IV Continuous <Continuous>    Cardiac:  metoprolol tartrate 50 milliGRAM(s) Oral every 8 hours    Endocrine:  dextrose 40% Gel 15 Gram(s) Oral once PRN  dextrose 50% Injectable 12.5 Gram(s) IV Push once  dextrose 50% Injectable 25 Gram(s) IV Push once  dextrose 50% Injectable 25 Gram(s) IV Push once  glucagon  Injectable 1 milliGRAM(s) IntraMuscular once PRN  insulin lispro (HumaLOG) corrective regimen sliding scale   SubCutaneous every 6 hours    Allergies    No Known Drug Allergies  shellfish (Hives)    Intolerances        Vital Signs Last 24 Hrs  T(C): 36.8 (09 Dec 2019 09:04), Max: 36.8 (08 Dec 2019 21:01)  T(F): 98.2 (09 Dec 2019 09:04), Max: 98.3 (08 Dec 2019 21:01)  HR: 86 (09 Dec 2019 12:00) (72 - 184)  BP: 121/75 (09 Dec 2019 12:00) (100/72 - 179/80)  BP(mean): 96 (09 Dec 2019 12:00) (84 - 118)  RR: 24 (09 Dec 2019 12:00) (14 - 28)  SpO2: 98% (09 Dec 2019 12:00) (95% - 100%)    12-08 @ 07:01  -  12-09 @ 07:00  --------------------------------------------------------  IN: 409 mL / OUT: 400 mL / NET: 9 mL    12-09 @ 07:01  -  12-09 @ 13:30  --------------------------------------------------------  IN: 65 mL / OUT: 0 mL / NET: 65 mL          LABS:  ABG - ( 08 Dec 2019 14:29 )  pH, Arterial: 7.45  pH, Blood: x     /  pCO2: 34    /  pO2: 81    / HCO3: 23    / Base Excess: -0.7  /  SaO2: x                   CBC Full  -  ( 09 Dec 2019 12:01 )  WBC Count : 8.37 K/uL  RBC Count : 4.28 M/uL  Hemoglobin : 8.7 g/dL  Hematocrit : 28.8 %  Platelet Count - Automated : 419 K/uL  Mean Cell Volume : 67.3 fl  Mean Cell Hemoglobin : 20.3 pg  Mean Cell Hemoglobin Concentration : 30.2 gm/dL  Auto Neutrophil # : x  Auto Lymphocyte # : x  Auto Monocyte # : x  Auto Eosinophil # : x  Auto Basophil # : x  Auto Neutrophil % : x  Auto Lymphocyte % : x  Auto Monocyte % : x  Auto Eosinophil % : x  Auto Basophil % : x    12-09    139  |  107  |  8   ----------------------------<  94  4.4   |  23  |  0.69    Ca    8.5      09 Dec 2019 05:42  Phos  3.5     12-09  Mg     1.9     12-09    TPro  5.4<L>  /  Alb  2.7<L>  /  TBili  0.6  /  DBili  <0.2  /  AST  54<H>  /  ALT  139<H>  /  AlkPhos  118  12-09    PT/INR - ( 09 Dec 2019 05:42 )   PT: 17.7 sec;   INR: 1.55          PTT - ( 09 Dec 2019 12:01 )  PTT:58.2 sec                  RADIOLOGY & ADDITIONAL STUDIES (The following images were personally reviewed): Interval Events:  Patient seen and examined at bedside.    Patient continues to complain of R sided flank/back pain. Denies any SOB or chest discomfort.     MEDICATIONS:  Pulmonary:  albuterol/ipratropium for Nebulization 3 milliLiter(s) Nebulizer every 6 hours PRN  budesonide  80 MICROgram(s)/formoterol 4.5 MICROgram(s) Inhaler 2 Puff(s) Inhalation two times a day  montelukast 10 milliGRAM(s) Oral daily    Antimicrobials:    Anticoagulants:  heparin  Infusion 1350 Unit(s)/Hr IV Continuous <Continuous>    Cardiac:  metoprolol tartrate 50 milliGRAM(s) Oral every 8 hours    Endocrine:  dextrose 40% Gel 15 Gram(s) Oral once PRN  dextrose 50% Injectable 12.5 Gram(s) IV Push once  dextrose 50% Injectable 25 Gram(s) IV Push once  dextrose 50% Injectable 25 Gram(s) IV Push once  glucagon  Injectable 1 milliGRAM(s) IntraMuscular once PRN  insulin lispro (HumaLOG) corrective regimen sliding scale   SubCutaneous every 6 hours    Allergies    No Known Drug Allergies  shellfish (Hives)    Intolerances    Vital Signs Last 24 Hrs  T(C): 36.8 (09 Dec 2019 09:04), Max: 36.8 (08 Dec 2019 21:01)  T(F): 98.2 (09 Dec 2019 09:04), Max: 98.3 (08 Dec 2019 21:01)  HR: 86 (09 Dec 2019 12:00) (72 - 184)  BP: 121/75 (09 Dec 2019 12:00) (100/72 - 179/80)  BP(mean): 96 (09 Dec 2019 12:00) (84 - 118)  RR: 24 (09 Dec 2019 12:00) (14 - 28)  SpO2: 98% (09 Dec 2019 12:00) (95% - 100%)    12-08 @ 07:01  -  12-09 @ 07:00  --------------------------------------------------------  IN: 409 mL / OUT: 400 mL / NET: 9 mL    12-09 @ 07:01  -  12-09 @ 13:30  --------------------------------------------------------  IN: 65 mL / OUT: 0 mL / NET: 65 mL    Physical Exam:  Constitutional: comfortable in bed   Head: NC/AT  Eyes: PERRL, EOMI, anicteric sclera  ENT: no nasal discharge; uvula midline, no oropharyngeal erythema or exudates; MMM  Neck: supple; no JVD or thyromegaly  Respiratory: trace crackles at the R base, otherwise CTA. improved from prior exam.   Cardiac: +S1/S2; RRR  Gastrointestinal: soft, NT/ND  Extremities: WWP, no clubbing or cyanosis; no peripheral edema; RLE healed herpetic lesion covered with bandage  Neurologic: awake and alert; SWANSON      LABS:  ABG - ( 08 Dec 2019 14:29 )  pH, Arterial: 7.45  pH, Blood: x     /  pCO2: 34    /  pO2: 81    / HCO3: 23    / Base Excess: -0.7  /  SaO2: x         CBC Full  -  ( 09 Dec 2019 12:01 )  WBC Count : 8.37 K/uL  RBC Count : 4.28 M/uL  Hemoglobin : 8.7 g/dL  Hematocrit : 28.8 %  Platelet Count - Automated : 419 K/uL  Mean Cell Volume : 67.3 fl  Mean Cell Hemoglobin : 20.3 pg  Mean Cell Hemoglobin Concentration : 30.2 gm/dL    12-09    139  |  107  |  8   ----------------------------<  94  4.4   |  23  |  0.69    Ca    8.5      09 Dec 2019 05:42  Phos  3.5     12-09  Mg     1.9     12-09    TPro  5.4<L>  /  Alb  2.7<L>  /  TBili  0.6  /  DBili  <0.2  /  AST  54<H>  /  ALT  139<H>  /  AlkPhos  118  12-09    PT/INR - ( 09 Dec 2019 05:42 )   PT: 17.7 sec;   INR: 1.55        PTT - ( 09 Dec 2019 12:01 )  PTT:58.2 sec    RADIOLOGY & ADDITIONAL STUDIES (The following images were personally reviewed):  Reviewed

## 2019-12-09 NOTE — PROGRESS NOTE ADULT - SUBJECTIVE AND OBJECTIVE BOX
INTERVAL HPI/OVERNIGHT EVENTS: O/N: 6pm PTT 66.7. CTA done, prelim read: no PE, no sig change in IVC mass, mod R pleural effusion, b/l pulm nodules-possible mets. 12 am PTT 63.5  12/8: transferred to SICU after episode of SVT on 5uris. now sinus tach, awaiting CTA and echo, HD stable.     MEDICATIONS  (STANDING):  budesonide  80 MICROgram(s)/formoterol 4.5 MICROgram(s) Inhaler 2 Puff(s) Inhalation two times a day  dextrose 5%. 1000 milliLiter(s) (50 mL/Hr) IV Continuous <Continuous>  dextrose 50% Injectable 12.5 Gram(s) IV Push once  dextrose 50% Injectable 25 Gram(s) IV Push once  dextrose 50% Injectable 25 Gram(s) IV Push once  ferrous    sulfate 325 milliGRAM(s) Oral daily  heparin  Infusion 1350 Unit(s)/Hr (13 mL/Hr) IV Continuous <Continuous>  insulin lispro (HumaLOG) corrective regimen sliding scale   SubCutaneous every 6 hours  lidocaine   Patch 1 Patch Transdermal daily  melatonin 10 milliGRAM(s) Oral at bedtime  metoprolol tartrate 50 milliGRAM(s) Oral every 8 hours  montelukast 10 milliGRAM(s) Oral daily  nicotine -  14 mG/24Hr(s) Patch 1 patch Transdermal daily  senna 1 Tablet(s) Oral at bedtime    MEDICATIONS  (PRN):  acetaminophen   Tablet .. 650 milliGRAM(s) Oral every 6 hours PRN Temp greater or equal to 38C (100.4F), Mild Pain (1 - 3), Moderate Pain (4 - 6)  albuterol/ipratropium for Nebulization 3 milliLiter(s) Nebulizer every 6 hours PRN Shortness of Breath and/or Wheezing  ALPRAZolam 0.25 milliGRAM(s) Oral every 12 hours PRN anxiety/insomnia  dextrose 40% Gel 15 Gram(s) Oral once PRN Blood Glucose LESS THAN 70 milliGRAM(s)/deciliter  glucagon  Injectable 1 milliGRAM(s) IntraMuscular once PRN Glucose LESS THAN 70 milligrams/deciliter  oxyCODONE    IR 5 milliGRAM(s) Oral every 4 hours PRN Severe Pain (7 - 10)  polyethylene glycol 3350 17 Gram(s) Oral daily PRN Constipation      Drug Dosing Weight  Height (cm): 165.1 (06 Dec 2019 05:43)  Weight (kg): 103.5 (08 Dec 2019 15:00)  BMI (kg/m2): 38 (08 Dec 2019 15:00)  BSA (m2): 2.09 (08 Dec 2019 15:00)      PAST MEDICAL & SURGICAL HISTORY:  Uterine fibroid  Hyperlipidemia  Asthma  Diabetes  Hypertension  No significant past surgical history      ICU Vital Signs Last 24 Hrs  T(C): 36.7 (09 Dec 2019 04:38), Max: 36.8 (08 Dec 2019 21:01)  T(F): 98.1 (09 Dec 2019 04:38), Max: 98.3 (08 Dec 2019 21:01)  HR: 84 (09 Dec 2019 07:00) (72 - 184)  BP: 123/94 (09 Dec 2019 07:00) (100/72 - 179/80)  BP(mean): 112 (09 Dec 2019 07:00) (82 - 115)  ABP: --  ABP(mean): --  RR: 20 (09 Dec 2019 07:00) (14 - 25)  SpO2: 96% (09 Dec 2019 07:00) (96% - 100%)      ABG - ( 08 Dec 2019 14:29 )  pH, Arterial: 7.45  pH, Blood: x     /  pCO2: 34    /  pO2: 81    / HCO3: 23    / Base Excess: -0.7  /  SaO2: x                     08 Dec 2019 07:01  -  09 Dec 2019 07:00  --------------------------------------------------------  IN:    heparin Infusion: 229 mL    Oral Fluid: 180 mL  Total IN: 409 mL    OUT:    Voided: 400 mL  Total OUT: 400 mL    Total NET: 9 mL      09 Dec 2019 07:01  -  09 Dec 2019 07:27  --------------------------------------------------------  IN:    heparin Infusion: 13 mL  Total IN: 13 mL    OUT:  Total OUT: 0 mL    Total NET: 13 mL          PHYSICAL EXAM:  Gen: NAD, resting comfortably in bed   Neuro: A&Ox3, no focal deficits  HEENT: MMM  Neck: no JVD  CV: RRR   Pulm: CTAx2  Abd: Soft, NT/ND, no guarding  Ext: trace b/l LE edema  Skin: no rash  Psych: anxious          LABS:  CBC Full  -  ( 09 Dec 2019 05:42 )  WBC Count : 8.13 K/uL  RBC Count : 3.99 M/uL  Hemoglobin : 8.0 g/dL  Hematocrit : 27.3 %  Platelet Count - Automated : 348 K/uL  Mean Cell Volume : 68.4 fl  Mean Cell Hemoglobin : 20.1 pg  Mean Cell Hemoglobin Concentration : 29.3 gm/dL  Auto Neutrophil # : x  Auto Lymphocyte # : x  Auto Monocyte # : x  Auto Eosinophil # : x  Auto Basophil # : x  Auto Neutrophil % : x  Auto Lymphocyte % : x  Auto Monocyte % : x  Auto Eosinophil % : x  Auto Basophil % : x    12-09    139  |  107  |  8   ----------------------------<  94  4.4   |  23  |  0.69    Ca    8.5      09 Dec 2019 05:42  Phos  3.5     12-09  Mg     1.9     12-09    TPro  5.4<L>  /  Alb  2.7<L>  /  TBili  0.6  /  DBili  <0.2  /  AST  54<H>  /  ALT  139<H>  /  AlkPhos  118  12-09    PT/INR - ( 09 Dec 2019 05:42 )   PT: 17.7 sec;   INR: 1.55          PTT - ( 09 Dec 2019 05:42 )  PTT:85.4 sec

## 2019-12-09 NOTE — DIETITIAN INITIAL EVALUATION ADULT. - PROBLEM SELECTOR PLAN 5
Pt currently with softer pressures - 111/80 at the time of this writing  - will hold off restarting home antihypertensives  - can restart if BP becomes elevated

## 2019-12-09 NOTE — DIETITIAN INITIAL EVALUATION ADULT. - PROBLEM SELECTOR PLAN 3
Pancreatic mass seen on CT abdomen with read pancreatic head/duodenal mass vs pancreatitis. Lipase sent in ED, still pending at the time of this writing. Pancreatitis a possibility given epigastric firmness and tenderness on exam, though lipase negative. Pancreatic mass may be a cancer.  - f/u lipase  - f/u   - consult GI in AM

## 2019-12-09 NOTE — PROGRESS NOTE ADULT - ASSESSMENT
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    Neuro: tylenol prn, xanax, melatonin, toradol prn, oxycodone prn, lidocaine patch, nicotine patch  CV: metoprolol 50q8  Pulm: s/p drainage of left pleural effusion, cytology pending. symbicort, singulair  FENGI: Regular diet. miralax  : voids  Endo: ISS  ID: none  Heme: heparin drip  PPx: no SCDs  Lines: PIVs  Wounds: s/p thoracentesis

## 2019-12-09 NOTE — DIETITIAN INITIAL EVALUATION ADULT. - LAB (SPECIFY)
No respiratory distress. No stridor, Lungs sounds clear with good aeration bilaterally.
BMP, Glu, POCT, lytes

## 2019-12-09 NOTE — PROGRESS NOTE ADULT - SUBJECTIVE AND OBJECTIVE BOX
INTERVAL EVENTS: Patient feeling well today. Denies CP, SOB, palpitations. Currently in NSR.     PAST MEDICAL & SURGICAL HISTORY:  Uterine fibroid  Hyperlipidemia  Asthma  Diabetes  Hypertension  No significant past surgical history      MEDICATIONS  (STANDING):  ALPRAZolam 0.5 milliGRAM(s) Oral every 12 hours  budesonide  80 MICROgram(s)/formoterol 4.5 MICROgram(s) Inhaler 2 Puff(s) Inhalation two times a day  dextrose 5%. 1000 milliLiter(s) (50 mL/Hr) IV Continuous <Continuous>  dextrose 50% Injectable 12.5 Gram(s) IV Push once  dextrose 50% Injectable 25 Gram(s) IV Push once  dextrose 50% Injectable 25 Gram(s) IV Push once  ferrous    sulfate 325 milliGRAM(s) Oral daily  heparin  Infusion 1350 Unit(s)/Hr (13 mL/Hr) IV Continuous <Continuous>  insulin lispro (HumaLOG) corrective regimen sliding scale   SubCutaneous every 6 hours  lidocaine   Patch 1 Patch Transdermal daily  melatonin 10 milliGRAM(s) Oral at bedtime  metoprolol tartrate 50 milliGRAM(s) Oral every 8 hours  montelukast 10 milliGRAM(s) Oral daily  nicotine -  14 mG/24Hr(s) Patch 1 patch Transdermal daily  senna 1 Tablet(s) Oral at bedtime    MEDICATIONS  (PRN):  acetaminophen   Tablet .. 650 milliGRAM(s) Oral every 6 hours PRN Temp greater or equal to 38C (100.4F), Mild Pain (1 - 3), Moderate Pain (4 - 6)  albuterol/ipratropium for Nebulization 3 milliLiter(s) Nebulizer every 6 hours PRN Shortness of Breath and/or Wheezing  dextrose 40% Gel 15 Gram(s) Oral once PRN Blood Glucose LESS THAN 70 milliGRAM(s)/deciliter  glucagon  Injectable 1 milliGRAM(s) IntraMuscular once PRN Glucose LESS THAN 70 milligrams/deciliter  oxyCODONE    IR 5 milliGRAM(s) Oral every 6 hours PRN Moderate Pain (4 - 6)  oxyCODONE    IR 10 milliGRAM(s) Oral every 6 hours PRN Severe Pain (7 - 10)  polyethylene glycol 3350 17 Gram(s) Oral daily PRN Constipation      Vital Signs Last 24 Hrs  T(C): 36.8 (09 Dec 2019 09:04), Max: 36.8 (08 Dec 2019 21:01)  T(F): 98.2 (09 Dec 2019 09:04), Max: 98.3 (08 Dec 2019 21:01)  HR: 83 (09 Dec 2019 14:17) (72 - 100)  BP: 140/81 (09 Dec 2019 14:17) (100/72 - 140/81)  BP(mean): 99 (09 Dec 2019 14:17) (85 - 118)  RR: 16 (09 Dec 2019 14:17) (14 - 28)  SpO2: 99% (09 Dec 2019 14:17) (95% - 100%)     PHYSICAL EXAM:  GEN: Awake, alert. NAD.   HEENT: NCAT, PERRL, EOMI. Mucosa moist. No JVD.  RESP: CTA b/l  CV: RRR. Normal S1/S2. No m/r/g.  ABD: Soft. NT/ND. BS+  EXT: Warm. B/L 2+ pitting edema of LE.   NEURO: AAOx3. No focal deficits.     LABS:                        8.7    8.37  )-----------( 419      ( 09 Dec 2019 12:01 )             28.8     12-09    139  |  107  |  8   ----------------------------<  94  4.4   |  23  |  0.69    Ca    8.5      09 Dec 2019 05:42  Phos  3.5     12-09  Mg     1.9     12-09    TPro  5.4<L>  /  Alb  2.7<L>  /  TBili  0.6  /  DBili  <0.2  /  AST  54<H>  /  ALT  139<H>  /  AlkPhos  118  12-09    CARDIAC MARKERS ( 08 Dec 2019 14:47 )  x     / <0.01 ng/mL / x     / x     / x          PT/INR - ( 09 Dec 2019 05:42 )   PT: 17.7 sec;   INR: 1.55          PTT - ( 09 Dec 2019 12:01 )  PTT:58.2 sec    I&O's Summary    08 Dec 2019 07:01  -  09 Dec 2019 07:00  --------------------------------------------------------  IN: 409 mL / OUT: 400 mL / NET: 9 mL    09 Dec 2019 07:01  -  09 Dec 2019 17:01  --------------------------------------------------------  IN: 65 mL / OUT: 0 mL / NET: 65 mL      BNP  RADIOLOGY & ADDITIONAL STUDIES:    TELEMETRY: NSR    EKG: NSR      Echo: < from: Echocardiogram (12.05.19 @ 10:46) >   1. Normalleft and right ventricular size and function.   2. No significant valvular disease.   3. No evidence of pulmonary hypertension.   4. No pericardial effusion.      < end of copied text >  < from: TTE Limited Echo w/o Cont (12.09.19 @ 12:30) >     1. Limited study.   2. Dilated inferior vena cava with vague echodensity in the lumen.   Poorly visualized IVC-RA junction. Suggest correlation with CT findings.   If clinically indicated DAY would provide better acoustic windows.   3. Right pleural effusion.      < end of copied text >    Radiology: < from: CT Angio Chest PE Protocol w/ IV Cont (12.08.19 @ 21:42) >    IMPRESSION:   1. Since 12/7/2019, there is no evidence of pulmonary embolism.    2. No significant change in large mass involving the inferior vena cava   with extension into the right atrium. Differential includes IVC sarcoma.     4. Increase in size of moderate right pleural effusion.    3. There are multiple small lung nodules bilaterally. These are   suspicious for metastatic disease.      < end of copied text >

## 2019-12-09 NOTE — DIETITIAN INITIAL EVALUATION ADULT. - PROBLEM SELECTOR PLAN 4
Pt with elevated alk phos, AST, ALT. Possible etiologies include malignancy, infection, hepatitis, autoimmune condition. CT A/P showing hepatic steatosis without focal lesion, normal gallbladder, no biliary ductal dilatation.  - f/u hepatitis panel  - Given abdominal pain, enzyme abnormalities, CT findings - GI consult in AM  - pain control    #Leukocytosis  Mildly elevated WBC at 12.18. May be reactive in setting of acute active process causing pleural effusion vs malignancy. Pt afebrile and non-toxic appearing  - trend WBC    #Thrombocytosis  Pt with elevated plts. Likely reactive in setting of acute process vs bleed vs cancer vs autoimmune process  - trend CBC

## 2019-12-09 NOTE — DIETITIAN INITIAL EVALUATION ADULT. - PROBLEM SELECTOR PLAN 8
Pt with Hx uterine fibroids, s/p uterine artery embolization with intermittent bleeding since embolization. Not complaining of active bleeding.  - consider ob/gyn consult

## 2019-12-09 NOTE — PROGRESS NOTE ADULT - SUBJECTIVE AND OBJECTIVE BOX
Patient seen and examined at bedside - resting comfortably in the chair. Patient is worried and anxious and does not want any procedures done today. Denies CP, SOB, no nausea/vomiting/diarrhea, no fever chills.     No acute events overnight. Patient remained HD stable.     heparin  Infusion 1350  metoprolol tartrate 50        Vital Signs Last 24 Hrs  T(C): 36.8 (09 Dec 2019 09:04), Max: 36.8 (08 Dec 2019 21:01)  T(F): 98.2 (09 Dec 2019 09:04), Max: 98.3 (08 Dec 2019 21:01)  HR: 83 (09 Dec 2019 14:17) (72 - 104)  BP: 140/81 (09 Dec 2019 14:17) (100/72 - 140/81)  BP(mean): 99 (09 Dec 2019 14:17) (85 - 118)  RR: 16 (09 Dec 2019 14:17) (14 - 28)  SpO2: 99% (09 Dec 2019 14:17) (95% - 100%)  I&O's Detail    08 Dec 2019 07:01  -  09 Dec 2019 07:00  --------------------------------------------------------  IN:    heparin Infusion: 229 mL    Oral Fluid: 180 mL  Total IN: 409 mL    OUT:    Voided: 400 mL  Total OUT: 400 mL    Total NET: 9 mL      09 Dec 2019 07:01  -  09 Dec 2019 15:44  --------------------------------------------------------  IN:    heparin Infusion: 65 mL  Total IN: 65 mL    OUT:  Total OUT: 0 mL    Total NET: 65 mL          Physical Exam:    General:  Well appearing, NAD, not cachetic  HEENT:  Nonicteric, PERRLA  CV:  RRR, no murmur, no JVD  Lungs:  CTA B/L, no wheezes, rales, rhonchi  Abdomen:  Obese, Soft, non-tender, no distended,   Extremities:  2+ pulses, no c/c, no edema  Skin:  Warm and dry, no rashes  Neuro:  AAOx3, non-focal, CN II-XII grossly intact  No Restraints      LABS:                        8.7    8.37  )-----------( 419      ( 09 Dec 2019 12:01 )             28.8     12-09    139  |  107  |  8   ----------------------------<  94  4.4   |  23  |  0.69    Ca    8.5      09 Dec 2019 05:42  Phos  3.5     12-09  Mg     1.9     12-09    TPro  5.4<L>  /  Alb  2.7<L>  /  TBili  0.6  /  DBili  <0.2  /  AST  54<H>  /  ALT  139<H>  /  AlkPhos  118  12-09    PT/INR - ( 09 Dec 2019 05:42 )   PT: 17.7 sec;   INR: 1.55          PTT - ( 09 Dec 2019 12:01 )  PTT:58.2 sec    Radiology and Additional Studies:    Assessment and Plan:     39F smoker with a PMHx of DM, HTN, HLD, s/p bilateral uterine artery embolization in 8/2019 due to fibroids now with 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    -patient HD stable with HR better controlled with Metoprolol 50q8, no additional episodes of SVT.   -Continue heparin drip   - Reg diet  -Possible Dx Lap w/ Gen Surg Tues?  - continuing to wait on pleural fluid cytology   Discussed plan with attending.

## 2019-12-09 NOTE — PROGRESS NOTE ADULT - ASSESSMENT
Assessment: 39F PMH DMII, HTN, HLD, tobacco smoker, s/p bilateral uterine artery embolization in 8/2019 due to fibroids now with large enhancing mass within the intrahepatic IVC extending from the left iliac vein to right atrium, s/p thoracentesis 12/05. Patient transferred to SICU after SVT, but hemodynamically stable after conversion with adenosine.    Recommendations: Assessment: 39F PMH DMII, HTN, HLD, tobacco smoker, s/p bilateral uterine artery embolization in 8/2019 due to fibroids now with large enhancing mass within the intrahepatic IVC extending from the left iliac vein to right atrium, s/p thoracentesis 12/05. Patient transferred to SICU after SVT, but hemodynamically stable after conversion with adenosine.    Recommendations:  - Preop for liver biopsy 12/10  - Laparoscopic liver biopsy 12/10 in PM

## 2019-12-09 NOTE — DIETITIAN INITIAL EVALUATION ADULT. - PROBLEM SELECTOR PLAN 1
Pt presenting with progressive shortness of breath x2 months. SOB worsened with exertion, now present at rest, is associated with substernal chest pressure and R sided flank pain. CT chest noted with large R sided pleural effusion. Etiology of effusion unclear at this time, will likely require thoracentesis for diagnosis. Possible etiologies include HF (BNP wnl), hepatic hydrothorax (no overt ascites), malignancy (given new unknown pancreatic mass), PE, infection (nontoxic appearing, no fever), pancreatitis, autoimmune condition. Pt currently oxygenating well, saturating well on room air.  - consult pulm in AM  - monitor respiratory status  - f/u lipase  - f/u MARGUERITE and RF  - f/u , CEA, CA-125

## 2019-12-10 ENCOUNTER — RESULT REVIEW (OUTPATIENT)
Age: 39
End: 2019-12-10

## 2019-12-10 LAB
ANION GAP SERPL CALC-SCNC: 9 MMOL/L — SIGNIFICANT CHANGE UP (ref 5–17)
APTT BLD: 31.7 SEC — SIGNIFICANT CHANGE UP (ref 27.5–36.3)
APTT BLD: 57.2 SEC — HIGH (ref 27.5–36.3)
BASE EXCESS BLDA CALC-SCNC: -4.5 MMOL/L — LOW (ref -2–3)
BUN SERPL-MCNC: 9 MG/DL — SIGNIFICANT CHANGE UP (ref 7–23)
CA-I BLDA-SCNC: 0.95 MMOL/L — LOW (ref 1.12–1.3)
CALCIUM SERPL-MCNC: 9.1 MG/DL — SIGNIFICANT CHANGE UP (ref 8.4–10.5)
CANCER AG125 SERPL-ACNC: 104 U/ML — HIGH
CHLORIDE SERPL-SCNC: 101 MMOL/L — SIGNIFICANT CHANGE UP (ref 96–108)
CO2 SERPL-SCNC: 24 MMOL/L — SIGNIFICANT CHANGE UP (ref 22–31)
COHGB MFR BLDA: 0.6 % — SIGNIFICANT CHANGE UP
CREAT SERPL-MCNC: 0.67 MG/DL — SIGNIFICANT CHANGE UP (ref 0.5–1.3)
CULTURE RESULTS: NO GROWTH — SIGNIFICANT CHANGE UP
GLUCOSE BLDC GLUCOMTR-MCNC: 111 MG/DL — HIGH (ref 70–99)
GLUCOSE BLDC GLUCOMTR-MCNC: 144 MG/DL — HIGH (ref 70–99)
GLUCOSE BLDC GLUCOMTR-MCNC: 145 MG/DL — HIGH (ref 70–99)
GLUCOSE BLDC GLUCOMTR-MCNC: 146 MG/DL — HIGH (ref 70–99)
GLUCOSE BLDC GLUCOMTR-MCNC: 149 MG/DL — HIGH (ref 70–99)
GLUCOSE SERPL-MCNC: 130 MG/DL — HIGH (ref 70–99)
HCO3 BLDA-SCNC: 21 MMOL/L — SIGNIFICANT CHANGE UP (ref 21–28)
HCT VFR BLD CALC: 29 % — LOW (ref 34.5–45)
HGB BLD-MCNC: 8.8 G/DL — LOW (ref 11.5–15.5)
HGB BLDA-MCNC: 7.8 G/DL — LOW (ref 11.5–15.5)
INR BLD: 1.52 — HIGH (ref 0.88–1.16)
MCHC RBC-ENTMCNC: 20.7 PG — LOW (ref 27–34)
MCHC RBC-ENTMCNC: 30.3 GM/DL — LOW (ref 32–36)
MCV RBC AUTO: 68.1 FL — LOW (ref 80–100)
METHGB MFR BLDA: 0.5 % — SIGNIFICANT CHANGE UP
NRBC # BLD: 0 /100 WBCS — SIGNIFICANT CHANGE UP (ref 0–0)
O2 CT VFR BLDA CALC: 11.2 ML/DL — SIGNIFICANT CHANGE UP (ref 15–23)
OXYHGB MFR BLDA: 98 % — SIGNIFICANT CHANGE UP (ref 94–100)
PCO2 BLDA: 39 MMHG — SIGNIFICANT CHANGE UP (ref 32–45)
PH BLDA: 7.34 — LOW (ref 7.35–7.45)
PHOSPHATE SERPL-MCNC: 3.6 MG/DL — SIGNIFICANT CHANGE UP (ref 2.5–4.5)
PLATELET # BLD AUTO: 372 K/UL — SIGNIFICANT CHANGE UP (ref 150–400)
PO2 BLDA: 179 MMHG — HIGH (ref 83–108)
POTASSIUM BLDA-SCNC: 4.3 MMOL/L — SIGNIFICANT CHANGE UP (ref 3.5–4.9)
POTASSIUM SERPL-MCNC: 4.5 MMOL/L — SIGNIFICANT CHANGE UP (ref 3.5–5.3)
POTASSIUM SERPL-SCNC: 4.5 MMOL/L — SIGNIFICANT CHANGE UP (ref 3.5–5.3)
PROTHROM AB SERPL-ACNC: 17.4 SEC — HIGH (ref 10–12.9)
RBC # BLD: 4.26 M/UL — SIGNIFICANT CHANGE UP (ref 3.8–5.2)
RBC # FLD: 19.3 % — HIGH (ref 10.3–14.5)
SAO2 % BLDA: 99 % — SIGNIFICANT CHANGE UP (ref 95–100)
SODIUM BLDA-SCNC: 134 MMOL/L — LOW (ref 138–146)
SODIUM SERPL-SCNC: 134 MMOL/L — LOW (ref 135–145)
SPECIMEN SOURCE: SIGNIFICANT CHANGE UP
WBC # BLD: 10 K/UL — SIGNIFICANT CHANGE UP (ref 3.8–10.5)
WBC # FLD AUTO: 10 K/UL — SIGNIFICANT CHANGE UP (ref 3.8–10.5)

## 2019-12-10 PROCEDURE — 88331 PATH CONSLTJ SURG 1 BLK 1SPC: CPT | Mod: 26

## 2019-12-10 PROCEDURE — 88341 IMHCHEM/IMCYTCHM EA ADD ANTB: CPT | Mod: 26

## 2019-12-10 PROCEDURE — 88342 IMHCHEM/IMCYTCHM 1ST ANTB: CPT | Mod: 26

## 2019-12-10 PROCEDURE — 99232 SBSQ HOSP IP/OBS MODERATE 35: CPT

## 2019-12-10 PROCEDURE — 88305 TISSUE EXAM BY PATHOLOGIST: CPT | Mod: 26

## 2019-12-10 RX ORDER — OXYCODONE HYDROCHLORIDE 5 MG/1
10 TABLET ORAL EVERY 6 HOURS
Refills: 0 | Status: DISCONTINUED | OUTPATIENT
Start: 2019-12-10 | End: 2019-12-12

## 2019-12-10 RX ORDER — GLUCAGON INJECTION, SOLUTION 0.5 MG/.1ML
1 INJECTION, SOLUTION SUBCUTANEOUS ONCE
Refills: 0 | Status: DISCONTINUED | OUTPATIENT
Start: 2019-12-10 | End: 2019-12-12

## 2019-12-10 RX ORDER — SODIUM CHLORIDE 9 MG/ML
1000 INJECTION INTRAMUSCULAR; INTRAVENOUS; SUBCUTANEOUS
Refills: 0 | Status: DISCONTINUED | OUTPATIENT
Start: 2019-12-10 | End: 2019-12-11

## 2019-12-10 RX ORDER — DEXTROSE 50 % IN WATER 50 %
25 SYRINGE (ML) INTRAVENOUS ONCE
Refills: 0 | Status: DISCONTINUED | OUTPATIENT
Start: 2019-12-10 | End: 2019-12-12

## 2019-12-10 RX ORDER — SODIUM CHLORIDE 9 MG/ML
1000 INJECTION, SOLUTION INTRAVENOUS
Refills: 0 | Status: DISCONTINUED | OUTPATIENT
Start: 2019-12-10 | End: 2019-12-12

## 2019-12-10 RX ORDER — METOPROLOL TARTRATE 50 MG
50 TABLET ORAL EVERY 8 HOURS
Refills: 0 | Status: DISCONTINUED | OUTPATIENT
Start: 2019-12-10 | End: 2019-12-12

## 2019-12-10 RX ORDER — HYDROMORPHONE HYDROCHLORIDE 2 MG/ML
0.25 INJECTION INTRAMUSCULAR; INTRAVENOUS; SUBCUTANEOUS
Refills: 0 | Status: DISCONTINUED | OUTPATIENT
Start: 2019-12-10 | End: 2019-12-11

## 2019-12-10 RX ORDER — FERROUS SULFATE 325(65) MG
325 TABLET ORAL DAILY
Refills: 0 | Status: DISCONTINUED | OUTPATIENT
Start: 2019-12-10 | End: 2019-12-12

## 2019-12-10 RX ORDER — LIDOCAINE 4 G/100G
1 CREAM TOPICAL DAILY
Refills: 0 | Status: DISCONTINUED | OUTPATIENT
Start: 2019-12-10 | End: 2019-12-12

## 2019-12-10 RX ORDER — POLYETHYLENE GLYCOL 3350 17 G/17G
17 POWDER, FOR SOLUTION ORAL DAILY
Refills: 0 | Status: DISCONTINUED | OUTPATIENT
Start: 2019-12-10 | End: 2019-12-12

## 2019-12-10 RX ORDER — ALPRAZOLAM 0.25 MG
0.5 TABLET ORAL EVERY 12 HOURS
Refills: 0 | Status: DISCONTINUED | OUTPATIENT
Start: 2019-12-10 | End: 2019-12-12

## 2019-12-10 RX ORDER — LANOLIN ALCOHOL/MO/W.PET/CERES
10 CREAM (GRAM) TOPICAL AT BEDTIME
Refills: 0 | Status: DISCONTINUED | OUTPATIENT
Start: 2019-12-10 | End: 2019-12-12

## 2019-12-10 RX ORDER — NICOTINE POLACRILEX 2 MG
1 GUM BUCCAL DAILY
Refills: 0 | Status: DISCONTINUED | OUTPATIENT
Start: 2019-12-10 | End: 2019-12-12

## 2019-12-10 RX ORDER — DEXTROSE 50 % IN WATER 50 %
15 SYRINGE (ML) INTRAVENOUS ONCE
Refills: 0 | Status: DISCONTINUED | OUTPATIENT
Start: 2019-12-10 | End: 2019-12-12

## 2019-12-10 RX ORDER — MORPHINE SULFATE 50 MG/1
2 CAPSULE, EXTENDED RELEASE ORAL ONCE
Refills: 0 | Status: DISCONTINUED | OUTPATIENT
Start: 2019-12-10 | End: 2019-12-10

## 2019-12-10 RX ORDER — INSULIN LISPRO 100/ML
VIAL (ML) SUBCUTANEOUS EVERY 6 HOURS
Refills: 0 | Status: DISCONTINUED | OUTPATIENT
Start: 2019-12-10 | End: 2019-12-12

## 2019-12-10 RX ORDER — SENNA PLUS 8.6 MG/1
1 TABLET ORAL AT BEDTIME
Refills: 0 | Status: DISCONTINUED | OUTPATIENT
Start: 2019-12-10 | End: 2019-12-12

## 2019-12-10 RX ORDER — MONTELUKAST 4 MG/1
10 TABLET, CHEWABLE ORAL DAILY
Refills: 0 | Status: DISCONTINUED | OUTPATIENT
Start: 2019-12-10 | End: 2019-12-12

## 2019-12-10 RX ORDER — BUDESONIDE AND FORMOTEROL FUMARATE DIHYDRATE 160; 4.5 UG/1; UG/1
2 AEROSOL RESPIRATORY (INHALATION)
Refills: 0 | Status: DISCONTINUED | OUTPATIENT
Start: 2019-12-10 | End: 2019-12-12

## 2019-12-10 RX ORDER — ACETAMINOPHEN 500 MG
650 TABLET ORAL EVERY 6 HOURS
Refills: 0 | Status: DISCONTINUED | OUTPATIENT
Start: 2019-12-10 | End: 2019-12-12

## 2019-12-10 RX ORDER — OXYCODONE HYDROCHLORIDE 5 MG/1
5 TABLET ORAL EVERY 6 HOURS
Refills: 0 | Status: DISCONTINUED | OUTPATIENT
Start: 2019-12-10 | End: 2019-12-12

## 2019-12-10 RX ORDER — DEXTROSE 50 % IN WATER 50 %
12.5 SYRINGE (ML) INTRAVENOUS ONCE
Refills: 0 | Status: DISCONTINUED | OUTPATIENT
Start: 2019-12-10 | End: 2019-12-12

## 2019-12-10 RX ADMIN — MORPHINE SULFATE 2 MILLIGRAM(S): 50 CAPSULE, EXTENDED RELEASE ORAL at 19:55

## 2019-12-10 RX ADMIN — OXYCODONE HYDROCHLORIDE 10 MILLIGRAM(S): 5 TABLET ORAL at 07:15

## 2019-12-10 RX ADMIN — MORPHINE SULFATE 2 MILLIGRAM(S): 50 CAPSULE, EXTENDED RELEASE ORAL at 20:17

## 2019-12-10 RX ADMIN — MONTELUKAST 10 MILLIGRAM(S): 4 TABLET, CHEWABLE ORAL at 11:19

## 2019-12-10 RX ADMIN — Medication 1 PATCH: at 20:17

## 2019-12-10 RX ADMIN — OXYCODONE HYDROCHLORIDE 10 MILLIGRAM(S): 5 TABLET ORAL at 07:59

## 2019-12-10 RX ADMIN — SODIUM CHLORIDE 80 MILLILITER(S): 9 INJECTION INTRAMUSCULAR; INTRAVENOUS; SUBCUTANEOUS at 00:57

## 2019-12-10 RX ADMIN — Medication 650 MILLIGRAM(S): at 23:10

## 2019-12-10 RX ADMIN — Medication 1 PATCH: at 07:04

## 2019-12-10 RX ADMIN — HYDROMORPHONE HYDROCHLORIDE 0.25 MILLIGRAM(S): 2 INJECTION INTRAMUSCULAR; INTRAVENOUS; SUBCUTANEOUS at 23:22

## 2019-12-10 RX ADMIN — Medication 1 PATCH: at 11:19

## 2019-12-10 RX ADMIN — OXYCODONE HYDROCHLORIDE 10 MILLIGRAM(S): 5 TABLET ORAL at 20:30

## 2019-12-10 RX ADMIN — Medication 50 MILLIGRAM(S): at 14:13

## 2019-12-10 RX ADMIN — Medication 50 MILLIGRAM(S): at 19:52

## 2019-12-10 RX ADMIN — Medication 0.5 MILLIGRAM(S): at 00:28

## 2019-12-10 RX ADMIN — OXYCODONE HYDROCHLORIDE 10 MILLIGRAM(S): 5 TABLET ORAL at 21:01

## 2019-12-10 RX ADMIN — SODIUM CHLORIDE 80 MILLILITER(S): 9 INJECTION INTRAMUSCULAR; INTRAVENOUS; SUBCUTANEOUS at 22:26

## 2019-12-10 RX ADMIN — Medication 50 MILLIGRAM(S): at 07:13

## 2019-12-10 RX ADMIN — HEPARIN SODIUM 13 UNIT(S)/HR: 5000 INJECTION INTRAVENOUS; SUBCUTANEOUS at 04:12

## 2019-12-10 RX ADMIN — SENNA PLUS 1 TABLET(S): 8.6 TABLET ORAL at 21:01

## 2019-12-10 RX ADMIN — HEPARIN SODIUM 16 UNIT(S)/HR: 5000 INJECTION INTRAVENOUS; SUBCUTANEOUS at 11:26

## 2019-12-10 RX ADMIN — Medication 0.5 MILLIGRAM(S): at 14:13

## 2019-12-10 RX ADMIN — Medication 325 MILLIGRAM(S): at 11:19

## 2019-12-10 RX ADMIN — BUDESONIDE AND FORMOTEROL FUMARATE DIHYDRATE 2 PUFF(S): 160; 4.5 AEROSOL RESPIRATORY (INHALATION) at 08:55

## 2019-12-10 RX ADMIN — Medication 1 PATCH: at 11:18

## 2019-12-10 RX ADMIN — Medication 10 MILLIGRAM(S): at 00:29

## 2019-12-10 RX ADMIN — Medication 650 MILLIGRAM(S): at 22:15

## 2019-12-10 NOTE — BRIEF OPERATIVE NOTE - OPERATION/FINDINGS
Veress needle insufflation and optiview entry into abdomen. Diagnostic laparoscopy revealed green ascites, small lesion on posterior aspect of right liver lobe, without gross infiltration of stomach or pancreas. Diagnostic laparoscopy also revealed multiple fibroids. Falciform ligament taken down with Harmonic. Endoscopic ultrasound revealed severely dilated hepatic veins and congested liver. Endoscopic ultrasound also showed a large retroperitoneal mass abutting the liver. Multiple core biopsies were taken of the left and right liver under endoscopic ultrasound. Bleeding was controlled with mechanical pressure and cautery using Argon beam. Fresh frozen section was stated to be spindle cell origin, with benign vs malignant sarcoma likely. 3 core biopsies were sent for permanent specimens. Copious irrigation and good hemostasis at end of case. Falciform ligament closed with Endoclose. 20cc of Eveseal sprayed on biopsy sites. Port sites closed with Endoloop. Skin closed with interrupted monocryl.

## 2019-12-10 NOTE — PROGRESS NOTE ADULT - SUBJECTIVE AND OBJECTIVE BOX
24hr Events:  O/N: 8pm PTT 71.2, NPO/IVF for biopsy, consented, VSS  12/9: am PTT 94, dec from 13.5 to 13, 12pm PTT 58, kept the same. Called Dr. Stephens, on vacation, covering physician unaware of patient. gyn & gynonc called. diet advanced and xanax increased to 0.5bid. SDU. 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    Continue heparin drip   - NPO/IVF for biopsy  -Glucose control  -Home medication as appropriate  - continuing to wait on pleural fluid cytology  -F/u AM labs 24hr Events:  O/N: 8pm PTT 71.2, NPO/IVF for biopsy, consented, VSS  12/9: am PTT 94, dec from 13.5 to 13, 12pm PTT 58, kept the same. Called Dr. Stephens, on vacation, covering physician unaware of patient. gyn & gynonc called. diet advanced and xanax increased to 0.5bid. SDU. cytology from pleural fluid negative and planned for OR biopsy with 12/10        MEDICATIONS  (STANDING):  ALPRAZolam 0.5 milliGRAM(s) Oral every 12 hours  budesonide  80 MICROgram(s)/formoterol 4.5 MICROgram(s) Inhaler 2 Puff(s) Inhalation two times a day  dextrose 5%. 1000 milliLiter(s) (50 mL/Hr) IV Continuous <Continuous>  dextrose 50% Injectable 12.5 Gram(s) IV Push once  dextrose 50% Injectable 25 Gram(s) IV Push once  dextrose 50% Injectable 25 Gram(s) IV Push once  ferrous    sulfate 325 milliGRAM(s) Oral daily  heparin  Infusion 1350 Unit(s)/Hr (13 mL/Hr) IV Continuous <Continuous>  insulin lispro (HumaLOG) corrective regimen sliding scale   SubCutaneous every 6 hours  lidocaine   Patch 1 Patch Transdermal daily  melatonin 10 milliGRAM(s) Oral at bedtime  metoprolol tartrate 50 milliGRAM(s) Oral every 8 hours  montelukast 10 milliGRAM(s) Oral daily  nicotine -  14 mG/24Hr(s) Patch 1 patch Transdermal daily  senna 1 Tablet(s) Oral at bedtime  sodium chloride 0.9%. 1000 milliLiter(s) (80 mL/Hr) IV Continuous <Continuous>    MEDICATIONS  (PRN):  acetaminophen   Tablet .. 650 milliGRAM(s) Oral every 6 hours PRN Temp greater or equal to 38C (100.4F), Mild Pain (1 - 3), Moderate Pain (4 - 6)  albuterol/ipratropium for Nebulization 3 milliLiter(s) Nebulizer every 6 hours PRN Shortness of Breath and/or Wheezing  dextrose 40% Gel 15 Gram(s) Oral once PRN Blood Glucose LESS THAN 70 milliGRAM(s)/deciliter  glucagon  Injectable 1 milliGRAM(s) IntraMuscular once PRN Glucose LESS THAN 70 milligrams/deciliter  oxyCODONE    IR 5 milliGRAM(s) Oral every 6 hours PRN Moderate Pain (4 - 6)  oxyCODONE    IR 10 milliGRAM(s) Oral every 6 hours PRN Severe Pain (7 - 10)  polyethylene glycol 3350 17 Gram(s) Oral daily PRN Constipation      Allergies    No Known Drug Allergies  shellfish (Hives)    Intolerances          Vital Signs Last 24 Hrs  T(C): 36.7 (10 Dec 2019 05:45), Max: 36.9 (09 Dec 2019 18:08)  T(F): 98.1 (10 Dec 2019 05:45), Max: 98.5 (09 Dec 2019 18:08)  HR: 96 (10 Dec 2019 06:50) (80 - 96)  BP: 132/61 (10 Dec 2019 06:50) (112/83 - 140/81)  BP(mean): 99 (09 Dec 2019 17:22) (95 - 118)  RR: 16 (10 Dec 2019 06:50) (16 - 28)  SpO2: 94% (10 Dec 2019 06:50) (93% - 100%)  CAPILLARY BLOOD GLUCOSE      POCT Blood Glucose.: 145 mg/dL (10 Dec 2019 06:42)  POCT Blood Glucose.: 144 mg/dL (10 Dec 2019 00:37)  POCT Blood Glucose.: 105 mg/dL (09 Dec 2019 18:37)  POCT Blood Glucose.: 171 mg/dL (09 Dec 2019 11:09)      12-09 @ 07:01  -  12-10 @ 07:00  --------------------------------------------------------  IN: 65 mL / OUT: 0 mL / NET: 65 mL        Physical Exam:    General:  Well appearing, NAD  CV:  RRR, no murmur, no JVD  Lungs:  CTA B/L, no wheezes, rales, rhonchi  Abdomen: soft, non-tender, non distended   Extremities: warm, well perfused, no edema          LABS:                        8.8    10.00 )-----------( 372      ( 10 Dec 2019 03:15 )             29.0     12-10    134<L>  |  101  |  9   ----------------------------<  130<H>  4.5   |  24  |  0.67    Ca    9.1      10 Dec 2019 03:14  Phos  3.6     12-10  Mg     1.9     12-09    TPro  5.4<L>  /  Alb  2.7<L>  /  TBili  0.6  /  DBili  <0.2  /  AST  54<H>  /  ALT  139<H>  /  AlkPhos  118  12-09    PT/INR - ( 10 Dec 2019 03:14 )   PT: 17.4 sec;   INR: 1.52          PTT - ( 10 Dec 2019 03:14 )  PTT:57.2 sec        ---------------------------------------------------------------------------  I personally reviewed: [  ]EKG   [  ]CXR    [  ] CT    [  ]Other  ---------------------------------------------------------------------------  PLEASE CHECK WHEN PRESENT:     [  ]Heart Failure     [  ] Acute     [  ] Acute on Chronic     [  ] Chronic  -------------------------------------------------------------------     [  ]Diastolic [HFpEF]     [  ]Systolic [HFrEF]     [  ]Combined [HFpEF & HFrEF]     [  ]Other:  -------------------------------------------------------------------  [ ] Respiratory failure  [ ] Acute cor pulmonale  [ ] Asthma/COPD Exacerbation  [ ] Pleural effusion  [ ] Aspiration pneumonia  -------------------------------------------------------------------  [  ]TEDDY     [  ]ATN     [  ]Reneal Medullary Necrosis     [  ]Renal Cortical Necrosis     [  ]Other Pathological Lesions:    [  ]CKD 1  [  ]CKD 2  [  ]CKD 3  [  ]CKD 4  [  ]CKD 5  [  ]Other  -------------------------------------------------------------------  [x ]Diabetes  [  ] Diabetic PVD Ulcer  [  ] Neuropathic ulcer to DM  [  ] Diabetes with Nephropathy  [  ] Osteomyelitis due to diabetes  --------------------------------------------------------------------  [  ]Malnutrition: See Nutrition Note  [  ]Cachexia  [  ]Other:   [  ]Supplement Ordered:  [  ]Morbid Obesity (BMI >=40]  ---------------------------------------------------------------------  [ ] Sepsis/severe sepsis/septic shock  [ ] UTI  [ ] Pneumonia  -----------------------------------------------------------------------  [ ] Acidosis/alkalosis  [ ] Fluid overload  [ ] Hypokalemia  [ ] Hyperkalemia  [ ] Hypomagnesemia  [ ] Hypophosphatemia  [ ] Hyperphosphatemia  ------------------------------------------------------------------------  [ ] Acute blood loss anemia  [ ] Post op blood loss anemia  [ ] Iron deficiency anemia  [ ] Anemia due to chronic disease  [ ] Hypercoagulable state  ----------------------------------------------------------------------  [ ] Cerebral infarction  [ ] Transient ischemia attack  [ ] Encephalopathy        Assessment/Plan;  39F smoker with a PMHx of DM, HTN, HLD, s/p bilateral uterine artery embolization in 8/2019 due to fibroids now with 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    -Continue heparin drip, will hold on call to OR  - NPO/IVF for biopsy  -Glucose control  -Home medication as appropriate  -Continuing to wait on pleural fluid cytology  -F/u AM labs

## 2019-12-10 NOTE — PROGRESS NOTE ADULT - ASSESSMENT
39F with hx of iron def anemia, uterine fibroids s/p bilateral uterine artery embolization (2019), with fatigue/, found to have large R pleural effusion and incidental large intrahepatic IVC mass, currently being worked up.     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 Thomason

## 2019-12-10 NOTE — PROGRESS NOTE ADULT - ASSESSMENT
39F with morbidly obese AA female, with HTN, HLD, DM, Uterine fibroids s/p B/L uterine artery embolization in 8/2019with a known IVC mass extending in to the right atrium undergoing work up is being consulted with for hemodynamically stable SVT which is likely atrial fibrillation. Patient remains in NSR.     Paroxysmal Afib: Currently in NSR. Unclear cause of AFib. May be due to mass and also other process causing irritation. [id number].   -Continue with Heparin gtt.   -Patient can be transitioned to NOAC when surgically feasible.   -Continue with Metoprolol 50mg TID.     HTN: Well controlled.  -Continue with BB.     LE Edema: Likely obstructive cause from IVC mass. Cannot rule out DVTs despite negative PE study.  -B/L LE dopplers to rule out DVTs.  -Elevate legs when applicable.     To be discussed on rounds.

## 2019-12-10 NOTE — PROGRESS NOTE ADULT - SUBJECTIVE AND OBJECTIVE BOX
INTERVAL EVENTS: Patient sleeping. Feeling well today. Denies any CP, SOB, palpitations.     PAST MEDICAL & SURGICAL HISTORY:  Uterine fibroid  Hyperlipidemia  Asthma  Diabetes  Hypertension  No significant past surgical history      MEDICATIONS  (STANDING):  ALPRAZolam 0.5 milliGRAM(s) Oral every 12 hours  budesonide  80 MICROgram(s)/formoterol 4.5 MICROgram(s) Inhaler 2 Puff(s) Inhalation two times a day  dextrose 5%. 1000 milliLiter(s) (50 mL/Hr) IV Continuous <Continuous>  dextrose 50% Injectable 12.5 Gram(s) IV Push once  dextrose 50% Injectable 25 Gram(s) IV Push once  dextrose 50% Injectable 25 Gram(s) IV Push once  ferrous    sulfate 325 milliGRAM(s) Oral daily  heparin  Infusion 1350 Unit(s)/Hr (16 mL/Hr) IV Continuous <Continuous>  insulin lispro (HumaLOG) corrective regimen sliding scale   SubCutaneous every 6 hours  lidocaine   Patch 1 Patch Transdermal daily  melatonin 10 milliGRAM(s) Oral at bedtime  metoprolol tartrate 50 milliGRAM(s) Oral every 8 hours  montelukast 10 milliGRAM(s) Oral daily  nicotine -  14 mG/24Hr(s) Patch 1 patch Transdermal daily  senna 1 Tablet(s) Oral at bedtime  sodium chloride 0.9%. 1000 milliLiter(s) (80 mL/Hr) IV Continuous <Continuous>    MEDICATIONS  (PRN):  acetaminophen   Tablet .. 650 milliGRAM(s) Oral every 6 hours PRN Temp greater or equal to 38C (100.4F), Mild Pain (1 - 3), Moderate Pain (4 - 6)  albuterol/ipratropium for Nebulization 3 milliLiter(s) Nebulizer every 6 hours PRN Shortness of Breath and/or Wheezing  dextrose 40% Gel 15 Gram(s) Oral once PRN Blood Glucose LESS THAN 70 milliGRAM(s)/deciliter  glucagon  Injectable 1 milliGRAM(s) IntraMuscular once PRN Glucose LESS THAN 70 milligrams/deciliter  oxyCODONE    IR 5 milliGRAM(s) Oral every 6 hours PRN Moderate Pain (4 - 6)  oxyCODONE    IR 10 milliGRAM(s) Oral every 6 hours PRN Severe Pain (7 - 10)  polyethylene glycol 3350 17 Gram(s) Oral daily PRN Constipation      Vital Signs Last 24 Hrs  T(C): 36.9 (10 Dec 2019 08:55), Max: 36.9 (09 Dec 2019 18:08)  T(F): 98.5 (10 Dec 2019 08:55), Max: 98.5 (09 Dec 2019 18:08)  HR: 84 (10 Dec 2019 08:53) (81 - 96)  BP: 106/57 (10 Dec 2019 08:53) (106/57 - 140/81)  BP(mean): 99 (09 Dec 2019 17:22) (99 - 99)  RR: 18 (10 Dec 2019 08:53) (16 - 18)  SpO2: 94% (10 Dec 2019 08:53) (93% - 100%)     PHYSICAL EXAM:  GEN: Awake, alert. NAD.   HEENT: NCAT, PERRL, EOMI. Mucosa moist. No JVD.  RESP: CTA b/l  CV: RRR. Normal S1/S2. No m/r/g.  ABD: Soft. NT/ND. BS+  EXT: Warm. B/L 2+ pitting edema of LE.   NEURO: AAOx3. No focal deficits.     LABS:                        8.8    10.00 )-----------( 372      ( 10 Dec 2019 03:15 )             29.0     12-10    134<L>  |  101  |  9   ----------------------------<  130<H>  4.5   |  24  |  0.67    Ca    9.1      10 Dec 2019 03:14  Phos  3.6     12-10  Mg     1.9     12-09    TPro  5.4<L>  /  Alb  2.7<L>  /  TBili  0.6  /  DBili  <0.2  /  AST  54<H>  /  ALT  139<H>  /  AlkPhos  118  12-09    CARDIAC MARKERS ( 08 Dec 2019 14:47 )  x     / <0.01 ng/mL / x     / x     / x          PT/INR - ( 10 Dec 2019 03:14 )   PT: 17.4 sec;   INR: 1.52          PTT - ( 10 Dec 2019 10:22 )  PTT:31.7 sec    I&O's Summary    09 Dec 2019 07:01  -  10 Dec 2019 07:00  --------------------------------------------------------  IN: 65 mL / OUT: 0 mL / NET: 65 mL    10 Dec 2019 07:01  -  10 Dec 2019 12:52  --------------------------------------------------------  IN: 0 mL / OUT: 0 mL / NET: 0 mL      BNP  RADIOLOGY & ADDITIONAL STUDIES:    TELEMETRY: NSR

## 2019-12-10 NOTE — PROGRESS NOTE ADULT - SUBJECTIVE AND OBJECTIVE BOX
Heme/Onc Progress Note (Dr. Thomason )    Interval History: Pt seen and examined. No acute events overnight. No new bleeding/ bruising. No fevers noted. remains NPO planned of biopsy today of liver.     Allergies    No Known Drug Allergies  shellfish (Hives)    Intolerances      Medications:  MEDICATIONS  (STANDING):    MEDICATIONS  (PRN):      PHYSICAL EXAM:    T(F): 98.2 (12-10-19 @ 13:54), Max: 98.5 (12-09-19 @ 18:08)  HR: 91 (12-10-19 @ 14:10) (81 - 96)  BP: 127/78 (12-10-19 @ 14:10) (106/57 - 134/71)  RR: 18 (12-10-19 @ 14:10) (16 - 18)  SpO2: 94% (12-10-19 @ 14:10) (93% - 100%)  Wt(kg): --    Daily Height in cm: 165.1 (10 Dec 2019 11:48)    Daily     GEN: NAD  HEENT: AT/NC, EOMI  NECK: Supple  CVS: +S1S2  LUNG: CTA /Bl  ABD: +BS  EXT: no c/c/e  NEURO: aaox3       Labs:                          8.8    10.00 )-----------( 372      ( 10 Dec 2019 03:15 )             29.0     CBC Full  -  ( 10 Dec 2019 03:15 )  WBC Count : 10.00 K/uL  RBC Count : 4.26 M/uL  Hemoglobin : 8.8 g/dL  Hematocrit : 29.0 %  Platelet Count - Automated : 372 K/uL  Mean Cell Volume : 68.1 fl  Mean Cell Hemoglobin : 20.7 pg  Mean Cell Hemoglobin Concentration : 30.3 gm/dL  Auto Neutrophil # : x  Auto Lymphocyte # : x  Auto Monocyte # : x  Auto Eosinophil # : x  Auto Basophil # : x  Auto Neutrophil % : x  Auto Lymphocyte % : x  Auto Monocyte % : x  Auto Eosinophil % : x  Auto Basophil % : x    PT/INR - ( 10 Dec 2019 03:14 )   PT: 17.4 sec;   INR: 1.52          PTT - ( 10 Dec 2019 10:22 )  PTT:31.7 sec    12-10    134<L>  |  101  |  9   ----------------------------<  130<H>  4.5   |  24  |  0.67    Ca    9.1      10 Dec 2019 03:14  Phos  3.6     12-10  Mg     1.9     12-09    TPro  5.4<L>  /  Alb  2.7<L>  /  TBili  0.6  /  DBili  <0.2  /  AST  54<H>  /  ALT  139<H>  /  AlkPhos  118  12-09 12/4 CT A/P   LOWER CHEST: Large right pleural effusion and mediastinal shift.    ABDOMEN:  LIVER: Within normal limits  BILE DUCTS: Normal caliber  GALLBLADDER: No calcified gallstones. Normal caliber wall  PANCREAS: Within normal limits  SPLEEN: Within normal limits  ADRENALS: Within normal limits  KIDNEYS: Within normal limits  PELVIS:  REPRODUCTIVE ORGANS: Multi fibroid uterus. Left ovarian cysts.  BLADDER: Within normal limits  PERITONEUM:No ascites, no free air.  BOWEL: Within normal limits.  VESSELS: Expansile enhancing mass within the intrahepatic IVC extending   proximally to the right atrial junction and distally to the renal veins.   Diminished opacification of the infrarenal IVC and left common iliac   vein, suspect combination of bland thrombus and mixing artifact.  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  Large right pleural effusion.        CT Chest non Con 12/4  Impression: 1. Since 8/23/2019, there is a new large right pleural   effusion causing compressive atelectasis of the entire right lower lobe   and mild shift of the heart and mediastinum to the left.    2. There are a few nodules in the left lung, the largest measuring 5 mm   in the left upper lobe. These nodules have nonspecific appearances. They   could be benign or malignant.    3. Large right upper quadrant mass identified on CT scan of abdomen and   pelvis of 12/4/2019 is partially visualized. It is consistent with   neoplasm.        12/8 CT Chest PE Protocol  IMPRESSION:   1. Since 12/7/2019, there is no evidence of pulmonary embolism.    2. No significant change in large mass involving the inferior vena cava   with extension into the right atrium. Differential includes IVC sarcoma.     3. There are multiple small lung nodules bilaterally. These are   suspicious for metastatic disease.    4. Increase in size of moderate right pleural effusion.          12/4 Cytopathology     Final Diagnosis  PLEURAL FLUID, RIGHT:    NEGATIVE FOR MALIGNANT CELLS    Mesothelial cells, histiocytes, and lymphocytes  Cell block reviewed

## 2019-12-10 NOTE — BRIEF OPERATIVE NOTE - NSICDXBRIEFPROCEDURE_GEN_ALL_CORE_FT
PROCEDURES:  Diagnostic laparoscopy 10-Dec-2019 18:39:38  Morris Marmolejo  Needle liver biopsy 10-Dec-2019 18:39:29  Morris Marmolejo

## 2019-12-10 NOTE — PROGRESS NOTE ADULT - ASSESSMENT
39F PMH DMII, HTN, HLD, tobacco smoker, s/p bilateral uterine artery embolization in 8/2019 due to fibroids now with large enhancing mass within the intrahepatic IVC extending from the left iliac vein to right atrium, s/p thoracentesis 12/05 now s/p diagnostic laparoscopy and liver bx.      Pain/nausea control PRN  Home meds  Incentive spirometer/OOB/Ambulate  No hgtt or SQH/SQ lovenox needed for now  IVF, Diet: Regular  Passed TOV  Care as per primary team    Team 1C Surgery will continue to follow

## 2019-12-10 NOTE — PROGRESS NOTE ADULT - ASSESSMENT
Assessment: 39F PMH DMII, HTN, HLD, tobacco smoker, s/p bilateral uterine artery embolization in 8/2019 due to fibroids now with large enhancing mass within the intrahepatic IVC extending from the left iliac vein to right atrium, s/p thoracentesis 12/05. Patient has been hemodynamically stable and appropriate for liver biopsy to obtain tissue diagnosis.    Recommendations:  - Continue NPO until OR  - Hold heparin drip on call to OR  - Laparoscopic liver biopsy today in PM  - Continue care per primary team  - General Surgery Team 1C will continue to follow  - Case discussed with Dr. Howe

## 2019-12-10 NOTE — PROGRESS NOTE ADULT - SUBJECTIVE AND OBJECTIVE BOX
Team 1C Surgery Post-Op Note, PCN:     Pre-Op Dx: Retroperitoneal mass  Procedure: Diagnostic laparoscopy  Needle liver biopsy    Surgeon: Shyam    Subjective:  Pt seen and examined at bedside 2 hours post-op. VSS. Pt is doing well, resting in bed. Pt denies abdominal pain, CP, SOB, nausea, vomiting. C/o voiding frequently.       Vital Signs Last 24 Hrs  T(C): 36.6 (10 Dec 2019 20:15), Max: 36.9 (10 Dec 2019 08:55)  T(F): 97.8 (10 Dec 2019 20:15), Max: 98.5 (10 Dec 2019 08:55)  HR: 82 (10 Dec 2019 21:48) (79 - 96)  BP: 164/95 (10 Dec 2019 21:48) (106/57 - 183/105)  RR: 16 (10 Dec 2019 21:48) (15 - 18)  SpO2: 98% (10 Dec 2019 21:48) (94% - 100%)    Physical Exam:  General: NAD, resting comfortably in bed  Pulmonary: Nonlabored breathing, no respiratory distress  Cardiovascular: NSR  Abdominal: soft, NT/ND, obese, incisions clean dry and intact.   Extremities: WWP, SCDs in place    LABS:                        8.8    10.00 )-----------( 372      ( 10 Dec 2019 03:15 )             29.0     12-10    134<L>  |  101  |  9   ----------------------------<  130<H>  4.5   |  24  |  0.67    Ca    9.1      10 Dec 2019 03:14  Phos  3.6     12-10  Mg     1.9     12-09    TPro  5.4<L>  /  Alb  2.7<L>  /  TBili  0.6  /  DBili  <0.2  /  AST  54<H>  /  ALT  139<H>  /  AlkPhos  118  12-09    PT/INR - ( 10 Dec 2019 03:14 )   PT: 17.4 sec;   INR: 1.52          PTT - ( 10 Dec 2019 10:22 )  PTT:31.7 sec    CAPILLARY BLOOD GLUCOSE  POCT Blood Glucose.: 146 mg/dL (10 Dec 2019 22:05)  POCT Blood Glucose.: 149 mg/dL (10 Dec 2019 13:39)  POCT Blood Glucose.: 111 mg/dL (10 Dec 2019 11:30)  POCT Blood Glucose.: 145 mg/dL (10 Dec 2019 06:42)  POCT Blood Glucose.: 144 mg/dL (10 Dec 2019 00:37)      LIVER FUNCTIONS - ( 09 Dec 2019 05:42 )  Alb: 2.7 g/dL / Pro: 5.4 g/dL / ALK PHOS: 118 U/L / ALT: 139 U/L / AST: 54 U/L / GGT: x           ABO Interpretation: B (12-10 @ 05:39)

## 2019-12-10 NOTE — PROGRESS NOTE ADULT - SUBJECTIVE AND OBJECTIVE BOX
ID: 39F PMH DMII, HTN, HLD, tobacco smoker, s/p bilateral uterine artery embolization in 8/2019 due to fibroids now with large enhancing mass within the intrahepatic IVC extending from the left iliac vein to right atrium, s/p thoracentesis 12/05    SUBJECTIVE: Abdominal pain unchanged      OBJECTIVE:    Vital Signs:  Vital Signs Last 24 Hrs  T(C): 36.7 (10 Dec 2019 05:45), Max: 36.9 (09 Dec 2019 18:08)  T(F): 98.1 (10 Dec 2019 05:45), Max: 98.5 (09 Dec 2019 18:08)  HR: 96 (10 Dec 2019 06:50) (80 - 96)  BP: 132/61 (10 Dec 2019 06:50) (112/83 - 140/81)  BP(mean): 99 (09 Dec 2019 17:22) (95 - 118)  RR: 16 (10 Dec 2019 06:50) (16 - 28)  SpO2: 94% (10 Dec 2019 06:50) (93% - 100%)    Physical Exam:  General: NAD  Pulmonary: Nonlabored breathing, no respiratory distress  Abdominal: Soft, NT/ND  Neuro: AAO x3, no focal deficits    Ins and Outs:  I&O's Summary    09 Dec 2019 07:01  -  10 Dec 2019 07:00  --------------------------------------------------------  IN: 65 mL / OUT: 0 mL / NET: 65 mL        Labs:                        8.8    10.00 )-----------( 372      ( 10 Dec 2019 03:15 )             29.0     12-10    134<L>  |  101  |  9   ----------------------------<  130<H>  4.5   |  24  |  0.67    Ca    9.1      10 Dec 2019 03:14  Phos  3.6     12-10  Mg     1.9     12-09    TPro  5.4<L>  /  Alb  2.7<L>  /  TBili  0.6  /  DBili  <0.2  /  AST  54<H>  /  ALT  139<H>  /  AlkPhos  118  12-09    PT/INR - ( 10 Dec 2019 03:14 )   PT: 17.4 sec;   INR: 1.52          PTT - ( 10 Dec 2019 03:14 )  PTT:57.2 sec    CAPILLARY BLOOD GLUCOSE      POCT Blood Glucose.: 145 mg/dL (10 Dec 2019 06:42)  POCT Blood Glucose.: 144 mg/dL (10 Dec 2019 00:37)  POCT Blood Glucose.: 105 mg/dL (09 Dec 2019 18:37)  POCT Blood Glucose.: 171 mg/dL (09 Dec 2019 11:09)    LIVER FUNCTIONS - ( 09 Dec 2019 05:42 )  Alb: 2.7 g/dL / Pro: 5.4 g/dL / ALK PHOS: 118 U/L / ALT: 139 U/L / AST: 54 U/L / GGT: x

## 2019-12-11 ENCOUNTER — RESULT REVIEW (OUTPATIENT)
Age: 39
End: 2019-12-11

## 2019-12-11 LAB
ALBUMIN SERPL ELPH-MCNC: 3.6 G/DL — SIGNIFICANT CHANGE UP (ref 3.3–5)
ALP SERPL-CCNC: 117 U/L — SIGNIFICANT CHANGE UP (ref 40–120)
ALT FLD-CCNC: 127 U/L — HIGH (ref 10–45)
ANION GAP SERPL CALC-SCNC: 11 MMOL/L — SIGNIFICANT CHANGE UP (ref 5–17)
APTT BLD: 27.8 SEC — SIGNIFICANT CHANGE UP (ref 27.5–36.3)
AST SERPL-CCNC: 76 U/L — HIGH (ref 10–40)
BILIRUB SERPL-MCNC: 0.8 MG/DL — SIGNIFICANT CHANGE UP (ref 0.2–1.2)
BUN SERPL-MCNC: 8 MG/DL — SIGNIFICANT CHANGE UP (ref 7–23)
CALCIUM SERPL-MCNC: 9.4 MG/DL — SIGNIFICANT CHANGE UP (ref 8.4–10.5)
CHLORIDE SERPL-SCNC: 102 MMOL/L — SIGNIFICANT CHANGE UP (ref 96–108)
CO2 SERPL-SCNC: 21 MMOL/L — LOW (ref 22–31)
CREAT SERPL-MCNC: 0.71 MG/DL — SIGNIFICANT CHANGE UP (ref 0.5–1.3)
GLUCOSE BLDC GLUCOMTR-MCNC: 152 MG/DL — HIGH (ref 70–99)
GLUCOSE BLDC GLUCOMTR-MCNC: 169 MG/DL — HIGH (ref 70–99)
GLUCOSE BLDC GLUCOMTR-MCNC: 197 MG/DL — HIGH (ref 70–99)
GLUCOSE SERPL-MCNC: 174 MG/DL — HIGH (ref 70–99)
HCT VFR BLD CALC: 30.5 % — LOW (ref 34.5–45)
HGB BLD-MCNC: 9.1 G/DL — LOW (ref 11.5–15.5)
INR BLD: 1.55 — HIGH (ref 0.88–1.16)
MAGNESIUM SERPL-MCNC: 1.6 MG/DL — SIGNIFICANT CHANGE UP (ref 1.6–2.6)
MCHC RBC-ENTMCNC: 20.5 PG — LOW (ref 27–34)
MCHC RBC-ENTMCNC: 29.8 GM/DL — LOW (ref 32–36)
MCV RBC AUTO: 68.7 FL — LOW (ref 80–100)
NRBC # BLD: 0 /100 WBCS — SIGNIFICANT CHANGE UP (ref 0–0)
PHOSPHATE SERPL-MCNC: 4 MG/DL — SIGNIFICANT CHANGE UP (ref 2.5–4.5)
PLATELET # BLD AUTO: 344 K/UL — SIGNIFICANT CHANGE UP (ref 150–400)
POTASSIUM SERPL-MCNC: 5.4 MMOL/L — HIGH (ref 3.5–5.3)
POTASSIUM SERPL-SCNC: 5.4 MMOL/L — HIGH (ref 3.5–5.3)
PROT SERPL-MCNC: 6.6 G/DL — SIGNIFICANT CHANGE UP (ref 6–8.3)
PROTHROM AB SERPL-ACNC: 17.7 SEC — HIGH (ref 10–12.9)
RBC # BLD: 4.44 M/UL — SIGNIFICANT CHANGE UP (ref 3.8–5.2)
RBC # FLD: 19.4 % — HIGH (ref 10.3–14.5)
SODIUM SERPL-SCNC: 134 MMOL/L — LOW (ref 135–145)
WBC # BLD: 13.95 K/UL — HIGH (ref 3.8–10.5)
WBC # FLD AUTO: 13.95 K/UL — HIGH (ref 3.8–10.5)

## 2019-12-11 PROCEDURE — 88112 CYTOPATH CELL ENHANCE TECH: CPT | Mod: 26

## 2019-12-11 PROCEDURE — 93970 EXTREMITY STUDY: CPT | Mod: 26

## 2019-12-11 PROCEDURE — 76856 US EXAM PELVIC COMPLETE: CPT | Mod: 26

## 2019-12-11 PROCEDURE — 76830 TRANSVAGINAL US NON-OB: CPT | Mod: 26

## 2019-12-11 PROCEDURE — 99233 SBSQ HOSP IP/OBS HIGH 50: CPT | Mod: GC

## 2019-12-11 PROCEDURE — 88305 TISSUE EXAM BY PATHOLOGIST: CPT | Mod: 26

## 2019-12-11 RX ORDER — MAGNESIUM OXIDE 400 MG ORAL TABLET 241.3 MG
400 TABLET ORAL ONCE
Refills: 0 | Status: COMPLETED | OUTPATIENT
Start: 2019-12-11 | End: 2019-12-11

## 2019-12-11 RX ORDER — SODIUM POLYSTYRENE SULFONATE 4.1 MEQ/G
15 POWDER, FOR SUSPENSION ORAL ONCE
Refills: 0 | Status: COMPLETED | OUTPATIENT
Start: 2019-12-11 | End: 2019-12-11

## 2019-12-11 RX ADMIN — OXYCODONE HYDROCHLORIDE 5 MILLIGRAM(S): 5 TABLET ORAL at 17:14

## 2019-12-11 RX ADMIN — Medication 0.5 MILLIGRAM(S): at 15:06

## 2019-12-11 RX ADMIN — Medication 1 PATCH: at 18:48

## 2019-12-11 RX ADMIN — HYDROMORPHONE HYDROCHLORIDE 0.25 MILLIGRAM(S): 2 INJECTION INTRAMUSCULAR; INTRAVENOUS; SUBCUTANEOUS at 00:30

## 2019-12-11 RX ADMIN — OXYCODONE HYDROCHLORIDE 10 MILLIGRAM(S): 5 TABLET ORAL at 23:45

## 2019-12-11 RX ADMIN — HYDROMORPHONE HYDROCHLORIDE 0.25 MILLIGRAM(S): 2 INJECTION INTRAMUSCULAR; INTRAVENOUS; SUBCUTANEOUS at 06:10

## 2019-12-11 RX ADMIN — SODIUM POLYSTYRENE SULFONATE 15 GRAM(S): 4.1 POWDER, FOR SUSPENSION ORAL at 16:11

## 2019-12-11 RX ADMIN — HYDROMORPHONE HYDROCHLORIDE 0.25 MILLIGRAM(S): 2 INJECTION INTRAMUSCULAR; INTRAVENOUS; SUBCUTANEOUS at 05:08

## 2019-12-11 RX ADMIN — Medication 2: at 07:04

## 2019-12-11 RX ADMIN — MONTELUKAST 10 MILLIGRAM(S): 4 TABLET, CHEWABLE ORAL at 11:39

## 2019-12-11 RX ADMIN — OXYCODONE HYDROCHLORIDE 10 MILLIGRAM(S): 5 TABLET ORAL at 22:48

## 2019-12-11 RX ADMIN — Medication 2: at 21:12

## 2019-12-11 RX ADMIN — HYDROMORPHONE HYDROCHLORIDE 0.25 MILLIGRAM(S): 2 INJECTION INTRAMUSCULAR; INTRAVENOUS; SUBCUTANEOUS at 10:06

## 2019-12-11 RX ADMIN — HYDROMORPHONE HYDROCHLORIDE 0.25 MILLIGRAM(S): 2 INJECTION INTRAMUSCULAR; INTRAVENOUS; SUBCUTANEOUS at 14:29

## 2019-12-11 RX ADMIN — Medication 30 MILLILITER(S): at 22:43

## 2019-12-11 RX ADMIN — BUDESONIDE AND FORMOTEROL FUMARATE DIHYDRATE 2 PUFF(S): 160; 4.5 AEROSOL RESPIRATORY (INHALATION) at 06:29

## 2019-12-11 RX ADMIN — Medication 1 PATCH: at 11:30

## 2019-12-11 RX ADMIN — Medication 325 MILLIGRAM(S): at 11:39

## 2019-12-11 RX ADMIN — OXYCODONE HYDROCHLORIDE 10 MILLIGRAM(S): 5 TABLET ORAL at 12:40

## 2019-12-11 RX ADMIN — Medication 50 MILLIGRAM(S): at 14:15

## 2019-12-11 RX ADMIN — OXYCODONE HYDROCHLORIDE 5 MILLIGRAM(S): 5 TABLET ORAL at 16:14

## 2019-12-11 RX ADMIN — OXYCODONE HYDROCHLORIDE 10 MILLIGRAM(S): 5 TABLET ORAL at 11:40

## 2019-12-11 RX ADMIN — BUDESONIDE AND FORMOTEROL FUMARATE DIHYDRATE 2 PUFF(S): 160; 4.5 AEROSOL RESPIRATORY (INHALATION) at 19:24

## 2019-12-11 RX ADMIN — HYDROMORPHONE HYDROCHLORIDE 0.25 MILLIGRAM(S): 2 INJECTION INTRAMUSCULAR; INTRAVENOUS; SUBCUTANEOUS at 10:21

## 2019-12-11 RX ADMIN — HYDROMORPHONE HYDROCHLORIDE 0.25 MILLIGRAM(S): 2 INJECTION INTRAMUSCULAR; INTRAVENOUS; SUBCUTANEOUS at 14:14

## 2019-12-11 RX ADMIN — Medication 2: at 15:37

## 2019-12-11 RX ADMIN — Medication 1 PATCH: at 06:30

## 2019-12-11 RX ADMIN — Medication 50 MILLIGRAM(S): at 21:11

## 2019-12-11 RX ADMIN — Medication 1 PATCH: at 11:38

## 2019-12-11 RX ADMIN — MAGNESIUM OXIDE 400 MG ORAL TABLET 400 MILLIGRAM(S): 241.3 TABLET ORAL at 15:06

## 2019-12-11 RX ADMIN — Medication 50 MILLIGRAM(S): at 06:29

## 2019-12-11 NOTE — PROGRESS NOTE ADULT - SUBJECTIVE AND OBJECTIVE BOX
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    SUBJECTIVE: Patient tolerating a diet without symptoms of anemia      OBJECTIVE:    Vital Signs:  Vital Signs Last 24 Hrs  T(C): 36.3 (11 Dec 2019 05:07), Max: 36.9 (10 Dec 2019 08:55)  T(F): 97.4 (11 Dec 2019 05:07), Max: 98.5 (10 Dec 2019 08:55)  HR: 78 (11 Dec 2019 05:48) (78 - 91)  BP: 143/93 (11 Dec 2019 05:48) (106/57 - 183/105)  BP(mean): --  RR: 16 (11 Dec 2019 05:48) (15 - 18)  SpO2: 97% (11 Dec 2019 05:48) (94% - 100%)    Physical Exam:  General: NAD  Pulmonary: Nonlabored breathing, no respiratory distress  Abdominal: Soft, NT/ND, incisions CDI  Neuro: AAO x3, no focal deficits    Ins and Outs:  I&O's Summary    10 Dec 2019 07:01  -  11 Dec 2019 07:00  --------------------------------------------------------  IN: 320 mL / OUT: 550 mL / NET: -230 mL        Labs:                        8.8    10.00 )-----------( 372      ( 10 Dec 2019 03:15 )             29.0     12-10    134<L>  |  101  |  9   ----------------------------<  130<H>  4.5   |  24  |  0.67    Ca    9.1      10 Dec 2019 03:14  Phos  3.6     12-10      PT/INR - ( 10 Dec 2019 03:14 )   PT: 17.4 sec;   INR: 1.52          PTT - ( 10 Dec 2019 10:22 )  PTT:31.7 sec    CAPILLARY BLOOD GLUCOSE      POCT Blood Glucose.: 169 mg/dL (11 Dec 2019 06:51)  POCT Blood Glucose.: 146 mg/dL (10 Dec 2019 22:05)  POCT Blood Glucose.: 149 mg/dL (10 Dec 2019 13:39)  POCT Blood Glucose.: 111 mg/dL (10 Dec 2019 11:30)

## 2019-12-11 NOTE — PHYSICAL THERAPY INITIAL EVALUATION ADULT - GENERAL OBSERVATIONS, REHAB EVAL
Received sitting at [facility] complaints of abdominal pain 10/10 +IV hep. EKG, B SCD. Left as found +RN Estella aware, call vargas

## 2019-12-11 NOTE — PROGRESS NOTE ADULT - SUBJECTIVE AND OBJECTIVE BOX
Heme/Onc     Interval History:  The patient feels OK. Mild abdominal discomfort. No SOB. No nausea or vomiting. No new bleeding/ bruising. S/p exploratory lap with biopsy of the mass      ROS:  + fatigue  + nausea  No SOB or CP  + abdominal discomfort  No fever, chills  No bleeding  ROS is otherwise negative    Allergies    No Known Drug Allergies  shellfish (Hives)    Intolerances      Medications:  MEDICATIONS  (STANDING):    MEDICATIONS  (PRN):      PHYSICAL EXAM:    Vital Signs Last 24 Hrs  T(C): 36.3 (11 Dec 2019 05:07), Max: 36.9 (10 Dec 2019 08:55)  T(F): 97.4 (11 Dec 2019 05:07), Max: 98.5 (10 Dec 2019 08:55)  HR: 82 (11 Dec 2019 00:01) (79 - 91)  BP: 140/85 (11 Dec 2019 00:01) (106/57 - 183/105)  BP(mean): --  RR: 16 (11 Dec 2019 00:01) (15 - 18)  SpO2: 96% (11 Dec 2019 00:01) (94% - 100%)    GEN: NAD  HEENT: AT/NC, EOMI  NECK: Supple  CVS: +S1S2  LUNG: CTA /Bl  ABD: +BS  EXT: no c/c/e  NEURO: aaox3       Labs:                          8.8    10.00 )-----------( 372      ( 10 Dec 2019 03:15 )             29.0     CBC Full  -  ( 10 Dec 2019 03:15 )  WBC Count : 10.00 K/uL  RBC Count : 4.26 M/uL  Hemoglobin : 8.8 g/dL  Hematocrit : 29.0 %  Platelet Count - Automated : 372 K/uL  Mean Cell Volume : 68.1 fl  Mean Cell Hemoglobin : 20.7 pg  Mean Cell Hemoglobin Concentration : 30.3 gm/dL  Auto Neutrophil # : x  Auto Lymphocyte # : x  Auto Monocyte # : x  Auto Eosinophil # : x  Auto Basophil # : x  Auto Neutrophil % : x  Auto Lymphocyte % : x  Auto Monocyte % : x  Auto Eosinophil % : x  Auto Basophil % : x    PT/INR - ( 10 Dec 2019 03:14 )   PT: 17.4 sec;   INR: 1.52          PTT - ( 10 Dec 2019 10:22 )  PTT:31.7 sec    12-10    134<L>  |  101  |  9   ----------------------------<  130<H>  4.5   |  24  |  0.67    Ca    9.1      10 Dec 2019 03:14  Phos  3.6     12-10  Mg     1.9     12-09    TPro  5.4<L>  /  Alb  2.7<L>  /  TBili  0.6  /  DBili  <0.2  /  AST  54<H>  /  ALT  139<H>  /  AlkPhos  118  12-09 12/4 CT A/P   LOWER CHEST: Large right pleural effusion and mediastinal shift.    ABDOMEN:  LIVER: Within normal limits  BILE DUCTS: Normal caliber  GALLBLADDER: No calcified gallstones. Normal caliber wall  PANCREAS: Within normal limits  SPLEEN: Within normal limits  ADRENALS: Within normal limits  KIDNEYS: Within normal limits  PELVIS:  REPRODUCTIVE ORGANS: Multi fibroid uterus. Left ovarian cysts.  BLADDER: Within normal limits  PERITONEUM:No ascites, no free air.  BOWEL: Within normal limits.  VESSELS: Expansile enhancing mass within the intrahepatic IVC extending   proximally to the right atrial junction and distally to the renal veins.   Diminished opacification of the infrarenal IVC and left common iliac   vein, suspect combination of bland thrombus and mixing artifact.  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  Large right pleural effusion.        CT Chest non Con 12/4  Impression: 1. Since 8/23/2019, there is a new large right pleural   effusion causing compressive atelectasis of the entire right lower lobe   and mild shift of the heart and mediastinum to the left.    2. There are a few nodules in the left lung, the largest measuring 5 mm   in the left upper lobe. These nodules have nonspecific appearances. They   could be benign or malignant.    3. Large right upper quadrant mass identified on CT scan of abdomen and   pelvis of 12/4/2019 is partially visualized. It is consistent with   neoplasm.        12/8 CT Chest PE Protocol  IMPRESSION:   1. Since 12/7/2019, there is no evidence of pulmonary embolism.    2. No significant change in large mass involving the inferior vena cava   with extension into the right atrium. Differential includes IVC sarcoma.     3. There are multiple small lung nodules bilaterally. These are   suspicious for metastatic disease.    4. Increase in size of moderate right pleural effusion.          12/4 Cytopathology     Final Diagnosis  PLEURAL FLUID, RIGHT:    NEGATIVE FOR MALIGNANT CELLS    Mesothelial cells, histiocytes, and lymphocytes  Cell block reviewed

## 2019-12-11 NOTE — PHYSICAL THERAPY INITIAL EVALUATION ADULT - PERTINENT HX OF CURRENT PROBLEM, REHAB EVAL
39F who presents with shortness of breath. SOB has been progressive,  has had associated substernal chest pressure that is non-radiating and varies in intensity. The chest pressure is worst when she lays on her left side. She has also had intermittent R sided flank/lower back pain that is intermittent, pounding in nature, and worse with food.

## 2019-12-11 NOTE — PHYSICAL THERAPY INITIAL EVALUATION ADULT - ADDITIONAL COMMENTS
house, 7 steps to enter B handrail however wide, denies falls in past year except one time patient's boyfriend dropped patient, patient's boyfriend not with patient at all times

## 2019-12-11 NOTE — PROGRESS NOTE ADULT - SUBJECTIVE AND OBJECTIVE BOX
24hr Events:  O/N:S/p Diagnostic laparoscopy and Needle liver biopsy, URBAN, VSS  12/10: ptt- 31.7 heparin increased to 16, s/p mass biopsy        Assessment/Plan;  39F smoker with a PMHx of DM, HTN, HLD, s/p bilateral uterine artery embolization in 8/2019 due to fibroids now with 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      - Consistent Carb diet  -Glucose control  -Home medication as appropriate  -Continuing to wait on pleural fluid cytology  -F/u AM labs 24hr Events:  O/N:S/p Diagnostic laparoscopy and Needle liver biopsy, URBAN, VSS  12/10: ptt- 31.7 heparin increased to 16, s/p mass biopsy    metoprolol tartrate 50        Vital Signs Last 24 Hrs  T(C): 36.3 (11 Dec 2019 05:07), Max: 36.9 (10 Dec 2019 08:55)  T(F): 97.4 (11 Dec 2019 05:07), Max: 98.5 (10 Dec 2019 08:55)  HR: 82 (11 Dec 2019 00:01) (79 - 91)  BP: 140/85 (11 Dec 2019 00:01) (106/57 - 183/105)  BP(mean): --  RR: 16 (11 Dec 2019 00:01) (15 - 18)  SpO2: 96% (11 Dec 2019 00:01) (94% - 100%)  I&O's Summary    10 Dec 2019 07:01  -  11 Dec 2019 07:00  --------------------------------------------------------  IN: 320 mL / OUT: 550 mL / NET: -230 mL        Physical Exam:  General: NAD, resting comfortably in bed  Pulmonary: Nonlabored breathing, no respiratory distress  Abdomen: soft, non-tender, non distended   Extremities: warm, well perfused, no edema      LABS:                        8.8    10.00 )-----------( 372      ( 10 Dec 2019 03:15 )             29.0     12-10    134<L>  |  101  |  9   ----------------------------<  130<H>  4.5   |  24  |  0.67    Ca    9.1      10 Dec 2019 03:14  Phos  3.6     12-10      PT/INR - ( 10 Dec 2019 03:14 )   PT: 17.4 sec;   INR: 1.52          PTT - ( 10 Dec 2019 10:22 )  PTT:31.7 sec    PLEASE CHECK WHEN PRESENT:     [  ]Heart Failure     [  ] Acute     [  ] Acute on Chronic     [  ] Chronic  -------------------------------------------------------------------     [  ]Diastolic [HFpEF]     [  ]Systolic [HFrEF]     [  ]Combined [HFpEF & HFrEF]     [  ]Other:  -------------------------------------------------------------------  [ ] Respiratory failure  [ ] Acute cor pulmonale  [ ] Asthma/COPD Exacerbation  [ ] Pleural effusion  [ ] Aspiration pneumonia  -------------------------------------------------------------------  [  ]TEDDY     [  ]ATN     [  ]Reneal Medullary Necrosis     [  ]Renal Cortical Necrosis     [  ]Other Pathological Lesions:    [  ]CKD 1  [  ]CKD 2  [  ]CKD 3  [  ]CKD 4  [  ]CKD 5  [  ]Other  -------------------------------------------------------------------  [x ]Diabetes  [  ] Diabetic PVD Ulcer  [  ] Neuropathic ulcer to DM  [  ] Diabetes with Nephropathy  [  ] Osteomyelitis due to diabetes  --------------------------------------------------------------------  [  ]Malnutrition: See Nutrition Note  [  ]Cachexia  [  ]Other:   [  ]Supplement Ordered:  [  ]Morbid Obesity (BMI >=40]  ---------------------------------------------------------------------  [ ] Sepsis/severe sepsis/septic shock  [ ] UTI  [ ] Pneumonia  -----------------------------------------------------------------------  [ ] Acidosis/alkalosis  [ ] Fluid overload  [ ] Hypokalemia  [ ] Hyperkalemia  [ ] Hypomagnesemia  [ ] Hypophosphatemia  [ ] Hyperphosphatemia  ------------------------------------------------------------------------  [ ] Acute blood loss anemia  [ ] Post op blood loss anemia  [ ] Iron deficiency anemia  [X ] Anemia due to chronic disease  [ ] Hypercoagulable state  ----------------------------------------------------------------------  [ ] Cerebral infarction  [ ] Transient ischemia attack  [ ] Encephalopathy    Assessment/Plan;  39F smoker with a PMHx of DM, HTN, HLD, s/p bilateral uterine artery embolization in 8/2019 due to fibroids now with 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      -Consistent Carb diet  -Glucose control  -Home medication as appropriate  -Continuing to wait on pleural fluid cytology  -F/u AM labs 24hr Events:  O/N:S/p Diagnostic laparoscopy and Needle liver biopsy, URBAN, VSS  12/10: ptt- 31.7 heparin increased to 16, s/p mass biopsy    metoprolol tartrate 50        Vital Signs Last 24 Hrs  T(C): 36.3 (11 Dec 2019 05:07), Max: 36.9 (10 Dec 2019 08:55)  T(F): 97.4 (11 Dec 2019 05:07), Max: 98.5 (10 Dec 2019 08:55)  HR: 82 (11 Dec 2019 00:01) (79 - 91)  BP: 140/85 (11 Dec 2019 00:01) (106/57 - 183/105)  BP(mean): --  RR: 16 (11 Dec 2019 00:01) (15 - 18)  SpO2: 96% (11 Dec 2019 00:01) (94% - 100%)  I&O's Summary    10 Dec 2019 07:01  -  11 Dec 2019 07:00  --------------------------------------------------------  IN: 320 mL / OUT: 550 mL / NET: -230 mL        Physical Exam:  General: NAD, resting comfortably in bed  Pulmonary: Nonlabored breathing, no respiratory distress  Abdomen: soft, non-tender, non distended incisions c/d/i  Extremities: warm, well perfused, no edema      LABS:                        8.8    10.00 )-----------( 372      ( 10 Dec 2019 03:15 )             29.0     12-10    134<L>  |  101  |  9   ----------------------------<  130<H>  4.5   |  24  |  0.67    Ca    9.1      10 Dec 2019 03:14  Phos  3.6     12-10      PT/INR - ( 10 Dec 2019 03:14 )   PT: 17.4 sec;   INR: 1.52          PTT - ( 10 Dec 2019 10:22 )  PTT:31.7 sec    PLEASE CHECK WHEN PRESENT:     [  ]Heart Failure     [  ] Acute     [  ] Acute on Chronic     [  ] Chronic  -------------------------------------------------------------------     [  ]Diastolic [HFpEF]     [  ]Systolic [HFrEF]     [  ]Combined [HFpEF & HFrEF]     [  ]Other:  -------------------------------------------------------------------  [ ] Respiratory failure  [ ] Acute cor pulmonale  [ ] Asthma/COPD Exacerbation  [ ] Pleural effusion  [ ] Aspiration pneumonia  -------------------------------------------------------------------  [  ]TEDDY     [  ]ATN     [  ]Reneal Medullary Necrosis     [  ]Renal Cortical Necrosis     [  ]Other Pathological Lesions:    [  ]CKD 1  [  ]CKD 2  [  ]CKD 3  [  ]CKD 4  [  ]CKD 5  [  ]Other  -------------------------------------------------------------------  [x ]Diabetes  [  ] Diabetic PVD Ulcer  [  ] Neuropathic ulcer to DM  [  ] Diabetes with Nephropathy  [  ] Osteomyelitis due to diabetes  --------------------------------------------------------------------  [  ]Malnutrition: See Nutrition Note  [  ]Cachexia  [  ]Other:   [  ]Supplement Ordered:  [  ]Morbid Obesity (BMI >=40]  ---------------------------------------------------------------------  [ ] Sepsis/severe sepsis/septic shock  [ ] UTI  [ ] Pneumonia  -----------------------------------------------------------------------  [ ] Acidosis/alkalosis  [ ] Fluid overload  [ ] Hypokalemia  [ ] Hyperkalemia  [ ] Hypomagnesemia  [ ] Hypophosphatemia  [ ] Hyperphosphatemia  ------------------------------------------------------------------------  [ ] Acute blood loss anemia  [ ] Post op blood loss anemia  [ ] Iron deficiency anemia  [X ] Anemia due to chronic disease  [ ] Hypercoagulable state  ----------------------------------------------------------------------  [ ] Cerebral infarction  [ ] Transient ischemia attack  [ ] Encephalopathy    Assessment/Plan;  39F smoker with a PMHx of DM, HTN, HLD, s/p bilateral uterine artery embolization in 8/2019 due to fibroids now with 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      -Consistent Carb diet  -Glucose control  -Home medication as appropriate  -Continuing to wait on pleural fluid cytology  -F/u AM labs

## 2019-12-11 NOTE — PHYSICAL THERAPY INITIAL EVALUATION ADULT - CRITERIA FOR SKILLED THERAPEUTIC INTERVENTIONS
anticipated discharge recommendation/rehab potential/therapy frequency/impairments found/functional limitations in following categories

## 2019-12-11 NOTE — PHYSICAL THERAPY INITIAL EVALUATION ADULT - ADL SKILLS, REHAB EVAL
able to take a bath independently; patient's boyfriend assists her to enter tub, able to toilet independently/independent

## 2019-12-11 NOTE — PROGRESS NOTE ADULT - SUBJECTIVE AND OBJECTIVE BOX
Interval Events:  Patient seen and examined at bedside.    Patient feels well, denies any SOB but continues to have some R sided flank/chest pain. Complaining of some abdominal pain thats surgery related.     MEDICATIONS:  Pulmonary:  budesonide  80 MICROgram(s)/formoterol 4.5 MICROgram(s) Inhaler 2 Puff(s) Inhalation two times a day  montelukast 10 milliGRAM(s) Oral daily    Antimicrobials:    Anticoagulants:    Cardiac:  metoprolol tartrate 50 milliGRAM(s) Oral every 8 hours    Endocrine:  dextrose 40% Gel 15 Gram(s) Oral once PRN  dextrose 50% Injectable 12.5 Gram(s) IV Push once  dextrose 50% Injectable 25 Gram(s) IV Push once  dextrose 50% Injectable 25 Gram(s) IV Push once  glucagon  Injectable 1 milliGRAM(s) IntraMuscular once PRN  insulin lispro (HumaLOG) corrective regimen sliding scale   SubCutaneous every 6 hours    Allergies    No Known Drug Allergies  shellfish (Hives)    Intolerances    Vital Signs Last 24 Hrs  T(C): 36.9 (11 Dec 2019 10:32), Max: 36.9 (11 Dec 2019 10:32)  T(F): 98.5 (11 Dec 2019 10:32), Max: 98.5 (11 Dec 2019 10:32)  HR: 88 (11 Dec 2019 08:35) (78 - 91)  BP: 153/98 (11 Dec 2019 08:35) (127/78 - 183/105)  BP(mean): 120 (11 Dec 2019 08:35) (120 - 120)  RR: 16 (11 Dec 2019 08:35) (15 - 18)  SpO2: 99% (11 Dec 2019 08:35) (94% - 100%)    12-10 @ 07:01 - 12-11 @ 07:00  --------------------------------------------------------  IN: 320 mL / OUT: 550 mL / NET: -230 mL    12-11 @ 07:01  -  12-11 @ 12:34  --------------------------------------------------------  IN: 180 mL / OUT: 0 mL / NET: 180 mL    Physical Exam:  General: NAD, AAO x3  HEENT: No icterus,. Moist mucous membranes  Neck: No JVD noted. Supple, no meningismus  Cardio: S1, S2 noted, RRR. No murmurs, rubs or gallops  Resp: Clear to auscultation b/l. No adventitious sounds  Abdo: Soft, NT, bowel sounds present. No organomegaly  Extremities: No edema noted. Pulses present b/l  Neuro: AAO x3, grossly normal motor strength.  Lymphnodes: no lymphadenopathy identified.  Skin: Dry, no rashes    LABS:  ABG - ( 10 Dec 2019 16:47 )  pH, Arterial: 7.34  pH, Blood: x     /  pCO2: 39    /  pO2: 179   / HCO3: 21    / Base Excess: -4.5  /  SaO2: x         CBC Full  -  ( 11 Dec 2019 08:05 )  WBC Count : 13.95 K/uL  RBC Count : 4.44 M/uL  Hemoglobin : 9.1 g/dL  Hematocrit : 30.5 %  Platelet Count - Automated : 344 K/uL  Mean Cell Volume : 68.7 fl  Mean Cell Hemoglobin : 20.5 pg  Mean Cell Hemoglobin Concentration : 29.8 gm/dL    12-11    134<L>  |  102  |  8   ----------------------------<  174<H>  5.4<H>   |  21<L>  |  0.71    Ca    9.4      11 Dec 2019 08:05  Phos  4.0     12-11  Mg     1.6     12-11    TPro  6.6  /  Alb  3.6  /  TBili  0.8  /  DBili  x   /  AST  76<H>  /  ALT  127<H>  /  AlkPhos  117  12-11    PT/INR - ( 11 Dec 2019 08:05 )   PT: 17.7 sec;   INR: 1.55          PTT - ( 11 Dec 2019 08:05 )  PTT:27.8 sec    RADIOLOGY & ADDITIONAL STUDIES (The following images were personally reviewed):  Reviewed.

## 2019-12-11 NOTE — PROGRESS NOTE ADULT - PROBLEM SELECTOR PLAN 1
Large, R sided pleural effusion now s/p thoracentesis 12/5 with removal of 1800cc of serous appearing fluid.    Exudative (though only slightly) based on lights criteria though cytology negative.     Repeat POCUS shows reaccumulation of small right sided simple effusion  no effusion on the left.    CT scan of chest reviewed which shows a small pleural effusion, the lung parenchyma overall looks clear with the exception of some ground glass opacities in the right lung which likely represents re-expansion pulmonary edema. The pleura did not show obvious abnormality.    Surgical note reports some ascites and hepatic congestions, suspect that the fluid may have originated in the abdomen and displaced to the R thoracic cavity via a diaphragmatic rift. 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 Will consider thora Vs chest tube Vs pleurX if fluid causes symptoms or reaccumulation becomes significant.  - Please ensure follow up with Dr. Liban campoverde or Madie whittaker as outpatient.

## 2019-12-11 NOTE — PROGRESS NOTE ADULT - ASSESSMENT
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 Patient has been hemodynamically stable and tolerating a diet.    Recommendations:  - Continue to hold chemoprophylaxis or heparin drip  - Ambulation and SCDs  - Follow up pathology from permanent sections  - Continue care per primary team  - General Surgery Team 1C will continue to follow  - Case discussed with Dr. Howe

## 2019-12-11 NOTE — PROGRESS NOTE ADULT - ASSESSMENT
39F with hx of iron def anemia, uterine fibroids s/p bilateral uterine artery embolization (2019), with fatigue/, found to have large R pleural effusion and incidental large intrahepatic IVC mass, currently being worked up.     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

## 2019-12-11 NOTE — PHYSICAL THERAPY INITIAL EVALUATION ADULT - DIAGNOSIS, PT EVAL
6B: Impaired Aerobic Capacity/Endurance Associated with Deconditioning ; 5A: Primary Prevention/Risk Reduction for Loss of Balance and Falling

## 2019-12-11 NOTE — PHYSICAL THERAPY INITIAL EVALUATION ADULT - IMPAIRMENTS FOUND, PT EVAL
aerobic capacity/endurance/muscle strength/ventilation and respiration/gas exchange/gait, locomotion, and balance

## 2019-12-12 ENCOUNTER — TRANSCRIPTION ENCOUNTER (OUTPATIENT)
Age: 39
End: 2019-12-12

## 2019-12-12 VITALS — TEMPERATURE: 98 F

## 2019-12-12 DIAGNOSIS — D72.829 ELEVATED WHITE BLOOD CELL COUNT, UNSPECIFIED: ICD-10-CM

## 2019-12-12 LAB
ANION GAP SERPL CALC-SCNC: 11 MMOL/L — SIGNIFICANT CHANGE UP (ref 5–17)
APPEARANCE UR: ABNORMAL
APTT BLD: 26 SEC — LOW (ref 27.5–36.3)
BACTERIA # UR AUTO: PRESENT /HPF
BASOPHILS # BLD AUTO: 0 K/UL — SIGNIFICANT CHANGE UP (ref 0–0.2)
BASOPHILS NFR BLD AUTO: 0 % — SIGNIFICANT CHANGE UP (ref 0–2)
BILIRUB UR-MCNC: NEGATIVE — SIGNIFICANT CHANGE UP
BUN SERPL-MCNC: 12 MG/DL — SIGNIFICANT CHANGE UP (ref 7–23)
CALCIUM SERPL-MCNC: 8.9 MG/DL — SIGNIFICANT CHANGE UP (ref 8.4–10.5)
CHLORIDE SERPL-SCNC: 102 MMOL/L — SIGNIFICANT CHANGE UP (ref 96–108)
CO2 SERPL-SCNC: 24 MMOL/L — SIGNIFICANT CHANGE UP (ref 22–31)
COLOR SPEC: YELLOW — SIGNIFICANT CHANGE UP
CREAT SERPL-MCNC: 0.78 MG/DL — SIGNIFICANT CHANGE UP (ref 0.5–1.3)
DIFF PNL FLD: ABNORMAL
EOSINOPHIL # BLD AUTO: 0 K/UL — SIGNIFICANT CHANGE UP (ref 0–0.5)
EOSINOPHIL NFR BLD AUTO: 0 % — SIGNIFICANT CHANGE UP (ref 0–6)
EPI CELLS # UR: SIGNIFICANT CHANGE UP /HPF (ref 0–5)
GLUCOSE BLDC GLUCOMTR-MCNC: 136 MG/DL — HIGH (ref 70–99)
GLUCOSE BLDC GLUCOMTR-MCNC: 145 MG/DL — HIGH (ref 70–99)
GLUCOSE BLDC GLUCOMTR-MCNC: 159 MG/DL — HIGH (ref 70–99)
GLUCOSE SERPL-MCNC: 148 MG/DL — HIGH (ref 70–99)
GLUCOSE UR QL: NEGATIVE — SIGNIFICANT CHANGE UP
HCT VFR BLD CALC: 29 % — LOW (ref 34.5–45)
HGB BLD-MCNC: 8.8 G/DL — LOW (ref 11.5–15.5)
KETONES UR-MCNC: NEGATIVE — SIGNIFICANT CHANGE UP
LEUKOCYTE ESTERASE UR-ACNC: NEGATIVE — SIGNIFICANT CHANGE UP
LYMPHOCYTES # BLD AUTO: 30.1 % — SIGNIFICANT CHANGE UP (ref 13–44)
LYMPHOCYTES # BLD AUTO: 4.9 K/UL — HIGH (ref 1–3.3)
MAGNESIUM SERPL-MCNC: 1.7 MG/DL — SIGNIFICANT CHANGE UP (ref 1.6–2.6)
MCHC RBC-ENTMCNC: 20.2 PG — LOW (ref 27–34)
MCHC RBC-ENTMCNC: 30.3 GM/DL — LOW (ref 32–36)
MCV RBC AUTO: 66.7 FL — LOW (ref 80–100)
MONOCYTES # BLD AUTO: 1.3 K/UL — HIGH (ref 0–0.9)
MONOCYTES NFR BLD AUTO: 8 % — SIGNIFICANT CHANGE UP (ref 2–14)
NEUTROPHILS # BLD AUTO: 10.08 K/UL — HIGH (ref 1.8–7.4)
NEUTROPHILS NFR BLD AUTO: 61.9 % — SIGNIFICANT CHANGE UP (ref 43–77)
NITRITE UR-MCNC: NEGATIVE — SIGNIFICANT CHANGE UP
NON-GYNECOLOGICAL CYTOLOGY STUDY: SIGNIFICANT CHANGE UP
NRBC # BLD: 1 /100 WBCS — HIGH (ref 0–0)
PH UR: 5.5 — SIGNIFICANT CHANGE UP (ref 5–8)
PHOSPHATE SERPL-MCNC: 3.7 MG/DL — SIGNIFICANT CHANGE UP (ref 2.5–4.5)
PLATELET # BLD AUTO: 247 K/UL — SIGNIFICANT CHANGE UP (ref 150–400)
POTASSIUM SERPL-MCNC: 4.4 MMOL/L — SIGNIFICANT CHANGE UP (ref 3.5–5.3)
POTASSIUM SERPL-SCNC: 4.4 MMOL/L — SIGNIFICANT CHANGE UP (ref 3.5–5.3)
PROT UR-MCNC: 30 MG/DL
RBC # BLD: 4.35 M/UL — SIGNIFICANT CHANGE UP (ref 3.8–5.2)
RBC # FLD: 19.8 % — HIGH (ref 10.3–14.5)
RBC CASTS # UR COMP ASSIST: ABNORMAL /HPF
SODIUM SERPL-SCNC: 137 MMOL/L — SIGNIFICANT CHANGE UP (ref 135–145)
SP GR SPEC: >=1.03 — SIGNIFICANT CHANGE UP (ref 1–1.03)
UROBILINOGEN FLD QL: 1 E.U./DL — SIGNIFICANT CHANGE UP
WBC # BLD: 16.29 K/UL — HIGH (ref 3.8–10.5)
WBC # FLD AUTO: 16.29 K/UL — HIGH (ref 3.8–10.5)
WBC UR QL: ABNORMAL /HPF

## 2019-12-12 PROCEDURE — 99233 SBSQ HOSP IP/OBS HIGH 50: CPT | Mod: GC

## 2019-12-12 PROCEDURE — 99238 HOSP IP/OBS DSCHRG MGMT 30/<: CPT

## 2019-12-12 RX ORDER — APIXABAN 2.5 MG/1
2 TABLET, FILM COATED ORAL
Qty: 28 | Refills: 0
Start: 2019-12-12 | End: 2019-12-18

## 2019-12-12 RX ORDER — MAGNESIUM SULFATE 500 MG/ML
2 VIAL (ML) INJECTION ONCE
Refills: 0 | Status: COMPLETED | OUTPATIENT
Start: 2019-12-12 | End: 2019-12-12

## 2019-12-12 RX ORDER — OXYCODONE HYDROCHLORIDE 5 MG/1
1 TABLET ORAL
Qty: 20 | Refills: 0
Start: 2019-12-12 | End: 2019-12-16

## 2019-12-12 RX ORDER — APIXABAN 2.5 MG/1
10 TABLET, FILM COATED ORAL EVERY 12 HOURS
Refills: 0 | Status: DISCONTINUED | OUTPATIENT
Start: 2019-12-12 | End: 2019-12-12

## 2019-12-12 RX ORDER — PANTOPRAZOLE SODIUM 20 MG/1
40 TABLET, DELAYED RELEASE ORAL
Refills: 0 | Status: DISCONTINUED | OUTPATIENT
Start: 2019-12-12 | End: 2019-12-12

## 2019-12-12 RX ORDER — LIDOCAINE 4 G/100G
1 CREAM TOPICAL ONCE
Refills: 0 | Status: COMPLETED | OUTPATIENT
Start: 2019-12-12 | End: 2019-12-12

## 2019-12-12 RX ORDER — PANTOPRAZOLE SODIUM 20 MG/1
1 TABLET, DELAYED RELEASE ORAL
Qty: 30 | Refills: 0
Start: 2019-12-12 | End: 2020-01-10

## 2019-12-12 RX ORDER — ACETAMINOPHEN 500 MG
2 TABLET ORAL
Qty: 0 | Refills: 0 | DISCHARGE
Start: 2019-12-12

## 2019-12-12 RX ADMIN — Medication 1 PATCH: at 12:00

## 2019-12-12 RX ADMIN — Medication 1 PATCH: at 12:24

## 2019-12-12 RX ADMIN — OXYCODONE HYDROCHLORIDE 5 MILLIGRAM(S): 5 TABLET ORAL at 04:10

## 2019-12-12 RX ADMIN — OXYCODONE HYDROCHLORIDE 10 MILLIGRAM(S): 5 TABLET ORAL at 06:55

## 2019-12-12 RX ADMIN — Medication 0.5 MILLIGRAM(S): at 05:59

## 2019-12-12 RX ADMIN — OXYCODONE HYDROCHLORIDE 10 MILLIGRAM(S): 5 TABLET ORAL at 12:24

## 2019-12-12 RX ADMIN — APIXABAN 10 MILLIGRAM(S): 2.5 TABLET, FILM COATED ORAL at 14:27

## 2019-12-12 RX ADMIN — Medication 30 MILLILITER(S): at 15:35

## 2019-12-12 RX ADMIN — Medication 325 MILLIGRAM(S): at 12:23

## 2019-12-12 RX ADMIN — Medication 1 PATCH: at 06:01

## 2019-12-12 RX ADMIN — OXYCODONE HYDROCHLORIDE 10 MILLIGRAM(S): 5 TABLET ORAL at 05:58

## 2019-12-12 RX ADMIN — OXYCODONE HYDROCHLORIDE 5 MILLIGRAM(S): 5 TABLET ORAL at 03:14

## 2019-12-12 RX ADMIN — Medication 50 MILLIGRAM(S): at 14:28

## 2019-12-12 RX ADMIN — Medication 50 GRAM(S): at 10:19

## 2019-12-12 RX ADMIN — BUDESONIDE AND FORMOTEROL FUMARATE DIHYDRATE 2 PUFF(S): 160; 4.5 AEROSOL RESPIRATORY (INHALATION) at 05:59

## 2019-12-12 RX ADMIN — Medication 50 MILLIGRAM(S): at 05:59

## 2019-12-12 RX ADMIN — MONTELUKAST 10 MILLIGRAM(S): 4 TABLET, CHEWABLE ORAL at 12:23

## 2019-12-12 RX ADMIN — OXYCODONE HYDROCHLORIDE 10 MILLIGRAM(S): 5 TABLET ORAL at 13:24

## 2019-12-12 RX ADMIN — LIDOCAINE 1 PATCH: 4 CREAM TOPICAL at 10:18

## 2019-12-12 NOTE — PROGRESS NOTE ADULT - ASSESSMENT
39F with hx of iron def anemia, uterine fibroids s/p bilateral uterine artery embolization (2019), with fatigue/, found to have large R pleural effusion and incidental large intrahepatic IVC mass, currently being worked up.     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

## 2019-12-12 NOTE — PROGRESS NOTE ADULT - SUBJECTIVE AND OBJECTIVE BOX
Heme/Onc     Interval History:  The patient c/o mild abdominal discomfort. Also feels weak.   No SOB. No nausea or vomiting. No new bleeding/ bruising. S/p exploratory lap with biopsy of the mass      ROS:  + fatigue  + nausea  No SOB or CP  + abdominal discomfort  No fever, chills  No bleeding  ROS is otherwise negative    Allergies    No Known Drug Allergies  shellfish (Hives)    Intolerances      Medications:  MEDICATIONS  (STANDING):    MEDICATIONS  (PRN):      PHYSICAL EXAM:    Vital Signs Last 24 Hrs  T(C): 36.9 (12 Dec 2019 06:16), Max: 37.3 (11 Dec 2019 14:08)  T(F): 98.5 (12 Dec 2019 06:16), Max: 99.1 (11 Dec 2019 14:08)  HR: 87 (12 Dec 2019 00:30) (78 - 98)  BP: 134/83 (12 Dec 2019 00:30) (134/83 - 153/98)  BP(mean): 121 (11 Dec 2019 19:19) (107 - 121)  RR: 16 (12 Dec 2019 00:30) (16 - 16)  SpO2: 98% (12 Dec 2019 00:30) (95% - 99%)    GEN: NAD  HEENT: AT/NC, EOMI  NECK: Supple  CVS: +S1S2  LUNG: CTA /Bl  ABD: +BS  EXT: no c/c/e  NEURO: aaox3       Labs:                        9.1    13.95 )-----------( 344      ( 11 Dec 2019 08:05 )             30.5         CBC Full  -  ( 11 Dec 2019 08:05 )  WBC Count : 13.95 K/uL  RBC Count : 4.44 M/uL  Hemoglobin : 9.1 g/dL  Hematocrit : 30.5 %  Platelet Count - Automated : 344 K/uL  Mean Cell Volume : 68.7 fl  Mean Cell Hemoglobin : 20.5 pg  Mean Cell Hemoglobin Concentration : 29.8 gm/dL  Auto Neutrophil # : x  Auto Lymphocyte # : x  Auto Monocyte # : x  Auto Eosinophil # : x  Auto Basophil # : x  Auto Neutrophil % : x  Auto Lymphocyte % : x  Auto Monocyte % : x  Auto Eosinophil % : x  Auto Basophil % : x        12-11    134<L>  |  102  |  8   ----------------------------<  174<H>  5.4<H>   |  21<L>  |  0.71    Ca    9.4      11 Dec 2019 08:05  Phos  4.0     12-11  Mg     1.6     12-11    TPro  6.6  /  Alb  3.6  /  TBili  0.8  /  DBili  x   /  AST  76<H>  /  ALT  127<H>  /  AlkPhos  117  12-11        PT/INR - ( 11 Dec 2019 08:05 )   PT: 17.7 sec;   INR: 1.55          PTT - ( 11 Dec 2019 08:05 )  PTT:27.8 sec      12/4 CT A/P   LOWER CHEST: Large right pleural effusion and mediastinal shift.    ABDOMEN:  LIVER: Within normal limits  BILE DUCTS: Normal caliber  GALLBLADDER: No calcified gallstones. Normal caliber wall  PANCREAS: Within normal limits  SPLEEN: Within normal limits  ADRENALS: Within normal limits  KIDNEYS: Within normal limits  PELVIS:  REPRODUCTIVE ORGANS: Multi fibroid uterus. Left ovarian cysts.  BLADDER: Within normal limits  PERITONEUM:No ascites, no free air.  BOWEL: Within normal limits.  VESSELS: Expansile enhancing mass within the intrahepatic IVC extending   proximally to the right atrial junction and distally to the renal veins.   Diminished opacification of the infrarenal IVC and left common iliac   vein, suspect combination of bland thrombus and mixing artifact.  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  Large right pleural effusion.        CT Chest non Con 12/4  Impression: 1. Since 8/23/2019, there is a new large right pleural   effusion causing compressive atelectasis of the entire right lower lobe   and mild shift of the heart and mediastinum to the left.    2. There are a few nodules in the left lung, the largest measuring 5 mm   in the left upper lobe. These nodules have nonspecific appearances. They   could be benign or malignant.    3. Large right upper quadrant mass identified on CT scan of abdomen and   pelvis of 12/4/2019 is partially visualized. It is consistent with   neoplasm.        12/8 CT Chest PE Protocol  IMPRESSION:   1. Since 12/7/2019, there is no evidence of pulmonary embolism.    2. No significant change in large mass involving the inferior vena cava   with extension into the right atrium. Differential includes IVC sarcoma.     3. There are multiple small lung nodules bilaterally. These are   suspicious for metastatic disease.    4. Increase in size of moderate right pleural effusion.          12/4 Cytopathology     Final Diagnosis  PLEURAL FLUID, RIGHT:    NEGATIVE FOR MALIGNANT CELLS    Mesothelial cells, histiocytes, and lymphocytes  Cell block reviewed

## 2019-12-12 NOTE — DISCHARGE NOTE PROVIDER - NSDCFUADDAPPT_GEN_ALL_CORE_FT
Please follow up with Dr. Thomason for biopsy results - call the office to schedule your appointment.  Please also follow up with Dr. Howe (General Surgeon), Dr. Shea (Vascular Surgeon) and Dr. Cedillo (Pulmonologist) - call the offices to schedule appointments (contact info above)

## 2019-12-12 NOTE — PROGRESS NOTE ADULT - ATTENDING COMMENTS
Progressive shortness of breath with new diagnosis of ?sarcoma (intraabdominal) and right sided pleural effusion. S/p thoracentesis; cytopath is negative; this does NOT exclude possibility of malignancy. Fluid is beginning to reaccumulate but minimally; may need indwelling pleural catheter.
I have personally seen, examined, and participated in the care of this patient. I have reviewed all pertinent clinical information, including history, physical exam, plan and the fellow's note and agree with the above.    Assessment and plan discussed and formulated as noted.
MILO James has acted as my scribe,.
Progressive shortness of breath with new diagnosis of ?sarcoma (intraabdominal) and right sided pleural effusion. High suspicion that the effusion is malignant. Bedside POCUS shows effusion to be simple. S/p thoracentesis on 12/5.
Progressive shortness of breath with new diagnosis of ?sarcoma (intraabdominal) and right sided pleural effusion. S/p thoracentesis; cytopath is negative; this does NOT exclude possibility of malignancy. Fluid is beginning to reaccumulate but minimally; may need indwelling pleural catheter. 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 Follow up as outpt. Call with questions.
Progressive shortness of breath with new diagnosis of ?sarcoma (intraabdominal) and right sided pleural effusion. High suspicion that the effusion is malignant. S/p thoracentesis; exudative fluid; cytology pending. Repeat CT chest to evaluate parenchyma s/p thoracentesis.
Progressive shortness of breath with new diagnosis of ?sarcoma (intraabdominal) and right sided pleural effusion. S/p thoracentesis; cytopath is negative; this does NOT exclude possibility of malignancy. Fluid is beginning to reaccumulate but minimally; may need indwelling pleural catheter. 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 Follow up as outpt. Call with questions.
#Intrahepatic IVC mass w/ extension, suspect sarcoma  #Large R pleural effusion  #SUSANNA

## 2019-12-12 NOTE — DISCHARGE NOTE NURSING/CASE MANAGEMENT/SOCIAL WORK - PATIENT PORTAL LINK FT
You can access the FollowMyHealth Patient Portal offered by [facility] by registering at the following website: [email or web address]. By joining [employer]’s FollowMyHealth portal, you will also be able to view your health information using other applications (apps) compatible with our system.

## 2019-12-12 NOTE — DISCHARGE NOTE PROVIDER - PROVIDER TOKENS
PROVIDER:[TOKEN:[[id number]],FOLLOWUP:[1 week]],PROVIDER:[TOKEN:[[id number]],FOLLOWUP:[1 week]],PROVIDER:[TOKEN:[[id number]],FOLLOWUP:[1 week]],PROVIDER:[TOKEN:[[id number]],FOLLOWUP:[2 weeks]]

## 2019-12-12 NOTE — PROGRESS NOTE ADULT - SUBJECTIVE AND OBJECTIVE BOX
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    SUBJECTIVE: Tolerating solids and ambulating    24HR EVENTS: Ultrasound shows no DVT, fibroid uterus      OBJECTIVE:    Vital Signs:  Vital Signs Last 24 Hrs  T(C): 36.9 (12 Dec 2019 06:16), Max: 37.3 (11 Dec 2019 14:08)  T(F): 98.5 (12 Dec 2019 06:16), Max: 99.1 (11 Dec 2019 14:08)  HR: 87 (12 Dec 2019 00:30) (78 - 98)  BP: 134/83 (12 Dec 2019 00:30) (134/83 - 153/98)  BP(mean): 121 (11 Dec 2019 19:19) (107 - 121)  RR: 16 (12 Dec 2019 00:30) (16 - 16)  SpO2: 98% (12 Dec 2019 00:30) (95% - 99%)    Physical Exam:  General: NAD  Pulmonary: Nonlabored breathing, no respiratory distress  Abdominal: Obese, soft, NT/ND, incisions CDI  Neuro: AAO x3, no focal deficits    Ins and Outs:  I&O's Summary    11 Dec 2019 07:01  -  12 Dec 2019 07:00  --------------------------------------------------------  IN: 420 mL / OUT: 1800 mL / NET: -1380 mL        Labs:                        9.1    13.95 )-----------( 344      ( 11 Dec 2019 08:05 )             30.5     12-11    134<L>  |  102  |  8   ----------------------------<  174<H>  5.4<H>   |  21<L>  |  0.71    Ca    9.4      11 Dec 2019 08:05  Phos  4.0     12-11  Mg     1.6     12-11    TPro  6.6  /  Alb  3.6  /  TBili  0.8  /  DBili  x   /  AST  76<H>  /  ALT  127<H>  /  AlkPhos  117  12-11    PT/INR - ( 11 Dec 2019 08:05 )   PT: 17.7 sec;   INR: 1.55          PTT - ( 11 Dec 2019 08:05 )  PTT:27.8 sec    CAPILLARY BLOOD GLUCOSE      POCT Blood Glucose.: 145 mg/dL (12 Dec 2019 06:50)  POCT Blood Glucose.: 152 mg/dL (11 Dec 2019 20:46)  POCT Blood Glucose.: 197 mg/dL (11 Dec 2019 15:27)    LIVER FUNCTIONS - ( 11 Dec 2019 08:05 )  Alb: 3.6 g/dL / Pro: 6.6 g/dL / ALK PHOS: 117 U/L / ALT: 127 U/L / AST: 76 U/L / GGT: x

## 2019-12-12 NOTE — DISCHARGE NOTE PROVIDER - NSDCCPCAREPLAN_GEN_ALL_CORE_FT
PRINCIPAL DISCHARGE DIAGNOSIS  Diagnosis: Pleural effusion  Assessment and Plan of Treatment:       SECONDARY DISCHARGE DIAGNOSES  Diagnosis: Shortness of breath  Assessment and Plan of Treatment:     Diagnosis: Pancreatic mass  Assessment and Plan of Treatment:

## 2019-12-12 NOTE — PROGRESS NOTE ADULT - REASON FOR ADMISSION
Shortness of breath
IVC mass extending into right atrial junction
Shortness of breath

## 2019-12-12 NOTE — PROGRESS NOTE ADULT - ASSESSMENT
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 Patient has been hemodynamically stable and tolerating a diet.    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 Patient has been hemodynamically stable and tolerating a diet.    Recommendations:  - May be discharged today with Eliquis or other DOAC  - Continue care per primary team  - No general surgery intervention at this time  - General Surgery Team 1C will continue to follow  - Case discussed with Dr. Howe

## 2019-12-12 NOTE — DISCHARGE NOTE PROVIDER - NSDCFUSCHEDAPPT_GEN_ALL_CORE_FT
DEEDEE CLARKE ; 12/23/2019 ; [facility] [place] [facility]  [address]  DEEDEE CLARKE ; 12/23/2019 ; [facility] [place] [facility]  [address] DEEDEE CLARKE ; 12/23/2019 ; [facility] MRI  [address]  DEEDEE CLARKE ; 12/23/2019 ; [facility]  [address]

## 2019-12-12 NOTE — PROGRESS NOTE ADULT - SUBJECTIVE AND OBJECTIVE BOX
O/N: URBAN, VSS                                  Assessment/Plan;  39F smoker with a PMHx of DM, HTN, HLD, s/p bilateral uterine artery embolization in 8/2019 due to fibroids now with 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      -Consistent Carb diet  -Glucose control  -Home medication as appropriate  -Continuing to wait on pleural fluid cytology  -F/u AM labs O/N: URBAN, VSS    S. c/o left lower abd pain over port site.    metoprolol tartrate 50      Allergies    No Known Drug Allergies  shellfish (Hives)    Intolerances        Vital Signs Last 24 Hrs  T(C): 36.9 (12 Dec 2019 06:16), Max: 37.3 (11 Dec 2019 14:08)  T(F): 98.5 (12 Dec 2019 06:16), Max: 99.1 (11 Dec 2019 14:08)  HR: 78 (12 Dec 2019 09:06) (78 - 98)  BP: 114/72 (12 Dec 2019 09:06) (114/72 - 150/101)  BP(mean): 121 (11 Dec 2019 19:19) (107 - 121)  RR: 16 (12 Dec 2019 09:06) (16 - 16)  SpO2: 97% (12 Dec 2019 09:06) (95% - 99%)    Physical Exam:  General: Awake, alert, NAD   Pulmonary:  Cardiovascular:  Abdominal: Large, soft, nondistended. Tender area at left lower port site. No induration or fluctuance.   Extremities:  Pulses:   Right:                                                                           Left:  FEM [ ]2+ [ ]1+ [ ] doppler                                            FEM [ ]2+ [ ]1+ [ ] doppler    POP [ ]2+ [ ]1+ [ ] doppler                                            POP [ ]2+ [ ]1+ [ ] doppler    DP [ ]2+ [ ]1+ [ ] doppler                                               DP [ ]2+ [ ]1+ [ ] doppler  PT[ ]2+ [ ]1+ [ ] doppler                                                 PT [ ]2+ [ ]1+ [ ] doppler      LABS:                        8.8    16.29 )-----------( 247      ( 12 Dec 2019 07:32 )             29.0     12-12    137  |  102  |  12  ----------------------------<  148<H>  4.4   |  24  |  0.78    Ca    8.9      12 Dec 2019 07:32  Phos  3.7     12-12  Mg     1.7     12-12    TPro  6.6  /  Alb  3.6  /  TBili  0.8  /  DBili  x   /  AST  76<H>  /  ALT  127<H>  /  AlkPhos  117  12-11    PT/INR - ( 11 Dec 2019 08:05 )   PT: 17.7 sec;   INR: 1.55          PTT - ( 12 Dec 2019 07:32 )  PTT:26.0 sec      RADIOLOGY & ADDITIONAL TESTS:       [  ]Heart Failure     [  ] Acute     [  ] Acute on Chronic     [  ] Chronic  -------------------------------------------------------------------     [  ]Diastolic [HFpEF]     [  ]Systolic [HFrEF]     [  ]Combined [HFpEF & HFrEF]     [  ]Other:  -------------------------------------------------------------------  [ ] Respiratory failure  [ ] Acute cor pulmonale  [ ] Asthma/COPD Exacerbation  [ ] Pleural effusion  [ ] Aspiration pneumonia  -------------------------------------------------------------------  [  ]TEDDY     [  ]ATN     [  ]Reneal Medullary Necrosis     [  ]Renal Cortical Necrosis     [  ]Other Pathological Lesions:    [  ]CKD 1  [  ]CKD 2  [  ]CKD 3  [  ]CKD 4  [  ]CKD 5  [  ]Other  -------------------------------------------------------------------  [x ]Diabetes  [  ] Diabetic PVD Ulcer  [  ] Neuropathic ulcer to DM  [  ] Diabetes with Nephropathy  [  ] Osteomyelitis due to diabetes  --------------------------------------------------------------------  [  ]Malnutrition: See Nutrition Note  [  ]Cachexia  [  ]Other:   [  ]Supplement Ordered:  [  ]Morbid Obesity (BMI >=40]  ---------------------------------------------------------------------  [ ] Sepsis/severe sepsis/septic shock  [ ] UTI  [ ] Pneumonia  -----------------------------------------------------------------------  [ ] Acidosis/alkalosis  [ ] Fluid overload  [ ] Hypokalemia  [ ] Hyperkalemia  [ ] Hypomagnesemia  [ ] Hypophosphatemia  [ ] Hyperphosphatemia  ------------------------------------------------------------------------  [ ] Acute blood loss anemia  [ ] Post op blood loss anemia  [ ] Iron deficiency anemia  [X ] Anemia due to chronic disease  [ ] Hypercoagulable state  ----------------------------------------------------------------------  [ ] Cerebral infarction  [ ] Transient ischemia attack  [ ] Encephalopathy              Assessment/Plan;  39F smoker with a PMHx of DM, HTN, HLD, s/p bilateral uterine artery embolization in 8/2019 due to fibroids now with 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    - Incision pain - lidocaine patch  - Adjust pain meds to dilaudid  - WBC increased to 16,000. Add diff to cbc,   - Resume AC if approved by Dr Howe.  -Ace bandages to both legs to reduce swelling.  -Consistent Carb diet  -Glucose control  -Home medication as appropriate  -Continuing to wait on pleural fluid cytology  -F/u AM labs  - D/c plan: PT reeval - D/c home today.

## 2019-12-12 NOTE — PROGRESS NOTE ADULT - SUBJECTIVE AND OBJECTIVE BOX
Interval Events:  Patient seen and examined at bedside.    MEDICATIONS:  Pulmonary:  budesonide  80 MICROgram(s)/formoterol 4.5 MICROgram(s) Inhaler 2 Puff(s) Inhalation two times a day  montelukast 10 milliGRAM(s) Oral daily    Antimicrobials:    Anticoagulants:  apixaban 10 milliGRAM(s) Oral every 12 hours    Cardiac:  metoprolol tartrate 50 milliGRAM(s) Oral every 8 hours    Endocrine:  dextrose 40% Gel 15 Gram(s) Oral once PRN  dextrose 50% Injectable 12.5 Gram(s) IV Push once  dextrose 50% Injectable 25 Gram(s) IV Push once  dextrose 50% Injectable 25 Gram(s) IV Push once  glucagon  Injectable 1 milliGRAM(s) IntraMuscular once PRN  insulin lispro (HumaLOG) corrective regimen sliding scale   SubCutaneous every 6 hours    Allergies    No Known Drug Allergies  shellfish (Hives)    Intolerances        Vital Signs Last 24 Hrs  T(C): 36.8 (12 Dec 2019 13:51), Max: 37.2 (11 Dec 2019 20:35)  T(F): 98.2 (12 Dec 2019 13:51), Max: 98.9 (11 Dec 2019 20:35)  HR: 95 (12 Dec 2019 11:35) (78 - 98)  BP: 119/72 (12 Dec 2019 11:35) (114/72 - 150/101)  BP(mean): 121 (11 Dec 2019 19:19) (110 - 121)  RR: 16 (12 Dec 2019 09:06) (16 - 16)  SpO2: 97% (12 Dec 2019 09:06) (95% - 99%)    12-11 @ 07:01 - 12-12 @ 07:00  --------------------------------------------------------  IN: 420 mL / OUT: 1800 mL / NET: -1380 mL    12-12 @ 07:01  -  12-12 @ 16:53  --------------------------------------------------------  IN: 0 mL / OUT: 150 mL / NET: -150 mL          LABS:      CBC Full  -  ( 12 Dec 2019 07:32 )  WBC Count : 16.29 K/uL  RBC Count : 4.35 M/uL  Hemoglobin : 8.8 g/dL  Hematocrit : 29.0 %  Platelet Count - Automated : 247 K/uL  Mean Cell Volume : 66.7 fl  Mean Cell Hemoglobin : 20.2 pg  Mean Cell Hemoglobin Concentration : 30.3 gm/dL  Auto Neutrophil # : 10.08 K/uL  Auto Lymphocyte # : 4.90 K/uL  Auto Monocyte # : 1.30 K/uL  Auto Eosinophil # : 0.00 K/uL  Auto Basophil # : 0.00 K/uL  Auto Neutrophil % : 61.9 %  Auto Lymphocyte % : 30.1 %  Auto Monocyte % : 8.0 %  Auto Eosinophil % : 0.0 %  Auto Basophil % : 0.0 %    12-12    137  |  102  |  12  ----------------------------<  148<H>  4.4   |  24  |  0.78    Ca    8.9      12 Dec 2019 07:32  Phos  3.7     12-12  Mg     1.7     12-12    TPro  6.6  /  Alb  3.6  /  TBili  0.8  /  DBili  x   /  AST  76<H>  /  ALT  127<H>  /  AlkPhos  117  12-11    PT/INR - ( 11 Dec 2019 08:05 )   PT: 17.7 sec;   INR: 1.55          PTT - ( 12 Dec 2019 07:32 )  PTT:26.0 sec      Urinalysis Basic - ( 12 Dec 2019 15:36 )    Color: x / Appearance: x / SG: x / pH: x  Gluc: x / Ketone: x  / Bili: x / Urobili: x   Blood: x / Protein: x / Nitrite: x   Leuk Esterase: x / RBC: Many /HPF / WBC 5-10 /HPF   Sq Epi: x / Non Sq Epi: 0-5 /HPF / Bacteria: Present /HPF                RADIOLOGY & ADDITIONAL STUDIES (The following images were personally reviewed): Interval Events:  Patient seen and examined at bedside.    She feels fatigued and tired. complaining of pain at only one of the port entry sites from biopsy surgery. Denies any SOB or cough. Denies sputum production.     MEDICATIONS:  Pulmonary:  budesonide  80 MICROgram(s)/formoterol 4.5 MICROgram(s) Inhaler 2 Puff(s) Inhalation two times a day  montelukast 10 milliGRAM(s) Oral daily    Antimicrobials:    Anticoagulants:  apixaban 10 milliGRAM(s) Oral every 12 hours    Cardiac:  metoprolol tartrate 50 milliGRAM(s) Oral every 8 hours    Endocrine:  dextrose 40% Gel 15 Gram(s) Oral once PRN  dextrose 50% Injectable 12.5 Gram(s) IV Push once  dextrose 50% Injectable 25 Gram(s) IV Push once  dextrose 50% Injectable 25 Gram(s) IV Push once  glucagon  Injectable 1 milliGRAM(s) IntraMuscular once PRN  insulin lispro (HumaLOG) corrective regimen sliding scale   SubCutaneous every 6 hours    Allergies    No Known Drug Allergies  shellfish (Hives)    Intolerances    Vital Signs Last 24 Hrs  T(C): 36.8 (12 Dec 2019 13:51), Max: 37.2 (11 Dec 2019 20:35)  T(F): 98.2 (12 Dec 2019 13:51), Max: 98.9 (11 Dec 2019 20:35)  HR: 95 (12 Dec 2019 11:35) (78 - 98)  BP: 119/72 (12 Dec 2019 11:35) (114/72 - 150/101)  BP(mean): 121 (11 Dec 2019 19:19) (110 - 121)  RR: 16 (12 Dec 2019 09:06) (16 - 16)  SpO2: 97% (12 Dec 2019 09:06) (95% - 99%)    12-11 @ 07:01  -  12-12 @ 07:00  --------------------------------------------------------  IN: 420 mL / OUT: 1800 mL / NET: -1380 mL    12-12 @ 07:01  -  12-12 @ 16:53  --------------------------------------------------------  IN: 0 mL / OUT: 150 mL / NET: -150 mL    Physical Exam:  General: NAD, AAO x3  HEENT: No icterus,. Moist mucous membranes  Neck: No JVD noted. Supple, no meningismus  Cardio: S1, S2 noted, RRR. No murmurs, rubs or gallops  Resp: Clear to auscultation b/l. No adventitious sounds  Abdo: Soft, some tenderness on the LUQ near surgical site, bowel sounds present. No organomegaly  Extremities: No edema noted. Pulses present b/l  Neuro: AAO x3, grossly normal motor strength.  Lymphnodes: no lymphadenopathy identified.  Skin: Dry, no rashes    LABS:  CBC Full  -  ( 12 Dec 2019 07:32 )  WBC Count : 16.29 K/uL  RBC Count : 4.35 M/uL  Hemoglobin : 8.8 g/dL  Hematocrit : 29.0 %  Platelet Count - Automated : 247 K/uL  Mean Cell Volume : 66.7 fl  Mean Cell Hemoglobin : 20.2 pg  Mean Cell Hemoglobin Concentration : 30.3 gm/dL  Auto Neutrophil # : 10.08 K/uL  Auto Lymphocyte # : 4.90 K/uL  Auto Monocyte # : 1.30 K/uL  Auto Eosinophil # : 0.00 K/uL  Auto Basophil # : 0.00 K/uL  Auto Neutrophil % : 61.9 %  Auto Lymphocyte % : 30.1 %  Auto Monocyte % : 8.0 %  Auto Eosinophil % : 0.0 %  Auto Basophil % : 0.0 %    12-12    137  |  102  |  12  ----------------------------<  148<H>  4.4   |  24  |  0.78    Ca    8.9      12 Dec 2019 07:32  Phos  3.7     12-12  Mg     1.7     12-12    TPro  6.6  /  Alb  3.6  /  TBili  0.8  /  DBili  x   /  AST  76<H>  /  ALT  127<H>  /  AlkPhos  117  12-11    PT/INR - ( 11 Dec 2019 08:05 )   PT: 17.7 sec;   INR: 1.55       PTT - ( 12 Dec 2019 07:32 )  PTT:26.0 sec    Urinalysis Basic - ( 12 Dec 2019 15:36 )    Color: x / Appearance: x / SG: x / pH: x  Gluc: x / Ketone: x  / Bili: x / Urobili: x   Blood: x / Protein: x / Nitrite: x   Leuk Esterase: x / RBC: Many /HPF / WBC 5-10 /HPF   Sq Epi: x / Non Sq Epi: 0-5 /HPF / Bacteria: Present /HPF    RADIOLOGY & ADDITIONAL STUDIES (The following images were personally reviewed):  Reviewed.

## 2019-12-12 NOTE — DISCHARGE NOTE PROVIDER - HOSPITAL COURSE
40yo F PMHx DM, HTN, HLD, uterine fibroids s/p bilateral uterine artery embolization in 8/2019 who presented with shortness of breath, and back pain, on workup CT found to have large right pleural effusion and incidental pancreatic/duodenal - IVC soft tissue mass with compression. Patient started on anticoagulation On12/5/19 she underwent thoracentesis for left pleural effusion. She tolerated the procedure well. The cytology for the effusion did not show malignancy. 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 Day of discharge patient is feeling okay, all the VS are within normal limits, the pain is well controlled on oral pain medication, tolerating diet without nausea, and ambulating with minimal assistance- patient is stable and ready for discharge today switched to PO anticoagulation - Eliquis. Patient to follow up as outpatient for results.

## 2019-12-12 NOTE — DISCHARGE NOTE PROVIDER - NSDCFUADDINST_GEN_ALL_CORE_FT
*Follow up with  in 1-2 weeks. Call the office at  to schedule your appointment.   *Medication: please  from your pharmacy and start taking a blood thinner - Eliquis 10mg twice a day for 7 day and then 5mg twice a day after that.  *You may shower; soap and water over incision sites. Do not scrub. Pat dry when done. No need for a dressing.  *Ambulate as tolerated, but no heavy lifting (>10lbs) or strenuous exercise.   *You may resume regular diet.   Call the office if you experience increasing pain, redness, swelling or drainage from incision sites, increased abdominal or back pain, or temperature >101.4F.

## 2019-12-12 NOTE — DISCHARGE NOTE PROVIDER - CARE PROVIDER_API CALL
Louis Thomason)  Hematology; Medical Oncology  [address], [address]  [place] [address]  Phone: [phone]  Fax: [phone]  Follow Up Time: 1 week    Kade Howe)  Surgery  [address] [place] [address]  Phone: [phone]  Fax: [phone]  Follow Up Time: 1 week    Adriana Shea)  Surgery; Vascular Surgery  [address] [place] [address]  Phone: [phone]  Fax: [phone]  Follow Up Time: 1 week    Mdaie Cedillo)  Critical Care Medicine; Internal Medicine; Pulmonary Disease  [address] [place] [id number]  Phone: [phone]  Fax: [phone]  Follow Up Time: 2 weeks

## 2019-12-12 NOTE — DISCHARGE NOTE PROVIDER - CARE PROVIDERS DIRECT ADDRESSES
,DirectAddress_Unknown,DirectAddress_Unknown,[email or web address]

## 2019-12-16 PROBLEM — D25.9 LEIOMYOMA OF UTERUS, UNSPECIFIED: Chronic | Status: ACTIVE | Noted: 2019-12-03

## 2019-12-16 PROBLEM — E78.5 HYPERLIPIDEMIA, UNSPECIFIED: Chronic | Status: ACTIVE | Noted: 2019-12-03

## 2019-12-17 VITALS
TEMPERATURE: 98 F | DIASTOLIC BLOOD PRESSURE: 87 MMHG | HEART RATE: 125 BPM | SYSTOLIC BLOOD PRESSURE: 125 MMHG | RESPIRATION RATE: 22 BRPM | WEIGHT: 220.02 LBS | HEIGHT: 65 IN | OXYGEN SATURATION: 100 %

## 2019-12-17 PROCEDURE — 86077 PHYS BLOOD BANK SERV XMATCH: CPT

## 2019-12-17 NOTE — ED ADULT TRIAGE NOTE - CHIEF COMPLAINT QUOTE
pt. presents with c/o shortness of breath, dizziness, palpitations. pt. reports hx of fluid in lungs, was recently discharged from [facility] after drainage.

## 2019-12-18 ENCOUNTER — INPATIENT (INPATIENT)
Facility: HOSPITAL | Age: 39
LOS: 7 days | Discharge: HOME | DRG: 543 | End: 2019-12-26
Attending: SURGERY | Admitting: SURGERY
Payer: MEDICAID

## 2019-12-18 DIAGNOSIS — R19.00 INTRA-ABDOMINAL AND PELVIC SWELLING, MASS AND LUMP, UNSPECIFIED SITE: ICD-10-CM

## 2019-12-18 DIAGNOSIS — I82.409 ACUTE EMBOLISM AND THROMBOSIS OF UNSPECIFIED DEEP VEINS OF UNSPECIFIED LOWER EXTREMITY: ICD-10-CM

## 2019-12-18 DIAGNOSIS — J45.909 UNSPECIFIED ASTHMA, UNCOMPLICATED: ICD-10-CM

## 2019-12-18 DIAGNOSIS — J90 PLEURAL EFFUSION, NOT ELSEWHERE CLASSIFIED: ICD-10-CM

## 2019-12-18 DIAGNOSIS — D68.9 COAGULATION DEFECT, UNSPECIFIED: ICD-10-CM

## 2019-12-18 LAB
ALBUMIN SERPL ELPH-MCNC: 3.7 G/DL — SIGNIFICANT CHANGE UP (ref 3.3–5)
ALP SERPL-CCNC: 151 U/L — HIGH (ref 40–120)
ALT FLD-CCNC: 671 U/L — HIGH (ref 10–45)
ANION GAP SERPL CALC-SCNC: 14 MMOL/L — SIGNIFICANT CHANGE UP (ref 5–17)
ANION GAP SERPL CALC-SCNC: 18 MMOL/L — HIGH (ref 5–17)
APTT BLD: 32.5 SEC — SIGNIFICANT CHANGE UP (ref 27.5–36.3)
APTT BLD: 32.8 SEC — SIGNIFICANT CHANGE UP (ref 27.5–36.3)
AST SERPL-CCNC: 908 U/L — HIGH (ref 10–40)
BASOPHILS # BLD AUTO: 0.12 K/UL — SIGNIFICANT CHANGE UP (ref 0–0.2)
BASOPHILS NFR BLD AUTO: 0.9 % — SIGNIFICANT CHANGE UP (ref 0–2)
BILIRUB SERPL-MCNC: 1.1 MG/DL — SIGNIFICANT CHANGE UP (ref 0.2–1.2)
BLD GP AB SCN SERPL QL: POSITIVE — SIGNIFICANT CHANGE UP
BUN SERPL-MCNC: 10 MG/DL — SIGNIFICANT CHANGE UP (ref 7–23)
BUN SERPL-MCNC: 9 MG/DL — SIGNIFICANT CHANGE UP (ref 7–23)
CALCIUM SERPL-MCNC: 9.1 MG/DL — SIGNIFICANT CHANGE UP (ref 8.4–10.5)
CALCIUM SERPL-MCNC: 9.2 MG/DL — SIGNIFICANT CHANGE UP (ref 8.4–10.5)
CHLORIDE SERPL-SCNC: 101 MMOL/L — SIGNIFICANT CHANGE UP (ref 96–108)
CHLORIDE SERPL-SCNC: 102 MMOL/L — SIGNIFICANT CHANGE UP (ref 96–108)
CK MB CFR SERPL CALC: 3.4 NG/ML — SIGNIFICANT CHANGE UP (ref 0–6.7)
CK SERPL-CCNC: 151 U/L — SIGNIFICANT CHANGE UP (ref 25–170)
CO2 SERPL-SCNC: 22 MMOL/L — SIGNIFICANT CHANGE UP (ref 22–31)
CO2 SERPL-SCNC: 23 MMOL/L — SIGNIFICANT CHANGE UP (ref 22–31)
CREAT SERPL-MCNC: 1.01 MG/DL — SIGNIFICANT CHANGE UP (ref 0.5–1.3)
CREAT SERPL-MCNC: 1.01 MG/DL — SIGNIFICANT CHANGE UP (ref 0.5–1.3)
EOSINOPHIL # BLD AUTO: 0.12 K/UL — SIGNIFICANT CHANGE UP (ref 0–0.5)
EOSINOPHIL NFR BLD AUTO: 0.9 % — SIGNIFICANT CHANGE UP (ref 0–6)
GLUCOSE BLDC GLUCOMTR-MCNC: 112 MG/DL — HIGH (ref 70–99)
GLUCOSE BLDC GLUCOMTR-MCNC: 117 MG/DL — HIGH (ref 70–99)
GLUCOSE BLDC GLUCOMTR-MCNC: 119 MG/DL — HIGH (ref 70–99)
GLUCOSE BLDC GLUCOMTR-MCNC: 120 MG/DL — HIGH (ref 70–99)
GLUCOSE BLDC GLUCOMTR-MCNC: 153 MG/DL — HIGH (ref 70–99)
GLUCOSE SERPL-MCNC: 115 MG/DL — HIGH (ref 70–99)
GLUCOSE SERPL-MCNC: 161 MG/DL — HIGH (ref 70–99)
HCG SERPL-ACNC: 0 MIU/ML — SIGNIFICANT CHANGE UP
HCT VFR BLD CALC: 33.3 % — LOW (ref 34.5–45)
HCT VFR BLD CALC: 37.9 % — SIGNIFICANT CHANGE UP (ref 34.5–45)
HGB BLD-MCNC: 10.7 G/DL — LOW (ref 11.5–15.5)
HGB BLD-MCNC: 9.6 G/DL — LOW (ref 11.5–15.5)
INR BLD: 4.75 — HIGH (ref 0.88–1.16)
INR BLD: 5.01 — CRITICAL HIGH (ref 0.88–1.16)
LYMPHOCYTES # BLD AUTO: 32.5 % — SIGNIFICANT CHANGE UP (ref 13–44)
LYMPHOCYTES # BLD AUTO: 4.3 K/UL — HIGH (ref 1–3.3)
MAGNESIUM SERPL-MCNC: 2 MG/DL — SIGNIFICANT CHANGE UP (ref 1.6–2.6)
MAGNESIUM SERPL-MCNC: 2.1 MG/DL — SIGNIFICANT CHANGE UP (ref 1.6–2.6)
MCHC RBC-ENTMCNC: 19.7 PG — LOW (ref 27–34)
MCHC RBC-ENTMCNC: 20 PG — LOW (ref 27–34)
MCHC RBC-ENTMCNC: 28.2 GM/DL — LOW (ref 32–36)
MCHC RBC-ENTMCNC: 28.8 GM/DL — LOW (ref 32–36)
MCV RBC AUTO: 69.4 FL — LOW (ref 80–100)
MCV RBC AUTO: 69.8 FL — LOW (ref 80–100)
MONOCYTES # BLD AUTO: 0.58 K/UL — SIGNIFICANT CHANGE UP (ref 0–0.9)
MONOCYTES NFR BLD AUTO: 4.4 % — SIGNIFICANT CHANGE UP (ref 2–14)
NEUTROPHILS # BLD AUTO: 7.89 K/UL — HIGH (ref 1.8–7.4)
NEUTROPHILS NFR BLD AUTO: 59.6 % — SIGNIFICANT CHANGE UP (ref 43–77)
NRBC # BLD: 4 /100 WBCS — HIGH (ref 0–0)
NRBC # BLD: SIGNIFICANT CHANGE UP /100 WBCS (ref 0–0)
PHOSPHATE SERPL-MCNC: 2.5 MG/DL — SIGNIFICANT CHANGE UP (ref 2.5–4.5)
PLATELET # BLD AUTO: 215 K/UL — SIGNIFICANT CHANGE UP (ref 150–400)
PLATELET # BLD AUTO: 220 K/UL — SIGNIFICANT CHANGE UP (ref 150–400)
POTASSIUM SERPL-MCNC: 4.3 MMOL/L — SIGNIFICANT CHANGE UP (ref 3.5–5.3)
POTASSIUM SERPL-MCNC: 4.3 MMOL/L — SIGNIFICANT CHANGE UP (ref 3.5–5.3)
POTASSIUM SERPL-SCNC: 4.3 MMOL/L — SIGNIFICANT CHANGE UP (ref 3.5–5.3)
POTASSIUM SERPL-SCNC: 4.3 MMOL/L — SIGNIFICANT CHANGE UP (ref 3.5–5.3)
PROT SERPL-MCNC: 6.7 G/DL — SIGNIFICANT CHANGE UP (ref 6–8.3)
PROTHROM AB SERPL-ACNC: 56.3 SEC — HIGH (ref 10–12.9)
PROTHROM AB SERPL-ACNC: 59.5 SEC — HIGH (ref 10–12.9)
RBC # BLD: 4.8 M/UL — SIGNIFICANT CHANGE UP (ref 3.8–5.2)
RBC # BLD: 5.43 M/UL — HIGH (ref 3.8–5.2)
RBC # FLD: 20.6 % — HIGH (ref 10.3–14.5)
RBC # FLD: 21.2 % — HIGH (ref 10.3–14.5)
RH IG SCN BLD-IMP: POSITIVE — SIGNIFICANT CHANGE UP
SODIUM SERPL-SCNC: 139 MMOL/L — SIGNIFICANT CHANGE UP (ref 135–145)
SODIUM SERPL-SCNC: 141 MMOL/L — SIGNIFICANT CHANGE UP (ref 135–145)
TROPONIN T SERPL-MCNC: <0.01 NG/ML — SIGNIFICANT CHANGE UP (ref 0–0.01)
WBC # BLD: 13.24 K/UL — HIGH (ref 3.8–10.5)
WBC # BLD: 14.08 K/UL — HIGH (ref 3.8–10.5)
WBC # FLD AUTO: 13.24 K/UL — HIGH (ref 3.8–10.5)
WBC # FLD AUTO: 14.08 K/UL — HIGH (ref 3.8–10.5)

## 2019-12-18 PROCEDURE — 82330 ASSAY OF CALCIUM: CPT

## 2019-12-18 PROCEDURE — P9045: CPT

## 2019-12-18 PROCEDURE — 86431 RHEUMATOID FACTOR QUANT: CPT

## 2019-12-18 PROCEDURE — 93010 ELECTROCARDIOGRAM REPORT: CPT

## 2019-12-18 PROCEDURE — 99223 1ST HOSP IP/OBS HIGH 75: CPT

## 2019-12-18 PROCEDURE — 76856 US EXAM PELVIC COMPLETE: CPT

## 2019-12-18 PROCEDURE — 86850 RBC ANTIBODY SCREEN: CPT

## 2019-12-18 PROCEDURE — 83540 ASSAY OF IRON: CPT

## 2019-12-18 PROCEDURE — 80048 BASIC METABOLIC PNL TOTAL CA: CPT

## 2019-12-18 PROCEDURE — 82962 GLUCOSE BLOOD TEST: CPT

## 2019-12-18 PROCEDURE — 80053 COMPREHEN METABOLIC PANEL: CPT

## 2019-12-18 PROCEDURE — 87075 CULTR BACTERIA EXCEPT BLOOD: CPT

## 2019-12-18 PROCEDURE — 82378 CARCINOEMBRYONIC ANTIGEN: CPT

## 2019-12-18 PROCEDURE — 84295 ASSAY OF SERUM SODIUM: CPT

## 2019-12-18 PROCEDURE — 97161 PT EVAL LOW COMPLEX 20 MIN: CPT

## 2019-12-18 PROCEDURE — 83036 HEMOGLOBIN GLYCOSYLATED A1C: CPT

## 2019-12-18 PROCEDURE — 88341 IMHCHEM/IMCYTCHM EA ADD ANTB: CPT

## 2019-12-18 PROCEDURE — 83615 LACTATE (LD) (LDH) ENZYME: CPT

## 2019-12-18 PROCEDURE — 82728 ASSAY OF FERRITIN: CPT

## 2019-12-18 PROCEDURE — 84100 ASSAY OF PHOSPHORUS: CPT

## 2019-12-18 PROCEDURE — 99285 EMERGENCY DEPT VISIT HI MDM: CPT | Mod: 25

## 2019-12-18 PROCEDURE — 86880 COOMBS TEST DIRECT: CPT

## 2019-12-18 PROCEDURE — 82945 GLUCOSE OTHER FLUID: CPT

## 2019-12-18 PROCEDURE — 88305 TISSUE EXAM BY PATHOLOGIST: CPT

## 2019-12-18 PROCEDURE — 83735 ASSAY OF MAGNESIUM: CPT

## 2019-12-18 PROCEDURE — 86301 IMMUNOASSAY TUMOR CA 19-9: CPT

## 2019-12-18 PROCEDURE — 83880 ASSAY OF NATRIURETIC PEPTIDE: CPT

## 2019-12-18 PROCEDURE — 83550 IRON BINDING TEST: CPT

## 2019-12-18 PROCEDURE — 84484 ASSAY OF TROPONIN QUANT: CPT

## 2019-12-18 PROCEDURE — C1889: CPT

## 2019-12-18 PROCEDURE — 71275 CT ANGIOGRAPHY CHEST: CPT

## 2019-12-18 PROCEDURE — 36415 COLL VENOUS BLD VENIPUNCTURE: CPT

## 2019-12-18 PROCEDURE — 88331 PATH CONSLTJ SURG 1 BLK 1SPC: CPT

## 2019-12-18 PROCEDURE — 86870 RBC ANTIBODY IDENTIFICATION: CPT

## 2019-12-18 PROCEDURE — 81001 URINALYSIS AUTO W/SCOPE: CPT

## 2019-12-18 PROCEDURE — 71045 X-RAY EXAM CHEST 1 VIEW: CPT

## 2019-12-18 PROCEDURE — 86900 BLOOD TYPING SEROLOGIC ABO: CPT

## 2019-12-18 PROCEDURE — 89051 BODY FLUID CELL COUNT: CPT

## 2019-12-18 PROCEDURE — 80076 HEPATIC FUNCTION PANEL: CPT

## 2019-12-18 PROCEDURE — 96375 TX/PRO/DX INJ NEW DRUG ADDON: CPT

## 2019-12-18 PROCEDURE — 94640 AIRWAY INHALATION TREATMENT: CPT

## 2019-12-18 PROCEDURE — 76705 ECHO EXAM OF ABDOMEN: CPT | Mod: 26

## 2019-12-18 PROCEDURE — 93321 DOPPLER ECHO F-UP/LMTD STD: CPT

## 2019-12-18 PROCEDURE — 87389 HIV-1 AG W/HIV-1&-2 AB AG IA: CPT

## 2019-12-18 PROCEDURE — 97116 GAIT TRAINING THERAPY: CPT

## 2019-12-18 PROCEDURE — 84132 ASSAY OF SERUM POTASSIUM: CPT

## 2019-12-18 PROCEDURE — 82105 ALPHA-FETOPROTEIN SERUM: CPT

## 2019-12-18 PROCEDURE — 87102 FUNGUS ISOLATION CULTURE: CPT

## 2019-12-18 PROCEDURE — 80074 ACUTE HEPATITIS PANEL: CPT

## 2019-12-18 PROCEDURE — 99285 EMERGENCY DEPT VISIT HI MDM: CPT

## 2019-12-18 PROCEDURE — 87070 CULTURE OTHR SPECIMN AEROBIC: CPT

## 2019-12-18 PROCEDURE — 86300 IMMUNOASSAY TUMOR CA 15-3: CPT

## 2019-12-18 PROCEDURE — 71260 CT THORAX DX C+: CPT

## 2019-12-18 PROCEDURE — 84466 ASSAY OF TRANSFERRIN: CPT

## 2019-12-18 PROCEDURE — 71045 X-RAY EXAM CHEST 1 VIEW: CPT | Mod: 26

## 2019-12-18 PROCEDURE — 82570 ASSAY OF URINE CREATININE: CPT

## 2019-12-18 PROCEDURE — 93970 EXTREMITY STUDY: CPT

## 2019-12-18 PROCEDURE — 86901 BLOOD TYPING SEROLOGIC RH(D): CPT

## 2019-12-18 PROCEDURE — 85027 COMPLETE CBC AUTOMATED: CPT

## 2019-12-18 PROCEDURE — 85730 THROMBOPLASTIN TIME PARTIAL: CPT

## 2019-12-18 PROCEDURE — 84311 SPECTROPHOTOMETRY: CPT

## 2019-12-18 PROCEDURE — 71275 CT ANGIOGRAPHY CHEST: CPT | Mod: 26

## 2019-12-18 PROCEDURE — 93970 EXTREMITY STUDY: CPT | Mod: 26

## 2019-12-18 PROCEDURE — 96374 THER/PROPH/DIAG INJ IV PUSH: CPT

## 2019-12-18 PROCEDURE — 86921 COMPATIBILITY TEST INCUBATE: CPT

## 2019-12-18 PROCEDURE — 83986 ASSAY PH BODY FLUID NOS: CPT

## 2019-12-18 PROCEDURE — 76830 TRANSVAGINAL US NON-OB: CPT

## 2019-12-18 PROCEDURE — 83605 ASSAY OF LACTIC ACID: CPT

## 2019-12-18 PROCEDURE — 86038 ANTINUCLEAR ANTIBODIES: CPT

## 2019-12-18 PROCEDURE — 93306 TTE W/DOPPLER COMPLETE: CPT

## 2019-12-18 PROCEDURE — 86922 COMPATIBILITY TEST ANTIGLOB: CPT

## 2019-12-18 PROCEDURE — 93880 EXTRACRANIAL BILAT STUDY: CPT

## 2019-12-18 PROCEDURE — 85610 PROTHROMBIN TIME: CPT

## 2019-12-18 PROCEDURE — 85025 COMPLETE CBC W/AUTO DIFF WBC: CPT

## 2019-12-18 PROCEDURE — 81025 URINE PREGNANCY TEST: CPT

## 2019-12-18 PROCEDURE — 86304 IMMUNOASSAY TUMOR CA 125: CPT

## 2019-12-18 PROCEDURE — 74177 CT ABD & PELVIS W/CONTRAST: CPT

## 2019-12-18 PROCEDURE — 88112 CYTOPATH CELL ENHANCE TECH: CPT

## 2019-12-18 PROCEDURE — 82042 OTHER SOURCE ALBUMIN QUAN EA: CPT

## 2019-12-18 PROCEDURE — 87205 SMEAR GRAM STAIN: CPT

## 2019-12-18 PROCEDURE — 84157 ASSAY OF PROTEIN OTHER: CPT

## 2019-12-18 PROCEDURE — 85018 HEMOGLOBIN: CPT

## 2019-12-18 PROCEDURE — 83690 ASSAY OF LIPASE: CPT

## 2019-12-18 RX ORDER — HYDROMORPHONE HYDROCHLORIDE 2 MG/ML
0.5 INJECTION INTRAMUSCULAR; INTRAVENOUS; SUBCUTANEOUS EVERY 4 HOURS
Refills: 0 | Status: DISCONTINUED | OUTPATIENT
Start: 2019-12-18 | End: 2019-12-21

## 2019-12-18 RX ORDER — METOPROLOL TARTRATE 50 MG
50 TABLET ORAL
Refills: 0 | Status: DISCONTINUED | OUTPATIENT
Start: 2019-12-18 | End: 2019-12-26

## 2019-12-18 RX ORDER — INSULIN LISPRO 100/ML
VIAL (ML) SUBCUTANEOUS
Refills: 0 | Status: DISCONTINUED | OUTPATIENT
Start: 2019-12-18 | End: 2019-12-26

## 2019-12-18 RX ORDER — CARVEDILOL PHOSPHATE 80 MG/1
6.25 CAPSULE, EXTENDED RELEASE ORAL EVERY 12 HOURS
Refills: 0 | Status: DISCONTINUED | OUTPATIENT
Start: 2019-12-18 | End: 2019-12-18

## 2019-12-18 RX ORDER — FERROUS SULFATE 325(65) MG
325 TABLET ORAL DAILY
Refills: 0 | Status: DISCONTINUED | OUTPATIENT
Start: 2019-12-18 | End: 2019-12-26

## 2019-12-18 RX ORDER — HYDROMORPHONE HYDROCHLORIDE 2 MG/ML
0.5 INJECTION INTRAMUSCULAR; INTRAVENOUS; SUBCUTANEOUS ONCE
Refills: 0 | Status: DISCONTINUED | OUTPATIENT
Start: 2019-12-18 | End: 2019-12-18

## 2019-12-18 RX ORDER — SODIUM CHLORIDE 9 MG/ML
1000 INJECTION, SOLUTION INTRAVENOUS
Refills: 0 | Status: DISCONTINUED | OUTPATIENT
Start: 2019-12-18 | End: 2019-12-26

## 2019-12-18 RX ORDER — DEXTROSE 50 % IN WATER 50 %
25 SYRINGE (ML) INTRAVENOUS ONCE
Refills: 0 | Status: DISCONTINUED | OUTPATIENT
Start: 2019-12-18 | End: 2019-12-26

## 2019-12-18 RX ORDER — LISINOPRIL 2.5 MG/1
20 TABLET ORAL DAILY
Refills: 0 | Status: DISCONTINUED | OUTPATIENT
Start: 2019-12-18 | End: 2019-12-22

## 2019-12-18 RX ORDER — TRAMADOL HYDROCHLORIDE 50 MG/1
25 TABLET ORAL ONCE
Refills: 0 | Status: DISCONTINUED | OUTPATIENT
Start: 2019-12-18 | End: 2019-12-18

## 2019-12-18 RX ORDER — HYDROMORPHONE HYDROCHLORIDE 2 MG/ML
0.2 INJECTION INTRAMUSCULAR; INTRAVENOUS; SUBCUTANEOUS ONCE
Refills: 0 | Status: DISCONTINUED | OUTPATIENT
Start: 2019-12-18 | End: 2019-12-18

## 2019-12-18 RX ORDER — PROTHROMBIN COMPLEX CONCENTRATE (HUMAN) 25.5; 16.5; 24; 22; 22; 26 [IU]/ML; [IU]/ML; [IU]/ML; [IU]/ML; [IU]/ML; [IU]/ML
2500 POWDER, FOR SOLUTION INTRAVENOUS ONCE
Refills: 0 | Status: COMPLETED | OUTPATIENT
Start: 2019-12-18 | End: 2019-12-18

## 2019-12-18 RX ORDER — BUDESONIDE AND FORMOTEROL FUMARATE DIHYDRATE 160; 4.5 UG/1; UG/1
2 AEROSOL RESPIRATORY (INHALATION)
Refills: 0 | Status: DISCONTINUED | OUTPATIENT
Start: 2019-12-18 | End: 2019-12-18

## 2019-12-18 RX ORDER — PANTOPRAZOLE SODIUM 20 MG/1
40 TABLET, DELAYED RELEASE ORAL
Refills: 0 | Status: DISCONTINUED | OUTPATIENT
Start: 2019-12-18 | End: 2019-12-18

## 2019-12-18 RX ORDER — GLUCAGON INJECTION, SOLUTION 0.5 MG/.1ML
1 INJECTION, SOLUTION SUBCUTANEOUS ONCE
Refills: 0 | Status: DISCONTINUED | OUTPATIENT
Start: 2019-12-18 | End: 2019-12-26

## 2019-12-18 RX ORDER — ONDANSETRON 8 MG/1
4 TABLET, FILM COATED ORAL EVERY 6 HOURS
Refills: 0 | Status: DISCONTINUED | OUTPATIENT
Start: 2019-12-18 | End: 2019-12-26

## 2019-12-18 RX ORDER — ALBUMIN HUMAN 25 %
100 VIAL (ML) INTRAVENOUS EVERY 12 HOURS
Refills: 0 | Status: DISCONTINUED | OUTPATIENT
Start: 2019-12-18 | End: 2019-12-19

## 2019-12-18 RX ORDER — BUDESONIDE AND FORMOTEROL FUMARATE DIHYDRATE 160; 4.5 UG/1; UG/1
2 AEROSOL RESPIRATORY (INHALATION)
Refills: 0 | Status: DISCONTINUED | OUTPATIENT
Start: 2019-12-18 | End: 2019-12-25

## 2019-12-18 RX ORDER — DEXTROSE 50 % IN WATER 50 %
12.5 SYRINGE (ML) INTRAVENOUS ONCE
Refills: 0 | Status: DISCONTINUED | OUTPATIENT
Start: 2019-12-18 | End: 2019-12-26

## 2019-12-18 RX ORDER — FUROSEMIDE 40 MG
20 TABLET ORAL ONCE
Refills: 0 | Status: COMPLETED | OUTPATIENT
Start: 2019-12-18 | End: 2019-12-18

## 2019-12-18 RX ORDER — DEXTROSE 50 % IN WATER 50 %
15 SYRINGE (ML) INTRAVENOUS ONCE
Refills: 0 | Status: DISCONTINUED | OUTPATIENT
Start: 2019-12-18 | End: 2019-12-26

## 2019-12-18 RX ORDER — PANTOPRAZOLE SODIUM 20 MG/1
40 TABLET, DELAYED RELEASE ORAL
Refills: 0 | Status: DISCONTINUED | OUTPATIENT
Start: 2019-12-18 | End: 2019-12-26

## 2019-12-18 RX ORDER — ATORVASTATIN CALCIUM 80 MG/1
40 TABLET, FILM COATED ORAL AT BEDTIME
Refills: 0 | Status: DISCONTINUED | OUTPATIENT
Start: 2019-12-18 | End: 2019-12-18

## 2019-12-18 RX ADMIN — BUDESONIDE AND FORMOTEROL FUMARATE DIHYDRATE 2 PUFF(S): 160; 4.5 AEROSOL RESPIRATORY (INHALATION) at 09:31

## 2019-12-18 RX ADMIN — HYDROMORPHONE HYDROCHLORIDE 0.2 MILLIGRAM(S): 2 INJECTION INTRAMUSCULAR; INTRAVENOUS; SUBCUTANEOUS at 12:27

## 2019-12-18 RX ADMIN — TRAMADOL HYDROCHLORIDE 25 MILLIGRAM(S): 50 TABLET ORAL at 06:37

## 2019-12-18 RX ADMIN — ONDANSETRON 4 MILLIGRAM(S): 8 TABLET, FILM COATED ORAL at 14:03

## 2019-12-18 RX ADMIN — HYDROMORPHONE HYDROCHLORIDE 0.5 MILLIGRAM(S): 2 INJECTION INTRAMUSCULAR; INTRAVENOUS; SUBCUTANEOUS at 12:08

## 2019-12-18 RX ADMIN — TRAMADOL HYDROCHLORIDE 25 MILLIGRAM(S): 50 TABLET ORAL at 05:34

## 2019-12-18 RX ADMIN — Medication 50 MILLIGRAM(S): at 18:30

## 2019-12-18 RX ADMIN — Medication 20 MILLIGRAM(S): at 12:08

## 2019-12-18 RX ADMIN — HYDROMORPHONE HYDROCHLORIDE 0.5 MILLIGRAM(S): 2 INJECTION INTRAMUSCULAR; INTRAVENOUS; SUBCUTANEOUS at 20:14

## 2019-12-18 RX ADMIN — HYDROMORPHONE HYDROCHLORIDE 0.5 MILLIGRAM(S): 2 INJECTION INTRAMUSCULAR; INTRAVENOUS; SUBCUTANEOUS at 09:31

## 2019-12-18 RX ADMIN — Medication 50 MILLILITER(S): at 19:43

## 2019-12-18 RX ADMIN — PANTOPRAZOLE SODIUM 40 MILLIGRAM(S): 20 TABLET, DELAYED RELEASE ORAL at 10:17

## 2019-12-18 RX ADMIN — Medication 325 MILLIGRAM(S): at 12:08

## 2019-12-18 RX ADMIN — HYDROMORPHONE HYDROCHLORIDE 0.5 MILLIGRAM(S): 2 INJECTION INTRAMUSCULAR; INTRAVENOUS; SUBCUTANEOUS at 21:14

## 2019-12-18 RX ADMIN — BUDESONIDE AND FORMOTEROL FUMARATE DIHYDRATE 2 PUFF(S): 160; 4.5 AEROSOL RESPIRATORY (INHALATION) at 21:10

## 2019-12-18 RX ADMIN — HYDROMORPHONE HYDROCHLORIDE 0.2 MILLIGRAM(S): 2 INJECTION INTRAMUSCULAR; INTRAVENOUS; SUBCUTANEOUS at 12:33

## 2019-12-18 NOTE — ED PROVIDER NOTE - CARE PLAN
Principal Discharge DX:	Pleural effusion Principal Discharge DX:	Pleural effusion  Secondary Diagnosis:	Shortness of breath  Secondary Diagnosis:	Supratherapeutic INR

## 2019-12-18 NOTE — CONSULT NOTE ADULT - ATTENDING COMMENTS
Large R effusion, most likely related to ascites and intra-abdominal pathology.  Comfortable now, Eliquis being held, plan thoracentesis when needed clinically for therapy.  Can wait until off anticoagulants longer. Will follow

## 2019-12-18 NOTE — H&P ADULT - HISTORY OF PRESENT ILLNESS
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  Pt states initially she was getting SOB only with exertion but now she feels SOB even while at rest. She also admits to occasional chest tightness especially on exertion.  In addition to SOB, Patient also complains of abdominal pain that has been present since laparoscopy.  She though abdominal pain was due to constipation and so she took milk of magnesia and had a bowel movement which help to improve her pain a bit. She also feels dizzy especially with walking.

## 2019-12-18 NOTE — CONSULT NOTE ADULT - SUBJECTIVE AND OBJECTIVE BOX
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 ED with progressively worsening SOB x4 days.  Pt states initially she was getting SOB only with exertion but now she feels SOB even while at rest. She also admits to occasional chest tightness especially on exertion.  In addition to SOB, Patient also complains of abdominal pain that has been present since laparoscopy.  She though abdominal pain was due to constipation and so she took milk of magnesia and had a bowel movement which help to improve her pain a bit. She also feels dizzy especially with walking. (18 Dec 2019 02:20)      PAST MEDICAL & SURGICAL HISTORY:  Uterine fibroid  Hyperlipidemia  Asthma  Diabetes  Hypertension  No significant past surgical history      FAMILY HISTORY:      SOCIAL HISTORY:  Smoking Status: [ ] Current, [ ] Former, [ ] Never  Pack Years:    MEDICATIONS:  Pulmonary:  budesonide 160 MICROgram(s)/formoterol 4.5 MICROgram(s) Inhaler 2 Puff(s) Inhalation two times a day    Antimicrobials:    Anticoagulants:    Onc:    GI/:  pantoprazole    Tablet 40 milliGRAM(s) Oral before breakfast    Endocrine:  dextrose 40% Gel 15 Gram(s) Oral once PRN  dextrose 50% Injectable 12.5 Gram(s) IV Push once  dextrose 50% Injectable 25 Gram(s) IV Push once  dextrose 50% Injectable 25 Gram(s) IV Push once  glucagon  Injectable 1 milliGRAM(s) IntraMuscular once PRN  insulin lispro (HumaLOG) corrective regimen sliding scale   SubCutaneous Before meals and at bedtime    Cardiac:  furosemide   Injectable 20 milliGRAM(s) IV Push once  lisinopril 20 milliGRAM(s) Oral daily  metoprolol tartrate 50 milliGRAM(s) Oral two times a day    Other Medications:  dextrose 40% Gel 15 Gram(s) Oral once PRN  dextrose 5%. 1000 milliLiter(s) IV Continuous <Continuous>  dextrose 50% Injectable 12.5 Gram(s) IV Push once  dextrose 50% Injectable 25 Gram(s) IV Push once  dextrose 50% Injectable 25 Gram(s) IV Push once  ferrous    sulfate 325 milliGRAM(s) Oral daily  glucagon  Injectable 1 milliGRAM(s) IntraMuscular once PRN  insulin lispro (HumaLOG) corrective regimen sliding scale   SubCutaneous Before meals and at bedtime      Allergies    No Known Drug Allergies  shellfish (Hives)    Intolerances        Vital Signs Last 24 Hrs  T(C): 36.7 (18 Dec 2019 08:31), Max: 36.7 (18 Dec 2019 05:09)  T(F): 98 (18 Dec 2019 08:31), Max: 98 (18 Dec 2019 05:09)  HR: 122 (18 Dec 2019 08:31) (117 - 125)  BP: 134/85 (18 Dec 2019 08:31) (121/78 - 134/85)  BP(mean): --  RR: 20 (18 Dec 2019 08:31) (18 - 22)  SpO2: 100% (18 Dec 2019 08:31) (98% - 100%)        LABS:      CBC Full  -  ( 18 Dec 2019 06:22 )  WBC Count : 14.08 K/uL  RBC Count : 4.80 M/uL  Hemoglobin : 9.6 g/dL  Hematocrit : 33.3 %  Platelet Count - Automated : 215 K/uL  Mean Cell Volume : 69.4 fl  Mean Cell Hemoglobin : 20.0 pg  Mean Cell Hemoglobin Concentration : 28.8 gm/dL  Auto Neutrophil # : x  Auto Lymphocyte # : x  Auto Monocyte # : x  Auto Eosinophil # : x  Auto Basophil # : x  Auto Neutrophil % : x  Auto Lymphocyte % : x  Auto Monocyte % : x  Auto Eosinophil % : x  Auto Basophil % : x    12-18    139  |  102  |  10  ----------------------------<  161<H>  4.3   |  23  |  1.01    Ca    9.1      18 Dec 2019 06:22  Phos  2.5     12-18  Mg     2.0     12-18    TPro  6.7  /  Alb  3.7  /  TBili  1.1  /  DBili  x   /  AST  908<H>  /  ALT  671<H>  /  AlkPhos  151<H>  12-18    PT/INR - ( 18 Dec 2019 00:41 )   PT: 56.3 sec;   INR: 4.75          PTT - ( 18 Dec 2019 00:41 )  PTT:32.8 sec                  RADIOLOGY & ADDITIONAL STUDIES (The following images were personally reviewed): Patient is a 39y old  Female who presents with a chief complaint of Pleural Effusion.      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  Pt states initially she was getting SOB only with exertion but now she feels SOB even while at rest. She also admits to occasional chest tightness especially on exertion.      Pulmonary HPI:  This is a 40 y/o woman who is a smoker, she is known to the pulmonary service after a recent hospital admission where she was found to have a large right sided exudative effusion in the setting of a IVC mass that involves the right atrium. 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 The patient was discharged home several days ago on eliquis for an IVC thrombus and presented now with dyspnea A CTA chest was done to rule out PE, no PE noted but a large right sided effusion as re-developed. The patient is currently on room oxygen satting 100% and noted after receiving some pain medication her symptoms improved. The patient has an elevated INR of 4.7 and elevated transaminases, her last dose of Eliquis was at 10pm. The pulmonary consult service was asked to see this patient for her recurrent right sided pleural effusion. The patient noted some mild dyspnea earlier but this has improved with sitting upright and pain medication. She denies chest pain, palpitations, nausea, vomiting, or diarrhea. She denies fevers, chills, or productive cough.     ROS:  A 14 point ROS was reviewed with the patient and was negative except for what is mentioned above.       PAST MEDICAL & SURGICAL HISTORY:  Uterine fibroid  Hyperlipidemia  Asthma  Diabetes  Hypertension  No significant past surgical history      FAMILY HISTORY:      SOCIAL HISTORY:  Smoking Status: [X ] Current, [ ] Former, [ ] Never  Pack Years:Prior 1ppd smoker, now significantly less.     MEDICATIONS:  Pulmonary:  budesonide 160 MICROgram(s)/formoterol 4.5 MICROgram(s) Inhaler 2 Puff(s) Inhalation two times a day    Antimicrobials:    Anticoagulants:    Onc:    GI/:  pantoprazole    Tablet 40 milliGRAM(s) Oral before breakfast    Endocrine:  dextrose 40% Gel 15 Gram(s) Oral once PRN  dextrose 50% Injectable 12.5 Gram(s) IV Push once  dextrose 50% Injectable 25 Gram(s) IV Push once  dextrose 50% Injectable 25 Gram(s) IV Push once  glucagon  Injectable 1 milliGRAM(s) IntraMuscular once PRN  insulin lispro (HumaLOG) corrective regimen sliding scale   SubCutaneous Before meals and at bedtime    Cardiac:  furosemide   Injectable 20 milliGRAM(s) IV Push once  lisinopril 20 milliGRAM(s) Oral daily  metoprolol tartrate 50 milliGRAM(s) Oral two times a day    Other Medications:  dextrose 40% Gel 15 Gram(s) Oral once PRN  dextrose 5%. 1000 milliLiter(s) IV Continuous <Continuous>  dextrose 50% Injectable 12.5 Gram(s) IV Push once  dextrose 50% Injectable 25 Gram(s) IV Push once  dextrose 50% Injectable 25 Gram(s) IV Push once  ferrous    sulfate 325 milliGRAM(s) Oral daily  glucagon  Injectable 1 milliGRAM(s) IntraMuscular once PRN  insulin lispro (HumaLOG) corrective regimen sliding scale   SubCutaneous Before meals and at bedtime      Allergies    No Known Drug Allergies  shellfish (Hives)    Intolerances        Vital Signs Last 24 Hrs  T(C): 36.7 (18 Dec 2019 08:31), Max: 36.7 (18 Dec 2019 05:09)  T(F): 98 (18 Dec 2019 08:31), Max: 98 (18 Dec 2019 05:09)  HR: 122 (18 Dec 2019 08:31) (117 - 125)  BP: 134/85 (18 Dec 2019 08:31) (121/78 - 134/85)  BP(mean): --  RR: 20 (18 Dec 2019 08:31) (18 - 22)  SpO2: 100% (18 Dec 2019 08:31) (98% - 100%)        LABS:      CBC Full  -  ( 18 Dec 2019 06:22 )  WBC Count : 14.08 K/uL  RBC Count : 4.80 M/uL  Hemoglobin : 9.6 g/dL  Hematocrit : 33.3 %  Platelet Count - Automated : 215 K/uL  Mean Cell Volume : 69.4 fl  Mean Cell Hemoglobin : 20.0 pg  Mean Cell Hemoglobin Concentration : 28.8 gm/dL  Auto Neutrophil # : x  Auto Lymphocyte # : x  Auto Monocyte # : x  Auto Eosinophil # : x  Auto Basophil # : x  Auto Neutrophil % : x  Auto Lymphocyte % : x  Auto Monocyte % : x  Auto Eosinophil % : x  Auto Basophil % : x    12-18    139  |  102  |  10  ----------------------------<  161<H>  4.3   |  23  |  1.01    Ca    9.1      18 Dec 2019 06:22  Phos  2.5     12-18  Mg     2.0     12-18    TPro  6.7  /  Alb  3.7  /  TBili  1.1  /  DBili  x   /  AST  908<H>  /  ALT  671<H>  /  AlkPhos  151<H>  12-18    PT/INR - ( 18 Dec 2019 00:41 )   PT: 56.3 sec;   INR: 4.75          PTT - ( 18 Dec 2019 00:41 )  PTT:32.8 sec                  RADIOLOGY & ADDITIONAL STUDIES (The following images were personally reviewed):  CTA chest

## 2019-12-18 NOTE — CONSULT NOTE ADULT - PROBLEM SELECTOR RECOMMENDATION 5
-Elevated INR, transaminitis  -As noted above.  -Would trend INR -Elevated INR in the setting of transaminitis  -It is unclear in the setting of acute liver injury how this elevated INR relates to this patients risk further VTE, an elevated INR in this setting may be somewhat misleading.  -As noted above.  -Would trend INR.  -Would send a fibrinogen level.

## 2019-12-18 NOTE — ED ADULT NURSE NOTE - NSIMPLEMENTINTERV_GEN_ALL_ED
Implemented All Universal Safety Interventions:  [place] to call system. Call bell, personal items and telephone within reach. Instruct patient to call for assistance. Room bathroom lighting operational. Non-slip footwear when patient is off stretcher. Physically safe environment: no spills, clutter or unnecessary equipment. Stretcher in lowest position, wheels locked, appropriate side rails in place.

## 2019-12-18 NOTE — CONSULT NOTE ADULT - PROBLEM SELECTOR RECOMMENDATION 4
-IVC clot, pt had been on eliquis.  -Trend INR and consider starting heparin once INR closer to 2. -IVC clot, pt had been on eliquis.  -Trend INR

## 2019-12-18 NOTE — CONSULT NOTE ADULT - ASSESSMENT
A/P: 39F smoker PMHx DM, asthma,  HTN, HLD, recent admission 12/5-12/12/19 for dyspnea 2/2 Right pleural effusion s/p thoracentesis and CT findings of pancreatic/duodenal soft tissue mass involving intrahepatic IVC and extending to Right atrial junction s/p laparoscopy and mass biopsy 12/10/19 (Dr. Howe, prelim biopsy spindle cell) on eliquis for IVC thrombus now p/w progressively worsening SOB x4 days, general Surgery consulted for RUQ tenderness on exam.     Pt AVSS, abdomen soft, with no evidence of cholelithiasis and normal CBD on ultrasounds, no evidence of acute cholecystitis at this time.  Surgery Team 1c to continue to follow  Discussed w/ Dr. Howe

## 2019-12-18 NOTE — ED PROVIDER NOTE - PHYSICAL EXAMINATION
GEN: Well appearing, well nourished, awake, alert, oriented to person, place, time/situation and in no apparent distress.  ENT: Airway patent, Nasal mucosa clear. Mouth with normal mucosa.  EYES: Clear bilaterally.  RESPIRATORY: Breathing comfortably with normal RR.  CARDIAC: Regular rate and rhythm  ABDOMEN: Soft, nontender, +bowel sounds, no rebound, rigidity, or guarding.  MSK: Range of motion is not limited, no deformities noted.  NEURO: Alert and oriented, no focal deficits.  SKIN: Skin normal color for race, warm, dry and intact. No evidence of rash.  PSYCH: Alert and oriented to person, place, time/situation. normal mood and affect. no apparent risk to self or others. GEN: Well developed, well nourished, awake, alert, oriented to person, place, time/situation and in no apparent distress. Pale  ENT: Airway patent, Nasal mucosa clear. Mouth with normal mucosa.  EYES: Clear bilaterally.  RESPIRATORY: Pt most comfortable sitting up. diminished BS right side. Breathing comfortably with normal RR and no hypoxia at rest, however becomes SOb with exertion.  CARDIAC: Regular rate and rhythm, borderline tachycardia, no m/r/g.  ABDOMEN: Soft, nontender, +bowel sounds, no rebound, rigidity, or guarding.  MSK: Range of motion is not limited, no deformities noted.  NEURO: Alert and oriented, no focal deficits.  SKIN: Skin normal color for race, warm, dry and intact. No evidence of rash.  PSYCH: Alert and oriented to person, place, time/situation. normal mood and affect. no apparent risk to self or others.

## 2019-12-18 NOTE — ED PROVIDER NOTE - OBJECTIVE STATEMENT
38yo F PMHx DM, HTN, HLD, uterine fibroids s/p bilateral uterine artery embolization in 8/2019 who presented with shortness of breath, and back pain, on workup CT found to have large right pleural effusion and incidental pancreatic/duodenal - IVC soft tissue mass with compression. Patient started on anticoagulation On12/5/19 she underwent thoracentesis for left pleural effusion. She tolerated the procedure well. The cytology for the effusion did not show malignancy. 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

## 2019-12-18 NOTE — ED ADULT NURSE NOTE - OBJECTIVE STATEMENT
Received a 39 year old female with a chief complaint of weakness, shortness of breath and palpitations. PMH of Pleural effusions.

## 2019-12-18 NOTE — ED PROVIDER NOTE - CLINICAL SUMMARY MEDICAL DECISION MAKING FREE TEXT BOX
38 y/o F with above PMHx presents with progressively worsening SOB since her DC with concern for reaccumulation of her pleural effusion. No chest pain or acute symptoms to suggest PE. Pt has been on Eliquis since her DC. On exam, pt noted to be tachycardic, EKG showing sinus tachycardia, but no hypoxia; however, she appears to be SOB with exertion on exam. Will check labs and CXR to evaluate for effusion, anemia, etc. Vascular surgery consulted for likely readmission.

## 2019-12-18 NOTE — CONSULT NOTE ADULT - PROBLEM SELECTOR RECOMMENDATION 9
-CT scan noted, Large right sided effusion noted on CT scan, no evidence of PE. The IVC mass is seen up into the right atrium.   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 This is likely the case this time as well. The negative pressure of the thoracic cavity creates a pressure gradient which causes translocation of ascitic fluid across the diaphragm into the thoracic cavity.   -A CT scan post thoracentesis w/ IV contrast was done last admission to [facility] for parenchymal or pleural disease that would explain the effusion and was negative except form some mild ground glass from re-expansion pulmonary edema.   -Pt currently on room air, satting 100%, relatively comfortable after receiving pain medication.   -Pt has an elevated INR of 4.7, elevated transaminases and took eliquis at 10pm last night.     Recommend:  -The patient will need a thoracentesis this admission, the only question is of timing. The patient is coagulopathic w/ elevated INR in the setting of transaminitis and eliquis last taken at 10pm last night. Risks and benefits were reviewed with the patient and the increased risk of bleeding due to the patients condition was discussed in detail. The patient noted that at this moment she feels comfortable and would prefer to wait for the thora until her coagulopathy has improved and the risk of significant bleeding is less.   -Will continue to follow very closely, should the patients breathing worsen, will do a thoracentesis at bedside as benefits then would outweigh the risks.   -Would not give K-Centra at this time given patient is not actively bleeding, has active malignancy w/ and IVC thrombus.   -The tap can be done if needed even with the coagulopathy and perhaps FFP can be given john procedure if needed.   -Please obtain type and screen.

## 2019-12-18 NOTE — H&P ADULT - ASSESSMENT
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 CTA Chest negative for PE    Pulmonology consult for right pleural effusion  General surgery consult for abdominal pain and elevated liver enzyme  Home medication as appropriate  Duplex US to R/O DVT, unlikely since on Eliquis  F/u AM labs

## 2019-12-18 NOTE — CONSULT NOTE ADULT - ASSESSMENT
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 She is s/p laparoscopic bx of the mass, pathology pending. She presents with a recurrent large right sided pleural effusion.

## 2019-12-18 NOTE — ED PROVIDER NOTE - PROGRESS NOTE DETAILS
vascular was consulted they will be down to see the patient Pulm fellow, Dr. Segal was consulted and aware they will follow. Due to elevated INR and stable respiratory status an emergent tap of pleural effusion is not indicated at this time. General surgery resident Manisha was consulted for transaminitis and abd pain in setting of recent laparoscopy by Dr. Howe. They will see the patient and follow during admission.

## 2019-12-18 NOTE — ED ADULT NURSE NOTE - CHIEF COMPLAINT QUOTE
pt. presents with c/o shortness of breath, dizziness, palpitations. pt. reports hx of fluid in lungs, was recently discharged from [facility] after drainage.

## 2019-12-18 NOTE — H&P ADULT - NSHPPHYSICALEXAM_GEN_ALL_CORE
General: NAD, awake, alert  Respiratory: No respiratory distress, Unlabored breathing  Heart: RRR  Abdomen: Soft, nondistended, RUQ tenderness to palpation, no guarding or rebound tenderness  Extremity: B/l LE swelling, Right leg slightly more swollen, no erythema. Palpable 1+ DP pulse b/l

## 2019-12-18 NOTE — ED ADULT NURSE REASSESSMENT NOTE - NS ED NURSE REASSESS COMMENT FT1
Patient received from [facility]. Patient admitted under vascular surgery, awaiting bed. Patient denies any complaints at this time.
Contacted vascular surgery for expressed pain modality for the patient. Instructed to maintain the patient NPO pending procedure. Informed patient.
Pt has decreased breath sounds on the right. Pt medicated as ordered with symbicort and dilaudid. Reports pain relief. Pt lung sounds unchanged after symbicort administration.

## 2019-12-18 NOTE — CONSULT NOTE ADULT - SUBJECTIVE AND OBJECTIVE BOX
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 Howe, prelim biopsy spindle cell) on eliquis for IVC thrombus now p/w progressively worsening SOB x4 days, general Surgery consulted for RUQ tenderness on exam. Pt states she had had achey intermittent 2/10 periumbilical discomfort since her admission on 12/5, which remains unchanged since discharge, denies new abdominal pain. Pain is not related to food or positioning. She denies nausea/emesis, tolerating diet well but reports decrease in appetite for the past few weeks. Reports BM every 1-3 days, last BM this morning, nonbloody,  voiding well without dysuria/pyuria. Denies personal/family hx of IBD. Father w/ hx of colon cancer    PMH: as above  PSH: As emilie, and s/p bilateral uterine artery embolization in 8/2019 due to fibroids,   Meds: As in med rec  Social: [id number] yrs, etoh use once monthly, denies illicit drug use    Vital Signs Last 24 Hrs  T(C): 36.7 (18 Dec 2019 05:09), Max: 36.7 (18 Dec 2019 05:09)  T(F): 98 (18 Dec 2019 05:09), Max: 98 (18 Dec 2019 05:09)  HR: 118 (18 Dec 2019 05:09) (118 - 125)  BP: 121/78 (18 Dec 2019 05:09) (121/78 - 125/87)  BP(mean): --  RR: 18 (18 Dec 2019 05:09) (18 - 22)  SpO2: 98% (18 Dec 2019 05:09) (98% - 100%)    General: NAD, resting comfortably  Neuro: AAOX3, EOMI, PERRLA, no scleral icterus  Pulm: Nonlabored breathing, no respiratory distress  CV: S1/S2 normal, no murmurs  Abdomen: soft, obese  minimal distention, mild RUQ and epigastric tenderness, negative Pearson's sign, no rebound, no guarding. Previous lap incision sites C/D/I without erythema, induration or drainage  Extremities: [facility]      LABS:                        9.6    14.08 )-----------( 215      ( 18 Dec 2019 06:22 )             33.3     12-18    139  |  102  |  10  ----------------------------<  161<H>  4.3   |  23  |  1.01    Ca    9.1      18 Dec 2019 06:22  Phos  2.5     12-18  Mg     2.0     12-18    TPro  6.7  /  Alb  3.7  /  TBili  1.1  /  DBili  x   /  AST  908<H>  /  ALT  671<H>  /  AlkPhos  151<H>  12-18    PT/INR - ( 18 Dec 2019 00:41 )   PT: 56.3 sec;   INR: 4.75          PTT - ( 18 Dec 2019 00:41 )  PTT:32.8 sec    < from: US Abdomen Limited (12.18.19 @ 04:12) >    Common bile duct: Normal diameter, measuring 0.3 cm.    Gallbladder: No visible gallstones. Thickened edematous wall. Contracted.   Negative sonographic Pearson sign.    Pancreas: Limited visualization.     Abdominal aorta: The visualized portions were unremarkable.    Inferior vena cava: Limited visualization    Right kidney: Normal echogenicity. No focal lesions. No hydronephrosis.   Length of 11.6 cm.    Ascites: Small    Also noted: Large pleural effusion is better evaluated on recent CTA   chest.      IMPRESSION:  1.  Thick-walled, contracted gallbladder is most likely secondary to   hydrostatic edema, given the patient's history. Differential diagnosis   includes secondary to hepatitis or pancreatitis.  2.  Low flow within the main portal vein without definite thrombosis,   likely secondary to compression centrally as demonstrated on recent CTs.  3.  Hepatomegaly.            Thank you for the opportunity to participate in the care of this patient.  ******PRELIMINARY REPORT******    ******PRELIMINARY REPORT******         < end of copied text >

## 2019-12-19 ENCOUNTER — RESULT REVIEW (OUTPATIENT)
Age: 39
End: 2019-12-19

## 2019-12-19 DIAGNOSIS — I10 ESSENTIAL (PRIMARY) HYPERTENSION: ICD-10-CM

## 2019-12-19 DIAGNOSIS — I82.0 BUDD-CHIARI SYNDROME: ICD-10-CM

## 2019-12-19 DIAGNOSIS — D47.3 ESSENTIAL (HEMORRHAGIC) THROMBOCYTHEMIA: ICD-10-CM

## 2019-12-19 DIAGNOSIS — D50.9 IRON DEFICIENCY ANEMIA, UNSPECIFIED: ICD-10-CM

## 2019-12-19 DIAGNOSIS — J45.909 UNSPECIFIED ASTHMA, UNCOMPLICATED: ICD-10-CM

## 2019-12-19 DIAGNOSIS — Z91.013 ALLERGY TO SEAFOOD: ICD-10-CM

## 2019-12-19 DIAGNOSIS — E11.9 TYPE 2 DIABETES MELLITUS WITHOUT COMPLICATIONS: ICD-10-CM

## 2019-12-19 DIAGNOSIS — B00.9 HERPESVIRAL INFECTION, UNSPECIFIED: ICD-10-CM

## 2019-12-19 DIAGNOSIS — D63.8 ANEMIA IN OTHER CHRONIC DISEASES CLASSIFIED ELSEWHERE: ICD-10-CM

## 2019-12-19 DIAGNOSIS — I48.0 PAROXYSMAL ATRIAL FIBRILLATION: ICD-10-CM

## 2019-12-19 DIAGNOSIS — F17.210 NICOTINE DEPENDENCE, CIGARETTES, UNCOMPLICATED: ICD-10-CM

## 2019-12-19 DIAGNOSIS — Z71.89 OTHER SPECIFIED COUNSELING: ICD-10-CM

## 2019-12-19 DIAGNOSIS — R18.8 OTHER ASCITES: ICD-10-CM

## 2019-12-19 DIAGNOSIS — R10.9 UNSPECIFIED ABDOMINAL PAIN: ICD-10-CM

## 2019-12-19 DIAGNOSIS — J90 PLEURAL EFFUSION, NOT ELSEWHERE CLASSIFIED: ICD-10-CM

## 2019-12-19 DIAGNOSIS — J98.11 ATELECTASIS: ICD-10-CM

## 2019-12-19 DIAGNOSIS — C76.2 MALIGNANT NEOPLASM OF ABDOMEN: ICD-10-CM

## 2019-12-19 DIAGNOSIS — F17.200 NICOTINE DEPENDENCE, UNSPECIFIED, UNCOMPLICATED: ICD-10-CM

## 2019-12-19 DIAGNOSIS — N93.9 ABNORMAL UTERINE AND VAGINAL BLEEDING, UNSPECIFIED: ICD-10-CM

## 2019-12-19 DIAGNOSIS — L42 PITYRIASIS ROSEA: ICD-10-CM

## 2019-12-19 DIAGNOSIS — E78.5 HYPERLIPIDEMIA, UNSPECIFIED: ICD-10-CM

## 2019-12-19 LAB
ANION GAP SERPL CALC-SCNC: 14 MMOL/L — SIGNIFICANT CHANGE UP (ref 5–17)
APTT BLD: 31 SEC — SIGNIFICANT CHANGE UP (ref 27.5–36.3)
BUN SERPL-MCNC: 17 MG/DL — SIGNIFICANT CHANGE UP (ref 7–23)
CALCIUM SERPL-MCNC: 9.2 MG/DL — SIGNIFICANT CHANGE UP (ref 8.4–10.5)
CHLORIDE SERPL-SCNC: 102 MMOL/L — SIGNIFICANT CHANGE UP (ref 96–108)
CO2 SERPL-SCNC: 25 MMOL/L — SIGNIFICANT CHANGE UP (ref 22–31)
CREAT SERPL-MCNC: 1.17 MG/DL — SIGNIFICANT CHANGE UP (ref 0.5–1.3)
GLUCOSE BLDC GLUCOMTR-MCNC: 101 MG/DL — HIGH (ref 70–99)
GLUCOSE BLDC GLUCOMTR-MCNC: 103 MG/DL — HIGH (ref 70–99)
GLUCOSE BLDC GLUCOMTR-MCNC: 110 MG/DL — HIGH (ref 70–99)
GLUCOSE BLDC GLUCOMTR-MCNC: 117 MG/DL — HIGH (ref 70–99)
GLUCOSE BLDC GLUCOMTR-MCNC: 128 MG/DL — HIGH (ref 70–99)
GLUCOSE BLDC GLUCOMTR-MCNC: 133 MG/DL — HIGH (ref 70–99)
GLUCOSE SERPL-MCNC: 101 MG/DL — HIGH (ref 70–99)
HAV IGM SER-ACNC: SIGNIFICANT CHANGE UP
HBV CORE IGM SER-ACNC: SIGNIFICANT CHANGE UP
HBV SURFACE AG SER-ACNC: SIGNIFICANT CHANGE UP
HCT VFR BLD CALC: 29.5 % — LOW (ref 34.5–45)
HCV AB S/CO SERPL IA: 0.15 S/CO — SIGNIFICANT CHANGE UP
HCV AB SERPL-IMP: SIGNIFICANT CHANGE UP
HGB BLD-MCNC: 8.4 G/DL — LOW (ref 11.5–15.5)
INR BLD: 3.38 — HIGH (ref 0.88–1.16)
INR BLD: 3.91 — HIGH (ref 0.88–1.16)
MAGNESIUM SERPL-MCNC: 2.1 MG/DL — SIGNIFICANT CHANGE UP (ref 1.6–2.6)
MCHC RBC-ENTMCNC: 20 PG — LOW (ref 27–34)
MCHC RBC-ENTMCNC: 28.5 GM/DL — LOW (ref 32–36)
MCV RBC AUTO: 70.1 FL — LOW (ref 80–100)
NRBC # BLD: 6 /100 WBCS — HIGH (ref 0–0)
PHOSPHATE SERPL-MCNC: 3.6 MG/DL — SIGNIFICANT CHANGE UP (ref 2.5–4.5)
PLATELET # BLD AUTO: 220 K/UL — SIGNIFICANT CHANGE UP (ref 150–400)
POTASSIUM SERPL-MCNC: 4.8 MMOL/L — SIGNIFICANT CHANGE UP (ref 3.5–5.3)
POTASSIUM SERPL-SCNC: 4.8 MMOL/L — SIGNIFICANT CHANGE UP (ref 3.5–5.3)
PROTHROM AB SERPL-ACNC: 39.7 SEC — HIGH (ref 10–12.9)
PROTHROM AB SERPL-ACNC: 46.1 SEC — HIGH (ref 10–12.9)
RBC # BLD: 4.21 M/UL — SIGNIFICANT CHANGE UP (ref 3.8–5.2)
RBC # BLD: 4.21 M/UL — SIGNIFICANT CHANGE UP (ref 3.8–5.2)
RBC # FLD: 20.8 % — HIGH (ref 10.3–14.5)
RETICS #: 138.1 K/UL — HIGH (ref 25–125)
RETICS/RBC NFR: 3.3 % — HIGH (ref 0.5–2.5)
SODIUM SERPL-SCNC: 141 MMOL/L — SIGNIFICANT CHANGE UP (ref 135–145)
WBC # BLD: 13.09 K/UL — HIGH (ref 3.8–10.5)
WBC # FLD AUTO: 13.09 K/UL — HIGH (ref 3.8–10.5)

## 2019-12-19 PROCEDURE — 99232 SBSQ HOSP IP/OBS MODERATE 35: CPT | Mod: GC

## 2019-12-19 RX ORDER — APIXABAN 2.5 MG/1
1 TABLET, FILM COATED ORAL
Qty: 60 | Refills: 0
Start: 2019-12-19 | End: 2020-01-17

## 2019-12-19 RX ORDER — DIPHENHYDRAMINE HCL 50 MG
25 CAPSULE ORAL ONCE
Refills: 0 | Status: COMPLETED | OUTPATIENT
Start: 2019-12-19 | End: 2019-12-19

## 2019-12-19 RX ORDER — ALBUMIN HUMAN 25 %
250 VIAL (ML) INTRAVENOUS ONCE
Refills: 0 | Status: COMPLETED | OUTPATIENT
Start: 2019-12-19 | End: 2019-12-19

## 2019-12-19 RX ORDER — POLYETHYLENE GLYCOL 3350 17 G/17G
17 POWDER, FOR SOLUTION ORAL AT BEDTIME
Refills: 0 | Status: DISCONTINUED | OUTPATIENT
Start: 2019-12-19 | End: 2019-12-26

## 2019-12-19 RX ADMIN — BUDESONIDE AND FORMOTEROL FUMARATE DIHYDRATE 2 PUFF(S): 160; 4.5 AEROSOL RESPIRATORY (INHALATION) at 08:34

## 2019-12-19 RX ADMIN — Medication 50 MILLILITER(S): at 05:44

## 2019-12-19 RX ADMIN — Medication 41.67 MILLILITER(S): at 11:01

## 2019-12-19 RX ADMIN — Medication 325 MILLIGRAM(S): at 12:22

## 2019-12-19 RX ADMIN — HYDROMORPHONE HYDROCHLORIDE 0.5 MILLIGRAM(S): 2 INJECTION INTRAMUSCULAR; INTRAVENOUS; SUBCUTANEOUS at 08:34

## 2019-12-19 RX ADMIN — HYDROMORPHONE HYDROCHLORIDE 0.5 MILLIGRAM(S): 2 INJECTION INTRAMUSCULAR; INTRAVENOUS; SUBCUTANEOUS at 18:05

## 2019-12-19 RX ADMIN — BUDESONIDE AND FORMOTEROL FUMARATE DIHYDRATE 2 PUFF(S): 160; 4.5 AEROSOL RESPIRATORY (INHALATION) at 21:33

## 2019-12-19 RX ADMIN — POLYETHYLENE GLYCOL 3350 17 GRAM(S): 17 POWDER, FOR SOLUTION ORAL at 21:33

## 2019-12-19 RX ADMIN — HYDROMORPHONE HYDROCHLORIDE 0.5 MILLIGRAM(S): 2 INJECTION INTRAMUSCULAR; INTRAVENOUS; SUBCUTANEOUS at 17:35

## 2019-12-19 RX ADMIN — HYDROMORPHONE HYDROCHLORIDE 0.5 MILLIGRAM(S): 2 INJECTION INTRAMUSCULAR; INTRAVENOUS; SUBCUTANEOUS at 08:57

## 2019-12-19 RX ADMIN — Medication 50 MILLIGRAM(S): at 17:35

## 2019-12-19 RX ADMIN — Medication 50 MILLIGRAM(S): at 05:44

## 2019-12-19 RX ADMIN — Medication 25 MILLIGRAM(S): at 01:23

## 2019-12-19 RX ADMIN — HYDROMORPHONE HYDROCHLORIDE 0.5 MILLIGRAM(S): 2 INJECTION INTRAMUSCULAR; INTRAVENOUS; SUBCUTANEOUS at 02:46

## 2019-12-19 NOTE — PATIENT PROFILE ADULT - HAS THE PATIENT BEEN ADMITTED FROM SHORT TERM REHAB?
Assessment   1  Acute pharyngitis (462) (J02 9)    Plan   Acute pharyngitis    · (1) INFLUENZA A/B AND RSV, PCR, > 2 MOS AGE; Status:Active; Requested    for:15Jan2018;    · (1) THROAT CULTURE (CULTURE, UPPER RESPIRATORY); Status:Active; Requested    LFA:[id number];    · Rapid StrepA- POC; Source:Throat; Status:Resulted - Requires Verification;   Done:    [id number] 12:00AM    Discussion/Summary   Discussion Summary:    RST - will send for culture  Flu swab sent  Increase fluid intake  Alternate tylenol and motrin as directed for fever/ Increase fluid intake  Medication Side Effects Reviewed: Possible side effects of new medications were reviewed with the patient/guardian today  Understands and agrees with treatment plan: The treatment plan was reviewed with the patient/guardian  The patient/guardian understands and agrees with the treatment plan    Counseling Documentation With Imm: The patient was counseled regarding diagnostic results,-- instructions for management,-- risk factor reductions,-- prognosis,-- patient and family education,-- impressions,-- risks and benefits of treatment options,-- importance of compliance with treatment  Follow Up Instructions: Follow Up with your Primary Care Provider in 1-2 days  If your symptoms worsen, go to the nearest [facility]  Chief Complaint   1  Fever, > 36 months   2  Sore Throat  Chief Complaint Free Text Note Form: Pt's mother states her son has had fevers for 2 days, also feels achy with sore throat, headache and upset stomach  Pt has not been eating well the last 2 days  Tmax was this AM at 101, but not given anything to treat it  History of Present Illness   Hospital Based Practices Required Assessment:      Pain Assessment      the patient states they have pain  Sandoval-Gonsalez FACES Pain Rating Scale Children >3 Score: 1  Abuse And Domestic Violence Screen   Domestic violence screen not done today   Reason DV Screen not done: child       Depression And Suicide Screen  Suicide screen not done today  -- Reason suicide screen not done: child  Primary Language is  english  Readiness To Learn: Receptive  Barriers To Learning: none  Preferred Learning: verbal      Education Completed: disease/condition-- and-- treatment/procedure      Teaching Method: verbal      Person Taught: patient      Evaluation Of Learning: verbalized/demonstrated understanding    Pharyngitis (Brief): The patient is being seen for an initial evaluation of and 2-3 days pharyngitis  Symptoms:  sore throat,-- swollen glands,-- painful swallowing,-- fever,-- chills-- and-- headache, but-- no abdominal pain-- and-- no nausea  No associated symptoms are reported  Review of Systems   Complete-Male Pre-Adolescent [facility] [place]:      Constitutional: No complaints of feeling tired, feels well, no fever or chills, no recent weight gain or loss  Eyes: No complaints of eye pain, no discharge from eyes, no eyesight problems, no itching, no red or dry eyes  ENT: no complaints of earache, no nasal discharge, no hoarseness, no nosebleeds, no loss of hearing, no sore throat-- and-- as noted in HPI  Cardiovascular: No complaints of slow or fast heart rate, no chest pain, no palpitations, no lower extremity edema  Respiratory: No complaints of dyspnea on exertion, no wheezing or shortness of breath, no cough  Gastrointestinal: No complaints of abdominal pain, no constipation, no nausea or vomiting, no diarrhea, no bloody stools  Genitourinary: No testicular pain, no nocturia or dysuria, no hesitancy, no incontinence, no genital lesion  Musculoskeletal: No complaints of joint stiffness or swelling, no myalgias, no limb pain or swelling  Integumentary: No complaints of skin rash or lesion, no itching or dryness, no skin wound        Neurological: No complaints of headache, no confusion, no convulsions, no numbness or tingling, no dizziness or fainting, no limb weakness or difficulty walking  Psychiatric: No complaints of anxiety, no sleep disturbance, denies suicidal thoughts, does not feel depressed, no change in personality, no emotional problems  Endocrine: No complaints of weakness, no deepening of voice, no proptosis, no muscle weakness  Hematologic/Lymphatic: No complaints of swollen glands, no neck swollen glands, does not bleed or bruise easily  ROS reported by the patient  Active Problems   1  Encounter for immunization (V03 89) (Z23)    Past Medical History   1  History of pharyngitis (V12 69) (Z87 09)   2  History of streptococcal pharyngitis (V12 09) (Z87 09)   3  No pertinent past medical history  Active Problems And Past Medical History Reviewed: The active problems and past medical history were reviewed and updated today  Family History   Mother    1  Denied: Family history of substance abuse   2  No family history of mental disorder  Family History Reviewed: The family history was reviewed and updated today  Social History    · Lives with parents   · No tobacco/smoke exposure   · Pets/Animals: Dog   · Seeing a dentist  Social History Reviewed: The social history was reviewed and updated today  Surgical History   1  Denied: History Of Prior Surgery  Surgical History Reviewed: The surgical history was reviewed and updated today  Current Meds    1  Flintstones Gummies CHEW;     Therapy: (Recorded:28Nov2016) to Recorded  Medication List Reviewed: The medication list was reviewed and updated today  Allergies   1  No Known Drug Allergies  2  No Known Environmental Allergies   3   No Known Food Allergies    Vitals   Signs   Recorded: [id number] 10:38AM   Temperature: 99 6 F, Oral  Heart Rate: 101  Respiration: 24  Height: 4 ft 6 in  Weight: 66 lb 3 2 oz  BMI Calculated: 15 96  BSA Calculated: 1 08  BMI Percentile: 56 %  2-20 Stature Percentile: 96 %  2-20 Weight Percentile: 84 %  O2 Saturation: 99  Pain Scale: 2    Results/Data   Rapid StrepA- POC [id number] 12:00AM Eric Ewing      Test Name Result Flag Reference   Rapid Strep Negative          Message   Return to work or school:    Kemi Patel is under my professional care  He was seen in my office on 01/15/2018        Please excuse illness  Marylou Hutchins PA-C        Signatures    Electronically signed by : Blaine Nath [facility]; Herminio 15 2018 11:35AM EST                       (Author)     Electronically signed by : UMESH Miller Sa ; Herminio 15 2018 11:36AM EST                       (Co-author) no

## 2019-12-19 NOTE — PROGRESS NOTE ADULT - PROBLEM SELECTOR PLAN 1
-CT scan noted, Large right sided effusion noted on CT scan, no evidence of PE. The IVC mass is seen up into the right atrium.   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 This is likely the case this time as well. The negative pressure of the thoracic cavity creates a pressure gradient which causes translocation of ascitic fluid across the diaphragm into the thoracic cavity.   -A CT scan post thoracentesis w/ IV contrast was done last admission to [facility] for parenchymal or pleural disease that would explain the effusion and was negative except form some mild ground glass from re-expansion pulmonary edema.   -Pt currently on room air, satting 100%, relatively comfortable after receiving pain medication.   -INR still elevated today at 3.9 on repeat.     Recommend:  -Pt wishes to defer thoracentesis until tomorrow to see if her INR will trend down on its on. If still elevated tomorrow she agreed to FFP shortly prior to procedure and we will proceed with a therapeutic thora.   -Check INR in AM   -If > 3 will give FFP prior to procedure.

## 2019-12-19 NOTE — PROGRESS NOTE ADULT - SUBJECTIVE AND OBJECTIVE BOX
Interval Events:  Patient seen and examined at bedside. The patient notes that she is still breathing relatively comfortably on room air but notes that if she gets up to exert herself she gets out of breath. The patient noted that she is not comfortable with proceeding with the thoracentesis today due to her INR still being elevated and she did not want to take FFP to try to reverse it, She preferred to wait one more day to see if her INR would drop on its own.     MEDICATIONS:  Pulmonary:  budesonide 160 MICROgram(s)/formoterol 4.5 MICROgram(s) Inhaler 2 Puff(s) Inhalation two times a day    Antimicrobials:    Anticoagulants:    Cardiac:  lisinopril 20 milliGRAM(s) Oral daily  metoprolol tartrate 50 milliGRAM(s) Oral two times a day    Endocrine:  dextrose 40% Gel 15 Gram(s) Oral once PRN  dextrose 50% Injectable 12.5 Gram(s) IV Push once  dextrose 50% Injectable 25 Gram(s) IV Push once  dextrose 50% Injectable 25 Gram(s) IV Push once  glucagon  Injectable 1 milliGRAM(s) IntraMuscular once PRN  insulin lispro (HumaLOG) corrective regimen sliding scale   SubCutaneous Before meals and at bedtime    Allergies    No Known Drug Allergies  shellfish (Hives)    Intolerances        Vital Signs Last 24 Hrs  T(C): 36.7 (19 Dec 2019 18:01), Max: 37 (19 Dec 2019 09:10)  T(F): 98 (19 Dec 2019 18:01), Max: 98.6 (19 Dec 2019 09:10)  HR: 90 (19 Dec 2019 16:25) (72 - 106)  BP: 132/83 (19 Dec 2019 16:25) (115/74 - 137/68)  BP(mean): 103 (19 Dec 2019 16:25) (85 - 103)  RR: 16 (19 Dec 2019 16:25) (16 - 19)  SpO2: 99% (19 Dec 2019 16:25) (97% - 100%)    General: NAD, AAO x3  HEENT: No icterus,. Moist mucous membranes  Neck: No JVD noted. Supple, no meningismus  Cardio: S1, S2 noted, RRR. No murmurs, rubs or gallops  Resp: No breath sounds on right side, clear to ausculation on the left. No wheezes, rales, or rhonchi .  Abdo: Soft, NT, bowel sounds present. No organomegaly  Extremities: No edema noted. Pulses present b/l  Neuro: AAO x3, grossly normal motor strength.  Lymphnodes: no lymphadenopathy identified.  Skin: Dry, no rashes    12-18 @ 07:01  -  12-19 @ 07:00  --------------------------------------------------------  IN: 858 mL / OUT: 300 mL / NET: 558 mL    12-19 @ 07:01  -  12-19 @ 18:10  --------------------------------------------------------  IN: 124.8 mL / OUT: 0 mL / NET: 124.8 mL          LABS:      CBC Full  -  ( 19 Dec 2019 05:47 )  WBC Count : 13.09 K/uL  RBC Count : 4.21 M/uL  Hemoglobin : 8.4 g/dL  Hematocrit : 29.5 %  Platelet Count - Automated : 220 K/uL  Mean Cell Volume : 70.1 fl  Mean Cell Hemoglobin : 20.0 pg  Mean Cell Hemoglobin Concentration : 28.5 gm/dL  Auto Neutrophil # : x  Auto Lymphocyte # : x  Auto Monocyte # : x  Auto Eosinophil # : x  Auto Basophil # : x  Auto Neutrophil % : x  Auto Lymphocyte % : x  Auto Monocyte % : x  Auto Eosinophil % : x  Auto Basophil % : x    12-19    141  |  102  |  17  ----------------------------<  101<H>  4.8   |  25  |  1.17    Ca    9.2      19 Dec 2019 05:47  Phos  3.6     12-19  Mg     2.1     12-19    TPro  6.7  /  Alb  3.7  /  TBili  1.1  /  DBili  x   /  AST  908<H>  /  ALT  671<H>  /  AlkPhos  151<H>  12-18    PT/INR - ( 19 Dec 2019 16:10 )   PT: 46.1 sec;   INR: 3.91          PTT - ( 19 Dec 2019 05:47 )  PTT:31.0 sec                  RADIOLOGY & ADDITIONAL STUDIES (The following images were personally reviewed):  Chest CT

## 2019-12-19 NOTE — PROGRESS NOTE ADULT - PROBLEM SELECTOR PLAN 4
-IVC clot, pt had been on eliquis.  -Trend INR.  -May consider restarting heparin gtt after thora tomorrow without a bolus and observing closely.

## 2019-12-19 NOTE — PROGRESS NOTE ADULT - PROBLEM SELECTOR PLAN 5
-Elevated INR in the setting of transaminitis  -It is unclear in the setting of acute liver injury how this elevated INR relates to this patients risk further VTE, an elevated INR in this setting may be somewhat misleading.  -As noted above.

## 2019-12-20 ENCOUNTER — APPOINTMENT (OUTPATIENT)
Dept: VASCULAR SURGERY | Facility: CLINIC | Age: 39
End: 2019-12-20

## 2019-12-20 ENCOUNTER — RESULT REVIEW (OUTPATIENT)
Age: 39
End: 2019-12-20

## 2019-12-20 LAB
ALBUMIN FLD-MCNC: 2 G/DL — SIGNIFICANT CHANGE UP
ALBUMIN SERPL ELPH-MCNC: 3.6 G/DL — SIGNIFICANT CHANGE UP (ref 3.3–5)
ALBUMIN SERPL ELPH-MCNC: 3.7 G/DL — SIGNIFICANT CHANGE UP (ref 3.3–5)
ALP SERPL-CCNC: 115 U/L — SIGNIFICANT CHANGE UP (ref 40–120)
ALT FLD-CCNC: 1215 U/L — HIGH (ref 10–45)
ANION GAP SERPL CALC-SCNC: 14 MMOL/L — SIGNIFICANT CHANGE UP (ref 5–17)
APPEARANCE UR: CLEAR — SIGNIFICANT CHANGE UP
AST SERPL-CCNC: 1268 U/L — HIGH (ref 10–40)
BILIRUB DIRECT SERPL-MCNC: 0.7 MG/DL — HIGH (ref 0–0.2)
BILIRUB INDIRECT FLD-MCNC: 0.9 MG/DL — SIGNIFICANT CHANGE UP (ref 0.2–1)
BILIRUB SERPL-MCNC: 1.6 MG/DL — HIGH (ref 0.2–1.2)
BILIRUB UR-MCNC: ABNORMAL
BUN SERPL-MCNC: 24 MG/DL — HIGH (ref 7–23)
CALCIUM SERPL-MCNC: 9.1 MG/DL — SIGNIFICANT CHANGE UP (ref 8.4–10.5)
CHLORIDE SERPL-SCNC: 98 MMOL/L — SIGNIFICANT CHANGE UP (ref 96–108)
CO2 SERPL-SCNC: 26 MMOL/L — SIGNIFICANT CHANGE UP (ref 22–31)
COLOR SPEC: YELLOW — SIGNIFICANT CHANGE UP
CREAT FLD-MCNC: 1.26 MG/DL — SIGNIFICANT CHANGE UP
CREAT SERPL-MCNC: 1.22 MG/DL — SIGNIFICANT CHANGE UP (ref 0.5–1.3)
DIFF PNL FLD: ABNORMAL
FIBRINOGEN PPP-MCNC: 258 MG/DL — SIGNIFICANT CHANGE UP (ref 258–438)
GLUCOSE BLDC GLUCOMTR-MCNC: 104 MG/DL — HIGH (ref 70–99)
GLUCOSE BLDC GLUCOMTR-MCNC: 112 MG/DL — HIGH (ref 70–99)
GLUCOSE BLDC GLUCOMTR-MCNC: 140 MG/DL — HIGH (ref 70–99)
GLUCOSE BLDC GLUCOMTR-MCNC: 165 MG/DL — HIGH (ref 70–99)
GLUCOSE FLD-MCNC: 135 MG/DL — SIGNIFICANT CHANGE UP
GLUCOSE SERPL-MCNC: 109 MG/DL — HIGH (ref 70–99)
GLUCOSE UR QL: NEGATIVE — SIGNIFICANT CHANGE UP
GRAM STN FLD: SIGNIFICANT CHANGE UP
HCT VFR BLD CALC: 28.3 % — LOW (ref 34.5–45)
HGB BLD-MCNC: 8.4 G/DL — LOW (ref 11.5–15.5)
INR BLD: 3.55 — HIGH (ref 0.88–1.16)
KETONES UR-MCNC: NEGATIVE — SIGNIFICANT CHANGE UP
LDH SERPL L TO P-CCNC: 1557 U/L — HIGH (ref 50–242)
LDH SERPL L TO P-CCNC: 428 U/L — SIGNIFICANT CHANGE UP
LEUKOCYTE ESTERASE UR-ACNC: ABNORMAL
MAGNESIUM SERPL-MCNC: 2.1 MG/DL — SIGNIFICANT CHANGE UP (ref 1.6–2.6)
MCHC RBC-ENTMCNC: 20.3 PG — LOW (ref 27–34)
MCHC RBC-ENTMCNC: 29.7 GM/DL — LOW (ref 32–36)
MCV RBC AUTO: 68.4 FL — LOW (ref 80–100)
NITRITE UR-MCNC: POSITIVE
NRBC # BLD: 12 /100 WBCS — HIGH (ref 0–0)
PH FLD: 7.72 — SIGNIFICANT CHANGE UP
PH UR: 5.5 — SIGNIFICANT CHANGE UP (ref 5–8)
PHOSPHATE SERPL-MCNC: 2.9 MG/DL — SIGNIFICANT CHANGE UP (ref 2.5–4.5)
PLATELET # BLD AUTO: 202 K/UL — SIGNIFICANT CHANGE UP (ref 150–400)
POTASSIUM SERPL-MCNC: 4.4 MMOL/L — SIGNIFICANT CHANGE UP (ref 3.5–5.3)
POTASSIUM SERPL-SCNC: 4.4 MMOL/L — SIGNIFICANT CHANGE UP (ref 3.5–5.3)
PROT FLD-MCNC: 3.1 G/DL — SIGNIFICANT CHANGE UP
PROT SERPL-MCNC: 6.3 G/DL — SIGNIFICANT CHANGE UP (ref 6–8.3)
PROT SERPL-MCNC: 6.4 G/DL — SIGNIFICANT CHANGE UP (ref 6–8.3)
PROT UR-MCNC: 30 MG/DL
PROTHROM AB SERPL-ACNC: 41.7 SEC — HIGH (ref 10–12.9)
RBC # BLD: 4.14 M/UL — SIGNIFICANT CHANGE UP (ref 3.8–5.2)
RBC # FLD: 20.8 % — HIGH (ref 10.3–14.5)
SODIUM SERPL-SCNC: 138 MMOL/L — SIGNIFICANT CHANGE UP (ref 135–145)
SP GR SPEC: >=1.03 — SIGNIFICANT CHANGE UP (ref 1–1.03)
SPECIMEN SOURCE FLD: SIGNIFICANT CHANGE UP
SPECIMEN SOURCE: SIGNIFICANT CHANGE UP
UROBILINOGEN FLD QL: 0.2 E.U./DL — SIGNIFICANT CHANGE UP
WBC # BLD: 15.18 K/UL — HIGH (ref 3.8–10.5)
WBC # FLD AUTO: 15.18 K/UL — HIGH (ref 3.8–10.5)

## 2019-12-20 PROCEDURE — 32557 INSERT CATH PLEURA W/ IMAGE: CPT | Mod: GC

## 2019-12-20 PROCEDURE — 88305 TISSUE EXAM BY PATHOLOGIST: CPT | Mod: 26

## 2019-12-20 PROCEDURE — 71045 X-RAY EXAM CHEST 1 VIEW: CPT | Mod: 26

## 2019-12-20 PROCEDURE — 71045 X-RAY EXAM CHEST 1 VIEW: CPT | Mod: 26,77

## 2019-12-20 PROCEDURE — 88112 CYTOPATH CELL ENHANCE TECH: CPT | Mod: 26

## 2019-12-20 RX ORDER — DIPHENHYDRAMINE HCL 50 MG
25 CAPSULE ORAL ONCE
Refills: 0 | Status: COMPLETED | OUTPATIENT
Start: 2019-12-20 | End: 2019-12-20

## 2019-12-20 RX ORDER — LIDOCAINE HCL 20 MG/ML
10 VIAL (ML) INJECTION ONCE
Refills: 0 | Status: COMPLETED | OUTPATIENT
Start: 2019-12-20 | End: 2019-12-20

## 2019-12-20 RX ORDER — PHENAZOPYRIDINE HCL 100 MG
200 TABLET ORAL ONCE
Refills: 0 | Status: COMPLETED | OUTPATIENT
Start: 2019-12-20 | End: 2019-12-20

## 2019-12-20 RX ADMIN — Medication 10 MILLILITER(S): at 14:46

## 2019-12-20 RX ADMIN — HYDROMORPHONE HYDROCHLORIDE 0.5 MILLIGRAM(S): 2 INJECTION INTRAMUSCULAR; INTRAVENOUS; SUBCUTANEOUS at 23:15

## 2019-12-20 RX ADMIN — HYDROMORPHONE HYDROCHLORIDE 0.5 MILLIGRAM(S): 2 INJECTION INTRAMUSCULAR; INTRAVENOUS; SUBCUTANEOUS at 07:22

## 2019-12-20 RX ADMIN — LISINOPRIL 20 MILLIGRAM(S): 2.5 TABLET ORAL at 06:52

## 2019-12-20 RX ADMIN — PANTOPRAZOLE SODIUM 40 MILLIGRAM(S): 20 TABLET, DELAYED RELEASE ORAL at 06:52

## 2019-12-20 RX ADMIN — Medication 25 MILLIGRAM(S): at 22:59

## 2019-12-20 RX ADMIN — HYDROMORPHONE HYDROCHLORIDE 0.5 MILLIGRAM(S): 2 INJECTION INTRAMUSCULAR; INTRAVENOUS; SUBCUTANEOUS at 15:00

## 2019-12-20 RX ADMIN — HYDROMORPHONE HYDROCHLORIDE 0.5 MILLIGRAM(S): 2 INJECTION INTRAMUSCULAR; INTRAVENOUS; SUBCUTANEOUS at 19:05

## 2019-12-20 RX ADMIN — Medication 50 MILLIGRAM(S): at 19:01

## 2019-12-20 RX ADMIN — Medication 25 MILLIGRAM(S): at 02:27

## 2019-12-20 RX ADMIN — Medication 325 MILLIGRAM(S): at 14:35

## 2019-12-20 RX ADMIN — HYDROMORPHONE HYDROCHLORIDE 0.5 MILLIGRAM(S): 2 INJECTION INTRAMUSCULAR; INTRAVENOUS; SUBCUTANEOUS at 15:20

## 2019-12-20 RX ADMIN — Medication 200 MILLIGRAM(S): at 02:27

## 2019-12-20 RX ADMIN — HYDROMORPHONE HYDROCHLORIDE 0.5 MILLIGRAM(S): 2 INJECTION INTRAMUSCULAR; INTRAVENOUS; SUBCUTANEOUS at 06:52

## 2019-12-20 RX ADMIN — BUDESONIDE AND FORMOTEROL FUMARATE DIHYDRATE 2 PUFF(S): 160; 4.5 AEROSOL RESPIRATORY (INHALATION) at 21:52

## 2019-12-20 RX ADMIN — Medication 50 MILLIGRAM(S): at 06:52

## 2019-12-20 RX ADMIN — HYDROMORPHONE HYDROCHLORIDE 0.5 MILLIGRAM(S): 2 INJECTION INTRAMUSCULAR; INTRAVENOUS; SUBCUTANEOUS at 22:59

## 2019-12-20 RX ADMIN — BUDESONIDE AND FORMOTEROL FUMARATE DIHYDRATE 2 PUFF(S): 160; 4.5 AEROSOL RESPIRATORY (INHALATION) at 09:35

## 2019-12-20 NOTE — PROGRESS NOTE ADULT - SUBJECTIVE AND OBJECTIVE BOX
Interval Events:  Patient seen and examined at bedside. The plan for a chest tube was discussed with the patient in detail at bedside. She was very anxious and concerned about feeling pain, but she understood that this was necessary. The patient still notes SOB while ambulating and has difficulty with talking in full sentences at times.     MEDICATIONS:  Pulmonary:  budesonide 160 MICROgram(s)/formoterol 4.5 MICROgram(s) Inhaler 2 Puff(s) Inhalation two times a day    Antimicrobials:    Anticoagulants:    Cardiac:  lisinopril 20 milliGRAM(s) Oral daily  metoprolol tartrate 50 milliGRAM(s) Oral two times a day    Endocrine:  dextrose 40% Gel 15 Gram(s) Oral once PRN  dextrose 50% Injectable 12.5 Gram(s) IV Push once  dextrose 50% Injectable 25 Gram(s) IV Push once  dextrose 50% Injectable 25 Gram(s) IV Push once  glucagon  Injectable 1 milliGRAM(s) IntraMuscular once PRN  insulin lispro (HumaLOG) corrective regimen sliding scale   SubCutaneous Before meals and at bedtime    Allergies    No Known Drug Allergies  shellfish (Hives)    Intolerances        Vital Signs Last 24 Hrs  T(C): 36.8 (20 Dec 2019 09:00), Max: 37.2 (20 Dec 2019 04:45)  T(F): 98.3 (20 Dec 2019 09:00), Max: 99 (20 Dec 2019 04:45)  HR: 74 (20 Dec 2019 08:15) (74 - 94)  BP: 134/82 (20 Dec 2019 08:15) (115/73 - 137/80)  BP(mean): 101 (20 Dec 2019 08:15) (86 - 103)  RR: 16 (20 Dec 2019 08:15) (16 - 20)  SpO2: 100% (20 Dec 2019 08:15) (93% - 100%)    General: NAD, AAO x3  HEENT: No icterus,. Moist mucous membranes  Neck: No JVD noted. Supple, no meningismus  Cardio: S1, S2 noted, RRR. No murmurs, rubs or gallops  Resp: No breath sounds on right side, clear to ausculation on the left. No wheezes, rales, or rhonchi .  Abdo: Soft, NT, bowel sounds present. No organomegaly  Extremities: No edema noted. Pulses present b/l  Neuro: AAO x3, grossly normal motor strength.  Lymphnodes: no lymphadenopathy identified.  Skin: Dry, no rashes      12-19 @ 07:01  -  12-20 @ 07:00  --------------------------------------------------------  IN: 124.8 mL / OUT: 200 mL / NET: -75.2 mL    12-20 @ 07:01  -  12-20 @ 10:41  --------------------------------------------------------  IN: 320 mL / OUT: 0 mL / NET: 320 mL          LABS:      CBC Full  -  ( 20 Dec 2019 06:05 )  WBC Count : 15.18 K/uL  RBC Count : 4.14 M/uL  Hemoglobin : 8.4 g/dL  Hematocrit : 28.3 %  Platelet Count - Automated : 202 K/uL  Mean Cell Volume : 68.4 fl  Mean Cell Hemoglobin : 20.3 pg  Mean Cell Hemoglobin Concentration : 29.7 gm/dL  Auto Neutrophil # : x  Auto Lymphocyte # : x  Auto Monocyte # : x  Auto Eosinophil # : x  Auto Basophil # : x  Auto Neutrophil % : x  Auto Lymphocyte % : x  Auto Monocyte % : x  Auto Eosinophil % : x  Auto Basophil % : x    12-20    138  |  98  |  24<H>  ----------------------------<  109<H>  4.4   |  26  |  1.22    Ca    9.1      20 Dec 2019 06:05  Phos  2.9     12-20  Mg     2.1     12-20    TPro  6.4  /  Alb  3.6  /  TBili  1.6<H>  /  DBili  0.7<H>  /  AST  1268<H>  /  ALT  1215<H>  /  AlkPhos  115  12-20    PT/INR - ( 20 Dec 2019 06:05 )   PT: 41.7 sec;   INR: 3.55          PTT - ( 19 Dec 2019 05:47 )  PTT:31.0 sec      Urinalysis Basic - ( 19 Dec 2019 23:44 )    Color: Yellow / Appearance: Clear / SG: >=1.030 / pH: x  Gluc: x / Ketone: NEGATIVE  / Bili: Moderate / Urobili: 0.2 E.U./dL   Blood: x / Protein: 30 mg/dL / Nitrite: POSITIVE   Leuk Esterase: Trace / RBC: Many /HPF / WBC 5-10 /HPF   Sq Epi: x / Non Sq Epi: 5-10 /HPF / Bacteria: Present /HPF                RADIOLOGY & ADDITIONAL STUDIES (The following images were personally reviewed):

## 2019-12-20 NOTE — PROCEDURE NOTE - NSPROCDETAILS_GEN_ALL_CORE
percutaneous/Seldinger technique/sterile dressing applied/supine position/ultrasound assessment of fluid (size)/dressing applied/secured in place/ultrasound assessment of fluid (location)/Large simple/thoracostomy tube placed percutaneously

## 2019-12-20 NOTE — PROGRESS NOTE ADULT - SUBJECTIVE AND OBJECTIVE BOX
INTERVAL HPI/OVERNIGHT EVENTS: Jovan reg diet, burning and itching w/ urination, UA +Nitrite, blood and trace leuk esterase. Urine cx pending. Given pyridium. VSS.     SUBJECTIVE: Pt seen and examined at bedside this am by surgery team. Tolerating diet, pain well controlled. Denies f/n/v/cp/sob.    MEDICATIONS  (STANDING):  budesonide 160 MICROgram(s)/formoterol 4.5 MICROgram(s) Inhaler 2 Puff(s) Inhalation two times a day  dextrose 5%. 1000 milliLiter(s) (50 mL/Hr) IV Continuous <Continuous>  dextrose 50% Injectable 12.5 Gram(s) IV Push once  dextrose 50% Injectable 25 Gram(s) IV Push once  dextrose 50% Injectable 25 Gram(s) IV Push once  ferrous    sulfate 325 milliGRAM(s) Oral daily  insulin lispro (HumaLOG) corrective regimen sliding scale   SubCutaneous Before meals and at bedtime  lisinopril 20 milliGRAM(s) Oral daily  metoprolol tartrate 50 milliGRAM(s) Oral two times a day  pantoprazole    Tablet 40 milliGRAM(s) Oral before breakfast  polyethylene glycol 3350 17 Gram(s) Oral at bedtime    MEDICATIONS  (PRN):  dextrose 40% Gel 15 Gram(s) Oral once PRN Blood Glucose LESS THAN 70 milliGRAM(s)/deciliter  glucagon  Injectable 1 milliGRAM(s) IntraMuscular once PRN Glucose LESS THAN 70 milligrams/deciliter  HYDROmorphone  Injectable 0.5 milliGRAM(s) IV Push every 4 hours PRN Severe Pain (7 - 10)  ondansetron Injectable 4 milliGRAM(s) IV Push every 6 hours PRN Nausea      Vital Signs Last 24 Hrs  T(C): 36.9 (20 Dec 2019 11:20), Max: 37.2 (20 Dec 2019 04:45)  T(F): 98.5 (20 Dec 2019 11:20), Max: 99 (20 Dec 2019 04:45)  HR: 92 (20 Dec 2019 11:20) (74 - 94)  BP: 139/88 (20 Dec 2019 11:20) (115/73 - 139/88)  BP(mean): 105 (20 Dec 2019 11:20) (89 - 105)  RR: 16 (20 Dec 2019 11:20) (16 - 20)  SpO2: 98% (20 Dec 2019 11:20) (93% - 100%)    PHYSICAL EXAM:    Constitutional: A&Ox3, NAD     Respiratory: non labored breathing, no respiratory distress    Cardiovascular: NSR, RRR    Gastrointestinal: abdomen soft, obese, minimal distention, mild RUQ and epigastric tenderness, negative Pearson's sign, no rebound, no guarding. Previous lap incision sites C/D/I without erythema, induration or drainage    Extremities: wwp, no calf tenderness or edema. SCDs in place     I&O's Detail    19 Dec 2019 07:01  -  20 Dec 2019 07:00  --------------------------------------------------------  IN:    Albumin 5%  - 250 mL: 124.8 mL  Total IN: 124.8 mL    OUT:    Voided: 200 mL  Total OUT: 200 mL    Total NET: -75.2 mL      20 Dec 2019 07:01  -  20 Dec 2019 12:44  --------------------------------------------------------  IN:    Oral Fluid: 320 mL  Total IN: 320 mL    OUT:  Total OUT: 0 mL    Total NET: 320 mL          LABS:                        8.4    15.18 )-----------( 202      ( 20 Dec 2019 06:05 )             28.3     12-20    138  |  98  |  24<H>  ----------------------------<  109<H>  4.4   |  26  |  1.22    Ca    9.1      20 Dec 2019 06:05  Phos  2.9     12-20  Mg     2.1     12-20    TPro  6.4  /  Alb  3.6  /  TBili  1.6<H>  /  DBili  0.7<H>  /  AST  1268<H>  /  ALT  1215<H>  /  AlkPhos  115  12-20    PT/INR - ( 20 Dec 2019 06:05 )   PT: 41.7 sec;   INR: 3.55          PTT - ( 19 Dec 2019 05:47 )  PTT:31.0 sec  Urinalysis Basic - ( 19 Dec 2019 23:44 )    Color: Yellow / Appearance: Clear / SG: >=1.030 / pH: x  Gluc: x / Ketone: NEGATIVE  / Bili: Moderate / Urobili: 0.2 E.U./dL   Blood: x / Protein: 30 mg/dL / Nitrite: POSITIVE   Leuk Esterase: Trace / RBC: Many /HPF / WBC 5-10 /HPF   Sq Epi: x / Non Sq Epi: 5-10 /HPF / Bacteria: Present /HPF        RADIOLOGY & ADDITIONAL STUDIES:

## 2019-12-20 NOTE — PROGRESS NOTE ADULT - PROBLEM SELECTOR PLAN 1
-CT scan noted, Large right sided effusion noted on CT scan, no evidence of PE. The IVC mass is seen up into the right atrium.   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 This is likely the case this time as well.   -A CT scan post thoracentesis w/ IV contrast was done last admission to [facility] for parenchymal or pleural disease that would explain the effusion and was negative except form some mild ground glass from re-expansion pulmonary edema.   -INR still elevated today at 3.5     Recommend:  -Plan is for bedside chest tube placement today with the possibility for a more long term option such a pleurX early next week if in line with the plan from a surgical standpoint, will discuss with Dr. Howe.   -Will administer FFP prior to procedure.

## 2019-12-21 LAB
ALBUMIN SERPL ELPH-MCNC: 3.6 G/DL — SIGNIFICANT CHANGE UP (ref 3.3–5)
ALP SERPL-CCNC: 147 U/L — HIGH (ref 40–120)
ALT FLD-CCNC: 1119 U/L — HIGH (ref 10–45)
ANION GAP SERPL CALC-SCNC: 13 MMOL/L — SIGNIFICANT CHANGE UP (ref 5–17)
AST SERPL-CCNC: 1016 U/L — HIGH (ref 10–40)
BILIRUB SERPL-MCNC: 1.9 MG/DL — HIGH (ref 0.2–1.2)
BUN SERPL-MCNC: 25 MG/DL — HIGH (ref 7–23)
CALCIUM SERPL-MCNC: 9.1 MG/DL — SIGNIFICANT CHANGE UP (ref 8.4–10.5)
CHLORIDE SERPL-SCNC: 95 MMOL/L — LOW (ref 96–108)
CO2 SERPL-SCNC: 25 MMOL/L — SIGNIFICANT CHANGE UP (ref 22–31)
CREAT SERPL-MCNC: 1.19 MG/DL — SIGNIFICANT CHANGE UP (ref 0.5–1.3)
GLUCOSE BLDC GLUCOMTR-MCNC: 102 MG/DL — HIGH (ref 70–99)
GLUCOSE BLDC GLUCOMTR-MCNC: 107 MG/DL — HIGH (ref 70–99)
GLUCOSE BLDC GLUCOMTR-MCNC: 135 MG/DL — HIGH (ref 70–99)
GLUCOSE BLDC GLUCOMTR-MCNC: 141 MG/DL — HIGH (ref 70–99)
GLUCOSE SERPL-MCNC: 106 MG/DL — HIGH (ref 70–99)
HCT VFR BLD CALC: 31.2 % — LOW (ref 34.5–45)
HGB BLD-MCNC: 8.8 G/DL — LOW (ref 11.5–15.5)
INR BLD: 2.63 — HIGH (ref 0.88–1.16)
MAGNESIUM SERPL-MCNC: 2 MG/DL — SIGNIFICANT CHANGE UP (ref 1.6–2.6)
MCHC RBC-ENTMCNC: 20.2 PG — LOW (ref 27–34)
MCHC RBC-ENTMCNC: 28.2 GM/DL — LOW (ref 32–36)
MCV RBC AUTO: 71.6 FL — LOW (ref 80–100)
NRBC # BLD: 4 /100 WBCS — HIGH (ref 0–0)
PHOSPHATE SERPL-MCNC: 3 MG/DL — SIGNIFICANT CHANGE UP (ref 2.5–4.5)
PLATELET # BLD AUTO: 203 K/UL — SIGNIFICANT CHANGE UP (ref 150–400)
POTASSIUM SERPL-MCNC: 4.8 MMOL/L — SIGNIFICANT CHANGE UP (ref 3.5–5.3)
POTASSIUM SERPL-SCNC: 4.8 MMOL/L — SIGNIFICANT CHANGE UP (ref 3.5–5.3)
PROT SERPL-MCNC: 6.5 G/DL — SIGNIFICANT CHANGE UP (ref 6–8.3)
PROTHROM AB SERPL-ACNC: 30.6 SEC — HIGH (ref 10–12.9)
RBC # BLD: 4.36 M/UL — SIGNIFICANT CHANGE UP (ref 3.8–5.2)
RBC # FLD: 21.4 % — HIGH (ref 10.3–14.5)
SODIUM SERPL-SCNC: 133 MMOL/L — LOW (ref 135–145)
WBC # BLD: 13.24 K/UL — HIGH (ref 3.8–10.5)
WBC # FLD AUTO: 13.24 K/UL — HIGH (ref 3.8–10.5)

## 2019-12-21 PROCEDURE — 71045 X-RAY EXAM CHEST 1 VIEW: CPT | Mod: 26

## 2019-12-21 RX ORDER — OXYCODONE HYDROCHLORIDE 5 MG/1
10 TABLET ORAL EVERY 4 HOURS
Refills: 0 | Status: DISCONTINUED | OUTPATIENT
Start: 2019-12-21 | End: 2019-12-23

## 2019-12-21 RX ORDER — HYDROMORPHONE HYDROCHLORIDE 2 MG/ML
0.5 INJECTION INTRAMUSCULAR; INTRAVENOUS; SUBCUTANEOUS EVERY 4 HOURS
Refills: 0 | Status: DISCONTINUED | OUTPATIENT
Start: 2019-12-21 | End: 2019-12-26

## 2019-12-21 RX ORDER — HYDROMORPHONE HYDROCHLORIDE 2 MG/ML
0.5 INJECTION INTRAMUSCULAR; INTRAVENOUS; SUBCUTANEOUS ONCE
Refills: 0 | Status: DISCONTINUED | OUTPATIENT
Start: 2019-12-21 | End: 2019-12-21

## 2019-12-21 RX ORDER — OXYCODONE HYDROCHLORIDE 5 MG/1
5 TABLET ORAL EVERY 4 HOURS
Refills: 0 | Status: DISCONTINUED | OUTPATIENT
Start: 2019-12-21 | End: 2019-12-23

## 2019-12-21 RX ORDER — LIDOCAINE 4 G/100G
2 CREAM TOPICAL ONCE
Refills: 0 | Status: COMPLETED | OUTPATIENT
Start: 2019-12-21 | End: 2019-12-21

## 2019-12-21 RX ADMIN — HYDROMORPHONE HYDROCHLORIDE 0.5 MILLIGRAM(S): 2 INJECTION INTRAMUSCULAR; INTRAVENOUS; SUBCUTANEOUS at 06:09

## 2019-12-21 RX ADMIN — OXYCODONE HYDROCHLORIDE 5 MILLIGRAM(S): 5 TABLET ORAL at 13:46

## 2019-12-21 RX ADMIN — HYDROMORPHONE HYDROCHLORIDE 0.5 MILLIGRAM(S): 2 INJECTION INTRAMUSCULAR; INTRAVENOUS; SUBCUTANEOUS at 17:16

## 2019-12-21 RX ADMIN — OXYCODONE HYDROCHLORIDE 10 MILLIGRAM(S): 5 TABLET ORAL at 22:15

## 2019-12-21 RX ADMIN — HYDROMORPHONE HYDROCHLORIDE 0.5 MILLIGRAM(S): 2 INJECTION INTRAMUSCULAR; INTRAVENOUS; SUBCUTANEOUS at 03:48

## 2019-12-21 RX ADMIN — OXYCODONE HYDROCHLORIDE 5 MILLIGRAM(S): 5 TABLET ORAL at 14:33

## 2019-12-21 RX ADMIN — HYDROMORPHONE HYDROCHLORIDE 0.5 MILLIGRAM(S): 2 INJECTION INTRAMUSCULAR; INTRAVENOUS; SUBCUTANEOUS at 04:29

## 2019-12-21 RX ADMIN — HYDROMORPHONE HYDROCHLORIDE 0.5 MILLIGRAM(S): 2 INJECTION INTRAMUSCULAR; INTRAVENOUS; SUBCUTANEOUS at 11:01

## 2019-12-21 RX ADMIN — BUDESONIDE AND FORMOTEROL FUMARATE DIHYDRATE 2 PUFF(S): 160; 4.5 AEROSOL RESPIRATORY (INHALATION) at 09:02

## 2019-12-21 RX ADMIN — PANTOPRAZOLE SODIUM 40 MILLIGRAM(S): 20 TABLET, DELAYED RELEASE ORAL at 06:10

## 2019-12-21 RX ADMIN — Medication 325 MILLIGRAM(S): at 12:16

## 2019-12-21 RX ADMIN — LIDOCAINE 2 PATCH: 4 CREAM TOPICAL at 21:36

## 2019-12-21 RX ADMIN — BUDESONIDE AND FORMOTEROL FUMARATE DIHYDRATE 2 PUFF(S): 160; 4.5 AEROSOL RESPIRATORY (INHALATION) at 21:37

## 2019-12-21 RX ADMIN — OXYCODONE HYDROCHLORIDE 10 MILLIGRAM(S): 5 TABLET ORAL at 21:37

## 2019-12-21 RX ADMIN — LISINOPRIL 20 MILLIGRAM(S): 2.5 TABLET ORAL at 06:10

## 2019-12-21 RX ADMIN — Medication 50 MILLIGRAM(S): at 17:32

## 2019-12-21 RX ADMIN — HYDROMORPHONE HYDROCHLORIDE 0.5 MILLIGRAM(S): 2 INJECTION INTRAMUSCULAR; INTRAVENOUS; SUBCUTANEOUS at 10:29

## 2019-12-21 RX ADMIN — HYDROMORPHONE HYDROCHLORIDE 0.5 MILLIGRAM(S): 2 INJECTION INTRAMUSCULAR; INTRAVENOUS; SUBCUTANEOUS at 07:29

## 2019-12-21 RX ADMIN — HYDROMORPHONE HYDROCHLORIDE 0.5 MILLIGRAM(S): 2 INJECTION INTRAMUSCULAR; INTRAVENOUS; SUBCUTANEOUS at 18:11

## 2019-12-21 NOTE — PROGRESS NOTE ADULT - SUBJECTIVE AND OBJECTIVE BOX
INTERVAL HPI: Patient seen and examined at bedside by chief resident. Serial chest xrays yesterday evening/last night did not demonstrate any evidence of pneumothorax. Denies any dyspnea but is experiencing chest pain at the chest tube site. Has not experienced any dysuria today.     lisinopril 20  metoprolol tartrate 50        Vital Signs Last 24 Hrs  T(C): 36.7 (21 Dec 2019 09:05), Max: 37.1 (20 Dec 2019 14:11)  T(F): 98 (21 Dec 2019 09:05), Max: 98.7 (20 Dec 2019 14:11)  HR: 80 (21 Dec 2019 04:00) (80 - 92)  BP: 117/73 (21 Dec 2019 04:00) (110/67 - 130/52)  BP(mean): 88 (21 Dec 2019 04:00) (75 - 89)  RR: 16 (21 Dec 2019 04:00) (16 - 18)  SpO2: 96% (21 Dec 2019 04:00) (96% - 100%)  I&O's Summary    20 Dec 2019 07:01  -  21 Dec 2019 07:00  --------------------------------------------------------  IN: 1315 mL / OUT: 350 mL / NET: 965 mL    21 Dec 2019 07:01  -  21 Dec 2019 12:46  --------------------------------------------------------  IN: 0 mL / OUT: 125 mL / NET: -125 mL        Physical Exam:  General: NAD  Pulmonary: Nonlabored breathing, no respiratory distress  Cardiovascular: RRR  Abdominal: Soft, obese, minimal distention, mild RUQ and epigastric tenderness, negative Pearson's sign, no rebound, no guarding. Previous lap incision sites C/D/I without erythema, induration or drainage  Extremities: WWP, 1-2+ pitting edema bilaterally        LABS:                        8.8    13.24 )-----------( 203      ( 21 Dec 2019 06:01 )             31.2     12-21    133<L>  |  95<L>  |  25<H>  ----------------------------<  106<H>  4.8   |  25  |  1.19    Ca    9.1      21 Dec 2019 06:01  Phos  3.0     12-21  Mg     2.0     12-21    TPro  6.5  /  Alb  3.6  /  TBili  1.9<H>  /  DBili  x   /  AST  1016<H>  /  ALT  1119<H>  /  AlkPhos  147<H>  12-21    PT/INR - ( 21 Dec 2019 06:01 )   PT: 30.6 sec;   INR: 2.63                Assessment and Plan:

## 2019-12-21 NOTE — PROGRESS NOTE ADULT - PROBLEM SELECTOR PLAN 1
-CT scan noted, Large right sided effusion noted on CT scan, no evidence of PE. The IVC mass is seen up into the right atrium.   - exudative effusion, lymphocytic predominant.   -A CT scan post thoracentesis w/ IV contrast was done last admission to [facility] for parenchymal or pleural disease that would explain the effusion and was negative except form some mild ground glass from re-expansion pulmonary edema.     Recommend:  - keep chest tube clamped for now.   - plans for pleur-ex on monday, will finalize if OK with Dr Howe.

## 2019-12-21 NOTE — PROGRESS NOTE ADULT - PROBLEM SELECTOR PLAN 4
-IVC clot, pt had been on eliquis.  -Trend INR.  -May consider restarting heparin gtt after chest tube without a bolus and observing closely.

## 2019-12-21 NOTE — PROGRESS NOTE ADULT - SUBJECTIVE AND OBJECTIVE BOX
PULMONARY CONSULT FOLLOW-UP NOTE    INTERVAL HPI:  Reviewed chart and overnight events; patient seen and examined at bedside. patient denies any acute events overnight. Breathing is stable, denies any fever/chills.     MEDICATIONS:  Pulmonary:  budesonide 160 MICROgram(s)/formoterol 4.5 MICROgram(s) Inhaler 2 Puff(s) Inhalation two times a day    Antimicrobials:    Anticoagulants:    Cardiac:  lisinopril 20 milliGRAM(s) Oral daily  metoprolol tartrate 50 milliGRAM(s) Oral two times a day      Allergies    No Known Drug Allergies  shellfish (Hives)    Intolerances        Vital Signs Last 24 Hrs  T(C): 37.3 (21 Dec 2019 17:48), Max: 37.3 (21 Dec 2019 17:48)  T(F): 99.1 (21 Dec 2019 17:48), Max: 99.1 (21 Dec 2019 17:48)  HR: 108 (21 Dec 2019 20:10) (80 - 108)  BP: 112/62 (21 Dec 2019 20:10) (110/67 - 117/73)  BP(mean): 81 (21 Dec 2019 20:10) (78 - 88)  RR: 16 (21 Dec 2019 20:10) (16 - 16)  SpO2: 97% (21 Dec 2019 20:10) (96% - 98%)    12-20 @ 07:01  -  12-21 @ 07:00  --------------------------------------------------------  IN: 1315 mL / OUT: 350 mL / NET: 965 mL    12-21 @ 07:01  -  12-21 @ 21:41  --------------------------------------------------------  IN: 0 mL / OUT: 125 mL / NET: -125 mL          PHYSICAL EXAM:  Constitutional: WDWN  HEENT: NC/AT; PERRL, anicteric sclera; MMM  Neck: supple  Cardiovascular: +S1/S2, RRR  Respiratory: decreased breath sounds right side.   Gastrointestinal: soft, NT/ND; +BSx4  Extremities: WWP; no edema, clubbing or cyanosis  Vascular: 2+ radial, DP/PT pulses B/L  Neurological: AAOx3; no focal deficits    LABS:      CBC Full  -  ( 21 Dec 2019 06:01 )  WBC Count : 13.24 K/uL  RBC Count : 4.36 M/uL  Hemoglobin : 8.8 g/dL  Hematocrit : 31.2 %  Platelet Count - Automated : 203 K/uL  Mean Cell Volume : 71.6 fl  Mean Cell Hemoglobin : 20.2 pg  Mean Cell Hemoglobin Concentration : 28.2 gm/dL  Auto Neutrophil # : x  Auto Lymphocyte # : x  Auto Monocyte # : x  Auto Eosinophil # : x  Auto Basophil # : x  Auto Neutrophil % : x  Auto Lymphocyte % : x  Auto Monocyte % : x  Auto Eosinophil % : x  Auto Basophil % : x    12-21    133<L>  |  95<L>  |  25<H>  ----------------------------<  106<H>  4.8   |  25  |  1.19    Ca    9.1      21 Dec 2019 06:01  Phos  3.0     12-21  Mg     2.0     12-21    TPro  6.5  /  Alb  3.6  /  TBili  1.9<H>  /  DBili  x   /  AST  1016<H>  /  ALT  1119<H>  /  AlkPhos  147<H>  12-21    PT/INR - ( 21 Dec 2019 06:01 )   PT: 30.6 sec;   INR: 2.63                Urinalysis Basic - ( 19 Dec 2019 23:44 )    Color: Yellow / Appearance: Clear / SG: >=1.030 / pH: x  Gluc: x / Ketone: NEGATIVE  / Bili: Moderate / Urobili: 0.2 E.U./dL   Blood: x / Protein: 30 mg/dL / Nitrite: POSITIVE   Leuk Esterase: Trace / RBC: Many /HPF / WBC 5-10 /HPF   Sq Epi: x / Non Sq Epi: 5-10 /HPF / Bacteria: Present /HPF                RADIOLOGY & ADDITIONAL STUDIES:

## 2019-12-22 LAB
-  AMPICILLIN: SIGNIFICANT CHANGE UP
-  CIPROFLOXACIN: SIGNIFICANT CHANGE UP
-  LEVOFLOXACIN: SIGNIFICANT CHANGE UP
-  NITROFURANTOIN: SIGNIFICANT CHANGE UP
-  TETRACYCLINE: SIGNIFICANT CHANGE UP
-  VANCOMYCIN: SIGNIFICANT CHANGE UP
ALBUMIN SERPL ELPH-MCNC: 3.4 G/DL — SIGNIFICANT CHANGE UP (ref 3.3–5)
ALP SERPL-CCNC: 137 U/L — HIGH (ref 40–120)
ALT FLD-CCNC: 1073 U/L — HIGH (ref 10–45)
ANION GAP SERPL CALC-SCNC: 12 MMOL/L — SIGNIFICANT CHANGE UP (ref 5–17)
ANION GAP SERPL CALC-SCNC: 18 MMOL/L — HIGH (ref 5–17)
APPEARANCE UR: ABNORMAL
APPEARANCE UR: CLEAR — SIGNIFICANT CHANGE UP
AST SERPL-CCNC: 845 U/L — HIGH (ref 10–40)
BILIRUB DIRECT SERPL-MCNC: 0.8 MG/DL — HIGH (ref 0–0.2)
BILIRUB INDIRECT FLD-MCNC: 1.2 MG/DL — HIGH (ref 0.2–1)
BILIRUB SERPL-MCNC: 2 MG/DL — HIGH (ref 0.2–1.2)
BILIRUB UR-MCNC: NEGATIVE — SIGNIFICANT CHANGE UP
BILIRUB UR-MCNC: NEGATIVE — SIGNIFICANT CHANGE UP
BUN SERPL-MCNC: 31 MG/DL — HIGH (ref 7–23)
BUN SERPL-MCNC: 32 MG/DL — HIGH (ref 7–23)
CALCIUM SERPL-MCNC: 8.4 MG/DL — SIGNIFICANT CHANGE UP (ref 8.4–10.5)
CALCIUM SERPL-MCNC: 9 MG/DL — SIGNIFICANT CHANGE UP (ref 8.4–10.5)
CHLORIDE SERPL-SCNC: 92 MMOL/L — LOW (ref 96–108)
CHLORIDE SERPL-SCNC: 94 MMOL/L — LOW (ref 96–108)
CHLORIDE UR-SCNC: 20 MMOL/L — SIGNIFICANT CHANGE UP
CO2 SERPL-SCNC: 22 MMOL/L — SIGNIFICANT CHANGE UP (ref 22–31)
CO2 SERPL-SCNC: 23 MMOL/L — SIGNIFICANT CHANGE UP (ref 22–31)
COLOR SPEC: YELLOW — SIGNIFICANT CHANGE UP
COLOR SPEC: YELLOW — SIGNIFICANT CHANGE UP
CREAT ?TM UR-MCNC: 237 MG/DL — SIGNIFICANT CHANGE UP
CREAT ?TM UR-MCNC: 277 MG/DL — SIGNIFICANT CHANGE UP
CREAT SERPL-MCNC: 1.28 MG/DL — SIGNIFICANT CHANGE UP (ref 0.5–1.3)
CREAT SERPL-MCNC: 1.41 MG/DL — HIGH (ref 0.5–1.3)
CULTURE RESULTS: SIGNIFICANT CHANGE UP
DIFF PNL FLD: ABNORMAL
DIFF PNL FLD: ABNORMAL
GLUCOSE BLDC GLUCOMTR-MCNC: 105 MG/DL — HIGH (ref 70–99)
GLUCOSE BLDC GLUCOMTR-MCNC: 109 MG/DL — HIGH (ref 70–99)
GLUCOSE BLDC GLUCOMTR-MCNC: 135 MG/DL — HIGH (ref 70–99)
GLUCOSE BLDC GLUCOMTR-MCNC: 136 MG/DL — HIGH (ref 70–99)
GLUCOSE SERPL-MCNC: 116 MG/DL — HIGH (ref 70–99)
GLUCOSE SERPL-MCNC: 137 MG/DL — HIGH (ref 70–99)
GLUCOSE UR QL: NEGATIVE — SIGNIFICANT CHANGE UP
GLUCOSE UR QL: NEGATIVE — SIGNIFICANT CHANGE UP
HCT VFR BLD CALC: 28.9 % — LOW (ref 34.5–45)
HGB BLD-MCNC: 8.8 G/DL — LOW (ref 11.5–15.5)
KETONES UR-MCNC: NEGATIVE — SIGNIFICANT CHANGE UP
KETONES UR-MCNC: NEGATIVE — SIGNIFICANT CHANGE UP
LEUKOCYTE ESTERASE UR-ACNC: ABNORMAL
LEUKOCYTE ESTERASE UR-ACNC: ABNORMAL
MAGNESIUM SERPL-MCNC: 2.1 MG/DL — SIGNIFICANT CHANGE UP (ref 1.6–2.6)
MCHC RBC-ENTMCNC: 20.6 PG — LOW (ref 27–34)
MCHC RBC-ENTMCNC: 30.4 GM/DL — LOW (ref 32–36)
MCV RBC AUTO: 67.7 FL — LOW (ref 80–100)
METHOD TYPE: SIGNIFICANT CHANGE UP
NITRITE UR-MCNC: NEGATIVE — SIGNIFICANT CHANGE UP
NITRITE UR-MCNC: POSITIVE
NRBC # BLD: 3 /100 WBCS — HIGH (ref 0–0)
ORGANISM # SPEC MICROSCOPIC CNT: SIGNIFICANT CHANGE UP
ORGANISM # SPEC MICROSCOPIC CNT: SIGNIFICANT CHANGE UP
OSMOLALITY UR: 317 MOSMOL/KG — SIGNIFICANT CHANGE UP (ref 100–650)
OSMOLALITY UR: 415 MOSMOL/KG — SIGNIFICANT CHANGE UP (ref 100–650)
PH UR: 5.5 — SIGNIFICANT CHANGE UP (ref 5–8)
PHOSPHATE SERPL-MCNC: 3.4 MG/DL — SIGNIFICANT CHANGE UP (ref 2.5–4.5)
PLATELET # BLD AUTO: 190 K/UL — SIGNIFICANT CHANGE UP (ref 150–400)
POTASSIUM SERPL-MCNC: 4.5 MMOL/L — SIGNIFICANT CHANGE UP (ref 3.5–5.3)
POTASSIUM SERPL-MCNC: 4.6 MMOL/L — SIGNIFICANT CHANGE UP (ref 3.5–5.3)
POTASSIUM SERPL-SCNC: 4.5 MMOL/L — SIGNIFICANT CHANGE UP (ref 3.5–5.3)
POTASSIUM SERPL-SCNC: 4.6 MMOL/L — SIGNIFICANT CHANGE UP (ref 3.5–5.3)
POTASSIUM UR-SCNC: 40 MMOL/L — SIGNIFICANT CHANGE UP
POTASSIUM UR-SCNC: 49 MMOL/L — SIGNIFICANT CHANGE UP
PROT ?TM UR-MCNC: 44 MG/DL — HIGH (ref 0–12)
PROT SERPL-MCNC: 6 G/DL — SIGNIFICANT CHANGE UP (ref 6–8.3)
PROT UR-MCNC: ABNORMAL MG/DL
PROT UR-MCNC: NEGATIVE MG/DL — SIGNIFICANT CHANGE UP
PROT/CREAT UR-RTO: 0.2 RATIO — SIGNIFICANT CHANGE UP (ref 0–0.2)
RBC # BLD: 4.27 M/UL — SIGNIFICANT CHANGE UP (ref 3.8–5.2)
RBC # FLD: 22.1 % — HIGH (ref 10.3–14.5)
SODIUM SERPL-SCNC: 129 MMOL/L — LOW (ref 135–145)
SODIUM SERPL-SCNC: 132 MMOL/L — LOW (ref 135–145)
SODIUM UR-SCNC: 20 MMOL/L — SIGNIFICANT CHANGE UP
SODIUM UR-SCNC: 20 MMOL/L — SIGNIFICANT CHANGE UP
SP GR SPEC: 1.02 — SIGNIFICANT CHANGE UP (ref 1–1.03)
SP GR SPEC: 1.02 — SIGNIFICANT CHANGE UP (ref 1–1.03)
SPECIMEN SOURCE: SIGNIFICANT CHANGE UP
UROBILINOGEN FLD QL: 0.2 E.U./DL — SIGNIFICANT CHANGE UP
UROBILINOGEN FLD QL: 1 E.U./DL — SIGNIFICANT CHANGE UP
WBC # BLD: 13.06 K/UL — HIGH (ref 3.8–10.5)
WBC # FLD AUTO: 13.06 K/UL — HIGH (ref 3.8–10.5)

## 2019-12-22 RX ORDER — NITROFURANTOIN MACROCRYSTAL 50 MG
100 CAPSULE ORAL
Refills: 0 | Status: DISCONTINUED | OUTPATIENT
Start: 2019-12-22 | End: 2019-12-22

## 2019-12-22 RX ORDER — CEFTRIAXONE 500 MG/1
1000 INJECTION, POWDER, FOR SOLUTION INTRAMUSCULAR; INTRAVENOUS ONCE
Refills: 0 | Status: COMPLETED | OUTPATIENT
Start: 2019-12-22 | End: 2019-12-22

## 2019-12-22 RX ORDER — LIDOCAINE 4 G/100G
1 CREAM TOPICAL ONCE
Refills: 0 | Status: COMPLETED | OUTPATIENT
Start: 2019-12-22 | End: 2019-12-22

## 2019-12-22 RX ORDER — FUROSEMIDE 40 MG
40 TABLET ORAL DAILY
Refills: 0 | Status: DISCONTINUED | OUTPATIENT
Start: 2019-12-22 | End: 2019-12-26

## 2019-12-22 RX ORDER — SODIUM CHLORIDE 9 MG/ML
1 INJECTION INTRAMUSCULAR; INTRAVENOUS; SUBCUTANEOUS THREE TIMES A DAY
Refills: 0 | Status: DISCONTINUED | OUTPATIENT
Start: 2019-12-22 | End: 2019-12-26

## 2019-12-22 RX ORDER — DIPHENHYDRAMINE HCL 50 MG
25 CAPSULE ORAL ONCE
Refills: 0 | Status: COMPLETED | OUTPATIENT
Start: 2019-12-22 | End: 2019-12-22

## 2019-12-22 RX ORDER — LIDOCAINE 4 G/100G
1 CREAM TOPICAL ONCE
Refills: 0 | Status: DISCONTINUED | OUTPATIENT
Start: 2019-12-22 | End: 2019-12-23

## 2019-12-22 RX ORDER — SODIUM CHLORIDE 9 MG/ML
1000 INJECTION INTRAMUSCULAR; INTRAVENOUS; SUBCUTANEOUS
Refills: 0 | Status: DISCONTINUED | OUTPATIENT
Start: 2019-12-22 | End: 2019-12-22

## 2019-12-22 RX ADMIN — Medication 25 MILLIGRAM(S): at 23:39

## 2019-12-22 RX ADMIN — LIDOCAINE 1 APPLICATION(S): 4 CREAM TOPICAL at 21:15

## 2019-12-22 RX ADMIN — OXYCODONE HYDROCHLORIDE 10 MILLIGRAM(S): 5 TABLET ORAL at 07:15

## 2019-12-22 RX ADMIN — Medication 100 MILLIGRAM(S): at 03:59

## 2019-12-22 RX ADMIN — LIDOCAINE 2 PATCH: 4 CREAM TOPICAL at 06:03

## 2019-12-22 RX ADMIN — HYDROMORPHONE HYDROCHLORIDE 0.5 MILLIGRAM(S): 2 INJECTION INTRAMUSCULAR; INTRAVENOUS; SUBCUTANEOUS at 03:37

## 2019-12-22 RX ADMIN — OXYCODONE HYDROCHLORIDE 10 MILLIGRAM(S): 5 TABLET ORAL at 07:00

## 2019-12-22 RX ADMIN — OXYCODONE HYDROCHLORIDE 10 MILLIGRAM(S): 5 TABLET ORAL at 02:17

## 2019-12-22 RX ADMIN — SODIUM CHLORIDE 1 GRAM(S): 9 INJECTION INTRAMUSCULAR; INTRAVENOUS; SUBCUTANEOUS at 15:26

## 2019-12-22 RX ADMIN — Medication 325 MILLIGRAM(S): at 12:22

## 2019-12-22 RX ADMIN — OXYCODONE HYDROCHLORIDE 10 MILLIGRAM(S): 5 TABLET ORAL at 13:30

## 2019-12-22 RX ADMIN — HYDROMORPHONE HYDROCHLORIDE 0.5 MILLIGRAM(S): 2 INJECTION INTRAMUSCULAR; INTRAVENOUS; SUBCUTANEOUS at 15:39

## 2019-12-22 RX ADMIN — Medication 40 MILLIGRAM(S): at 17:09

## 2019-12-22 RX ADMIN — CEFTRIAXONE 100 MILLIGRAM(S): 500 INJECTION, POWDER, FOR SOLUTION INTRAMUSCULAR; INTRAVENOUS at 09:57

## 2019-12-22 RX ADMIN — BUDESONIDE AND FORMOTEROL FUMARATE DIHYDRATE 2 PUFF(S): 160; 4.5 AEROSOL RESPIRATORY (INHALATION) at 09:04

## 2019-12-22 RX ADMIN — OXYCODONE HYDROCHLORIDE 10 MILLIGRAM(S): 5 TABLET ORAL at 01:30

## 2019-12-22 RX ADMIN — BUDESONIDE AND FORMOTEROL FUMARATE DIHYDRATE 2 PUFF(S): 160; 4.5 AEROSOL RESPIRATORY (INHALATION) at 21:16

## 2019-12-22 RX ADMIN — LIDOCAINE 1 PATCH: 4 CREAM TOPICAL at 23:38

## 2019-12-22 RX ADMIN — Medication 50 MILLIGRAM(S): at 18:50

## 2019-12-22 RX ADMIN — SODIUM CHLORIDE 1 GRAM(S): 9 INJECTION INTRAMUSCULAR; INTRAVENOUS; SUBCUTANEOUS at 21:13

## 2019-12-22 RX ADMIN — POLYETHYLENE GLYCOL 3350 17 GRAM(S): 17 POWDER, FOR SOLUTION ORAL at 21:13

## 2019-12-22 RX ADMIN — OXYCODONE HYDROCHLORIDE 10 MILLIGRAM(S): 5 TABLET ORAL at 14:30

## 2019-12-22 RX ADMIN — LIDOCAINE 2 PATCH: 4 CREAM TOPICAL at 10:25

## 2019-12-22 RX ADMIN — PANTOPRAZOLE SODIUM 40 MILLIGRAM(S): 20 TABLET, DELAYED RELEASE ORAL at 07:00

## 2019-12-22 RX ADMIN — HYDROMORPHONE HYDROCHLORIDE 0.5 MILLIGRAM(S): 2 INJECTION INTRAMUSCULAR; INTRAVENOUS; SUBCUTANEOUS at 03:18

## 2019-12-22 RX ADMIN — HYDROMORPHONE HYDROCHLORIDE 0.5 MILLIGRAM(S): 2 INJECTION INTRAMUSCULAR; INTRAVENOUS; SUBCUTANEOUS at 15:20

## 2019-12-22 NOTE — PROGRESS NOTE ADULT - SUBJECTIVE AND OBJECTIVE BOX
SUBJECTIVE: Patient seen and examined bedside by chief resident. Complaining of dysuria overnight; antibiotics started. Otherwise no complaints. No pain, no difficulty breathing, no fevers or chills.     cefTRIAXone   IVPB 1000 milliGRAM(s) IV Intermittent once  lisinopril 20 milliGRAM(s) Oral daily  metoprolol tartrate 50 milliGRAM(s) Oral two times a day    MEDICATIONS  (PRN):  dextrose 40% Gel 15 Gram(s) Oral once PRN Blood Glucose LESS THAN 70 milliGRAM(s)/deciliter  glucagon  Injectable 1 milliGRAM(s) IntraMuscular once PRN Glucose LESS THAN 70 milligrams/deciliter  HYDROmorphone  Injectable 0.5 milliGRAM(s) IV Push every 4 hours PRN Breakthrough pain  ondansetron Injectable 4 milliGRAM(s) IV Push every 6 hours PRN Nausea  oxyCODONE    IR 5 milliGRAM(s) Oral every 4 hours PRN Moderate Pain (4 - 6)  oxyCODONE    IR 10 milliGRAM(s) Oral every 4 hours PRN Severe Pain (7 - 10)      I&O's Detail    21 Dec 2019 07:01  -  22 Dec 2019 07:00  --------------------------------------------------------  IN:  Total IN: 0 mL    OUT:    Voided: 325 mL  Total OUT: 325 mL    Total NET: -325 mL          Vital Signs Last 24 Hrs  T(C): 36.8 (22 Dec 2019 04:55), Max: 37.3 (21 Dec 2019 17:48)  T(F): 98.2 (22 Dec 2019 04:55), Max: 99.1 (21 Dec 2019 17:48)  HR: 86 (22 Dec 2019 08:20) (86 - 108)  BP: 109/57 (22 Dec 2019 08:20) (105/69 - 119/64)  BP(mean): 77 (22 Dec 2019 08:20) (77 - 84)  RR: 18 (22 Dec 2019 08:20) (16 - 18)  SpO2: 95% (22 Dec 2019 04:11) (95% - 97%)    General: NAD, resting comfortably in bed  C/V: sinus tachy on tele  Pulm: Nonlabored breathing, no respiratory distress, CT clamped  Abd: soft, obese ND, NT, incisions c/d/i  Extrem: WWP, no edema, SCDs in place    LABS:                        8.8    13.06 )-----------( 190      ( 22 Dec 2019 06:22 )             28.9     12-22    129<L>  |  94<L>  |  31<H>  ----------------------------<  116<H>  4.6   |  23  |  1.41<H>    Ca    8.4      22 Dec 2019 06:22  Phos  3.4     12-22  Mg     2.1     12-22    TPro  6.0  /  Alb  3.4  /  TBili  2.0<H>  /  DBili  0.8<H>  /  AST  x   /  ALT  x   /  AlkPhos  137<H>  12-22    PT/INR - ( 21 Dec 2019 06:01 )   PT: 30.6 sec;   INR: 2.63                RADIOLOGY & ADDITIONAL STUDIES:      Culture - Body Fluid with Gram Stain (collected 12-20-19 @ 18:14)  Source: Pleural Fl right pleural fluid  Gram Stain (12-20-19 @ 21:14):    No organisms seen    Rare WBC's  Preliminary Report (12-21-19 @ 09:10):    No growth to date    Culture - Urine (collected 12-20-19 @ 08:48)  Source: .Urine None  Preliminary Report (12-21-19 @ 14:12):    20,000 CFU/ml Enterococcus faecalis    Susceptibility to follow.

## 2019-12-22 NOTE — CONSULT NOTE ADULT - SUBJECTIVE AND OBJECTIVE BOX
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 Howe, prelim biopsy spindle cell) now p/w progressively worsening SOB x4 days, achy periumbilical pain unchanged since laparoscopy, and elevated LFTs. CT demonstrating worsening of R pleural effusion and no PE. US w/ hepatomegaly and low flow in main portal vein likely 2/2 compression, no s/s of acute cholecystitis or choledocholithiasis. S/p bedside chest tube placement     	    PAST MEDICAL & SURGICAL HISTORY:  Uterine fibroid  Hyperlipidemia  Asthma  Diabetes  Hypertension  No significant past surgical history      Allergies    No Known Drug Allergies  shellfish (Hives)    Intolerances        FAMILY HISTORY: NIL CONTRIBUTORY      SOCIAL HISTORY:      MEDICATIONS  (STANDING):  budesonide 160 MICROgram(s)/formoterol 4.5 MICROgram(s) Inhaler 2 Puff(s) Inhalation two times a day  dextrose 5%. 1000 milliLiter(s) (50 mL/Hr) IV Continuous <Continuous>  dextrose 50% Injectable 12.5 Gram(s) IV Push once  dextrose 50% Injectable 25 Gram(s) IV Push once  dextrose 50% Injectable 25 Gram(s) IV Push once  ferrous    sulfate 325 milliGRAM(s) Oral daily  insulin lispro (HumaLOG) corrective regimen sliding scale   SubCutaneous Before meals and at bedtime  metoprolol tartrate 50 milliGRAM(s) Oral two times a day  pantoprazole    Tablet 40 milliGRAM(s) Oral before breakfast  polyethylene glycol 3350 17 Gram(s) Oral at bedtime  sodium chloride 1 Gram(s) Oral three times a day    MEDICATIONS  (PRN):  dextrose 40% Gel 15 Gram(s) Oral once PRN Blood Glucose LESS THAN 70 milliGRAM(s)/deciliter  glucagon  Injectable 1 milliGRAM(s) IntraMuscular once PRN Glucose LESS THAN 70 milligrams/deciliter  HYDROmorphone  Injectable 0.5 milliGRAM(s) IV Push every 4 hours PRN Breakthrough pain  ondansetron Injectable 4 milliGRAM(s) IV Push every 6 hours PRN Nausea  oxyCODONE    IR 5 milliGRAM(s) Oral every 4 hours PRN Moderate Pain (4 - 6)  oxyCODONE    IR 10 milliGRAM(s) Oral every 4 hours PRN Severe Pain (7 - 10)      Vital Signs Last 24 Hrs  T(C): 36.8 (22 Dec 2019 10:00), Max: 37.3 (21 Dec 2019 17:48)  T(F): 98.2 (22 Dec 2019 10:00), Max: 99.1 (21 Dec 2019 17:48)  HR: 74 (22 Dec 2019 12:18) (74 - 108)  BP: 124/63 (22 Dec 2019 12:18) (105/69 - 124/63)  BP(mean): 85 (22 Dec 2019 12:18) (77 - 85)  RR: 16 (22 Dec 2019 12:18) (16 - 18)  SpO2: 96% (22 Dec 2019 08:20) (95% - 97%)    REVIEW OF SYSTEMS:  Gen: No fever  CVS: No chest pain  Resp: No shortness of breath  Abd: No nausea/ no vomiting/No abdominal pain  CNS: No headache      PHYSICAL EXAM:  GENERAL: NAD, well-developed, well nourished, alert, awake, no acute distress at present  CVS: S1S2+  Pulm: Nonlabored breathing, no respiratory distress, CT clamped  Abd: soft, obese ND, NT, incisions c/d/i  Extrem: lower extremity edema 2+        CAPILLARY BLOOD GLUCOSE      POCT Blood Glucose.: 105 mg/dL (22 Dec 2019 12:20)  POCT Blood Glucose.: 109 mg/dL (22 Dec 2019 06:28)  POCT Blood Glucose.: 141 mg/dL (21 Dec 2019 21:21)  POCT Blood Glucose.: 102 mg/dL (21 Dec 2019 16:17)      I&O's Summary    21 Dec 2019 07:01  -  22 Dec 2019 07:00  --------------------------------------------------------  IN: 0 mL / OUT: 325 mL / NET: -325 mL    22 Dec 2019 07:01  -  22 Dec 2019 12:48  --------------------------------------------------------  IN: 50 mL / OUT: 150 mL / NET: -100 mL          LABS:                            8.8    13.06 )-----------( 190      ( 22 Dec 2019 06:22 )             28.9       PT/INR - ( 21 Dec 2019 06:01 )   PT: 30.6 sec;   INR: 2.63                Urinalysis Basic - ( 22 Dec 2019 11:44 )    Color: Yellow / Appearance: Clear / S.025 / pH: x  Gluc: x / Ketone: NEGATIVE  / Bili: Negative / Urobili: 1.0 E.U./dL   Blood: x / Protein: Trace mg/dL / Nitrite: POSITIVE   Leuk Esterase: Moderate / RBC: 5-10 /HPF / WBC Many /HPF   Sq Epi: x / Non Sq Epi: Many /HPF / Bacteria: Many /HPF        RADIOLOGY & ADDITIONAL TESTS:

## 2019-12-22 NOTE — CONSULT NOTE ADULT - ASSESSMENT
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 Howe, prelim biopsy spindle cell) now p/w progressively worsening SOB x4 days, achy periumbilical pain unchanged since laparoscopy, and elevated LFTs. CT demonstrating worsening of R pleural effusion and no PE. US w/ hepatomegaly and low flow in main portal vein likely 2/2 compression, no s/s of acute cholecystitis or choledocholithiasis. S/p bedside chest tube placement 12/20      # Nonoliguric TEDDY   - Ddx includes DAVID vs renal vein thrombus (high risk in pancreatic malignancy)  - Had CT angio on 12/18  - Cr 0.7 at baseline > 1.4 today  - please send for renal vein doppler, and renal sono  - repeat urinalysis: large blood , LE pos, many bacteria, many wbc - suggestive of asymptomatic UTI with mild proteinuria and hematuria, unlikely GN  - Send CK  - urine prot/creatinine ratio 0.2  - Avoid ACE/ARB/NSAIDS/Contrast/Metformin for now      # Hyponatremia - 129  - volume overloaded  - may be due to poor free water clearance - electrolyte free water clearance ~ 50%  - no neurological symptoms  - free water restriction  - Lasix 40 mg IV stat then daily 39F smoker PMHx DM, asthma, HTN, HLD, recent admission 12/5-12/12/19 for symptomatic dyspnea R pleural effusion s/p thoracentesis 12/5, workup for effusion with 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 Howe, prelim biopsy spindle cell) now p/w progressively worsening SOB x4 days, achy periumbilical pain unchanged since laparoscopy, and elevated LFTs. CT demonstrating worsening of R pleural effusion and no PE. US w/ hepatomegaly and low flow in main portal vein likely 2/2 compression, no s/s of acute cholecystitis or choledocholithiasis. S/p bedside chest tube placement 12/20      # Nonoliguric TEDDY   - Ddx includes DAVID vs renal vein thrombus (high risk in pancreatic malignancy)  - Had CT angio on 12/18  - Cr 0.7 at baseline > 1.4 today  - please send for renal vein doppler, and renal sono  - repeat urinalysis: large blood , LE pos, many bacteria, many wbc - suggestive of asymptomatic UTI with mild proteinuria and hematuria, unlikely GN  - Send CK  - urine prot/creatinine ratio 0.2  - Avoid ACE/ARB/NSAIDS/Contrast/Metformin for now      # Hyponatremia - 129  - volume overloaded  - may be due to poor free water clearance - electrolyte free water clearance ~ 50%  - no neurological symptoms  - free water restriction  - Lasix 40 mg IV stat then daily      # Anemia  - Hb 8.8  - send serum free light chains, serum immunofixation, SPEP, UPEP

## 2019-12-23 ENCOUNTER — APPOINTMENT (OUTPATIENT)
Dept: MRI IMAGING | Facility: HOSPITAL | Age: 39
End: 2019-12-23

## 2019-12-23 ENCOUNTER — APPOINTMENT (OUTPATIENT)
Dept: INTERVENTIONAL RADIOLOGY/VASCULAR | Facility: HOSPITAL | Age: 39
End: 2019-12-23

## 2019-12-23 DIAGNOSIS — N17.9 ACUTE KIDNEY FAILURE, UNSPECIFIED: ICD-10-CM

## 2019-12-23 LAB
% ALBUMIN: 51.9 % — SIGNIFICANT CHANGE UP
% ALPHA 1: 8 % — SIGNIFICANT CHANGE UP
% ALPHA 2: 10.7 % — SIGNIFICANT CHANGE UP
% BETA: 10.8 % — SIGNIFICANT CHANGE UP
% GAMMA: 18.6 % — SIGNIFICANT CHANGE UP
ALBUMIN SERPL ELPH-MCNC: 3 G/DL — LOW (ref 3.6–5.5)
ALBUMIN/GLOB SERPL ELPH: 1.1 RATIO — SIGNIFICANT CHANGE UP
ALPHA1 GLOB SERPL ELPH-MCNC: 0.5 G/DL — HIGH (ref 0.1–0.4)
ALPHA2 GLOB SERPL ELPH-MCNC: 0.6 G/DL — SIGNIFICANT CHANGE UP (ref 0.5–1)
ANION GAP SERPL CALC-SCNC: 17 MMOL/L — SIGNIFICANT CHANGE UP (ref 5–17)
ANION GAP SERPL CALC-SCNC: 17 MMOL/L — SIGNIFICANT CHANGE UP (ref 5–17)
APTT BLD: 29.1 SEC — SIGNIFICANT CHANGE UP (ref 27.5–36.3)
B PERT IGG+IGM PNL SER: SIGNIFICANT CHANGE UP
B-GLOBULIN SERPL ELPH-MCNC: 0.6 G/DL — SIGNIFICANT CHANGE UP (ref 0.5–1)
BUN SERPL-MCNC: 27 MG/DL — HIGH (ref 7–23)
BUN SERPL-MCNC: 33 MG/DL — HIGH (ref 7–23)
CALCIUM SERPL-MCNC: 8.9 MG/DL — SIGNIFICANT CHANGE UP (ref 8.4–10.5)
CALCIUM SERPL-MCNC: 9.4 MG/DL — SIGNIFICANT CHANGE UP (ref 8.4–10.5)
CHLORIDE SERPL-SCNC: 92 MMOL/L — LOW (ref 96–108)
CHLORIDE SERPL-SCNC: 95 MMOL/L — LOW (ref 96–108)
CHOLEST FLD-MCNC: 35 MG/DL — SIGNIFICANT CHANGE UP
CK SERPL-CCNC: 405 U/L — HIGH (ref 25–170)
CO2 SERPL-SCNC: 23 MMOL/L — SIGNIFICANT CHANGE UP (ref 22–31)
CO2 SERPL-SCNC: 23 MMOL/L — SIGNIFICANT CHANGE UP (ref 22–31)
COLOR FLD: SIGNIFICANT CHANGE UP
COMMENT - FLUIDS: SIGNIFICANT CHANGE UP
CREAT SERPL-MCNC: 1.11 MG/DL — SIGNIFICANT CHANGE UP (ref 0.5–1.3)
CREAT SERPL-MCNC: 1.28 MG/DL — SIGNIFICANT CHANGE UP (ref 0.5–1.3)
FLUID INTAKE SUBSTANCE CLASS: SIGNIFICANT CHANGE UP
FLUID SEGMENTED GRANULOCYTES: 14 % — SIGNIFICANT CHANGE UP
GAMMA GLOBULIN: 1.1 G/DL — SIGNIFICANT CHANGE UP (ref 0.6–1.6)
GLUCOSE BLDC GLUCOMTR-MCNC: 142 MG/DL — HIGH (ref 70–99)
GLUCOSE BLDC GLUCOMTR-MCNC: 157 MG/DL — HIGH (ref 70–99)
GLUCOSE BLDC GLUCOMTR-MCNC: 186 MG/DL — HIGH (ref 70–99)
GLUCOSE BLDC GLUCOMTR-MCNC: 204 MG/DL — HIGH (ref 70–99)
GLUCOSE BLDC GLUCOMTR-MCNC: 97 MG/DL — SIGNIFICANT CHANGE UP (ref 70–99)
GLUCOSE SERPL-MCNC: 118 MG/DL — HIGH (ref 70–99)
GLUCOSE SERPL-MCNC: 156 MG/DL — HIGH (ref 70–99)
HCT VFR BLD CALC: 29.9 % — LOW (ref 34.5–45)
HGB BLD-MCNC: 8.8 G/DL — LOW (ref 11.5–15.5)
INR BLD: 2.53 — HIGH (ref 0.88–1.16)
INTERPRETATION SERPL IFE-IMP: SIGNIFICANT CHANGE UP
KAPPA LC SER QL IFE: 3.38 MG/DL — HIGH (ref 0.33–1.94)
KAPPA/LAMBDA FREE LIGHT CHAIN RATIO, SERUM: 1.68 RATIO — HIGH (ref 0.26–1.65)
LAMBDA LC SER QL IFE: 2.01 MG/DL — SIGNIFICANT CHANGE UP (ref 0.57–2.63)
LYMPHOCYTES # FLD: 11 % — SIGNIFICANT CHANGE UP
MAGNESIUM SERPL-MCNC: 2.2 MG/DL — SIGNIFICANT CHANGE UP (ref 1.6–2.6)
MCHC RBC-ENTMCNC: 20.3 PG — LOW (ref 27–34)
MCHC RBC-ENTMCNC: 29.4 GM/DL — LOW (ref 32–36)
MCV RBC AUTO: 69.1 FL — LOW (ref 80–100)
MESOTHL CELL # FLD: 1 % — SIGNIFICANT CHANGE UP
MONOS+MACROS # FLD: 69 % — SIGNIFICANT CHANGE UP
NRBC # BLD: 2 /100 WBCS — HIGH (ref 0–0)
PHOSPHATE SERPL-MCNC: 3.1 MG/DL — SIGNIFICANT CHANGE UP (ref 2.5–4.5)
PLATELET # BLD AUTO: 196 K/UL — SIGNIFICANT CHANGE UP (ref 150–400)
POTASSIUM SERPL-MCNC: 4 MMOL/L — SIGNIFICANT CHANGE UP (ref 3.5–5.3)
POTASSIUM SERPL-MCNC: 4.5 MMOL/L — SIGNIFICANT CHANGE UP (ref 3.5–5.3)
POTASSIUM SERPL-SCNC: 4 MMOL/L — SIGNIFICANT CHANGE UP (ref 3.5–5.3)
POTASSIUM SERPL-SCNC: 4.5 MMOL/L — SIGNIFICANT CHANGE UP (ref 3.5–5.3)
PROT PATTERN SERPL ELPH-IMP: SIGNIFICANT CHANGE UP
PROT SERPL-MCNC: 5.7 G/DL — LOW (ref 6–8.3)
PROT SERPL-MCNC: 5.7 G/DL — LOW (ref 6–8.3)
PROTHROM AB SERPL-ACNC: 29.4 SEC — HIGH (ref 10–12.9)
RBC # BLD: 4.33 M/UL — SIGNIFICANT CHANGE UP (ref 3.8–5.2)
RBC # FLD: 21.8 % — HIGH (ref 10.3–14.5)
RCV VOL RI: HIGH /UL (ref 0–0)
SODIUM SERPL-SCNC: 132 MMOL/L — LOW (ref 135–145)
SODIUM SERPL-SCNC: 135 MMOL/L — SIGNIFICANT CHANGE UP (ref 135–145)
SPECIMEN SOURCE FLD: SIGNIFICANT CHANGE UP
TOTAL NUCLEATED CELL COUNT, BODY FLUID: 95 /UL — SIGNIFICANT CHANGE UP
TRIGL FLD-MCNC: 40 MG/DL — SIGNIFICANT CHANGE UP
TUBE TYPE: SIGNIFICANT CHANGE UP
WBC # BLD: 12.33 K/UL — HIGH (ref 3.8–10.5)
WBC # FLD AUTO: 12.33 K/UL — HIGH (ref 3.8–10.5)

## 2019-12-23 PROCEDURE — 76775 US EXAM ABDO BACK WALL LIM: CPT | Mod: 26

## 2019-12-23 PROCEDURE — 71045 X-RAY EXAM CHEST 1 VIEW: CPT | Mod: 26

## 2019-12-23 PROCEDURE — 99232 SBSQ HOSP IP/OBS MODERATE 35: CPT | Mod: GC

## 2019-12-23 RX ORDER — LIDOCAINE 4 G/100G
2 CREAM TOPICAL EVERY 24 HOURS
Refills: 0 | Status: DISCONTINUED | OUTPATIENT
Start: 2019-12-23 | End: 2019-12-26

## 2019-12-23 RX ORDER — HYDROMORPHONE HYDROCHLORIDE 2 MG/ML
2 INJECTION INTRAMUSCULAR; INTRAVENOUS; SUBCUTANEOUS EVERY 4 HOURS
Refills: 0 | Status: DISCONTINUED | OUTPATIENT
Start: 2019-12-23 | End: 2019-12-26

## 2019-12-23 RX ORDER — ONDANSETRON 8 MG/1
4 TABLET, FILM COATED ORAL ONCE
Refills: 0 | Status: COMPLETED | OUTPATIENT
Start: 2019-12-23 | End: 2019-12-23

## 2019-12-23 RX ORDER — CYCLOBENZAPRINE HYDROCHLORIDE 10 MG/1
5 TABLET, FILM COATED ORAL THREE TIMES A DAY
Refills: 0 | Status: DISCONTINUED | OUTPATIENT
Start: 2019-12-23 | End: 2019-12-26

## 2019-12-23 RX ORDER — HYDROMORPHONE HYDROCHLORIDE 2 MG/ML
4 INJECTION INTRAMUSCULAR; INTRAVENOUS; SUBCUTANEOUS EVERY 4 HOURS
Refills: 0 | Status: DISCONTINUED | OUTPATIENT
Start: 2019-12-23 | End: 2019-12-26

## 2019-12-23 RX ADMIN — ONDANSETRON 4 MILLIGRAM(S): 8 TABLET, FILM COATED ORAL at 01:25

## 2019-12-23 RX ADMIN — BUDESONIDE AND FORMOTEROL FUMARATE DIHYDRATE 2 PUFF(S): 160; 4.5 AEROSOL RESPIRATORY (INHALATION) at 22:25

## 2019-12-23 RX ADMIN — HYDROMORPHONE HYDROCHLORIDE 4 MILLIGRAM(S): 2 INJECTION INTRAMUSCULAR; INTRAVENOUS; SUBCUTANEOUS at 23:04

## 2019-12-23 RX ADMIN — SODIUM CHLORIDE 1 GRAM(S): 9 INJECTION INTRAMUSCULAR; INTRAVENOUS; SUBCUTANEOUS at 06:56

## 2019-12-23 RX ADMIN — OXYCODONE HYDROCHLORIDE 5 MILLIGRAM(S): 5 TABLET ORAL at 01:28

## 2019-12-23 RX ADMIN — SODIUM CHLORIDE 1 GRAM(S): 9 INJECTION INTRAMUSCULAR; INTRAVENOUS; SUBCUTANEOUS at 14:31

## 2019-12-23 RX ADMIN — LIDOCAINE 1 PATCH: 4 CREAM TOPICAL at 11:46

## 2019-12-23 RX ADMIN — Medication 50 MILLIGRAM(S): at 06:56

## 2019-12-23 RX ADMIN — Medication 325 MILLIGRAM(S): at 11:39

## 2019-12-23 RX ADMIN — Medication 4: at 12:42

## 2019-12-23 RX ADMIN — Medication 2: at 06:56

## 2019-12-23 RX ADMIN — PANTOPRAZOLE SODIUM 40 MILLIGRAM(S): 20 TABLET, DELAYED RELEASE ORAL at 06:56

## 2019-12-23 RX ADMIN — OXYCODONE HYDROCHLORIDE 5 MILLIGRAM(S): 5 TABLET ORAL at 07:06

## 2019-12-23 RX ADMIN — Medication 50 MILLIGRAM(S): at 19:10

## 2019-12-23 RX ADMIN — LIDOCAINE 1 PATCH: 4 CREAM TOPICAL at 06:50

## 2019-12-23 RX ADMIN — Medication 40 MILLIGRAM(S): at 06:56

## 2019-12-23 RX ADMIN — OXYCODONE HYDROCHLORIDE 5 MILLIGRAM(S): 5 TABLET ORAL at 07:04

## 2019-12-23 RX ADMIN — BUDESONIDE AND FORMOTEROL FUMARATE DIHYDRATE 2 PUFF(S): 160; 4.5 AEROSOL RESPIRATORY (INHALATION) at 10:09

## 2019-12-23 RX ADMIN — HYDROMORPHONE HYDROCHLORIDE 4 MILLIGRAM(S): 2 INJECTION INTRAMUSCULAR; INTRAVENOUS; SUBCUTANEOUS at 22:54

## 2019-12-23 RX ADMIN — SODIUM CHLORIDE 1 GRAM(S): 9 INJECTION INTRAMUSCULAR; INTRAVENOUS; SUBCUTANEOUS at 21:47

## 2019-12-23 RX ADMIN — OXYCODONE HYDROCHLORIDE 5 MILLIGRAM(S): 5 TABLET ORAL at 01:35

## 2019-12-23 NOTE — DIETITIAN INITIAL EVALUATION ADULT. - PERTINENT MEDS FT
Humalog sliding scale, furosemide injectable, sodium chloride, polyethylene glycol 3350, ferrous sulfate, metoprolol tartrate, ondansetron injectable, pantoprazole

## 2019-12-23 NOTE — PROGRESS NOTE ADULT - PROBLEM SELECTOR PLAN 1
-CT scan noted, Large right sided effusion noted on CT scan, no evidence of PE. The IVC mass is seen up into the right atrium.   -Lymphocytic exudate, cytology pending.   -S/P Right sided chest tube placement.   -Bedside US done which shows significant right sided atelectasis with only a small simple effusion.     Recommend:  -The options were discussed with the patient in detail given her reluctance to proceed with PleurX catheter placement. The first option was that we keep the chest tube in place for now, and keep it clamped so that we can monitor how quickly fluid accumulates and if it does rapidly come back we could do a pleurX later this week. The second option was that we drain the fluid completely via chest tube, remove it, and the follow up w/ serial US as an out patient with the caveat that she will likely need an out patient thoracentesis at some point and possibly may wind up needing a pleurX in the end as well.   -The patient asked for time to weigh her options with her significant other.   -Case discussed w/ Dr. Novoa and the Surgical Team.

## 2019-12-23 NOTE — DIETITIAN INITIAL EVALUATION ADULT. - ENERGY NEEDS
Ht: 65" Wt(12/17): 220lbs IBW: 125lbs (+/-10%), 176%IBW, BMI: 36.6 kg/m2   IBW (56.6 kg) used for calculations as pt >120% of IBW.  Nutrient needs based on [facility] standards of care for adults. Needs adjusted for obesity.   Fluid needs deferred to team.

## 2019-12-23 NOTE — DIETITIAN INITIAL EVALUATION ADULT. - PERTINENT LABORATORY DATA
(12/23) POCT 157 (H), Na 132 (L), BUN 33 (H),glucose 156 (H), GFR 52 (L), (12/22) POCT 105-136(H), (12/21) AST/SGOT 1016 (H), ALT/SGPT 1119 (H), (12/7) A1c 6.1% (H)

## 2019-12-23 NOTE — PROGRESS NOTE ADULT - PROBLEM SELECTOR PLAN 1
# Nonoliguric TEDDY   Ddx includes DAVID vs renal vein thrombus (high risk in pancreatic malignancy)  TEDDY is improving, Cr downtrending to 1.28   - renal doppler US is pending   - Avoid ACE/ARB/NSAIDS/Contrast/Metformin for now  - Hyponatremia - mostly hypervolemic improving with diuresis, continue with 40 mg IV lasix daily   will follow

## 2019-12-23 NOTE — DIETITIAN INITIAL EVALUATION ADULT. - OTHER INFO
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 CTA Chest negative for PE. US w/ hepatomegaly and low flow in main portal vein likely 2/2 compression, no s/s of acute cholecystitis or choledocholithiasis. S/p chest tube placement 12/20. Renal consulted for hyponatremia and TEDDY.     Pt seen sitting up at the edge of bed, pleasant. Pt reports following a regular diet PTA, and requests to be on regular diet in house. Confirms allergy to shellfish. Denies chewing/swallowing difficulties. Pt seen by RD on previous admission (12/9/19), previously reported -222lbs, confirms the same during this visit. Pt noted w/ admit wt 220lbs (12/17) which is within UBW. Pt reports fair appetite at present, consuming >75% of meals. Denies N/V/D, reports +constipation. Reports hasn't had a BM since admission (12/17); Pt noted ordered for bowel regimen. +Pain around chest tube placement; receiving pain medication noted. Briefly discussed heart-healthy nutrition therapy and current diet order as pt appeared disinterested in nutrition education at this time. Discussed nutritional management of constipation. Though pt requests to remove CSTSHO restriction, pt agreeable to low-sodium diet at this time. RD to f/u per protocol. 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 CTA Chest negative for PE. US w/ hepatomegaly and low flow in main portal vein likely 2/2 compression, no s/s of acute cholecystitis or choledocholithiasis. S/p chest tube placement 12/20. Renal consulted for hyponatremia and TEDDY.     Pt seen sitting up at the edge of bed, pleasant. Pt reports following a regular diet PTA, and requests to be on regular diet in house. Confirms allergy to shellfish. Denies chewing/swallowing difficulties. Pt seen by RD on previous admission (12/9/19), previously reported -222lbs, confirms the same during this visit. Pt noted w/ admit wt 220lbs (12/17) which is within UBW. Pt was on CSTCHO diet at the time of assessment, consuming >75% of meals. Diet noted just changed to clear liquids. Denies N/V/D, reports +constipation. Reports hasn't had a BM since admission (12/17); Pt noted ordered for bowel regimen. +Pain around chest tube placement; receiving pain medication noted. Briefly discussed heart-healthy nutrition therapy and current diet order as pt appeared disinterested in nutrition education at this time. Discussed nutritional management of constipation. Though pt requests to remove CSTSHO restriction, pt agreeable to low-sodium diet at this time. RD to f/u per protocol.

## 2019-12-23 NOTE — PROGRESS NOTE ADULT - SUBJECTIVE AND OBJECTIVE BOX
O/N Events: URBAN  Subjective:   complaining of pain at chest tube insertion site, otherwise no complaints   TEDDY improved, Cr almost back to baseline, still mildly hyponatremic at 132     VITALS  Vital Signs Last 24 Hrs  T(C): 37.2 (23 Dec 2019 13:53), Max: 37.2 (23 Dec 2019 13:53)  T(F): 98.9 (23 Dec 2019 13:53), Max: 98.9 (23 Dec 2019 13:53)  HR: 90 (23 Dec 2019 12:30) (88 - 112)  BP: 119/67 (23 Dec 2019 12:30) (95/52 - 133/65)  BP(mean): 86 (23 Dec 2019 12:30) (69 - 95)  RR: 18 (23 Dec 2019 12:30) (16 - 20)  SpO2: 99% (23 Dec 2019 12:30) (98% - 100%)    PHYSICAL EXAM  General: NAD, sitting OOB in the chair sclera  Neck: Supple; no JVD  Respiratory: CTA B/L  Cardiovascular: Regular rhythm/rate; S1/S2; no MGR  Gastrointestinal: Soft; NTND  Extremities: WWP; 2+ pitting edema b/l   Neurological: non focal     MEDICATIONS  (STANDING):  budesonide 160 MICROgram(s)/formoterol 4.5 MICROgram(s) Inhaler 2 Puff(s) Inhalation two times a day  dextrose 5%. 1000 milliLiter(s) (50 mL/Hr) IV Continuous <Continuous>  dextrose 50% Injectable 12.5 Gram(s) IV Push once  dextrose 50% Injectable 25 Gram(s) IV Push once  dextrose 50% Injectable 25 Gram(s) IV Push once  ferrous    sulfate 325 milliGRAM(s) Oral daily  furosemide   Injectable 40 milliGRAM(s) IV Push daily  insulin lispro (HumaLOG) corrective regimen sliding scale   SubCutaneous Before meals and at bedtime  lidocaine   Patch 2 Patch Transdermal every 24 hours  metoprolol tartrate 50 milliGRAM(s) Oral two times a day  pantoprazole    Tablet 40 milliGRAM(s) Oral before breakfast  polyethylene glycol 3350 17 Gram(s) Oral at bedtime  pregabalin 25 milliGRAM(s) Oral three times a day  sodium chloride 1 Gram(s) Oral three times a day    MEDICATIONS  (PRN):  cyclobenzaprine 5 milliGRAM(s) Oral three times a day PRN Muscle Spasm  dextrose 40% Gel 15 Gram(s) Oral once PRN Blood Glucose LESS THAN 70 milliGRAM(s)/deciliter  glucagon  Injectable 1 milliGRAM(s) IntraMuscular once PRN Glucose LESS THAN 70 milligrams/deciliter  HYDROmorphone   Tablet 2 milliGRAM(s) Oral every 4 hours PRN Moderate Pain (4 - 6)  HYDROmorphone   Tablet 4 milliGRAM(s) Oral every 4 hours PRN Severe Pain (7 - 10)  HYDROmorphone  Injectable 0.5 milliGRAM(s) IV Push every 4 hours PRN Breakthrough pain  ondansetron Injectable 4 milliGRAM(s) IV Push every 6 hours PRN Nausea      LABS                        8.8    12.33 )-----------( 196      ( 23 Dec 2019 06:37 )             29.9     12    132<L>  |  92<L>  |  33<H>  ----------------------------<  156<H>  4.0   |  23  |  1.28    Ca    8.9      23 Dec 2019 06:37  Phos  3.1       Mg     2.2         TPro  5.7<L>  /  Alb  x   /  TBili  x   /  DBili  x   /  AST  x   /  ALT  x   /  AlkPhos  x       LIVER FUNCTIONS - ( 23 Dec 2019 11:05 )  Alb: x     / Pro: 5.7 g/dL / ALK PHOS: x     / ALT: x     / AST: x     / GGT: x           PT/INR - ( 23 Dec 2019 06:37 )   PT: 29.4 sec;   INR: 2.53          PTT - ( 23 Dec 2019 06:37 )  PTT:29.1 sec  Urinalysis Basic - ( 22 Dec 2019 18:21 )    Color: Yellow / Appearance: Cloudy / S.025 / pH: x  Gluc: x / Ketone: NEGATIVE  / Bili: Negative / Urobili: 0.2 E.U./dL   Blood: x / Protein: NEGATIVE mg/dL / Nitrite: NEGATIVE   Leuk Esterase: Small / RBC: 5-10 /HPF / WBC > 10 /HPF   Sq Epi: x / Non Sq Epi: 5-10 /HPF / Bacteria: Many /HPF      CARDIAC MARKERS ( 23 Dec 2019 06:37 )  x     / x     / 405 U/L / x     / x

## 2019-12-23 NOTE — CONSULT NOTE ADULT - SUBJECTIVE AND OBJECTIVE BOX
Pain Management Consult Note - [facility] [phone]      Chief Complaint: Pleural Effusion      HPI: Pateint seen and examined today, patient in nad. Patient with a history of uterine fibroid, diabetes, asthma, DVT, pleural effusion, SOB, s/p laparoscopy and mass biopsy, admitted with worsening SOB, periumbilical pain and elevated LFT's, now s/p chest tube placement. Patient reports pain to R chest tube site, pain described as burning, worsened with deep breathing, coughing and movement. Patient reports pain relief with lidocaine patch with Oxycodone Po however reports vomiting after last dose. Patient Axox3, no s/s of oversedation. Chest tube dressing c,d,i. Patient denies muscle spasms, numbness or tingling to extremities.         Pain is _x__ sharp ____dull _x__burning _x__achy ___ Intensity: __x__ mild __x_mod ___severe     Location ____surgical site ____cervical _____lumbar ____abd ____upper ext____lower ext  x__ R Chest    Worse with __x__activity _x___movement _____physical therapy___ Rest    Improved with _x___medication ___x_rest ____physical therapy      ROS: Const:  _-__febrile   Eyes:___ENT:___CV: _-__chest pain  Resp: __-__sob  GI:__-_nausea _x__vomiting ___abd pain ___npo ___clears _x_full diet __bm  :___ Musk: _x__pain _-__spasm  Skin:___ Neuro:  _-__bdxqptro_-__qbsahogcy_-__ numbness __-_weakness _-__paresth  Psych:_-_anxiety  Endo:___ Heme:___Allergy:_________, __x_all others reviewed and negative      PAST MEDICAL & SURGICAL HISTORY:  Uterine fibroid  Hyperlipidemia  Asthma  Diabetes  Hypertension  No significant past surgical history      SH: _x__Tobacco smoked 17 years x1 pack,  up until 9/2010   _-__Alcohol                          FH:FAMILY HISTORY:      budesonide 160 MICROgram(s)/formoterol 4.5 MICROgram(s) Inhaler 2 Puff(s) Inhalation two times a day  dextrose 40% Gel 15 Gram(s) Oral once PRN  dextrose 5%. 1000 milliLiter(s) IV Continuous <Continuous>  dextrose 50% Injectable 12.5 Gram(s) IV Push once  dextrose 50% Injectable 25 Gram(s) IV Push once  dextrose 50% Injectable 25 Gram(s) IV Push once  ferrous    sulfate 325 milliGRAM(s) Oral daily  furosemide   Injectable 40 milliGRAM(s) IV Push daily  glucagon  Injectable 1 milliGRAM(s) IntraMuscular once PRN  HYDROmorphone  Injectable 0.5 milliGRAM(s) IV Push every 4 hours PRN  insulin lispro (HumaLOG) corrective regimen sliding scale   SubCutaneous Before meals and at bedtime  lidocaine 2% Gel 1 Application(s) Topical once  metoprolol tartrate 50 milliGRAM(s) Oral two times a day  ondansetron Injectable 4 milliGRAM(s) IV Push every 6 hours PRN  oxyCODONE    IR 5 milliGRAM(s) Oral every 4 hours PRN  oxyCODONE    IR 10 milliGRAM(s) Oral every 4 hours PRN  pantoprazole    Tablet 40 milliGRAM(s) Oral before breakfast  polyethylene glycol 3350 17 Gram(s) Oral at bedtime  sodium chloride 1 Gram(s) Oral three times a day      T(C): 36.8 (12-23-19 @ 04:57), Max: 36.8 (12-22-19 @ 17:46)  HR: 90 (12-23-19 @ 11:51) (88 - 112)  BP: 115/69 (12-23-19 @ 11:51) (95/52 - 133/65)  RR: 20 (12-23-19 @ 11:51) (16 - 20)  SpO2: 100% (12-23-19 @ 11:51) (98% - 100%)  Wt(kg): --    T(C): 36.8 (12-23-19 @ 04:57), Max: 36.8 (12-22-19 @ 17:46)  HR: 90 (12-23-19 @ 11:51) (88 - 112)  BP: 115/69 (12-23-19 @ 11:51) (95/52 - 133/65)  RR: 20 (12-23-19 @ 11:51) (16 - 20)  SpO2: 100% (12-23-19 @ 11:51) (98% - 100%)  Wt(kg): --    T(C): 36.8 (12-23-19 @ 04:57), Max: 36.8 (12-22-19 @ 17:46)  HR: 90 (12-23-19 @ 11:51) (88 - 112)  BP: 115/69 (12-23-19 @ 11:51) (95/52 - 133/65)  RR: 20 (12-23-19 @ 11:51) (16 - 20)  SpO2: 100% (12-23-19 @ 11:51) (98% - 100%)  Wt(kg): --      PHYSICAL EXAM:  Gen Appearance: _x__no acute distress __x_appropriate        Neuro: __x_SILT feet____ EOM Intact Psych: AAOX3__, _x__mood/affect appropriate        Eyes: _x__conjunctiva WNL  __x___ Pupils equal and round        ENT: _x__ears and nose atraumatic_x__ Hearing grossly intact        Neck: _x__trachea midline, no visible masses ___thyroid without palpable mass    Resp: _x__Nml WOB____No tactile fremitus ___clear to auscultation    Cardio: x___extremities free from edema __x__pedal pulses palpable    GI/Abdomen: _x__soft __x___ Nontender__x____Nondistended_____HSM    Lymphatic: ___no palpable nodes in neck  ___no palpable nodes calves and feet    Skin/Wound: _x__Incision, ___Dressing c/d/i,   ____surrounding tissues soft,  ___drain/chest tube present__x__    Muscular: EHL _5__/5  Gastrocnemius__5_/5    ___absent clubbing/cyanosis        ASSESSMENT: This is a 39y old Female with a history of uterine fibroid, diabetes, asthma, DVT, pleural effusion, SOB, s/p laparoscopy and mass biopsy, admitted with worsening SOB, periumbilical pain and elevated LFT's, now s/p chest tube placement.       Recommended Treatment PLAN:  1. Dilaudid 2-4mg Po Q4h prn moderate to severe pain  2. Dilaudid 0.5mg prn breakthrough pain Q4h   3. Lyrica 25mg Po TID  4. Flexeril 5mg Po Q8h prn muscle spasms  Plan discussed with Dr. Calhoun Pain Management Consult Note - [facility] [phone]      Chief Complaint: Pleural Effusion      HPI: Pateint seen and examined today, patient in nad. Patient with a history of uterine fibroid, diabetes, asthma, DVT, pleural effusion, SOB, s/p laparoscopy and mass biopsy, admitted with worsening SOB, periumbilical pain and elevated LFT's, now s/p chest tube placement. Patient reports pain to R chest tube site, pain described as burning, worsened with deep breathing, coughing and movement. Patient reports pain relief with lidocaine patch with Oxycodone Po however reports vomiting after last dose. Patient Axox3, no s/s of oversedation. Chest tube dressing c,d,i. Patient denies muscle spasms, numbness or tingling to extremities.         Pain is _x__ sharp ____dull _x__burning _x__achy ___ Intensity: __x__ mild __x_mod ___severe     Location ____surgical site ____cervical _____lumbar ____abd ____upper ext____lower ext  x__ R Chest    Worse with __x__activity _x___movement _____physical therapy___ Rest    Improved with _x___medication ___x_rest ____physical therapy      ROS: Const:  _-__febrile   Eyes:___ENT:___CV: _-__chest pain  Resp: __-__sob  GI:__-_nausea _x__vomiting ___abd pain ___npo ___clears _x_full diet __bm  :___ Musk: _x__pain _-__spasm  Skin:___ Neuro:  _-__fbvrbhru_-__pajjdrziq_-__ numbness __-_weakness _-__paresth  Psych:_-_anxiety  Endo:___ Heme:___Allergy:_________, __x_all others reviewed and negative      PAST MEDICAL & SURGICAL HISTORY:  Uterine fibroid  Hyperlipidemia  Asthma  Diabetes  Hypertension  No significant past surgical history      SH: _x__Tobacco smoked 17 years x1 pack,  up until 9/2010   _-__Alcohol                          FH:FAMILY HISTORY:      budesonide 160 MICROgram(s)/formoterol 4.5 MICROgram(s) Inhaler 2 Puff(s) Inhalation two times a day  dextrose 40% Gel 15 Gram(s) Oral once PRN  dextrose 5%. 1000 milliLiter(s) IV Continuous <Continuous>  dextrose 50% Injectable 12.5 Gram(s) IV Push once  dextrose 50% Injectable 25 Gram(s) IV Push once  dextrose 50% Injectable 25 Gram(s) IV Push once  ferrous    sulfate 325 milliGRAM(s) Oral daily  furosemide   Injectable 40 milliGRAM(s) IV Push daily  glucagon  Injectable 1 milliGRAM(s) IntraMuscular once PRN  HYDROmorphone  Injectable 0.5 milliGRAM(s) IV Push every 4 hours PRN  insulin lispro (HumaLOG) corrective regimen sliding scale   SubCutaneous Before meals and at bedtime  lidocaine 2% Gel 1 Application(s) Topical once  metoprolol tartrate 50 milliGRAM(s) Oral two times a day  ondansetron Injectable 4 milliGRAM(s) IV Push every 6 hours PRN  oxyCODONE    IR 5 milliGRAM(s) Oral every 4 hours PRN  oxyCODONE    IR 10 milliGRAM(s) Oral every 4 hours PRN  pantoprazole    Tablet 40 milliGRAM(s) Oral before breakfast  polyethylene glycol 3350 17 Gram(s) Oral at bedtime  sodium chloride 1 Gram(s) Oral three times a day      T(C): 36.8 (12-23-19 @ 04:57), Max: 36.8 (12-22-19 @ 17:46)  HR: 90 (12-23-19 @ 11:51) (88 - 112)  BP: 115/69 (12-23-19 @ 11:51) (95/52 - 133/65)  RR: 20 (12-23-19 @ 11:51) (16 - 20)  SpO2: 100% (12-23-19 @ 11:51) (98% - 100%)  Wt(kg): --    T(C): 36.8 (12-23-19 @ 04:57), Max: 36.8 (12-22-19 @ 17:46)  HR: 90 (12-23-19 @ 11:51) (88 - 112)  BP: 115/69 (12-23-19 @ 11:51) (95/52 - 133/65)  RR: 20 (12-23-19 @ 11:51) (16 - 20)  SpO2: 100% (12-23-19 @ 11:51) (98% - 100%)  Wt(kg): --    T(C): 36.8 (12-23-19 @ 04:57), Max: 36.8 (12-22-19 @ 17:46)  HR: 90 (12-23-19 @ 11:51) (88 - 112)  BP: 115/69 (12-23-19 @ 11:51) (95/52 - 133/65)  RR: 20 (12-23-19 @ 11:51) (16 - 20)  SpO2: 100% (12-23-19 @ 11:51) (98% - 100%)  Wt(kg): --      PHYSICAL EXAM:  Gen Appearance: _x__no acute distress __x_appropriate        Neuro: __x_SILT feet____ EOM Intact Psych: AAOX3__, _x__mood/affect appropriate        Eyes: _x__conjunctiva WNL  __x___ Pupils equal and round        ENT: _x__ears and nose atraumatic_x__ Hearing grossly intact        Neck: _x__trachea midline, no visible masses ___thyroid without palpable mass    Resp: _x__Nml WOB____No tactile fremitus ___clear to auscultation    Cardio: x___extremities free from edema __x__pedal pulses palpable    GI/Abdomen: _x__soft __x___ Nontender__x____Nondistended_____HSM    Lymphatic: ___no palpable nodes in neck  ___no palpable nodes calves and feet    Skin/Wound: _x__Incision, ___Dressing c/d/i,   ____surrounding tissues soft,  ___drain/chest tube present__x__    Muscular: EHL _5__/5  Gastrocnemius__5_/5    ___absent clubbing/cyanosis        ASSESSMENT: This is a 39y old Female with a history of uterine fibroid, diabetes, asthma, DVT, pleural effusion, SOB, s/p laparoscopy and mass biopsy, admitted with worsening SOB, periumbilical pain and elevated LFT's, now s/p chest tube placement.       Recommended Treatment PLAN:  1. Dilaudid 2-4mg Po Q4h prn moderate to severe pain  2. Dilaudid 0.5mg prn breakthrough pain Q4h   3. Lyrica 25mg Po TID  4. Flexeril 5mg Po Q8h prn muscle spasms  5. x2 lidocaine patches to affected area, 12hours on 12hours off  Plan discussed with Dr. Calhuon

## 2019-12-23 NOTE — DIETITIAN INITIAL EVALUATION ADULT. - ADD RECOMMEND
1) Recommend change to low-sodium diet (Pt requesting to take off CSTCHO restriction as A1c 6.1%). 2) Monitor lytes and replete prn. 3) Obtain and monitor wt trends. 4) RD to f/u on nutrition education. 1) When medically feasible, consider changing to low-sodium diet (Pt requesting to take off CSTCHO restriction as A1c 6.1%). 2) Monitor lytes and replete prn. 3) Obtain and monitor wt trends. 4) RD to f/u on nutrition education.

## 2019-12-23 NOTE — PROGRESS NOTE ADULT - SUBJECTIVE AND OBJECTIVE BOX
SUBJECTIVE: Patient seen and examined bedside by chief resident. Patient complaining of pain around chest tube and does not want to have pleurx today. Pulm to reassess later today. Otherwise no complaints has been OOB.     furosemide   Injectable 40 milliGRAM(s) IV Push daily  metoprolol tartrate 50 milliGRAM(s) Oral two times a day    MEDICATIONS  (PRN):  dextrose 40% Gel 15 Gram(s) Oral once PRN Blood Glucose LESS THAN 70 milliGRAM(s)/deciliter  glucagon  Injectable 1 milliGRAM(s) IntraMuscular once PRN Glucose LESS THAN 70 milligrams/deciliter  HYDROmorphone  Injectable 0.5 milliGRAM(s) IV Push every 4 hours PRN Breakthrough pain  ondansetron Injectable 4 milliGRAM(s) IV Push every 6 hours PRN Nausea  oxyCODONE    IR 5 milliGRAM(s) Oral every 4 hours PRN Moderate Pain (4 - 6)  oxyCODONE    IR 10 milliGRAM(s) Oral every 4 hours PRN Severe Pain (7 - 10)      I&O's Detail    22 Dec 2019 07:01  -  23 Dec 2019 07:00  --------------------------------------------------------  IN:    IV PiggyBack: 50 mL    Oral Fluid: 360 mL  Total IN: 410 mL    OUT:    Voided: 650 mL  Total OUT: 650 mL    Total NET: -240 mL      23 Dec 2019 07:01  -  23 Dec 2019 09:47  --------------------------------------------------------  IN:  Total IN: 0 mL    OUT:    Voided: 200 mL  Total OUT: 200 mL    Total NET: -200 mL          Vital Signs Last 24 Hrs  T(C): 36.8 (23 Dec 2019 04:57), Max: 36.8 (22 Dec 2019 10:00)  T(F): 98.3 (23 Dec 2019 04:57), Max: 98.3 (23 Dec 2019 04:57)  HR: 104 (23 Dec 2019 08:05) (74 - 112)  BP: 113/75 (23 Dec 2019 08:05) (108/54 - 133/65)  BP(mean): 89 (23 Dec 2019 08:05) (78 - 95)  RR: 18 (23 Dec 2019 08:05) (16 - 18)  SpO2: 98% (23 Dec 2019 07:42) (95% - 98%)    General: NAD, resting comfortably in bed  C/V: sinus tachy on tele  Pulm: Nonlabored breathing, no respiratory distress, CT clamped  Abd: soft, obese ND, NT, incisions c/d/i  Extrem: WWP, no edema, SCDs in place    LABS:                        8.8    12.33 )-----------( 196      ( 23 Dec 2019 06:37 )             29.9         132<L>  |  92<L>  |  33<H>  ----------------------------<  156<H>  4.0   |  23  |  1.28    Ca    8.9      23 Dec 2019 06:37  Phos  3.1       Mg     2.2         TPro  6.0  /  Alb  3.4  /  TBili  2.0<H>  /  DBili  0.8<H>  /  AST  845<H>  /  ALT  1073<H>  /  AlkPhos  137<H>  12    PT/INR - ( 23 Dec 2019 06:37 )   PT: 29.4 sec;   INR: 2.53          PTT - ( 23 Dec 2019 06:37 )  PTT:29.1 sec  Urinalysis Basic - ( 22 Dec 2019 18:21 )    Color: Yellow / Appearance: Cloudy / S.025 / pH: x  Gluc: x / Ketone: NEGATIVE  / Bili: Negative / Urobili: 0.2 E.U./dL   Blood: x / Protein: NEGATIVE mg/dL / Nitrite: NEGATIVE   Leuk Esterase: Small / RBC: 5-10 /HPF / WBC > 10 /HPF   Sq Epi: x / Non Sq Epi: 5-10 /HPF / Bacteria: Many /HPF        RADIOLOGY & ADDITIONAL STUDIES:      Culture - Body Fluid with Gram Stain (collected 19 @ 18:14)  Source: Pleural Fl right pleural fluid  Gram Stain (19 @ 21:14):    No organisms seen    Rare WBC's  Preliminary Report (19 @ 09:10):    No growth to date    Culture - Urine (collected 19 @ 08:48)  Source: .Urine None  Final Report (19 @ 15:26):    20,000 CFU/ml Enterococcus faecalis  Organism: Enterococcus faecalis (19 @ 15:26)  Organism: Enterococcus faecalis (19 @ 15:26)      -  Ampicillin: S <=2 Predicts results to ampicillin/sulbactam, amoxacillin-clavulanate and  piperacillin-tazobactam.      -  Ciprofloxacin: S <=1      -  Levofloxacin: S 1      -  Nitrofurantoin: S <=32 Should not be used to treat pyelonephritis.      -  Tetra/Doxy: R >8      -  Vancomycin: S 2      Method Type: RIGOBERTO

## 2019-12-24 LAB
ALBUMIN SERPL ELPH-MCNC: 3.2 G/DL — LOW (ref 3.3–5)
ALP SERPL-CCNC: 151 U/L — HIGH (ref 40–120)
ALT FLD-CCNC: 776 U/L — HIGH (ref 10–45)
ANION GAP SERPL CALC-SCNC: 15 MMOL/L — SIGNIFICANT CHANGE UP (ref 5–17)
APPEARANCE UR: ABNORMAL
AST SERPL-CCNC: 473 U/L — HIGH (ref 10–40)
BILIRUB SERPL-MCNC: 1.8 MG/DL — HIGH (ref 0.2–1.2)
BILIRUB UR-MCNC: NEGATIVE — SIGNIFICANT CHANGE UP
BUN SERPL-MCNC: 26 MG/DL — HIGH (ref 7–23)
CALCIUM SERPL-MCNC: 8.9 MG/DL — SIGNIFICANT CHANGE UP (ref 8.4–10.5)
CHLORIDE SERPL-SCNC: 95 MMOL/L — LOW (ref 96–108)
CO2 SERPL-SCNC: 23 MMOL/L — SIGNIFICANT CHANGE UP (ref 22–31)
COLOR SPEC: YELLOW — SIGNIFICANT CHANGE UP
CREAT SERPL-MCNC: 1.14 MG/DL — SIGNIFICANT CHANGE UP (ref 0.5–1.3)
DIFF PNL FLD: ABNORMAL
GLUCOSE BLDC GLUCOMTR-MCNC: 112 MG/DL — HIGH (ref 70–99)
GLUCOSE BLDC GLUCOMTR-MCNC: 127 MG/DL — HIGH (ref 70–99)
GLUCOSE BLDC GLUCOMTR-MCNC: 131 MG/DL — HIGH (ref 70–99)
GLUCOSE BLDC GLUCOMTR-MCNC: 138 MG/DL — HIGH (ref 70–99)
GLUCOSE SERPL-MCNC: 133 MG/DL — HIGH (ref 70–99)
GLUCOSE UR QL: NEGATIVE — SIGNIFICANT CHANGE UP
HCT VFR BLD CALC: 31.4 % — LOW (ref 34.5–45)
HGB BLD-MCNC: 9.1 G/DL — LOW (ref 11.5–15.5)
KETONES UR-MCNC: NEGATIVE — SIGNIFICANT CHANGE UP
LEUKOCYTE ESTERASE UR-ACNC: ABNORMAL
MAGNESIUM SERPL-MCNC: 2.1 MG/DL — SIGNIFICANT CHANGE UP (ref 1.6–2.6)
MCHC RBC-ENTMCNC: 20.3 PG — LOW (ref 27–34)
MCHC RBC-ENTMCNC: 29 GM/DL — LOW (ref 32–36)
MCV RBC AUTO: 70.1 FL — LOW (ref 80–100)
NITRITE UR-MCNC: NEGATIVE — SIGNIFICANT CHANGE UP
NON-GYNECOLOGICAL CYTOLOGY STUDY: SIGNIFICANT CHANGE UP
NRBC # BLD: 1 /100 WBCS — HIGH (ref 0–0)
PH UR: 6 — SIGNIFICANT CHANGE UP (ref 5–8)
PHOSPHATE SERPL-MCNC: 2.8 MG/DL — SIGNIFICANT CHANGE UP (ref 2.5–4.5)
PLATELET # BLD AUTO: 188 K/UL — SIGNIFICANT CHANGE UP (ref 150–400)
POTASSIUM SERPL-MCNC: 4.9 MMOL/L — SIGNIFICANT CHANGE UP (ref 3.5–5.3)
POTASSIUM SERPL-SCNC: 4.9 MMOL/L — SIGNIFICANT CHANGE UP (ref 3.5–5.3)
PROT SERPL-MCNC: 6 G/DL — SIGNIFICANT CHANGE UP (ref 6–8.3)
PROT UR-MCNC: NEGATIVE MG/DL — SIGNIFICANT CHANGE UP
RBC # BLD: 4.48 M/UL — SIGNIFICANT CHANGE UP (ref 3.8–5.2)
RBC # FLD: 22.4 % — HIGH (ref 10.3–14.5)
SODIUM SERPL-SCNC: 133 MMOL/L — LOW (ref 135–145)
SP GR SPEC: 1.01 — SIGNIFICANT CHANGE UP (ref 1–1.03)
SURGICAL PATHOLOGY STUDY: SIGNIFICANT CHANGE UP
UROBILINOGEN FLD QL: 0.2 E.U./DL — SIGNIFICANT CHANGE UP
WBC # BLD: 11.84 K/UL — HIGH (ref 3.8–10.5)
WBC # FLD AUTO: 11.84 K/UL — HIGH (ref 3.8–10.5)

## 2019-12-24 PROCEDURE — 71045 X-RAY EXAM CHEST 1 VIEW: CPT | Mod: 26

## 2019-12-24 PROCEDURE — 99232 SBSQ HOSP IP/OBS MODERATE 35: CPT | Mod: GC

## 2019-12-24 RX ORDER — CIPROFLOXACIN LACTATE 400MG/40ML
500 VIAL (ML) INTRAVENOUS EVERY 12 HOURS
Refills: 0 | Status: DISCONTINUED | OUTPATIENT
Start: 2019-12-24 | End: 2019-12-26

## 2019-12-24 RX ADMIN — ONDANSETRON 4 MILLIGRAM(S): 8 TABLET, FILM COATED ORAL at 06:22

## 2019-12-24 RX ADMIN — Medication 40 MILLIGRAM(S): at 06:22

## 2019-12-24 RX ADMIN — HYDROMORPHONE HYDROCHLORIDE 4 MILLIGRAM(S): 2 INJECTION INTRAMUSCULAR; INTRAVENOUS; SUBCUTANEOUS at 06:35

## 2019-12-24 RX ADMIN — SODIUM CHLORIDE 1 GRAM(S): 9 INJECTION INTRAMUSCULAR; INTRAVENOUS; SUBCUTANEOUS at 13:07

## 2019-12-24 RX ADMIN — Medication 50 MILLIGRAM(S): at 18:12

## 2019-12-24 RX ADMIN — SODIUM CHLORIDE 1 GRAM(S): 9 INJECTION INTRAMUSCULAR; INTRAVENOUS; SUBCUTANEOUS at 06:17

## 2019-12-24 RX ADMIN — BUDESONIDE AND FORMOTEROL FUMARATE DIHYDRATE 2 PUFF(S): 160; 4.5 AEROSOL RESPIRATORY (INHALATION) at 22:00

## 2019-12-24 RX ADMIN — Medication 25 MILLIGRAM(S): at 22:00

## 2019-12-24 RX ADMIN — SODIUM CHLORIDE 1 GRAM(S): 9 INJECTION INTRAMUSCULAR; INTRAVENOUS; SUBCUTANEOUS at 22:00

## 2019-12-24 RX ADMIN — Medication 325 MILLIGRAM(S): at 12:27

## 2019-12-24 RX ADMIN — PANTOPRAZOLE SODIUM 40 MILLIGRAM(S): 20 TABLET, DELAYED RELEASE ORAL at 06:18

## 2019-12-24 RX ADMIN — Medication 500 MILLIGRAM(S): at 18:12

## 2019-12-24 RX ADMIN — HYDROMORPHONE HYDROCHLORIDE 4 MILLIGRAM(S): 2 INJECTION INTRAMUSCULAR; INTRAVENOUS; SUBCUTANEOUS at 06:23

## 2019-12-24 RX ADMIN — Medication 50 MILLIGRAM(S): at 06:18

## 2019-12-24 RX ADMIN — BUDESONIDE AND FORMOTEROL FUMARATE DIHYDRATE 2 PUFF(S): 160; 4.5 AEROSOL RESPIRATORY (INHALATION) at 09:11

## 2019-12-24 NOTE — CONSULT NOTE ADULT - ASSESSMENT
Patient is a 39F w/ UTI w/ E. Faecalis.  Patient was given Ceftriaxone which is not efficacious against E. Faecalis.      Recs:  -Treat w/ Ampicillin if prefer IV abx   -Can treat w/ Cipro, OR Augmentin for Oral abx.  -Patient can follow up with Dr. Alegre if needed.

## 2019-12-24 NOTE — PROGRESS NOTE ADULT - SUBJECTIVE AND OBJECTIVE BOX
OVERNIGHT EVENTS:  evening BMP improved, pt c/o pain on urination, UA STAT, trace of leuko neg nitrites,   : Pleurx cancelled 2/2 lack of fluid (may unclamp today and monitor vs keep clamped and see if reaccumulates--awaiting Bright); pleurx outpatient possible, final path leiomyosarcoma, , [place] light chains elevated. Pain mgmt following        SUBJECTIVE: Patient examined bedside with chief resident. Patient examined in bedside and denies any acute events overnight. Patient denies nausea, emesis, SOB and chest pain. Patient making adequate urine output.      furosemide   Injectable 40 milliGRAM(s) IV Push daily  metoprolol tartrate 50 milliGRAM(s) Oral two times a day      Vital Signs Last 24 Hrs  T(C): 36.4 (24 Dec 2019 09:16), Max: 37.2 (23 Dec 2019 13:53)  T(F): 97.6 (24 Dec 2019 09:16), Max: 98.9 (23 Dec 2019 13:53)  HR: 86 (24 Dec 2019 08:36) (78 - 99)  BP: 109/67 (24 Dec 2019 08:36) (95/52 - 120/90)  BP(mean): 83 (24 Dec 2019 08:36) (69 - 100)  RR: 16 (24 Dec 2019 08:36) (16 - 20)  SpO2: 99% (24 Dec 2019 08:36) (95% - 100%)  I&O's Detail    23 Dec 2019 07:  -  24 Dec 2019 07:00  --------------------------------------------------------  IN:  Total IN: 0 mL    OUT:    Voided: 575 mL  Total OUT: 575 mL    Total NET: -575 mL      24 Dec 2019 07:  -  24 Dec 2019 10:39  --------------------------------------------------------  IN:  Total IN: 0 mL    OUT:    Voided: 300 mL  Total OUT: 300 mL    Total NET: -300 mL          General: NAD, resting comfortably in bed  C/V: NSR  Pulm: Nonlabored breathing, no respiratory distress  Abd: soft, NT/ND.  Extrem: WWP, no edema, SCDs in place    LABS:                        9.1    11.84 )-----------( 188      ( 24 Dec 2019 06:06 )             31.4     12-24    133<L>  |  95<L>  |  26<H>  ----------------------------<  133<H>  4.9   |  23  |  1.14    Ca    8.9      24 Dec 2019 06:06  Phos  2.8     12-24  Mg     2.1     12-24    TPro  6.0  /  Alb  3.2<L>  /  TBili  1.8<H>  /  DBili  x   /  AST  473<H>  /  ALT  776<H>  /  AlkPhos  151<H>  12-24    PT/INR - ( 23 Dec 2019 06:37 )   PT: 29.4 sec;   INR: 2.53          PTT - ( 23 Dec 2019 06:37 )  PTT:29.1 sec  Urinalysis Basic - ( 24 Dec 2019 01:06 )    Color: Yellow / Appearance: Hazy / S.015 / pH: x  Gluc: x / Ketone: NEGATIVE  / Bili: Negative / Urobili: 0.2 E.U./dL   Blood: x / Protein: NEGATIVE mg/dL / Nitrite: NEGATIVE   Leuk Esterase: Trace / RBC: 5-10 /HPF / WBC < 5 /HPF   Sq Epi: x / Non Sq Epi: 5-10 /HPF / Bacteria: Present /HPF

## 2019-12-24 NOTE — PROGRESS NOTE ADULT - PROBLEM SELECTOR PLAN 1
# Nonoliguric TEDDY   Ddx includes DAVID vs renal vein thrombus (high risk in pancreatic malignancy)  TEDDY is improving, Cr downtrending to 1.28   - renal doppler US is pending   - Avoid ACE/ARB/NSAIDS/Contrast/Metformin for now  - Hyponatremia - most likely from hypervolemia, continue with 40 mg IV lasix for now would consider to increase lasix if SNa remains low   - repeat UA without proteinuria however has persistent hematuria per her she has been having hematuria since August after uterine artery embolization low concern hematuria being from urinary tract, kidneys and bladder WNL on recent CTscan    Will follow

## 2019-12-24 NOTE — PROGRESS NOTE ADULT - SUBJECTIVE AND OBJECTIVE BOX
Interval Events:  Patient seen and examined at bedside. The patient denied chest pain at the chest tube insertion site. The patient denied any dyspnea or cough but noted that she feels tired. The patient had a repeat Lung US today w/ minimal fluid left after unclamping the chest tube and placing to suction and thus it was removed.     MEDICATIONS:  Pulmonary:  budesonide 160 MICROgram(s)/formoterol 4.5 MICROgram(s) Inhaler 2 Puff(s) Inhalation two times a day    Antimicrobials:  ciprofloxacin     Tablet 500 milliGRAM(s) Oral every 12 hours    Anticoagulants:    Cardiac:  furosemide   Injectable 40 milliGRAM(s) IV Push daily  metoprolol tartrate 50 milliGRAM(s) Oral two times a day    Endocrine:  dextrose 40% Gel 15 Gram(s) Oral once PRN  dextrose 50% Injectable 12.5 Gram(s) IV Push once  dextrose 50% Injectable 25 Gram(s) IV Push once  dextrose 50% Injectable 25 Gram(s) IV Push once  glucagon  Injectable 1 milliGRAM(s) IntraMuscular once PRN  insulin lispro (HumaLOG) corrective regimen sliding scale   SubCutaneous Before meals and at bedtime    Allergies    No Known Drug Allergies  shellfish (Hives)    Intolerances        Vital Signs Last 24 Hrs  T(C): 36.3 (24 Dec 2019 17:41), Max: 36.7 (23 Dec 2019 22:25)  T(F): 97.3 (24 Dec 2019 17:41), Max: 98 (23 Dec 2019 22:25)  HR: 70 (24 Dec 2019 16:53) (70 - 94)  BP: 102/69 (24 Dec 2019 16:53) (102/69 - 132/89)  BP(mean): 82 (24 Dec 2019 16:53) (74 - 102)  RR: 16 (24 Dec 2019 16:53) (16 - 18)  SpO2: 98% (24 Dec 2019 16:53) (98% - 100%)    PHYSICAL EXAM:  Constitutional: WDWN  HEENT: NC/AT; PERRL, anicteric sclera; MMM  Neck: supple  Cardiovascular: +S1/S2, RRR  Respiratory: decreased breath sounds right side.   Gastrointestinal: soft, NT/ND; +BSx4  Extremities: WWP; no edema, clubbing or cyanosis  Vascular: 2+ radial, DP/PT pulses B/L  Neurological: AAOx3; no focal deficits      12-23 @ 07:01  -  12-24 @ 07:00  --------------------------------------------------------  IN: 0 mL / OUT: 575 mL / NET: -575 mL     @ 07:01   @ 18:40  --------------------------------------------------------  IN: 0 mL / OUT: 500 mL / NET: -500 mL          LABS:      CBC Full  -  ( 24 Dec 2019 06:06 )  WBC Count : 11.84 K/uL  RBC Count : 4.48 M/uL  Hemoglobin : 9.1 g/dL  Hematocrit : 31.4 %  Platelet Count - Automated : 188 K/uL  Mean Cell Volume : 70.1 fl  Mean Cell Hemoglobin : 20.3 pg  Mean Cell Hemoglobin Concentration : 29.0 gm/dL  Auto Neutrophil # : x  Auto Lymphocyte # : x  Auto Monocyte # : x  Auto Eosinophil # : x  Auto Basophil # : x  Auto Neutrophil % : x  Auto Lymphocyte % : x  Auto Monocyte % : x  Auto Eosinophil % : x  Auto Basophil % : x        133<L>  |  95<L>  |  26<H>  ----------------------------<  133<H>  4.9   |  23  |  1.14    Ca    8.9      24 Dec 2019 06:06  Phos  2.8       Mg     2.1         TPro  6.0  /  Alb  3.2<L>  /  TBili  1.8<H>  /  DBili  x   /  AST  473<H>  /  ALT  776<H>  /  AlkPhos  151<H>      PT/INR - ( 23 Dec 2019 06:37 )   PT: 29.4 sec;   INR: 2.53          PTT - ( 23 Dec 2019 06:37 )  PTT:29.1 sec      Urinalysis Basic - ( 24 Dec 2019 01:06 )    Color: Yellow / Appearance: Hazy / S.015 / pH: x  Gluc: x / Ketone: NEGATIVE  / Bili: Negative / Urobili: 0.2 E.U./dL   Blood: x / Protein: NEGATIVE mg/dL / Nitrite: NEGATIVE   Leuk Esterase: Trace / RBC: 5-10 /HPF / WBC < 5 /HPF   Sq Epi: x / Non Sq Epi: 5-10 /HPF / Bacteria: Present /HPF                RADIOLOGY & ADDITIONAL STUDIES (The following images were personally reviewed):  chest xray

## 2019-12-24 NOTE — PROGRESS NOTE ADULT - SUBJECTIVE AND OBJECTIVE BOX
O/N Events: URBAN  Subjective:  reports dysuria, hematuria unchanged, SOB unchanged, Cr down to 1.1, hyponatremic 133, LFTs downtrending     VITALS  Vital Signs Last 24 Hrs  T(C): 36.4 (24 Dec 2019 09:16), Max: 37.2 (23 Dec 2019 13:53)  T(F): 97.6 (24 Dec 2019 09:16), Max: 98.9 (23 Dec 2019 13:53)  HR: 86 (24 Dec 2019 08:36) (78 - 99)  BP: 109/67 (24 Dec 2019 08:36) (103/58 - 120/90)  BP(mean): 83 (24 Dec 2019 08:36) (74 - 100)  RR: 16 (24 Dec 2019 08:36) (16 - 18)  SpO2: 99% (24 Dec 2019 08:36) (95% - 99%)    PHYSICAL EXAM  General: NAD, sitting OOB in the chair sclera  Neck: Supple; no JVD  Respiratory: CTA B/L  Cardiovascular: Regular rhythm/rate; S1/S2; no MGR  Gastrointestinal: Soft; NTND  Extremities: WWP; 2+ pitting edema b/l   Neurological: non focal       MEDICATIONS  (STANDING):  budesonide 160 MICROgram(s)/formoterol 4.5 MICROgram(s) Inhaler 2 Puff(s) Inhalation two times a day  dextrose 5%. 1000 milliLiter(s) (50 mL/Hr) IV Continuous <Continuous>  dextrose 50% Injectable 12.5 Gram(s) IV Push once  dextrose 50% Injectable 25 Gram(s) IV Push once  dextrose 50% Injectable 25 Gram(s) IV Push once  ferrous    sulfate 325 milliGRAM(s) Oral daily  furosemide   Injectable 40 milliGRAM(s) IV Push daily  insulin lispro (HumaLOG) corrective regimen sliding scale   SubCutaneous Before meals and at bedtime  lidocaine   Patch 2 Patch Transdermal every 24 hours  metoprolol tartrate 50 milliGRAM(s) Oral two times a day  pantoprazole    Tablet 40 milliGRAM(s) Oral before breakfast  polyethylene glycol 3350 17 Gram(s) Oral at bedtime  pregabalin 25 milliGRAM(s) Oral three times a day  sodium chloride 1 Gram(s) Oral three times a day    MEDICATIONS  (PRN):  cyclobenzaprine 5 milliGRAM(s) Oral three times a day PRN Muscle Spasm  dextrose 40% Gel 15 Gram(s) Oral once PRN Blood Glucose LESS THAN 70 milliGRAM(s)/deciliter  glucagon  Injectable 1 milliGRAM(s) IntraMuscular once PRN Glucose LESS THAN 70 milligrams/deciliter  HYDROmorphone   Tablet 2 milliGRAM(s) Oral every 4 hours PRN Moderate Pain (4 - 6)  HYDROmorphone   Tablet 4 milliGRAM(s) Oral every 4 hours PRN Severe Pain (7 - 10)  HYDROmorphone  Injectable 0.5 milliGRAM(s) IV Push every 4 hours PRN Breakthrough pain  ondansetron Injectable 4 milliGRAM(s) IV Push every 6 hours PRN Nausea      LABS                        9.1    11.84 )-----------( 188      ( 24 Dec 2019 06:06 )             31.4     12-24    133<L>  |  95<L>  |  26<H>  ----------------------------<  133<H>  4.9   |  23  |  1.14    Ca    8.9      24 Dec 2019 06:06  Phos  2.8     12-24  Mg     2.1     12-24    TPro  6.0  /  Alb  3.2<L>  /  TBili  1.8<H>  /  DBili  x   /  AST  473<H>  /  ALT  776<H>  /  AlkPhos  151<H>  12-24    LIVER FUNCTIONS - ( 24 Dec 2019 06:06 )  Alb: 3.2 g/dL / Pro: 6.0 g/dL / ALK PHOS: 151 U/L / ALT: 776 U/L / AST: 473 U/L / GGT: x           PT/INR - ( 23 Dec 2019 06:37 )   PT: 29.4 sec;   INR: 2.53          PTT - ( 23 Dec 2019 06:37 )  PTT:29.1 sec  Urinalysis Basic - ( 24 Dec 2019 01:06 )    Color: Yellow / Appearance: Hazy / S.015 / pH: x  Gluc: x / Ketone: NEGATIVE  / Bili: Negative / Urobili: 0.2 E.U./dL   Blood: x / Protein: NEGATIVE mg/dL / Nitrite: NEGATIVE   Leuk Esterase: Trace / RBC: 5-10 /HPF / WBC < 5 /HPF   Sq Epi: x / Non Sq Epi: 5-10 /HPF / Bacteria: Present /HPF      CARDIAC MARKERS ( 23 Dec 2019 06:37 )  x     / x     / 405 U/L / x     / x

## 2019-12-24 NOTE — CONSULT NOTE ADULT - SUBJECTIVE AND OBJECTIVE BOX
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  Pt states initially she was getting SOB only with exertion but now she feels SOB even while at rest. She also admits to occasional chest tightness especially on exertion.  In addition to SOB, Patient also complains of abdominal pain that has been present since laparoscopy.  She though abdominal pain was due to constipation and so she took milk of magnesia and had a bowel movement which help to improve her pain a bit. She also feels dizzy especially with walking. (18 Dec 2019 02:20)          ( ( ( ( ( ( ( ( ( ( ( ( (  NOTE ) ) ) ) ) ) ) ) ) ) ) ) ) ) ) )        Patient is as stated above.   team consulted for recurrent UTI.  Patient states she has been having UTI symptoms for a couple of days, complaining of intermittent dysuria, and suprapubic discomfort.  Patient states has been having intermittent hematuria due to her fibroids for many years.     Vital Signs Last 24 Hrs  T(C): 36.4 (24 Dec 2019 09:16), Max: 37.2 (23 Dec 2019 13:53)  T(F): 97.6 (24 Dec 2019 09:16), Max: 98.9 (23 Dec 2019 13:53)  HR: 86 (24 Dec 2019 08:36) (78 - 99)  BP: 109/67 (24 Dec 2019 08:36) (103/58 - 120/90)  BP(mean): 83 (24 Dec 2019 08:36) (74 - 100)  RR: 16 (24 Dec 2019 08:36) (16 - 18)  SpO2: 99% (24 Dec 2019 08:36) (95% - 99%)  I&O's Summary    23 Dec 2019 07:01  -  24 Dec 2019 07:00  --------------------------------------------------------  IN: 0 mL / OUT: 575 mL / NET: -575 mL    24 Dec 2019 07:01  -  24 Dec 2019 12:22  --------------------------------------------------------  IN: 0 mL / OUT: 300 mL / NET: -300 mL        PE:  Gen:  Abd:  :  TORIE:    LABS:                        9.1    11.84 )-----------( 188      ( 24 Dec 2019 06:06 )             31.4     12-24    133<L>  |  95<L>  |  26<H>  ----------------------------<  133<H>  4.9   |  23  |  1.14    Ca    8.9      24 Dec 2019 06:06  Phos  2.8     12-24  Mg     2.1     12-24    TPro  6.0  /  Alb  3.2<L>  /  TBili  1.8<H>  /  DBili  x   /  AST  473<H>  /  ALT  776<H>  /  AlkPhos  151<H>  12-24    PT/INR - ( 23 Dec 2019 06:37 )   PT: 29.4 sec;   INR: 2.53          PTT - ( 23 Dec 2019 06:37 )  PTT:29.1 sec  Cultures  Culture Results:   No growth to date (12-20 @ 18:14)  Culture Results:   20,000 CFU/ml Enterococcus faecalis (12-20 @ 08:48)      A/P Patient is a 39y old  Female who presents with a chief complaint of Pleural Effusion (24 Dec 2019 10:38)      HPI:  39F smoker with a PMHx of DM, HTN, HLD, s/p bilateral uterine artery embolization in 8/2019 due to fibroids, recently admitted for SOB and found to have Right pleural effusion s/p thoracentesis 12/5/19 and pancreatic/duodenal soft tissue mass with 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 ED with progressively worsening SOB x4 days.  Pt states initially she was getting SOB only with exertion but now she feels SOB even while at rest. She also admits to occasional chest tightness especially on exertion.  In addition to SOB, Patient also complains of abdominal pain that has been present since laparoscopy.  She though abdominal pain was due to constipation and so she took milk of magnesia and had a bowel movement which help to improve her pain a bit. She also feels dizzy especially with walking. (18 Dec 2019 02:20)          ( ( ( ( ( ( ( ( ( ( ( ( (  NOTE ) ) ) ) ) ) ) ) ) ) ) ) ) ) ) )        Patient is as stated above.   team consulted for recurrent UTI.  Patient states she has been having UTI symptoms for a couple of days since admission, complaining of intermittent dysuria, and suprapubic discomfort.  Patient states has been having intermittent hematuria due to her fibroids for many years. Patient denies fevers/chills, discharge, diarrhea, back pain.      Vital Signs Last 24 Hrs  T(C): 36.4 (24 Dec 2019 09:16), Max: 37.2 (23 Dec 2019 13:53)  T(F): 97.6 (24 Dec 2019 09:16), Max: 98.9 (23 Dec 2019 13:53)  HR: 86 (24 Dec 2019 08:36) (78 - 99)  BP: 109/67 (24 Dec 2019 08:36) (103/58 - 120/90)  BP(mean): 83 (24 Dec 2019 08:36) (74 - 100)  RR: 16 (24 Dec 2019 08:36) (16 - 18)  SpO2: 99% (24 Dec 2019 08:36) (95% - 99%)  I&O's Summary    23 Dec 2019 07:01  -  24 Dec 2019 07:00  --------------------------------------------------------  IN: 0 mL / OUT: 575 mL / NET: -575 mL    24 Dec 2019 07:01  -  24 Dec 2019 12:22  --------------------------------------------------------  IN: 0 mL / OUT: 300 mL / NET: -300 mL        PE:  Gen: NAD, alert and oriented x 3   Abd: soft nt/nd. Negative CVAT B/L  : Negative SP tenderness      LABS:                        9.1    11.84 )-----------( 188      ( 24 Dec 2019 06:06 )             31.4     12-24    133<L>  |  95<L>  |  26<H>  ----------------------------<  133<H>  4.9   |  23  |  1.14    Ca    8.9      24 Dec 2019 06:06  Phos  2.8     12-24  Mg     2.1     12-24    TPro  6.0  /  Alb  3.2<L>  /  TBili  1.8<H>  /  DBili  x   /  AST  473<H>  /  ALT  776<H>  /  AlkPhos  151<H>  12-24    PT/INR - ( 23 Dec 2019 06:37 )   PT: 29.4 sec;   INR: 2.53          PTT - ( 23 Dec 2019 06:37 )  PTT:29.1 sec  Cultures  Culture Results:   No growth to date (12-20 @ 18:14)  Culture Results:   20,000 CFU/ml Enterococcus faecalis (12-20 @ 08:48)      A/P

## 2019-12-24 NOTE — PROGRESS NOTE ADULT - PROBLEM SELECTOR PLAN 1
-Chest tube unclamped today and placed on suction. only 100cc drained all day. Repeat lung US after this showed minimal fluid in the pleural spaced but significant atelectasis.   -Chest tube was removed and post removal X-Ray was negative for any pneumothorax, atelectasis was noted.   -Cytology from pleural fluid negative for malignant cells.     Recommend:  -The patient can follow up with Dr. Novoa in the outpatient setting at the end of this week or early next week for repeat lung ultrasound and possible thoracentesis if warranted.

## 2019-12-25 ENCOUNTER — TRANSCRIPTION ENCOUNTER (OUTPATIENT)
Age: 39
End: 2019-12-25

## 2019-12-25 LAB
ANION GAP SERPL CALC-SCNC: 15 MMOL/L — SIGNIFICANT CHANGE UP (ref 5–17)
BUN SERPL-MCNC: 21 MG/DL — SIGNIFICANT CHANGE UP (ref 7–23)
CALCIUM SERPL-MCNC: 8.8 MG/DL — SIGNIFICANT CHANGE UP (ref 8.4–10.5)
CHLORIDE SERPL-SCNC: 96 MMOL/L — SIGNIFICANT CHANGE UP (ref 96–108)
CO2 SERPL-SCNC: 21 MMOL/L — LOW (ref 22–31)
CREAT SERPL-MCNC: 0.88 MG/DL — SIGNIFICANT CHANGE UP (ref 0.5–1.3)
CULTURE RESULTS: NO GROWTH — SIGNIFICANT CHANGE UP
GLUCOSE BLDC GLUCOMTR-MCNC: 126 MG/DL — HIGH (ref 70–99)
GLUCOSE BLDC GLUCOMTR-MCNC: 167 MG/DL — HIGH (ref 70–99)
GLUCOSE BLDC GLUCOMTR-MCNC: 171 MG/DL — HIGH (ref 70–99)
GLUCOSE BLDC GLUCOMTR-MCNC: 203 MG/DL — HIGH (ref 70–99)
GLUCOSE SERPL-MCNC: 140 MG/DL — HIGH (ref 70–99)
HCT VFR BLD CALC: 31.1 % — LOW (ref 34.5–45)
HGB BLD-MCNC: 9 G/DL — LOW (ref 11.5–15.5)
MAGNESIUM SERPL-MCNC: 2.1 MG/DL — SIGNIFICANT CHANGE UP (ref 1.6–2.6)
MCHC RBC-ENTMCNC: 20.3 PG — LOW (ref 27–34)
MCHC RBC-ENTMCNC: 28.9 GM/DL — LOW (ref 32–36)
MCV RBC AUTO: 70.2 FL — LOW (ref 80–100)
NRBC # BLD: 1 /100 WBCS — HIGH (ref 0–0)
PHOSPHATE SERPL-MCNC: 2.5 MG/DL — SIGNIFICANT CHANGE UP (ref 2.5–4.5)
PLATELET # BLD AUTO: 164 K/UL — SIGNIFICANT CHANGE UP (ref 150–400)
POTASSIUM SERPL-MCNC: 4.6 MMOL/L — SIGNIFICANT CHANGE UP (ref 3.5–5.3)
POTASSIUM SERPL-SCNC: 4.6 MMOL/L — SIGNIFICANT CHANGE UP (ref 3.5–5.3)
RBC # BLD: 4.43 M/UL — SIGNIFICANT CHANGE UP (ref 3.8–5.2)
RBC # FLD: 22.6 % — HIGH (ref 10.3–14.5)
SODIUM SERPL-SCNC: 132 MMOL/L — LOW (ref 135–145)
SPECIMEN SOURCE: SIGNIFICANT CHANGE UP
WBC # BLD: 9.19 K/UL — SIGNIFICANT CHANGE UP (ref 3.8–10.5)
WBC # FLD AUTO: 9.19 K/UL — SIGNIFICANT CHANGE UP (ref 3.8–10.5)

## 2019-12-25 PROCEDURE — 99232 SBSQ HOSP IP/OBS MODERATE 35: CPT

## 2019-12-25 RX ORDER — FLUTICASONE PROPIONATE 50 MCG
1 SPRAY, SUSPENSION NASAL ONCE
Refills: 0 | Status: COMPLETED | OUTPATIENT
Start: 2019-12-25 | End: 2019-12-25

## 2019-12-25 RX ORDER — FUROSEMIDE 40 MG
1 TABLET ORAL
Qty: 30 | Refills: 0
Start: 2019-12-25 | End: 2020-01-23

## 2019-12-25 RX ORDER — BUDESONIDE AND FORMOTEROL FUMARATE DIHYDRATE 160; 4.5 UG/1; UG/1
2 AEROSOL RESPIRATORY (INHALATION)
Refills: 0 | Status: DISCONTINUED | OUTPATIENT
Start: 2019-12-25 | End: 2019-12-26

## 2019-12-25 RX ORDER — POLYETHYLENE GLYCOL 3350 17 G/17G
17 POWDER, FOR SOLUTION ORAL ONCE
Refills: 0 | Status: COMPLETED | OUTPATIENT
Start: 2019-12-25 | End: 2019-12-25

## 2019-12-25 RX ORDER — CIPROFLOXACIN LACTATE 400MG/40ML
1 VIAL (ML) INTRAVENOUS
Qty: 0 | Refills: 0 | DISCHARGE
Start: 2019-12-25

## 2019-12-25 RX ADMIN — SODIUM CHLORIDE 1 GRAM(S): 9 INJECTION INTRAMUSCULAR; INTRAVENOUS; SUBCUTANEOUS at 15:11

## 2019-12-25 RX ADMIN — Medication 4: at 21:57

## 2019-12-25 RX ADMIN — Medication 2: at 17:01

## 2019-12-25 RX ADMIN — Medication 2: at 12:18

## 2019-12-25 RX ADMIN — Medication 325 MILLIGRAM(S): at 12:19

## 2019-12-25 RX ADMIN — Medication 50 MILLIGRAM(S): at 18:12

## 2019-12-25 RX ADMIN — BUDESONIDE AND FORMOTEROL FUMARATE DIHYDRATE 2 PUFF(S): 160; 4.5 AEROSOL RESPIRATORY (INHALATION) at 19:04

## 2019-12-25 RX ADMIN — Medication 500 MILLIGRAM(S): at 18:12

## 2019-12-25 RX ADMIN — POLYETHYLENE GLYCOL 3350 17 GRAM(S): 17 POWDER, FOR SOLUTION ORAL at 21:57

## 2019-12-25 RX ADMIN — SODIUM CHLORIDE 1 GRAM(S): 9 INJECTION INTRAMUSCULAR; INTRAVENOUS; SUBCUTANEOUS at 21:57

## 2019-12-25 RX ADMIN — Medication 40 MILLIGRAM(S): at 06:32

## 2019-12-25 RX ADMIN — Medication 50 MILLIGRAM(S): at 06:32

## 2019-12-25 RX ADMIN — HYDROMORPHONE HYDROCHLORIDE 0.5 MILLIGRAM(S): 2 INJECTION INTRAMUSCULAR; INTRAVENOUS; SUBCUTANEOUS at 23:43

## 2019-12-25 RX ADMIN — BUDESONIDE AND FORMOTEROL FUMARATE DIHYDRATE 2 PUFF(S): 160; 4.5 AEROSOL RESPIRATORY (INHALATION) at 08:55

## 2019-12-25 RX ADMIN — PANTOPRAZOLE SODIUM 40 MILLIGRAM(S): 20 TABLET, DELAYED RELEASE ORAL at 06:31

## 2019-12-25 RX ADMIN — SODIUM CHLORIDE 1 GRAM(S): 9 INJECTION INTRAMUSCULAR; INTRAVENOUS; SUBCUTANEOUS at 06:31

## 2019-12-25 RX ADMIN — Medication 500 MILLIGRAM(S): at 06:31

## 2019-12-25 RX ADMIN — POLYETHYLENE GLYCOL 3350 17 GRAM(S): 17 POWDER, FOR SOLUTION ORAL at 17:01

## 2019-12-25 NOTE — DISCHARGE NOTE PROVIDER - NSDCACTIVITY_GEN_ALL_CORE
No heavy lifting/straining/Showering allowed Walking - Outdoors allowed/No heavy lifting/straining/Walking - Indoors allowed/Showering allowed

## 2019-12-25 NOTE — DISCHARGE NOTE PROVIDER - NSDCFUADDAPPT_GEN_ALL_CORE_FT
Please follow up with Dr. Howe in clinic within the next 1-2 weeks. Call his office at [phone] to schedule your appointment.    Please also follow up with Dr. Novoa in clinic to discuss further management of your pleural effusion and need for chest tube. Call his office at [phone] to schedule your appointment.

## 2019-12-25 NOTE — DISCHARGE NOTE PROVIDER - HOSPITAL COURSE
Pt presented to [facility] ED on 12/18 w/ progressively worsening SOB for 4 days and abdominal pain. Abdominal US in the ED demonstrated a thickened edematous gall bladder wall indicative of hydrostatic edema, low flow within the main portal vein and hepatomegaly. CXR and CT scan showed significant right pleural effusion, without any signs of PE. The IVC mass was seen extended up to the right atrium. 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 Labs were significant for an INR of 5; she was given 4U of FFP. Pulmonology was consulted to tap the effusion but they wanted to hold off at that time. On 12/19, pt developed burning and itching with urination so a UA was ordered which was significant for positive nitrite, blood and trace leukocyte esterase. Urine cultures were sent and pt was given pyridium for symptomatic relief. On 12/20, pt was given 3U FFP in preparation for bedside chest tube placement by pulm. The tube produced 1400cc of serosanguinous fluid; analysis revealed that the effusion was an exudate. The tube was clamped and a CXR was ordered to assess for pneumothorax. On 12/21, pulm recommended keeping the chest tube clamped because they were planning on replacing it with a pleurx and retaining some of the pleural fluid would assist in swapping the devices. On 12/22, pts urine culture grew E. faecium and she was still complaining of dysuria. She was given a 1g dose of ceftriaxone and Dr. Bartholomew from nephrology was consulted for electrolyte abnormalities as well as an TEDDY. Urine lytes were ordered for further detail, the patient was started on salt tabs and was placed on 1.5L fluid restriction. On 12/23, pt refused the pleurx and the procedure was cancelled. 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 On 12/24, the chest tube was removed and 2 serial cxr were without any signs of pneumothorax. She was switched to cipro for her UTI based on sensitivites. Pt was instructed to follow up with Dr. Howe around the 2nd-3rd week of January to discuss next steps in her care.

## 2019-12-25 NOTE — PROGRESS NOTE ADULT - SUBJECTIVE AND OBJECTIVE BOX
INTERVAL HPI: Patient seen and examined at bedside by chief resident. Chest tube was removed by pulm yesterday evening; chest xray ordered to assess the lungs demonstrated no signs of pneumothorax. Denies any chest pain, difficulty with breathing, or shortness of breath. Pt was also started on cipro yesterday for LUTI. Pt is not endorsing urina    ciprofloxacin     Tablet 500  ciprofloxacin     Tablet 500  furosemide   Injectable 40  metoprolol tartrate 50        Vital Signs Last 24 Hrs  T(C): 36.4 (24 Dec 2019 21:43), Max: 36.4 (24 Dec 2019 09:16)  T(F): 97.5 (24 Dec 2019 21:43), Max: 97.6 (24 Dec 2019 09:16)  HR: 74 (25 Dec 2019 04:28) (68 - 94)  BP: 131/65 (25 Dec 2019 04:28) (102/69 - 147/82)  BP(mean): 91 (25 Dec 2019 04:28) (82 - 109)  RR: 17 (25 Dec 2019 04:28) (16 - 17)  SpO2: 99% (25 Dec 2019 04:28) (95% - 100%)  I&O's Summary    23 Dec 2019 07:01  -  24 Dec 2019 07:00  --------------------------------------------------------  IN: 0 mL / OUT: 575 mL / NET: -575 mL    24 Dec 2019 07:01  -  25 Dec 2019 05:11  --------------------------------------------------------  IN: 0 mL / OUT: 950 mL / NET: -950 mL        Physical Exam:  General: NAD  Pulmonary: Nonlabored breathing, no respiratory distress; chest tube removed, dressing placed on right lateral chest site  Cardiovascular: RRR  Abdominal: Soft, nontender, nondistended obese abdomen, incision CDI  Extremities: WWP, edema improved, still present bilaterally      LABS:                        9.1    11.84 )-----------( 188      ( 24 Dec 2019 06:06 )             31.4     12-24    133<L>  |  95<L>  |  26<H>  ----------------------------<  133<H>  4.9   |  23  |  1.14    Ca    8.9      24 Dec 2019 06:06  Phos  2.8     12-24  Mg     2.1     12-24    TPro  6.0  /  Alb  3.2<L>  /  TBili  1.8<H>  /  DBili  x   /  AST  473<H>  /  ALT  776<H>  /  AlkPhos  151<H>  12-24    PT/INR - ( 23 Dec 2019 06:37 )   PT: 29.4 sec;   INR: 2.53          PTT - ( 23 Dec 2019 06:37 )  PTT:29.1 sec      Assessment and Plan: INTERVAL HPI: Patient seen and examined at bedside by chief resident. Chest tube was removed by pulm yesterday evening; chest xray ordered to assess the lungs demonstrated no signs of pneumothorax. Denies any chest pain, difficulty with breathing, or shortness of breath. Denies abdominal pain, nausea or vomiting. States that she feels emotionally unsettled and overwhelmed given the news she received yesterday about her leiomyosarcoma    ciprofloxacin     Tablet 500  ciprofloxacin     Tablet 500  furosemide   Injectable 40  metoprolol tartrate 50        Vital Signs Last 24 Hrs  T(C): 36.4 (24 Dec 2019 21:43), Max: 36.4 (24 Dec 2019 09:16)  T(F): 97.5 (24 Dec 2019 21:43), Max: 97.6 (24 Dec 2019 09:16)  HR: 74 (25 Dec 2019 04:28) (68 - 94)  BP: 131/65 (25 Dec 2019 04:28) (102/69 - 147/82)  BP(mean): 91 (25 Dec 2019 04:28) (82 - 109)  RR: 17 (25 Dec 2019 04:28) (16 - 17)  SpO2: 99% (25 Dec 2019 04:28) (95% - 100%)  I&O's Summary    23 Dec 2019 07:01  -  24 Dec 2019 07:00  --------------------------------------------------------  IN: 0 mL / OUT: 575 mL / NET: -575 mL    24 Dec 2019 07:01  -  25 Dec 2019 05:11  --------------------------------------------------------  IN: 0 mL / OUT: 950 mL / NET: -950 mL        Physical Exam:  General: NAD  Pulmonary: Nonlabored breathing, no respiratory distress; chest tube removed, dressing placed on right lateral chest site  Cardiovascular: RRR  Abdominal: Soft, nontender, nondistended obese abdomen, incision CDI  Extremities: WWP, edema improved, still present bilaterally      LABS:                        9.1    11.84 )-----------( 188      ( 24 Dec 2019 06:06 )             31.4     12-24    133<L>  |  95<L>  |  26<H>  ----------------------------<  133<H>  4.9   |  23  |  1.14    Ca    8.9      24 Dec 2019 06:06  Phos  2.8     12-24  Mg     2.1     12-24    TPro  6.0  /  Alb  3.2<L>  /  TBili  1.8<H>  /  DBili  x   /  AST  473<H>  /  ALT  776<H>  /  AlkPhos  151<H>  12-24    PT/INR - ( 23 Dec 2019 06:37 )   PT: 29.4 sec;   INR: 2.53          PTT - ( 23 Dec 2019 06:37 )  PTT:29.1 sec      Assessment and Plan:

## 2019-12-25 NOTE — DISCHARGE NOTE PROVIDER - NSDCFUADDINST_GEN_ALL_CORE_FT
Do NOT restart your eliquis/apixaban or any other anticoagulation medication until you have seen Dr. Howe in clinic for your follow up appointment and have discussed this with him. You may resume taking all other home medications as you were prior to your hospitalization.

## 2019-12-25 NOTE — DISCHARGE NOTE PROVIDER - CARE PROVIDERS DIRECT ADDRESSES
,DirectAddress_Unknown,[email or web address]

## 2019-12-25 NOTE — DISCHARGE NOTE PROVIDER - PROVIDER TOKENS
PROVIDER:[TOKEN:[[id number]:[2 weeks]],PROVIDER:[TOKEN:[[id number]],FOLLOWUP:[2 weeks]]

## 2019-12-25 NOTE — DISCHARGE NOTE PROVIDER - NSDCCPCAREPLAN_GEN_ALL_CORE_FT
PRINCIPAL DISCHARGE DIAGNOSIS  Diagnosis: Pleural effusion  Assessment and Plan of Treatment:       SECONDARY DISCHARGE DIAGNOSES  Diagnosis: Primary leiomyosarcoma  Assessment and Plan of Treatment:     Diagnosis: Supratherapeutic INR  Assessment and Plan of Treatment:     Diagnosis: Shortness of breath  Assessment and Plan of Treatment:

## 2019-12-25 NOTE — DISCHARGE NOTE PROVIDER - CARE PROVIDER_API CALL
Kade Howe)  Surgery  [address] [address]  Phone: [phone]  Fax: [phone]  Follow Up Time: 2 weeks    Nettie Noova)  Critical Care Medicine; Pulmonary Disease  [address], [address] [place] [id number]  Phone: [phone]  Fax: [phone]  Follow Up Time: 2 weeks

## 2019-12-25 NOTE — PROGRESS NOTE ADULT - SUBJECTIVE AND OBJECTIVE BOX
CC: PLEURAL EFFUSION    INTERVAL HISTORY:    * Stable hyponatremia. * CT removed. * Persistent fluid overload.     ROS: No chest pain. No shortness of breath. No nausea.    PAST MEDICAL & SURGICAL HISTORY:  Uterine fibroid  Hyperlipidemia  Asthma  Diabetes  Hypertension  No significant past surgical history    PHYSICAL EXAM:  T(C): 36.6 (25 Dec 2019 14:00), Max: 37.1 (25 Dec 2019 09:38)  HR: 74 (25 Dec 2019 08:14)  BP: 124/79 (25 Dec 2019 08:14) (102/69 - 147/82)  RR: 16 (25 Dec 2019 08:14)  SpO2: 98% (25 Dec 2019 08:14)      24 Dec 2019 07:01  -  25 Dec 2019 07:00  --------------------------------------------------------  IN:  Total IN: 0 mL    OUT:    Voided: 1150 mL  Total OUT: 1150 mL    Total NET: -1150 mL      25 Dec 2019 07:01  -  25 Dec 2019 14:59  --------------------------------------------------------  IN:  Total IN: 0 mL    OUT:    Voided: 750 mL  Total OUT: 750 mL    Total NET: -750 mL        Weight     Appearance: alert, pleasant, INAD.  ENT: oral mucosa moist, no pallor/cyanosis.  Neck: no JVD visible.  Cardiac: no rubs. no murmurs. Extremities: pitting bilateral legs   Skin: no rashes.  Extremities (digits): no clubbing or cyanosis.  Respratory effort: no access muscle use. Lungs: CLEAR TO AUSCULTATION.  Abdomen: soft. nontender. no masses.  Psych affect: not depressed. Orientation: person, place, situation.     MEDICATIONS  (STANDING):  budesonide 160 MICROgram(s)/formoterol 4.5 MICROgram(s) Inhaler 2 Puff(s) Inhalation two times a day  ciprofloxacin     Tablet 500 milliGRAM(s) Oral every 12 hours  dextrose 5%. 1000 milliLiter(s) (50 mL/Hr) IV Continuous <Continuous>  dextrose 50% Injectable 12.5 Gram(s) IV Push once  dextrose 50% Injectable 25 Gram(s) IV Push once  dextrose 50% Injectable 25 Gram(s) IV Push once  ferrous    sulfate 325 milliGRAM(s) Oral daily  furosemide   Injectable 40 milliGRAM(s) IV Push daily  insulin lispro (HumaLOG) corrective regimen sliding scale   SubCutaneous Before meals and at bedtime  lidocaine   Patch 2 Patch Transdermal every 24 hours  metoprolol tartrate 50 milliGRAM(s) Oral two times a day  pantoprazole    Tablet 40 milliGRAM(s) Oral before breakfast  polyethylene glycol 3350 17 Gram(s) Oral at bedtime  pregabalin 25 milliGRAM(s) Oral three times a day  sodium chloride 1 Gram(s) Oral three times a day    MEDICATIONS  (PRN):  cyclobenzaprine 5 milliGRAM(s) Oral three times a day PRN Muscle Spasm  dextrose 40% Gel 15 Gram(s) Oral once PRN Blood Glucose LESS THAN 70 milliGRAM(s)/deciliter  glucagon  Injectable 1 milliGRAM(s) IntraMuscular once PRN Glucose LESS THAN 70 milligrams/deciliter  HYDROmorphone   Tablet 2 milliGRAM(s) Oral every 4 hours PRN Moderate Pain (4 - 6)  HYDROmorphone   Tablet 4 milliGRAM(s) Oral every 4 hours PRN Severe Pain (7 - 10)  HYDROmorphone  Injectable 0.5 milliGRAM(s) IV Push every 4 hours PRN Breakthrough pain  ondansetron Injectable 4 milliGRAM(s) IV Push every 6 hours PRN Nausea    DATA:  132<L>  |  96     |  21     ----------------------------<  140<H>  Ca:8.8   (25 Dec 2019 06:10)  4.6     |  21<L>  |  0.88       eGFR if Non : 82  eGFR if : 95    TPro  6.0    /  Alb  3.2<L>  /  TBili  1.8<H>  /  DBili  x      /  AST  473<H>  /  ALT  776<H>  /  AlkPhos  151<H>  24 Dec 2019 06:06    SCr 0.88 [25 Dec 2019 06:10]  SCr 1.14 [24 Dec 2019 06:06]  SCr 1.11 [23 Dec 2019 22:47]  SCr 1.28 [23 Dec 2019 06:37]  SCr 1.28 [22 Dec 2019 17:01]                          9.0<L>  9.19  )-----------( 164      ( 25 Dec 2019 06:10 )             31.1<L>    Phos:2.5 mg/dL M.1 mg/dL PTH:-- Uric acid:-- Serum Osm:--  Ferritin:-- Iron:-- TIBC:-- Tsat:--  B12:-- TSH:-- (25 Dec 2019 06:10)    Urinalysis Basic - ( 24 Dec 2019 01:06 )  Color: Yellow / Appearance: Hazy<!> / S.015 / pH: x  Gluc: x / Ketone: NEGATIVE  / Bili: Negative / Urobili: 0.2 E.U./dL   Blood: x / Protein: NEGATIVE mg/dL / Nitrite: NEGATIVE   Leuk Esterase: Trace<!> / RBC: 5-10 /HPF<!> / WBC < 5 /HPF   Sq Epi: x / Non Sq Epi: 5-10 /HPF<!> / Bacteria: Present /HPF<!>      UProt:-- UCr:237 mg/dL P/C Ratio:0.2 Ratio 24 hour Prot:-- UVol:-- CrCl:--  Dara:20 mmol/L UOsm:317 mosmol/kg UVol:-- UCl:20 mmol/L UK:40 mmol/L (22 Dec 2019 11:44)

## 2019-12-26 ENCOUNTER — TRANSCRIPTION ENCOUNTER (OUTPATIENT)
Age: 39
End: 2019-12-26

## 2019-12-26 VITALS — TEMPERATURE: 98 F

## 2019-12-26 LAB
GLUCOSE BLDC GLUCOMTR-MCNC: 125 MG/DL — HIGH (ref 70–99)
GLUCOSE BLDC GLUCOMTR-MCNC: 176 MG/DL — HIGH (ref 70–99)

## 2019-12-26 PROCEDURE — 81001 URINALYSIS AUTO W/SCOPE: CPT

## 2019-12-26 PROCEDURE — 82945 GLUCOSE OTHER FLUID: CPT

## 2019-12-26 PROCEDURE — 85384 FIBRINOGEN ACTIVITY: CPT

## 2019-12-26 PROCEDURE — 83521 IG LIGHT CHAINS FREE EACH: CPT

## 2019-12-26 PROCEDURE — 88112 CYTOPATH CELL ENHANCE TECH: CPT

## 2019-12-26 PROCEDURE — 84155 ASSAY OF PROTEIN SERUM: CPT

## 2019-12-26 PROCEDURE — 83735 ASSAY OF MAGNESIUM: CPT

## 2019-12-26 PROCEDURE — 86334 IMMUNOFIX E-PHORESIS SERUM: CPT

## 2019-12-26 PROCEDURE — 82042 OTHER SOURCE ALBUMIN QUAN EA: CPT

## 2019-12-26 PROCEDURE — 88305 TISSUE EXAM BY PATHOLOGIST: CPT

## 2019-12-26 PROCEDURE — 83986 ASSAY PH BODY FLUID NOS: CPT

## 2019-12-26 PROCEDURE — 36415 COLL VENOUS BLD VENIPUNCTURE: CPT

## 2019-12-26 PROCEDURE — 84311 SPECTROPHOTOMETRY: CPT

## 2019-12-26 PROCEDURE — 80074 ACUTE HEPATITIS PANEL: CPT

## 2019-12-26 PROCEDURE — 80076 HEPATIC FUNCTION PANEL: CPT

## 2019-12-26 PROCEDURE — 85025 COMPLETE CBC W/AUTO DIFF WBC: CPT

## 2019-12-26 PROCEDURE — P9045: CPT

## 2019-12-26 PROCEDURE — 84157 ASSAY OF PROTEIN OTHER: CPT

## 2019-12-26 PROCEDURE — 83615 LACTATE (LD) (LDH) ENZYME: CPT

## 2019-12-26 PROCEDURE — 84478 ASSAY OF TRIGLYCERIDES: CPT

## 2019-12-26 PROCEDURE — P9047: CPT

## 2019-12-26 PROCEDURE — 85730 THROMBOPLASTIN TIME PARTIAL: CPT

## 2019-12-26 PROCEDURE — 84702 CHORIONIC GONADOTROPIN TEST: CPT

## 2019-12-26 PROCEDURE — 87186 SC STD MICRODIL/AGAR DIL: CPT

## 2019-12-26 PROCEDURE — 84300 ASSAY OF URINE SODIUM: CPT

## 2019-12-26 PROCEDURE — 89051 BODY FLUID CELL COUNT: CPT

## 2019-12-26 PROCEDURE — 85045 AUTOMATED RETICULOCYTE COUNT: CPT

## 2019-12-26 PROCEDURE — 82553 CREATINE MB FRACTION: CPT

## 2019-12-26 PROCEDURE — 93005 ELECTROCARDIOGRAM TRACING: CPT

## 2019-12-26 PROCEDURE — 87070 CULTURE OTHR SPECIMN AEROBIC: CPT

## 2019-12-26 PROCEDURE — 76775 US EXAM ABDO BACK WALL LIM: CPT

## 2019-12-26 PROCEDURE — 86880 COOMBS TEST DIRECT: CPT

## 2019-12-26 PROCEDURE — 87205 SMEAR GRAM STAIN: CPT

## 2019-12-26 PROCEDURE — 86850 RBC ANTIBODY SCREEN: CPT

## 2019-12-26 PROCEDURE — 99232 SBSQ HOSP IP/OBS MODERATE 35: CPT | Mod: GC

## 2019-12-26 PROCEDURE — 84484 ASSAY OF TROPONIN QUANT: CPT

## 2019-12-26 PROCEDURE — 86901 BLOOD TYPING SEROLOGIC RH(D): CPT

## 2019-12-26 PROCEDURE — 93970 EXTREMITY STUDY: CPT

## 2019-12-26 PROCEDURE — 82962 GLUCOSE BLOOD TEST: CPT

## 2019-12-26 PROCEDURE — 84133 ASSAY OF URINE POTASSIUM: CPT

## 2019-12-26 PROCEDURE — 86870 RBC ANTIBODY IDENTIFICATION: CPT

## 2019-12-26 PROCEDURE — 85027 COMPLETE CBC AUTOMATED: CPT

## 2019-12-26 PROCEDURE — 82570 ASSAY OF URINE CREATININE: CPT

## 2019-12-26 PROCEDURE — 83935 ASSAY OF URINE OSMOLALITY: CPT

## 2019-12-26 PROCEDURE — 84100 ASSAY OF PHOSPHORUS: CPT

## 2019-12-26 PROCEDURE — 94640 AIRWAY INHALATION TREATMENT: CPT

## 2019-12-26 PROCEDURE — 71045 X-RAY EXAM CHEST 1 VIEW: CPT

## 2019-12-26 PROCEDURE — 71275 CT ANGIOGRAPHY CHEST: CPT

## 2019-12-26 PROCEDURE — 82550 ASSAY OF CK (CPK): CPT

## 2019-12-26 PROCEDURE — 76705 ECHO EXAM OF ABDOMEN: CPT

## 2019-12-26 PROCEDURE — 87075 CULTR BACTERIA EXCEPT BLOOD: CPT

## 2019-12-26 PROCEDURE — 80048 BASIC METABOLIC PNL TOTAL CA: CPT

## 2019-12-26 PROCEDURE — 84156 ASSAY OF PROTEIN URINE: CPT

## 2019-12-26 PROCEDURE — 80053 COMPREHEN METABOLIC PANEL: CPT

## 2019-12-26 PROCEDURE — 99285 EMERGENCY DEPT VISIT HI MDM: CPT

## 2019-12-26 PROCEDURE — 82436 ASSAY OF URINE CHLORIDE: CPT

## 2019-12-26 PROCEDURE — 85610 PROTHROMBIN TIME: CPT

## 2019-12-26 PROCEDURE — P9017: CPT

## 2019-12-26 PROCEDURE — 86900 BLOOD TYPING SEROLOGIC ABO: CPT

## 2019-12-26 PROCEDURE — 87086 URINE CULTURE/COLONY COUNT: CPT

## 2019-12-26 PROCEDURE — 36430 TRANSFUSION BLD/BLD COMPNT: CPT

## 2019-12-26 PROCEDURE — 84165 PROTEIN E-PHORESIS SERUM: CPT

## 2019-12-26 PROCEDURE — 82040 ASSAY OF SERUM ALBUMIN: CPT

## 2019-12-26 RX ORDER — CIPROFLOXACIN LACTATE 400MG/40ML
2 VIAL (ML) INTRAVENOUS
Qty: 4 | Refills: 0
Start: 2019-12-26 | End: 2019-12-26

## 2019-12-26 RX ORDER — ACETAMINOPHEN 500 MG
975 TABLET ORAL ONCE
Refills: 0 | Status: COMPLETED | OUTPATIENT
Start: 2019-12-26 | End: 2019-12-26

## 2019-12-26 RX ADMIN — SODIUM CHLORIDE 1 GRAM(S): 9 INJECTION INTRAMUSCULAR; INTRAVENOUS; SUBCUTANEOUS at 05:41

## 2019-12-26 RX ADMIN — SODIUM CHLORIDE 1 GRAM(S): 9 INJECTION INTRAMUSCULAR; INTRAVENOUS; SUBCUTANEOUS at 11:42

## 2019-12-26 RX ADMIN — Medication 325 MILLIGRAM(S): at 11:30

## 2019-12-26 RX ADMIN — BUDESONIDE AND FORMOTEROL FUMARATE DIHYDRATE 2 PUFF(S): 160; 4.5 AEROSOL RESPIRATORY (INHALATION) at 05:41

## 2019-12-26 RX ADMIN — Medication 2: at 11:42

## 2019-12-26 RX ADMIN — Medication 975 MILLIGRAM(S): at 04:27

## 2019-12-26 RX ADMIN — Medication 500 MILLIGRAM(S): at 05:41

## 2019-12-26 RX ADMIN — Medication 40 MILLIGRAM(S): at 05:41

## 2019-12-26 RX ADMIN — HYDROMORPHONE HYDROCHLORIDE 0.5 MILLIGRAM(S): 2 INJECTION INTRAMUSCULAR; INTRAVENOUS; SUBCUTANEOUS at 00:00

## 2019-12-26 RX ADMIN — PANTOPRAZOLE SODIUM 40 MILLIGRAM(S): 20 TABLET, DELAYED RELEASE ORAL at 07:01

## 2019-12-26 RX ADMIN — Medication 975 MILLIGRAM(S): at 03:27

## 2019-12-26 RX ADMIN — Medication 50 MILLIGRAM(S): at 05:41

## 2019-12-26 NOTE — PROGRESS NOTE ADULT - ASSESSMENT
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 Hyponatremia.    Suggest:    1. Cont lasix, aim net negative 1 L/d as BP tolerates.  2. Follow Scr.  3. Follow Sna.    Please call with any questions.    Morris Lee MD, FACP, FASN | kidney.[facility]  Nephrology, Hypertension, and Internal Medicine  Mobile: [phone] (Daytime Hours Only)  Office/Answering Service: [phone]  Asst. Prof. of Medicine, [employer] at [facility] - Nephrology at [address]  [address], [address], [place], [place]
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 Howe, prelim biopsy spindle cell) now p/w progressively worsening SOB x4 days, achy periumbilical pain unchanged since laparoscopy, and elevated LFTs. CT demonstrating worsening of R pleural effusion and no PE. US w/ hepatomegaly and low flow in main portal vein likely 2/2 compression, no s/s of acute cholecystitis or choledocholithiasis.     Pain/nausea control  Regular diet  Home HTN and asthma meds  F/u pulm   Hold eliquis   ISS  AM labs
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 Howe, prelim biopsy spindle cell) now p/w progressively worsening SOB x4 days, achy periumbilical pain unchanged since laparoscopy, and elevated LFTs. CT demonstrating worsening of R pleural effusion and no PE. US w/ hepatomegaly and low flow in main portal vein likely 2/2 compression, no s/s of acute cholecystitis or choledocholithiasis.     Tele  Pain/nausea control  Regular diet  Home HTN and asthma meds  F/u pulm recs: plan for 3 U FFP prior to bedside chest tube today and transition to pleurX next week  Hold eliquis   ISS  AM labs
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 Howe, prelim biopsy spindle cell) now p/w progressively worsening SOB x4 days, achy periumbilical pain unchanged since laparoscopy, and elevated LFTs. CT demonstrating worsening of R pleural effusion and no PE. US w/ hepatomegaly and low flow in main portal vein likely 2/2 compression, no s/s of acute cholecystitis or choledocholithiasis. S/p bedside chest tube placement 12/20    Pain/nausea control  Pleur-ex placement tomorrow  Regular diet, NPO at MD  Home HTN and asthma meds  F/u pulm   Hold eliquis   TEDDY and hyponatremia: Renal lytes, fluid restrict, NS @60, f/u renal recs  ISS  AM labs
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 Howe, prelim biopsy spindle cell) now p/w progressively worsening SOB x4 days, achy periumbilical pain unchanged since laparoscopy, and elevated LFTs. CT demonstrating worsening of R pleural effusion and no PE. US w/ hepatomegaly and low flow in main portal vein likely 2/2 compression, no s/s of acute cholecystitis or choledocholithiasis. S/p bedside chest tube placement 12/20    Pain/nausea control  Pleurx placement tomorrow (planned for today however pt not agreeable at this time)  Regular diet, NPO at MD  Home HTN and asthma meds  F/u pulm   Hold eliquis   TEDDY and hyponatremia: Renal labs, fluid restrict, salt tabs, renal sono/doppler  AM labs
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 Howe, prelim biopsy spindle cell) now p/w progressively worsening SOB x4 days, achy periumbilical pain unchanged since laparoscopy, and elevated LFTs. CT demonstrating worsening of R pleural effusion and no PE. US w/ hepatomegaly and low flow in main portal vein likely 2/2 compression, no s/s of acute cholecystitis or choledocholithiasis. S/p bedside chest tube placement 12/20, final path shows leiomyosarcoma 12/23    Pain/nausea control  CLD  Cipro for UTI  Home HTN and asthma meds  Hold eliquis   f/u Renal US & nephro recs  ISS  AM labs  likely d/c later today
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 Howe, prelim biopsy spindle cell) now p/w progressively worsening SOB x4 days, achy periumbilical pain unchanged since laparoscopy, and elevated LFTs. CT demonstrating worsening of R pleural effusion and no PE. US w/ hepatomegaly and low flow in main portal vein likely 2/2 compression, no s/s of acute cholecystitis or choledocholithiasis. S/p bedside chest tube placement 12/20, final path shows leiomyosarcoma 12/23    Pain/nausea control  Reg  Home HTN and asthma meds  F/u pulm   Hold eliquis   TEDDY and hyponatremia: Renal lytes, fluid restrict, NS @60, f/u renal recs  ISS  AM labs
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 Howe, prelim biopsy spindle cell) now p/w progressively worsening SOB x4 days, achy periumbilical pain unchanged since laparoscopy, and elevated LFTs. CT demonstrating worsening of R pleural effusion and no PE. US w/ hepatomegaly and low flow in main portal vein likely 2/2 compression, no s/s of acute cholecystitis or choledocholithiasis. S/p bedside chest tube placement 12/20, final path shows leiomyosarcoma 12/23    Pain/nausea control  Regular DM diet  Cipro for UTI  Home HTN and asthma meds  Hold eliquis   f/u Renal US & nephro recs  ISS  Home today
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 Howe, prelim biopsy spindle cell) now p/w progressively worsening SOB x4 days and recurrence of right pleural effusion, periumbilical pain unchanged since laparoscopy, and elevated LFTs. CT demonstrating worsening of R pleural effusion(no PE) US w/ hepatomegaly and low flow in main portal vein likely 2/2 compression, nephrology consulted for new onset TEDDY.
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 Howe, prelim biopsy spindle cell) now p/w progressively worsening SOB x4 days and recurrence of right pleural effusion, periumbilical pain unchanged since laparoscopy, and elevated LFTs. CT demonstrating worsening of R pleural effusion(no PE) US w/ hepatomegaly and low flow in main portal vein likely 2/2 compression, nephrology consulted for new onset TEDDY.
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 Howe, prelim biopsy spindle cell) now p/w progressively worsening SOB x4 days and recurrence of right pleural effusion, periumbilical pain unchanged since laparoscopy, and elevated LFTs. CT demonstrating worsening of R pleural effusion(no PE) US w/ hepatomegaly and low flow in main portal vein likely 2/2 compression, nephrology consulted for new onset TEDDY.
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 She is s/p laparoscopic bx of the mass, pathology pending. She presents with a recurrent large right sided pleural effusion.
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 She is s/p laparoscopic bx of the mass, pathology pending. She presents with a recurrent large right sided pleural effusion.
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 She is s/p laparoscopic bx of the mass, pathology pending. She presents with a recurrent large right sided pleural effusion.
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 Howe, prelim biopsy spindle cell) now p/w progressively worsening SOB x4 days, achy periumbilical pain unchanged since laparoscopy, and elevated LFTs. CT demonstrating worsening of R pleural effusion and no PE. US w/ hepatomegaly and low flow in main portal vein likely 2/2 compression, no s/s of acute cholecystitis or choledocholithiasis.     Tele  Pain/nausea control  NPO, albumin   Home HTN and asthma meds  f/u Pulm recs   Hold eliquis   ISS  AM labs

## 2019-12-26 NOTE — PROGRESS NOTE ADULT - ATTENDING COMMENTS
Patient seen and examined with house-staff during bedside rounds.  Resident note read, including vitals, physical findings, laboratory data, and radiological reports.   Revisions included below.  Direct personal management at bed side and extensive interpretation of the data.  Plan was outlined and discussed in details with the housestaff.  Decision making of high complexity  Action taken for acute disease activity to reflect the level of care provided:  - medication reconciliation  - review laboratory data
Patient seen and examined with house-staff during bedside rounds.  Resident note read, including vitals, physical findings, laboratory data, and radiological reports.   Revisions included below.  Direct personal management at bed side and extensive interpretation of the data.  Plan was outlined and discussed in details with the housestaff.  Decision making of high complexity  Action taken for acute disease activity to reflect the level of care provided:  - medication reconciliation  - review laboratory data  CT was clamped.  I unclamped the CT and no drainage. CT was removed and CXR stable .  DC and will follow as outpatient
Patient seen and examined with house-staff during bedside rounds.  Resident note read, including vitals, physical findings, laboratory data, and radiological reports.   Revisions included below.  Direct personal management at bed side and extensive interpretation of the data.  Plan was outlined and discussed in details with the housestaff.  Decision making of high complexity  Action taken for acute disease activity to reflect the level of care provided:  - medication reconciliation  - review laboratory data  seen several times.  US revealed small amount of pleural fluid.  Unclamp the cT continue drainage.  I discussed with Dr Howe and she is for surgery once she is optoimized
Patient seen and examined with house-staff during bedside rounds.  Resident note read, including vitals, physical findings, laboratory data, and radiological reports.   Revisions included below.  Direct personal management at bed side and extensive interpretation of the data.  Plan was outlined and discussed in details with the housestaff.  Decision making of high complexity  Action taken for acute disease activity to reflect the level of care provided:  - medication reconciliation  - review laboratory data  seen and examined  plan for drainage of the pleural fluid either CT or thoracentesis
for chest tube today to palliate dyspnea

## 2019-12-26 NOTE — DISCHARGE NOTE NURSING/CASE MANAGEMENT/SOCIAL WORK - PATIENT PORTAL LINK FT
You can access the FollowMyHealth Patient Portal offered by [facility] by registering at the following website: [email or web address]. By joining [facility]’s FollowMyHealth portal, you will also be able to view your health information using other applications (apps) compatible with our system.

## 2019-12-26 NOTE — PROVIDER CONTACT NOTE (OTHER) - ACTION/TREATMENT ORDERED:
Notified team 1 Rito MCGRATH, aware and notified. Reassessed vss. No intervention noted at this current time. Team 1 resident will assess pt as bedside shortly. Will closely monitor pt for any changes.

## 2019-12-26 NOTE — PROGRESS NOTE ADULT - REASON FOR ADMISSION
Pleural Effusion

## 2019-12-26 NOTE — PROGRESS NOTE ADULT - SUBJECTIVE AND OBJECTIVE BOX
SUBJECTIVE: Patient seen and examined bedside by chief resident. URBNA overnight. Denies dyspnea. Pain controlled. Anxious about surgery but comfortable leaving today.     ciprofloxacin     Tablet 500 milliGRAM(s) Oral every 12 hours  furosemide   Injectable 40 milliGRAM(s) IV Push daily  metoprolol tartrate 50 milliGRAM(s) Oral two times a day    MEDICATIONS  (PRN):  cyclobenzaprine 5 milliGRAM(s) Oral three times a day PRN Muscle Spasm  dextrose 40% Gel 15 Gram(s) Oral once PRN Blood Glucose LESS THAN 70 milliGRAM(s)/deciliter  glucagon  Injectable 1 milliGRAM(s) IntraMuscular once PRN Glucose LESS THAN 70 milligrams/deciliter  HYDROmorphone   Tablet 2 milliGRAM(s) Oral every 4 hours PRN Moderate Pain (4 - 6)  HYDROmorphone   Tablet 4 milliGRAM(s) Oral every 4 hours PRN Severe Pain (7 - 10)  HYDROmorphone  Injectable 0.5 milliGRAM(s) IV Push every 4 hours PRN Breakthrough pain  ondansetron Injectable 4 milliGRAM(s) IV Push every 6 hours PRN Nausea      I&O's Detail    25 Dec 2019 07:01  -  26 Dec 2019 07:00  --------------------------------------------------------  IN:  Total IN: 0 mL    OUT:    Voided: 950 mL  Total OUT: 950 mL    Total NET: -950 mL          Vital Signs Last 24 Hrs  T(C): 36.7 (26 Dec 2019 05:20), Max: 37.1 (25 Dec 2019 09:38)  T(F): 98.1 (26 Dec 2019 05:20), Max: 98.8 (25 Dec 2019 09:38)  HR: 72 (26 Dec 2019 04:50) (72 - 104)  BP: 111/66 (26 Dec 2019 04:50) (111/66 - 137/93)  BP(mean): 79 (26 Dec 2019 04:50) (79 - 109)  RR: 17 (26 Dec 2019 04:50) (16 - 18)  SpO2: 99% (26 Dec 2019 04:50) (98% - 100%)    General: NAD, resting comfortably in bed  C/V: NSR  Pulm: Nonlabored breathing, no respiratory distress, dressing over CT site  Abd: soft, ND, NT  Extrem: WWP, no edema, SCDs in place    LABS:                        9.0    9.19  )-----------( 164      ( 25 Dec 2019 06:10 )             31.1     12-25    132<L>  |  96  |  21  ----------------------------<  140<H>  4.6   |  21<L>  |  0.88    Ca    8.8      25 Dec 2019 06:10  Phos  2.5     12-25  Mg     2.1     12-25            RADIOLOGY & ADDITIONAL STUDIES:

## 2019-12-26 NOTE — PROGRESS NOTE ADULT - SUBJECTIVE AND OBJECTIVE BOX
O/N Events: URBAN  Subjective:  does not offer any complaints, feels good, denies SOB/CP denies dysuria   LE edema unchanged      VITALS  Vital Signs Last 24 Hrs  T(C): 36.8 (26 Dec 2019 09:10), Max: 37.1 (25 Dec 2019 22:15)  T(F): 98.2 (26 Dec 2019 09:10), Max: 98.8 (25 Dec 2019 22:15)  HR: 76 (26 Dec 2019 11:40) (57 - 104)  BP: 131/84 (26 Dec 2019 11:40) (104/56 - 137/93)  BP(mean): 101 (26 Dec 2019 11:40) (74 - 109)  RR: 14 (26 Dec 2019 11:40) (14 - 18)  SpO2: 100% (26 Dec 2019 11:40) (99% - 100%)    PHYSICAL EXAM  General: NAD, sitting OOB in the chair sclera  Neck: Supple; no JVD  Respiratory: CTA B/L  Cardiovascular: Regular rhythm/rate; S1/S2; no MGR  Gastrointestinal: Soft; NTND  Extremities: WWP; 2+ pitting edema b/l   Neurological: non focal     MEDICATIONS  (STANDING):  budesonide 160 MICROgram(s)/formoterol 4.5 MICROgram(s) Inhaler 2 Puff(s) Inhalation two times a day  ciprofloxacin     Tablet 500 milliGRAM(s) Oral every 12 hours  dextrose 5%. 1000 milliLiter(s) (50 mL/Hr) IV Continuous <Continuous>  dextrose 50% Injectable 12.5 Gram(s) IV Push once  dextrose 50% Injectable 25 Gram(s) IV Push once  dextrose 50% Injectable 25 Gram(s) IV Push once  ferrous    sulfate 325 milliGRAM(s) Oral daily  furosemide   Injectable 40 milliGRAM(s) IV Push daily  insulin lispro (HumaLOG) corrective regimen sliding scale   SubCutaneous Before meals and at bedtime  lidocaine   Patch 2 Patch Transdermal every 24 hours  metoprolol tartrate 50 milliGRAM(s) Oral two times a day  pantoprazole    Tablet 40 milliGRAM(s) Oral before breakfast  polyethylene glycol 3350 17 Gram(s) Oral at bedtime  sodium chloride 1 Gram(s) Oral three times a day    MEDICATIONS  (PRN):  cyclobenzaprine 5 milliGRAM(s) Oral three times a day PRN Muscle Spasm  dextrose 40% Gel 15 Gram(s) Oral once PRN Blood Glucose LESS THAN 70 milliGRAM(s)/deciliter  glucagon  Injectable 1 milliGRAM(s) IntraMuscular once PRN Glucose LESS THAN 70 milligrams/deciliter  HYDROmorphone   Tablet 2 milliGRAM(s) Oral every 4 hours PRN Moderate Pain (4 - 6)  HYDROmorphone   Tablet 4 milliGRAM(s) Oral every 4 hours PRN Severe Pain (7 - 10)  HYDROmorphone  Injectable 0.5 milliGRAM(s) IV Push every 4 hours PRN Breakthrough pain  ondansetron Injectable 4 milliGRAM(s) IV Push every 6 hours PRN Nausea      LABS                        9.0    9.19  )-----------( 164      ( 25 Dec 2019 06:10 )             31.1     12-25    132<L>  |  96  |  21  ----------------------------<  140<H>  4.6   |  21<L>  |  0.88    Ca    8.8      25 Dec 2019 06:10  Phos  2.5     12-25  Mg     2.1     12-25

## 2019-12-26 NOTE — PROVIDER CONTACT NOTE (OTHER) - SITUATION
Patient is in bed asymptomatic, relaxed, and about to fall asleep and heart went low as 45 bpm to 59.

## 2019-12-26 NOTE — DISCHARGE NOTE NURSING/CASE MANAGEMENT/SOCIAL WORK - NSDCFUADDAPPT_GEN_ALL_CORE_FT
Please follow up with Dr. Howe in clinic within the next 1-2 weeks. Call his office at [phone] to schedule your appointment.    Please also follow up with Dr. Novoa in clinic to discuss further management of your pleural effusion and need for chest tube. Call his office at [phone] to schedule your appointment.

## 2019-12-26 NOTE — PROVIDER CONTACT NOTE (OTHER) - ASSESSMENT
Pt is asymptomatic: no sob, distress, dizziness ,and chest pain noted at this current time. Pt relaxed and about the nap. /56(74) Resp 14 Spo2: 99 on rm air

## 2019-12-26 NOTE — PROGRESS NOTE ADULT - PROBLEM SELECTOR PLAN 1
# Nonoliguric TEDDY   Ddx includes DAVID vs renal vein thrombus (high risk in pancreatic malignancy)  TEDDY improved, Cr downtrending to 0.8  plan for discharge today and return in mid January for tumor resection meanwhile would continue with 40 Mg PO lasix for LE edema   Discussed with Dr. Robbins

## 2019-12-26 NOTE — PROGRESS NOTE ADULT - SUBJECTIVE AND OBJECTIVE BOX
Pain Management Progress Note - [facility] [phone]      HPI: Pateint seen and examined today, patient in nad. Patient with a history of uterine fibroid, diabetes, asthma, DVT, pleural effusion, SOB, s/p laparoscopy and mass biopsy, admitted with worsening SOB, periumbilical pain and elevated LFT's, now s/p chest tube placement, chest tube dcd. Patient reports pain to previous chest tube site, patient described as a "pulling feeling" when taking deep breathes. Patient reports pain relief with Dilaudid PO. Patient Axox3, no s/s of oversedation. Patient reporting "burning sensation" across ehr skin with lyrica Po, lyrica dcd by primary team.          Pain is _x__ sharp ____dull _x__burning _x__achy ___ Intensity: __x__ mild __x_mod ___severe     Location ____surgical site ____cervical _____lumbar ____abd ____upper ext____lower ext  x__ R Chest    Worse with __x__activity _x___movement _____physical therapy___ Rest    Improved with _x___medication ___x_rest ____physical therapy    traMADol  pantoprazole    Tablet  carvedilol  atorvastatin  budesonide 160 MICROgram(s)/formoterol 4.5 MICROgram(s) Inhaler  insulin lispro (HumaLOG) corrective regimen sliding scale  dextrose 5%.  dextrose 40% Gel  dextrose 50% Injectable  glucagon  Injectable  budesonide 160 MICROgram(s)/formoterol 4.5 MICROgram(s) Inhaler  pantoprazole    Tablet  metoprolol tartrate  lisinopril  ferrous    sulfate  HYDROmorphone  Injectable  prothrombin complex concentrate IVPB (KCENTRA)  furosemide   Injectable  HYDROmorphone  Injectable  ondansetron Injectable  albumin human 25% IVPB  HYDROmorphone  Injectable  diphenhydrAMINE   Injectable  albumin human  5% IVPB  polyethylene glycol 3350  phenazopyridine  diphenhydrAMINE   Injectable  lidocaine 1% Injectable  diphenhydrAMINE   Injectable  HYDROmorphone  Injectable  oxyCODONE    IR  oxyCODONE    IR  HYDROmorphone  Injectable  lidocaine   Patch  nitrofurantoin monohydrate/macrocrystals (MACROBID)  cefTRIAXone   IVPB  sodium chloride 0.9%.  sodium chloride  furosemide   Injectable  lidocaine 2% Gel  diphenhydrAMINE   Injectable  lidocaine 2% Gel  lidocaine   Patch  ondansetron Injectable  HYDROmorphone   Tablet  HYDROmorphone   Tablet  pregabalin  cyclobenzaprine  lidocaine   Patch  ciprofloxacin     Tablet  budesonide 160 MICROgram(s)/formoterol 4.5 MICROgram(s) Inhaler  fluticasone propionate 50 MICROgram(s)/spray Nasal Spray  polyethylene glycol 3350  acetaminophen   Tablet ..  sodium phosphate IVPB      ROS: Const:  _-__febrile   Eyes:___ENT:___CV: _-__chest pain  Resp: __-__sob  GI:__-_nausea _x__vomiting ___abd pain ___npo ___clears _x_full diet __bm  :___ Musk: _x__pain _-__spasm  Skin:___ Neuro:  _-__nnbvoujc_-__tvrdsasou_-__ numbness __-_weakness _-__paresth  Psych:_-_anxiety  Endo:___ Heme:___Allergy:_________, __x_all others reviewed and negative      PAST MEDICAL & SURGICAL HISTORY:  Uterine fibroid  Hyperlipidemia  Asthma  Diabetes  Hypertension  No significant past surgical history          Hemoglobin: 9.0 g/dL (12-25 @ 06:10)        T(C): 36.8 (12-26-19 @ 09:10), Max: 37.1 (12-25-19 @ 22:15)  HR: 57 (12-26-19 @ 09:21) (57 - 104)  BP: 104/56 (12-26-19 @ 09:21) (104/56 - 137/93)  RR: 14 (12-26-19 @ 09:21) (14 - 18)  SpO2: 99% (12-26-19 @ 09:21) (99% - 100%)  Wt(kg): --     PHYSICAL EXAM:  Gen Appearance: _x__no acute distress __x_appropriate        Neuro: __x_SILT feet____ EOM Intact Psych: AAOX3__, _x__mood/affect appropriate        Eyes: _x__conjunctiva WNL  __x___ Pupils equal and round        ENT: _x__ears and nose atraumatic_x__ Hearing grossly intact        Neck: _x__trachea midline, no visible masses ___thyroid without palpable mass    Resp: _x__Nml WOB____No tactile fremitus ___clear to auscultation    Cardio: x___extremities free from edema __x__pedal pulses palpable    GI/Abdomen: _x__soft __x___ Nontender__x____Nondistended_____HSM    Lymphatic: ___no palpable nodes in neck  ___no palpable nodes calves and feet    Skin/Wound: _x__Incision, ___Dressing c/d/i,   ____surrounding tissues soft,  ___drain/chest tube present__x__    Muscular: EHL _5__/5  Gastrocnemius__5_/5    ___absent clubbing/cyanosis        ASSESSMENT: This is a 39y old Female with a history of uterine fibroid, diabetes, asthma, DVT, pleural effusion, SOB, s/p laparoscopy and mass biopsy, admitted with worsening SOB, periumbilical pain and elevated LFT's, now s/p chest tube placement, pain controlled with current pain medication regimen.        Recommended Treatment PLAN:  1. Dilaudid 2-4mg Po Q4h prn moderate to severe pain  2. Dilaudid 0.5mg prn breakthrough pain Q4h   3. Flexeril 5mg Po Q8h prn muscle spasms  4. x2 lidocaine patches to affected area, 12hours on 12hours off  Plan discussed with Dr. Calhoun

## 2019-12-26 NOTE — PROGRESS NOTE ADULT - SUBJECTIVE AND OBJECTIVE BOX
Interval Events: Reviewed  Patient seen and examined at bedside.    Patient is a 39y old  Female who presents with a chief complaint of Pleural Effusion (26 Dec 2019 12:53)    she is fdfranco [place]  PAST MEDICAL & SURGICAL HISTORY:  Uterine fibroid  Hyperlipidemia  Asthma  Diabetes  Hypertension  No significant past surgical history      MEDICATIONS:  Pulmonary:  budesonide 160 MICROgram(s)/formoterol 4.5 MICROgram(s) Inhaler 2 Puff(s) Inhalation two times a day    Antimicrobials:  ciprofloxacin     Tablet 500 milliGRAM(s) Oral every 12 hours    Anticoagulants:    Cardiac:  furosemide   Injectable 40 milliGRAM(s) IV Push daily  metoprolol tartrate 50 milliGRAM(s) Oral two times a day      Allergies    No Known Drug Allergies  shellfish (Hives)    Intolerances        Vital Signs Last 24 Hrs  T(C): 36.7 (26 Dec 2019 14:02), Max: 36.8 (26 Dec 2019 09:10)  T(F): 98 (26 Dec 2019 14:02), Max: 98.2 (26 Dec 2019 09:10)  HR: 76 (26 Dec 2019 11:40) (57 - 104)  BP: 131/84 (26 Dec 2019 11:40) (104/56 - 137/93)  BP(mean): 101 (26 Dec 2019 11:40) (74 - 109)  RR: 14 (26 Dec 2019 11:40) (14 - 18)  SpO2: 100% (26 Dec 2019 11:40) (99% - 100%)    12-25 @ 07:01  -  12-26 @ 07:00  --------------------------------------------------------  IN: 0 mL / OUT: 950 mL / NET: -950 mL          Review of Systems:   •	General: negative  •	Skin/Breast: negative  •	Ophthalmologic: negative  •	ENMT: negative  •	Respiratory and Thorax: negative  •	Cardiovascular: negative  •	Gastrointestinal: negative  •	Genitourinary: negative  •	Musculoskeletal: negative  •	Neurological: negative  •	Psychiatric: negative  •	Hematology/Lymphatics: negative  •	Endocrine: negative  •	Allergic/Immunologic: negative    Physical Exam:   • Constitutional:	Well-developed, well nourished  • Eyes:	EOMI; PERRL; no drainage or redness  • ENMT:	No oral lesions; no gross abnormalities  • Neck	No bruits; no thyromegaly or nodules  • Breasts:	not examined  • Back:	No deformity or limitation of movement  • Respiratory:	Breath Sounds equal & clear to percussion & auscultation, no accessory muscle use  • Cardiovascular:	Regular rate & rhythm, normal S1, S2; no murmurs, gallops or rubs; no S3, S4  • Gastrointestinal:	Soft, non-tender, no hepatosplenomegaly, normal bowel sounds  • Genitourinary:	not examined  • Rectal: not examined  • Extremities:	No cyanosis, clubbing or edema  • Vascular:	Equal and normal pulses (carotid, femoral, dorsalis pedis)  • Neurologica:l	not examined  • Skin:	No lesions; no rash  • Lymph Nodes:	No lymphadedenopathy  • Musculoskeletal:	No joint pain, swelling or deformity; no limitation of movement        LABS:      CBC Full  -  ( 25 Dec 2019 06:10 )  WBC Count : 9.19 K/uL  RBC Count : 4.43 M/uL  Hemoglobin : 9.0 g/dL  Hematocrit : 31.1 %  Platelet Count - Automated : 164 K/uL  Mean Cell Volume : 70.2 fl  Mean Cell Hemoglobin : 20.3 pg  Mean Cell Hemoglobin Concentration : 28.9 gm/dL  Auto Neutrophil # : x  Auto Lymphocyte # : x  Auto Monocyte # : x  Auto Eosinophil # : x  Auto Basophil # : x  Auto Neutrophil % : x  Auto Lymphocyte % : x  Auto Monocyte % : x  Auto Eosinophil % : x  Auto Basophil % : x    12-25    132<L>  |  96  |  21  ----------------------------<  140<H>  4.6   |  21<L>  |  0.88    Ca    8.8      25 Dec 2019 06:10  Phos  2.5     12-25  Mg     2.1     12-25                          RADIOLOGY & ADDITIONAL STUDIES (The following images were personally reviewed):  Wolfe:                                     No  Urine output:                       adequate  DVT prophylaxis:                 Yes  Flattus:                                  Yes  Bowel movement:              No

## 2019-12-29 ENCOUNTER — INPATIENT (INPATIENT)
Facility: HOSPITAL | Age: 39
LOS: 1 days | Discharge: HOME HEALTH | DRG: 542 | End: 2019-12-31
Attending: INTERNAL MEDICINE | Admitting: INTERNAL MEDICINE
Payer: MEDICAID

## 2019-12-29 VITALS
SYSTOLIC BLOOD PRESSURE: 124 MMHG | TEMPERATURE: 98 F | HEART RATE: 107 BPM | RESPIRATION RATE: 18 BRPM | DIASTOLIC BLOOD PRESSURE: 85 MMHG | OXYGEN SATURATION: 100 %

## 2019-12-29 DIAGNOSIS — D68.9 COAGULATION DEFECT, UNSPECIFIED: ICD-10-CM

## 2019-12-29 DIAGNOSIS — N17.9 ACUTE KIDNEY FAILURE, UNSPECIFIED: ICD-10-CM

## 2019-12-29 DIAGNOSIS — E11.9 TYPE 2 DIABETES MELLITUS WITHOUT COMPLICATIONS: ICD-10-CM

## 2019-12-29 DIAGNOSIS — J91.0 MALIGNANT PLEURAL EFFUSION: ICD-10-CM

## 2019-12-29 DIAGNOSIS — I10 ESSENTIAL (PRIMARY) HYPERTENSION: ICD-10-CM

## 2019-12-29 DIAGNOSIS — Z72.0 TOBACCO USE: ICD-10-CM

## 2019-12-29 DIAGNOSIS — E78.5 HYPERLIPIDEMIA, UNSPECIFIED: ICD-10-CM

## 2019-12-29 DIAGNOSIS — Z79.84 LONG TERM (CURRENT) USE OF ORAL HYPOGLYCEMIC DRUGS: ICD-10-CM

## 2019-12-29 DIAGNOSIS — E87.1 HYPO-OSMOLALITY AND HYPONATREMIA: ICD-10-CM

## 2019-12-29 DIAGNOSIS — N39.0 URINARY TRACT INFECTION, SITE NOT SPECIFIED: ICD-10-CM

## 2019-12-29 DIAGNOSIS — Z79.899 OTHER LONG TERM (CURRENT) DRUG THERAPY: ICD-10-CM

## 2019-12-29 DIAGNOSIS — J90 PLEURAL EFFUSION, NOT ELSEWHERE CLASSIFIED: ICD-10-CM

## 2019-12-29 DIAGNOSIS — D25.9 LEIOMYOMA OF UTERUS, UNSPECIFIED: ICD-10-CM

## 2019-12-29 DIAGNOSIS — J45.909 UNSPECIFIED ASTHMA, UNCOMPLICATED: ICD-10-CM

## 2019-12-29 DIAGNOSIS — B95.2 ENTEROCOCCUS AS THE CAUSE OF DISEASES CLASSIFIED ELSEWHERE: ICD-10-CM

## 2019-12-29 DIAGNOSIS — R63.8 OTHER SYMPTOMS AND SIGNS CONCERNING FOOD AND FLUID INTAKE: ICD-10-CM

## 2019-12-29 DIAGNOSIS — I87.1 COMPRESSION OF VEIN: ICD-10-CM

## 2019-12-29 DIAGNOSIS — Z91.89 OTHER SPECIFIED PERSONAL RISK FACTORS, NOT ELSEWHERE CLASSIFIED: ICD-10-CM

## 2019-12-29 DIAGNOSIS — C49.9 MALIGNANT NEOPLASM OF CONNECTIVE AND SOFT TISSUE, UNSPECIFIED: ICD-10-CM

## 2019-12-29 DIAGNOSIS — J45.20 MILD INTERMITTENT ASTHMA, UNCOMPLICATED: ICD-10-CM

## 2019-12-29 LAB
ALBUMIN SERPL ELPH-MCNC: 3.2 G/DL — LOW (ref 3.3–5)
ALBUMIN SERPL ELPH-MCNC: 3.6 G/DL — SIGNIFICANT CHANGE UP (ref 3.3–5)
ALP SERPL-CCNC: 170 U/L — HIGH (ref 40–120)
ALP SERPL-CCNC: 195 U/L — HIGH (ref 40–120)
ALT FLD-CCNC: 259 U/L — HIGH (ref 10–45)
ALT FLD-CCNC: 309 U/L — HIGH (ref 10–45)
ANION GAP SERPL CALC-SCNC: 15 MMOL/L — SIGNIFICANT CHANGE UP (ref 5–17)
ANION GAP SERPL CALC-SCNC: 15 MMOL/L — SIGNIFICANT CHANGE UP (ref 5–17)
APTT BLD: 28.7 SEC — SIGNIFICANT CHANGE UP (ref 27.5–36.3)
AST SERPL-CCNC: 110 U/L — HIGH (ref 10–40)
AST SERPL-CCNC: 173 U/L — HIGH (ref 10–40)
BASOPHILS # BLD AUTO: 0.08 K/UL — SIGNIFICANT CHANGE UP (ref 0–0.2)
BASOPHILS # BLD AUTO: 0.09 K/UL — SIGNIFICANT CHANGE UP (ref 0–0.2)
BASOPHILS NFR BLD AUTO: 0.9 % — SIGNIFICANT CHANGE UP (ref 0–2)
BASOPHILS NFR BLD AUTO: 1.1 % — SIGNIFICANT CHANGE UP (ref 0–2)
BILIRUB SERPL-MCNC: 1.4 MG/DL — HIGH (ref 0.2–1.2)
BILIRUB SERPL-MCNC: 1.5 MG/DL — HIGH (ref 0.2–1.2)
BLD GP AB SCN SERPL QL: POSITIVE — SIGNIFICANT CHANGE UP
BUN SERPL-MCNC: 10 MG/DL — SIGNIFICANT CHANGE UP (ref 7–23)
BUN SERPL-MCNC: 9 MG/DL — SIGNIFICANT CHANGE UP (ref 7–23)
CALCIUM SERPL-MCNC: 8.8 MG/DL — SIGNIFICANT CHANGE UP (ref 8.4–10.5)
CALCIUM SERPL-MCNC: 9.1 MG/DL — SIGNIFICANT CHANGE UP (ref 8.4–10.5)
CHLORIDE SERPL-SCNC: 93 MMOL/L — LOW (ref 96–108)
CHLORIDE SERPL-SCNC: 96 MMOL/L — SIGNIFICANT CHANGE UP (ref 96–108)
CO2 SERPL-SCNC: 21 MMOL/L — LOW (ref 22–31)
CO2 SERPL-SCNC: 21 MMOL/L — LOW (ref 22–31)
CREAT SERPL-MCNC: 0.8 MG/DL — SIGNIFICANT CHANGE UP (ref 0.5–1.3)
CREAT SERPL-MCNC: 0.84 MG/DL — SIGNIFICANT CHANGE UP (ref 0.5–1.3)
EOSINOPHIL # BLD AUTO: 0.22 K/UL — SIGNIFICANT CHANGE UP (ref 0–0.5)
EOSINOPHIL # BLD AUTO: 0.27 K/UL — SIGNIFICANT CHANGE UP (ref 0–0.5)
EOSINOPHIL NFR BLD AUTO: 2.7 % — SIGNIFICANT CHANGE UP (ref 0–6)
EOSINOPHIL NFR BLD AUTO: 3 % — SIGNIFICANT CHANGE UP (ref 0–6)
GLUCOSE BLDC GLUCOMTR-MCNC: 187 MG/DL — HIGH (ref 70–99)
GLUCOSE BLDC GLUCOMTR-MCNC: 191 MG/DL — HIGH (ref 70–99)
GLUCOSE BLDC GLUCOMTR-MCNC: 201 MG/DL — HIGH (ref 70–99)
GLUCOSE SERPL-MCNC: 173 MG/DL — HIGH (ref 70–99)
GLUCOSE SERPL-MCNC: 180 MG/DL — HIGH (ref 70–99)
HCT VFR BLD CALC: 32.3 % — LOW (ref 34.5–45)
HCT VFR BLD CALC: 34.2 % — LOW (ref 34.5–45)
HGB BLD-MCNC: 9.1 G/DL — LOW (ref 11.5–15.5)
HGB BLD-MCNC: 9.9 G/DL — LOW (ref 11.5–15.5)
IMM GRANULOCYTES NFR BLD AUTO: 0.4 % — SIGNIFICANT CHANGE UP (ref 0–1.5)
INR BLD: 1.95 — HIGH (ref 0.88–1.16)
LYMPHOCYTES # BLD AUTO: 2.56 K/UL — SIGNIFICANT CHANGE UP (ref 1–3.3)
LYMPHOCYTES # BLD AUTO: 34.7 % — SIGNIFICANT CHANGE UP (ref 13–44)
LYMPHOCYTES # BLD AUTO: 4.44 K/UL — HIGH (ref 1–3.3)
LYMPHOCYTES # BLD AUTO: 45.1 % — HIGH (ref 13–44)
MAGNESIUM SERPL-MCNC: 1.5 MG/DL — LOW (ref 1.6–2.6)
MCHC RBC-ENTMCNC: 19.8 PG — LOW (ref 27–34)
MCHC RBC-ENTMCNC: 20.2 PG — LOW (ref 27–34)
MCHC RBC-ENTMCNC: 28.2 GM/DL — LOW (ref 32–36)
MCHC RBC-ENTMCNC: 28.9 GM/DL — LOW (ref 32–36)
MCV RBC AUTO: 68.5 FL — LOW (ref 80–100)
MCV RBC AUTO: 71.8 FL — LOW (ref 80–100)
MONOCYTES # BLD AUTO: 0.52 K/UL — SIGNIFICANT CHANGE UP (ref 0–0.9)
MONOCYTES # BLD AUTO: 0.9 K/UL — SIGNIFICANT CHANGE UP (ref 0–0.9)
MONOCYTES NFR BLD AUTO: 12.2 % — SIGNIFICANT CHANGE UP (ref 2–14)
MONOCYTES NFR BLD AUTO: 5.3 % — SIGNIFICANT CHANGE UP (ref 2–14)
NEUTROPHILS # BLD AUTO: 3.58 K/UL — SIGNIFICANT CHANGE UP (ref 1.8–7.4)
NEUTROPHILS # BLD AUTO: 4.35 K/UL — SIGNIFICANT CHANGE UP (ref 1.8–7.4)
NEUTROPHILS NFR BLD AUTO: 44.2 % — SIGNIFICANT CHANGE UP (ref 43–77)
NEUTROPHILS NFR BLD AUTO: 48.6 % — SIGNIFICANT CHANGE UP (ref 43–77)
NRBC # BLD: 0 /100 WBCS — SIGNIFICANT CHANGE UP (ref 0–0)
NT-PROBNP SERPL-SCNC: 107 PG/ML — SIGNIFICANT CHANGE UP (ref 0–300)
PHOSPHATE SERPL-MCNC: 2.4 MG/DL — LOW (ref 2.5–4.5)
PLATELET # BLD AUTO: 204 K/UL — SIGNIFICANT CHANGE UP (ref 150–400)
PLATELET # BLD AUTO: 270 K/UL — SIGNIFICANT CHANGE UP (ref 150–400)
POTASSIUM SERPL-MCNC: 4 MMOL/L — SIGNIFICANT CHANGE UP (ref 3.5–5.3)
POTASSIUM SERPL-MCNC: 4.9 MMOL/L — SIGNIFICANT CHANGE UP (ref 3.5–5.3)
POTASSIUM SERPL-SCNC: 4 MMOL/L — SIGNIFICANT CHANGE UP (ref 3.5–5.3)
POTASSIUM SERPL-SCNC: 4.9 MMOL/L — SIGNIFICANT CHANGE UP (ref 3.5–5.3)
PROT SERPL-MCNC: 6.1 G/DL — SIGNIFICANT CHANGE UP (ref 6–8.3)
PROT SERPL-MCNC: 7.3 G/DL — SIGNIFICANT CHANGE UP (ref 6–8.3)
PROTHROM AB SERPL-ACNC: 22.5 SEC — HIGH (ref 10–12.9)
RBC # BLD: 4.5 M/UL — SIGNIFICANT CHANGE UP (ref 3.8–5.2)
RBC # BLD: 4.99 M/UL — SIGNIFICANT CHANGE UP (ref 3.8–5.2)
RBC # FLD: 23.5 % — HIGH (ref 10.3–14.5)
RBC # FLD: 24 % — HIGH (ref 10.3–14.5)
RH IG SCN BLD-IMP: POSITIVE — SIGNIFICANT CHANGE UP
SODIUM SERPL-SCNC: 129 MMOL/L — LOW (ref 135–145)
SODIUM SERPL-SCNC: 132 MMOL/L — LOW (ref 135–145)
WBC # BLD: 7.37 K/UL — SIGNIFICANT CHANGE UP (ref 3.8–10.5)
WBC # BLD: 9.85 K/UL — SIGNIFICANT CHANGE UP (ref 3.8–10.5)
WBC # FLD AUTO: 7.37 K/UL — SIGNIFICANT CHANGE UP (ref 3.8–10.5)
WBC # FLD AUTO: 9.85 K/UL — SIGNIFICANT CHANGE UP (ref 3.8–10.5)

## 2019-12-29 PROCEDURE — 71046 X-RAY EXAM CHEST 2 VIEWS: CPT | Mod: 26

## 2019-12-29 PROCEDURE — 99223 1ST HOSP IP/OBS HIGH 75: CPT | Mod: GC

## 2019-12-29 PROCEDURE — 99285 EMERGENCY DEPT VISIT HI MDM: CPT

## 2019-12-29 PROCEDURE — 93010 ELECTROCARDIOGRAM REPORT: CPT

## 2019-12-29 RX ORDER — ATORVASTATIN CALCIUM 80 MG/1
20 TABLET, FILM COATED ORAL AT BEDTIME
Refills: 0 | Status: DISCONTINUED | OUTPATIENT
Start: 2019-12-29 | End: 2019-12-31

## 2019-12-29 RX ORDER — PHYTONADIONE (VIT K1) 5 MG
10 TABLET ORAL ONCE
Refills: 0 | Status: COMPLETED | OUTPATIENT
Start: 2019-12-29 | End: 2019-12-29

## 2019-12-29 RX ORDER — INSULIN LISPRO 100/ML
VIAL (ML) SUBCUTANEOUS
Refills: 0 | Status: DISCONTINUED | OUTPATIENT
Start: 2019-12-29 | End: 2019-12-31

## 2019-12-29 RX ORDER — BUDESONIDE AND FORMOTEROL FUMARATE DIHYDRATE 160; 4.5 UG/1; UG/1
2 AEROSOL RESPIRATORY (INHALATION)
Refills: 0 | Status: DISCONTINUED | OUTPATIENT
Start: 2019-12-29 | End: 2019-12-31

## 2019-12-29 RX ORDER — LANOLIN ALCOHOL/MO/W.PET/CERES
5 CREAM (GRAM) TOPICAL ONCE
Refills: 0 | Status: COMPLETED | OUTPATIENT
Start: 2019-12-29 | End: 2019-12-29

## 2019-12-29 RX ORDER — DEXTROSE 50 % IN WATER 50 %
15 SYRINGE (ML) INTRAVENOUS ONCE
Refills: 0 | Status: DISCONTINUED | OUTPATIENT
Start: 2019-12-29 | End: 2019-12-31

## 2019-12-29 RX ORDER — OXYCODONE HYDROCHLORIDE 5 MG/1
5 TABLET ORAL EVERY 6 HOURS
Refills: 0 | Status: DISCONTINUED | OUTPATIENT
Start: 2019-12-29 | End: 2019-12-29

## 2019-12-29 RX ORDER — IBUPROFEN 200 MG
400 TABLET ORAL EVERY 6 HOURS
Refills: 0 | Status: DISCONTINUED | OUTPATIENT
Start: 2019-12-29 | End: 2019-12-31

## 2019-12-29 RX ORDER — SODIUM CHLORIDE 9 MG/ML
1000 INJECTION, SOLUTION INTRAVENOUS
Refills: 0 | Status: DISCONTINUED | OUTPATIENT
Start: 2019-12-29 | End: 2019-12-31

## 2019-12-29 RX ORDER — FUROSEMIDE 40 MG
20 TABLET ORAL ONCE
Refills: 0 | Status: COMPLETED | OUTPATIENT
Start: 2019-12-29 | End: 2019-12-29

## 2019-12-29 RX ORDER — DEXTROSE 50 % IN WATER 50 %
12.5 SYRINGE (ML) INTRAVENOUS ONCE
Refills: 0 | Status: DISCONTINUED | OUTPATIENT
Start: 2019-12-29 | End: 2019-12-31

## 2019-12-29 RX ORDER — GLUCAGON INJECTION, SOLUTION 0.5 MG/.1ML
1 INJECTION, SOLUTION SUBCUTANEOUS ONCE
Refills: 0 | Status: DISCONTINUED | OUTPATIENT
Start: 2019-12-29 | End: 2019-12-31

## 2019-12-29 RX ADMIN — Medication 5 MILLIGRAM(S): at 22:25

## 2019-12-29 RX ADMIN — Medication 2: at 06:51

## 2019-12-29 RX ADMIN — Medication 4: at 12:46

## 2019-12-29 RX ADMIN — Medication 400 MILLIGRAM(S): at 08:27

## 2019-12-29 RX ADMIN — BUDESONIDE AND FORMOTEROL FUMARATE DIHYDRATE 2 PUFF(S): 160; 4.5 AEROSOL RESPIRATORY (INHALATION) at 09:29

## 2019-12-29 RX ADMIN — BUDESONIDE AND FORMOTEROL FUMARATE DIHYDRATE 2 PUFF(S): 160; 4.5 AEROSOL RESPIRATORY (INHALATION) at 22:04

## 2019-12-29 RX ADMIN — ATORVASTATIN CALCIUM 20 MILLIGRAM(S): 80 TABLET, FILM COATED ORAL at 22:04

## 2019-12-29 RX ADMIN — Medication 20 MILLIGRAM(S): at 06:38

## 2019-12-29 RX ADMIN — Medication 10 MILLIGRAM(S): at 14:38

## 2019-12-29 RX ADMIN — Medication 400 MILLIGRAM(S): at 16:45

## 2019-12-29 RX ADMIN — Medication 400 MILLIGRAM(S): at 17:45

## 2019-12-29 RX ADMIN — Medication 2: at 18:20

## 2019-12-29 NOTE — H&P ADULT - ASSESSMENT
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

## 2019-12-29 NOTE — H&P ADULT - PROBLEM SELECTOR PLAN 1
Patient w recurrent pleural effusion likely 2/2 leiomyosarcoma, s/p recent chest tube with fluid negative for malignant cells. Currently saturating well without respiratory distress.   - Lasix 20mg IV once to assess response  - Supplemental O2 prn  - Pulmonology consult, thora vs tube vs window, patient may now agree to window previously refused. (Made NPO, ordered T+S and coags) Patient w recurrent pleural effusion likely 2/2 leiomyosarcoma, s/p recent chest tube with fluid negative for malignant cells. Currently saturating well without respiratory distress.   - Lasix 20mg IV once to assess response  - Supplemental O2 prn  - Pulmonology consulted in ED, to see patient. Thora vs chest tube vs window, patient may now agree to window previously refused. (Made NPO, ordered T+S and coags)  - Mgmt of obstructive mass as below Patient w recurrent pleural effusion likely 2/2 leiomyosarcoma, s/p recent chest tube with fluid negative for malignant cells. Currently saturating well without respiratory distress.   - Lasix 20mg IV once to assess response  - Supplemental O2 prn  - Pulmonology consulted in ED, to see patient for consideration of possible PleurX, patient may now agree to procedure previously refused. (Made NPO, ordered T+S and coags)  - Mgmt of obstructive mass as below

## 2019-12-29 NOTE — ED PROVIDER NOTE - OBJECTIVE STATEMENT
38 y/o female with a PMHx of HTN, HLD, obesity, DM, asthma, uterine fibroid who was recently diagnosed with R. pleural effusion is present in the ER c/o sob and increasing swelling to her legs. Pt reports she was discharged a few days ago in which she notes improvement, however slowly the sob returned. The swelling to her legs has caused her discomfort and difficulty walking. She denies the following: fever, chills, dizziness, syncope, chest pain, hx of dvt/pe.

## 2019-12-29 NOTE — PROGRESS NOTE ADULT - SUBJECTIVE AND OBJECTIVE BOX
OVERNIGHT EVENTS: Admitted for management of worsening SOB, pleuritic chest pain, and non resolving LE edema.     SUBJECTIVE / INTERVAL HPI: Patient seen and examined at bedside. NAD, resting comfortably in bed on R side. Reports improved pain overall, but occasional pleuritic pain localized to the R side on large inspirations. LE edema has also been bothersome and reports that her legs fell "tight" from the fluid accumulation Otherwise reports no fever, chills, headaches, light headedness.       PHYSICAL EXAM:    General: WDWN  HEENT: NC/AT; PERRL, anicteric sclera; MMM  Neck: supple  Cardiovascular: +S1/S2, RRR  Respiratory: CTA B/L; no W/R/R; decreased aeration of R side from mid lung field down   Gastrointestinal: soft, NT/ND; +BSx4  Extremities: WWP, no clubbing or cyanosis; 3+ pitting edema of bilateral lower extremities up to buttocks.   Vascular: 2+ radial, DP/PT pulses B/L  Neurological: AAOx3; no focal deficits  Psychiatric: pleasant mood and affect  Dermatologic: no appreciable wounds or damage to the skin    VITAL SIGNS:  Vital Signs Last 24 Hrs  T(C): 36.7 (29 Dec 2019 05:55), Max: 36.7 (29 Dec 2019 04:30)  T(F): 98.1 (29 Dec 2019 05:55), Max: 98.1 (29 Dec 2019 05:55)  HR: 80 (29 Dec 2019 05:55) (80 - 107)  BP: 116/78 (29 Dec 2019 05:55) (104/69 - 124/85)  BP(mean): --  RR: 18 (29 Dec 2019 05:55) (18 - 18)  SpO2: 100% (29 Dec 2019 05:55) (100% - 100%)      MEDICATIONS:  MEDICATIONS  (STANDING):  atorvastatin 20 milliGRAM(s) Oral at bedtime  budesonide  80 MICROgram(s)/formoterol 4.5 MICROgram(s) Inhaler 2 Puff(s) Inhalation two times a day  dextrose 5%. 1000 milliLiter(s) (50 mL/Hr) IV Continuous <Continuous>  dextrose 50% Injectable 12.5 Gram(s) IV Push once  insulin lispro (HumaLOG) corrective regimen sliding scale   SubCutaneous three times a day before meals    MEDICATIONS  (PRN):  dextrose 40% Gel 15 Gram(s) Oral once PRN Blood Glucose LESS THAN 70 milliGRAM(s)/deciliter  glucagon  Injectable 1 milliGRAM(s) IntraMuscular once PRN Glucose LESS THAN 70 milligrams/deciliter  ibuprofen  Tablet. 400 milliGRAM(s) Oral every 6 hours PRN Moderate Pain (4 - 6)      ALLERGIES:  Allergies    No Known Drug Allergies  shellfish (Hives)    Intolerances        LABS:                        9.9    9.85  )-----------( 270      ( 29 Dec 2019 02:52 )             34.2     12-29    129<L>  |  93<L>  |  10  ----------------------------<  180<H>  4.9   |  21<L>  |  0.84    Ca    9.1      29 Dec 2019 02:52    TPro  7.3  /  Alb  3.6  /  TBili  1.5<H>  /  DBili  x   /  AST  173<H>  /  ALT  309<H>  /  AlkPhos  195<H>  12-29        CAPILLARY BLOOD GLUCOSE      POCT Blood Glucose.: 191 mg/dL (29 Dec 2019 06:46)      RADIOLOGY & ADDITIONAL TESTS: Reviewed.

## 2019-12-29 NOTE — H&P ADULT - PROBLEM SELECTOR PLAN 8
Addressed on last visit by renal, likely 2/2 hypervolemia. Fluid restriction and trial of lasix 20mg IV.

## 2019-12-29 NOTE — H&P ADULT - PROBLEM SELECTOR PLAN 2
Patient with abdominal mass invading IVC, c/b pleural effusion and ascites (both negative for malignant cells).   - Surgery consult in AM  - Heme/onc consult in AM

## 2019-12-29 NOTE — ED PROVIDER NOTE - CLINICAL SUMMARY MEDICAL DECISION MAKING FREE TEXT BOX
40 y/o female with sob who was recently diagnosed with pleural effusion is here with reoccurring pleural effusion found on CXR, compared to previous. Here with sob at rest with exertional dyspnea. VS stable. Pt well appearing in NAD. Diminished lung sounds on the right. Discussed with pulm - will admit.

## 2019-12-29 NOTE — ED ADULT TRIAGE NOTE - ARRIVAL INFO ADDITIONAL COMMENTS
pt c/o dizziness, sob, fluid on lungs and swollen legs.  hx of abd mass that has been biopsied with inconclusive findings.  pt has had chest tube for drainage and  says she needs "the machine at home to continuously drain her lungs" so this will not happen every few days.

## 2019-12-29 NOTE — H&P ADULT - PROBLEM SELECTOR PROBLEM 8
Transition of care performed with sharing of clinical summary Nutrition, metabolism, and development symptoms Hyponatremia

## 2019-12-29 NOTE — PROGRESS NOTE ADULT - PROBLEM SELECTOR PLAN 3
Patient was scheduled for surgical follow-up in January, however unable to make it to appointment 2/2 worsening symptoms.   - Surgery and heme/onc consult as above    #IVC thrombus  Patient initially on Eliquis after dx of IVC thrombus associated with tumor. Was discharged off AC in anticipation of potential surgery.   - Will hold AC today, if no surgical procedure to be performed during hospitalization, patient should be on full AC

## 2019-12-29 NOTE — H&P ADULT - PROBLEM SELECTOR PLAN 3
Patient was scheduled for surgical follow-up in January, however unable to make it to appointment 2/2 worsening symptoms.   - Surgery consult in AM  - Heme/onc consult in AM Patient was scheduled for surgical follow-up in January, however unable to make it to appointment 2/2 worsening symptoms.   - Surgery consult in AM  - Heme/onc consult in AM    #IVC thrombus  Patient initially on Eliquis after dx of IVC thrombus associated with tumor. Was discharged off AC in anticipation of potential surgery.   - Will hold AC today, if no surgical procedure to be performed patient should be on full AC

## 2019-12-29 NOTE — PROGRESS NOTE ADULT - PROBLEM SELECTOR PLAN 8
Addressed on last visit by renal, likely 2/2 hypervolemia. Fluid restriction and trial of lasix 20mg IV.  - f/u repeat BMP

## 2019-12-29 NOTE — H&P ADULT - NSHPPHYSICALEXAM_GEN_ALL_CORE
VITAL SIGNS:  T(C): 36.6 (12-29-19 @ 01:54), Max: 36.6 (12-29-19 @ 01:54)  T(F): 97.8 (12-29-19 @ 01:54), Max: 97.8 (12-29-19 @ 01:54)  HR: 107 (12-29-19 @ 01:54) (107 - 107)  BP: 124/85 (12-29-19 @ 01:54) (124/85 - 124/85)  BP(mean): --  RR: 18 (12-29-19 @ 01:54) (18 - 18)  SpO2: 100% (12-29-19 @ 01:54) (100% - 100%)  Wt(kg): --    PHYSICAL EXAM:  Constitutional: WDWN resting comfortably in bed; NAD  Head: NC/AT  Eyes: PERRL, EOMI, anicteric sclera  ENT: no nasal discharge; uvula midline, no oropharyngeal erythema or exudates; MMM  Neck: supple; no JVD or thyromegaly  Respiratory: CTA B/L; no W/R/R, no retractions  Cardiac: +S1/S2; RRR; no M/R/G; PMI non-displaced  Gastrointestinal: soft, NT/ND; no rebound or guarding; +BSx4  Genitourinary: normal external genitalia  Back: spine midline, no bony tenderness or step-offs; no CVAT B/L  Extremities: WWP, no clubbing or cyanosis; no peripheral edema  Musculoskeletal: NROM x4; no joint swelling, tenderness or erythema  Vascular: 2+ radial, femoral, DP/PT pulses B/L  Dermatologic: skin warm, dry and intact; no rashes, wounds, or scars  Lymphatic: no submandibular or cervical LAD  Neurologic: AAOx3; CNII-XII grossly intact; no focal deficits  Psychiatric: affect and characteristics of appearance, verbalizations, behaviors are appropriate VITAL SIGNS:  T(C): 36.6 (12-29-19 @ 01:54), Max: 36.6 (12-29-19 @ 01:54)  T(F): 97.8 (12-29-19 @ 01:54), Max: 97.8 (12-29-19 @ 01:54)  HR: 107 (12-29-19 @ 01:54) (107 - 107)  BP: 124/85 (12-29-19 @ 01:54) (124/85 - 124/85)  BP(mean): --  RR: 18 (12-29-19 @ 01:54) (18 - 18)  SpO2: 100% (12-29-19 @ 01:54) (100% - 100%)  Wt(kg): --    PHYSICAL EXAM:  Constitutional: WDWN obese female resting comfortably in bed; NAD  Head: NC/AT  Eyes: PERRL, EOMI, anicteric sclera  ENT: no nasal discharge; uvula midline, no oropharyngeal erythema or exudates; MMM  Neck: supple; no JVD or thyromegaly  Respiratory: Decreased breath sounds from mid-lung down on the right, otherwise no wheezes/rales/rhonchi. No labored breathing, no accessory muscle use.   Cardiac: +S1/S2; RRR; no M/R/G; PMI non-displaced  Gastrointestinal: soft, NT/ND; no rebound or guarding; +BSx4  Back: spine midline, no bony tenderness or step-offs; no CVAT B/L  Extremities: WWP, no clubbing or cyanosis; no peripheral edema  Musculoskeletal: NROM x4; no joint swelling, tenderness or erythema  Vascular: 2+ radial, femoral, DP/PT pulses B/L  Dermatologic: skin warm, dry and intact; no rashes, wounds, or scars  Lymphatic: no submandibular or cervical LAD  Neurologic: AAOx3; CNII-XII grossly intact; no focal deficits  Psychiatric: affect and characteristics of appearance, verbalizations, behaviors are appropriate VITAL SIGNS:  T(C): 36.6 (12-29-19 @ 01:54), Max: 36.6 (12-29-19 @ 01:54)  T(F): 97.8 (12-29-19 @ 01:54), Max: 97.8 (12-29-19 @ 01:54)  HR: 107 (12-29-19 @ 01:54) (107 - 107)  BP: 124/85 (12-29-19 @ 01:54) (124/85 - 124/85)  BP(mean): --  RR: 18 (12-29-19 @ 01:54) (18 - 18)  SpO2: 100% (12-29-19 @ 01:54) (100% - 100%)  Wt(kg): --    PHYSICAL EXAM:  Constitutional: WDWN obese female resting comfortably in bed; NAD  Head: NC/AT  Eyes: PERRL, EOMI, anicteric sclera  ENT: no nasal discharge; uvula midline, no oropharyngeal erythema or exudates; MMM  Neck: supple; no JVD or thyromegaly  Respiratory: Decreased breath sounds from mid-lung down on the right, otherwise no wheezes/rales/rhonchi. No labored breathing, no accessory muscle use.   Cardiac: +S1/S2; RRR; no M/R/G; PMI non-displaced  Gastrointestinal: soft, NT/ND; no rebound or guarding; +BSx4  Back: spine midline, no bony tenderness or step-offs; no CVAT B/L  Extremities: WWP, no clubbing or cyanosis; 3+ pitting edema of bilateral lower extremities up to buttocks.   Musculoskeletal: NROM x4; no joint swelling, tenderness or erythema  Vascular: 2+ radial, femoral, DP/PT pulses B/L  Dermatologic: skin warm, dry and intact; no rashes, wounds, or scars  Lymphatic: no submandibular or cervical LAD  Neurologic: AAOx3; CNII-XII grossly intact; no focal deficits  Psychiatric: affect and characteristics of appearance, verbalizations, behaviors are appropriate

## 2019-12-29 NOTE — PROGRESS NOTE ADULT - PROBLEM SELECTOR PLAN 1
Patient w recurrent pleural effusion likely 2/2 leiomyosarcoma, s/p recent chest tube with fluid negative for malignant cells. Currently saturating well without respiratory distress.   - Lasix 20mg IV once; patient with significant UOP and some improvement in sx   - Supplemental O2 prn  - Pulmonology consulted in ED, to see patient for consideration of possible PleurX, patient may now agree to procedure previously refused; any likely intervention would be tomorrow 12/30   - Mgmt of obstructive mass as below

## 2019-12-29 NOTE — ED PROVIDER NOTE - ATTENDING CONTRIBUTION TO CARE
recurrent shortness of breath/dyspena on exertion.  suspect secondary to recurrent effusion.  cxr with right effusion 1/2 lung volume.  no fever to suggest infection.  has had drained several times.  reportedly refused drainage catheter last admission but now agreeable.  will admit for further management

## 2019-12-29 NOTE — H&P ADULT - PROBLEM SELECTOR PLAN 10
1.       PCP Contacted on Admission: (Y/N) --> Name & Phone #:  2.       Date of Contact with PCP:  3.       PCP Contacted at Discharge: (Y/N)  4.       Summary of Handoff Given to PCP:  5.       Post-Discharge Appointment Date and Location: [facility]

## 2019-12-29 NOTE — H&P ADULT - NSHPREVIEWOFSYSTEMS_GEN_ALL_CORE
REVIEW OF SYSTEMS:    CONSTITUTIONAL: No weakness, fevers or chills.   EYES/ENT: No visual changes;  No vertigo or throat pain   NECK: No pain or stiffness  RESPIRATORY: Shortness of breath, cough. No wheezing, hemoptysis;  CARDIOVASCULAR: No chest pain or palpitations  GASTROINTESTINAL: No abdominal or epigastric pain. No nausea, vomiting, or hematemesis; No diarrhea or constipation. No melena or hematochezia.  GENITOURINARY: No dysuria, frequency or hematuria  EXTREMITIES: Lower extremity swelling  NEUROLOGICAL: No numbness or weakness  SKIN: No itching, burning, rashes, or lesions   All other review of systems is negative unless indicated above. REVIEW OF SYSTEMS:  CONSTITUTIONAL: No weakness, fevers or chills.   EYES/ENT: No visual changes;  No vertigo or throat pain   NECK: No pain or stiffness  RESPIRATORY: Shortness of breath, cough. No wheezing, hemoptysis;  CARDIOVASCULAR: No chest pain or palpitations  GASTROINTESTINAL: No abdominal or epigastric pain. No nausea, vomiting, or hematemesis; No diarrhea or constipation. No melena or hematochezia.  GENITOURINARY: No dysuria, frequency or hematuria  EXTREMITIES: Lower extremity swelling  NEUROLOGICAL: No numbness or weakness  SKIN: No itching, burning, rashes, or lesions   All other review of systems is negative unless indicated above.

## 2019-12-29 NOTE — PROGRESS NOTE ADULT - PROBLEM SELECTOR PLAN 6
Non insulin dependent  - SSI  - Consistent carb diet once no longer NPO H Plasty Text: Given the location of the defect, shape of the defect and the proximity to free margins a H-plasty was deemed most appropriate for repair.  Using a sterile surgical marker, the appropriate advancement arms of the H-plasty were drawn incorporating the defect and placing the expected incisions within the relaxed skin tension lines where possible. The area thus outlined was incised deep to adipose tissue with a #15 scalpel blade. The skin margins were undermined to an appropriate distance in all directions utilizing iris scissors.  The opposing advancement arms were then advanced into place in opposite direction and anchored with interrupted buried subcutaneous sutures.

## 2019-12-29 NOTE — H&P ADULT - NSHPLABSRESULTS_GEN_ALL_CORE
LABS:                        9.9    9.85  )-----------( 270      ( 29 Dec 2019 02:52 )             34.2     12-29    129<L>  |  93<L>  |  10  ----------------------------<  180<H>  4.9   |  21<L>  |  0.84    Ca    9.1      29 Dec 2019 02:52    TPro  7.3  /  Alb  3.6  /  TBili  1.5<H>  /  DBili  x   /  AST  173<H>  /  ALT  309<H>  /  AlkPhos  195<H>  12-29        CAPILLARY BLOOD GLUCOSE          RADIOLOGY & ADDITIONAL TESTS: Reviewed.

## 2019-12-29 NOTE — H&P ADULT - HISTORY OF PRESENT ILLNESS
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 On recent admission patient was recommended for pleural window but refused the procedure, was discharged w plan for surgery f/u in late January, now returns with increasing shortness of breath at rest as well as lower extremity swelling. CXR in ED shows reaccumulation of the previous pleural effusion. 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 On recent admission patient was recommended for pleural window but refused the procedure, was discharged w plan for surgery f/u in late January, now returns with increasing shortness of breath at rest as well as lower extremity swelling. CXR in ED shows reaccumulation of the previous pleural effusion. Patient also states significant lower extremity edema, similar to discharge but slightly worse. She denies any fevers, chills, nausea, vomiting, dizziness, lightheadedness, palpitations, or any pain. No other complaints at this time. 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 On recent admission patient was recommended for PleurX but refused the procedure, was discharged w plan for surgery f/u in late January, now returns with increasing shortness of breath at rest as well as lower extremity swelling. CXR in ED shows reaccumulation of the previous pleural effusion. Patient also states significant lower extremity edema, similar to discharge but slightly worse. She denies any fevers, chills, nausea, vomiting, dizziness, lightheadedness, palpitations, or any pain. No other complaints at this time. 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 Was briefly on Eliquis for thrombus, held for potential procedure. On recent admission patient was recommended for PleurX but refused the procedure, was discharged w plan for surgery f/u in mid January. Pt now returns with increasing shortness of breath at rest as well as lower extremity swelling. CXR in ED shows reaccumulation of the previous pleural effusion. Patient also states significant lower extremity edema, similar to discharge but slightly worse. She denies any fevers, chills, nausea, vomiting, dizziness, lightheadedness, palpitations, or any pain. No other complaints at this time.

## 2019-12-29 NOTE — PROGRESS NOTE ADULT - PROBLEM SELECTOR PLAN 2
Patient with abdominal mass invading IVC, c/b pleural effusion and ascites (both negative for malignant cells).   - Surgery consult, f/u recs  - Heme/onc consult, f/u recs

## 2019-12-30 ENCOUNTER — TRANSCRIPTION ENCOUNTER (OUTPATIENT)
Age: 39
End: 2019-12-30

## 2019-12-30 DIAGNOSIS — J98.11 ATELECTASIS: ICD-10-CM

## 2019-12-30 PROBLEM — C49.9 MALIGNANT NEOPLASM OF CONNECTIVE AND SOFT TISSUE, UNSPECIFIED: Chronic | Status: ACTIVE | Noted: 2019-12-29

## 2019-12-30 LAB
ALBUMIN SERPL ELPH-MCNC: 3.4 G/DL — SIGNIFICANT CHANGE UP (ref 3.3–5)
ALP SERPL-CCNC: 173 U/L — HIGH (ref 40–120)
ALT FLD-CCNC: 231 U/L — HIGH (ref 10–45)
ANION GAP SERPL CALC-SCNC: 14 MMOL/L — SIGNIFICANT CHANGE UP (ref 5–17)
APTT BLD: 29 SEC — SIGNIFICANT CHANGE UP (ref 27.5–36.3)
AST SERPL-CCNC: 101 U/L — HIGH (ref 10–40)
BASOPHILS # BLD AUTO: 0.09 K/UL — SIGNIFICANT CHANGE UP (ref 0–0.2)
BASOPHILS NFR BLD AUTO: 0.9 % — SIGNIFICANT CHANGE UP (ref 0–2)
BILIRUB DIRECT SERPL-MCNC: 0.7 MG/DL — HIGH (ref 0–0.2)
BILIRUB INDIRECT FLD-MCNC: 0.8 MG/DL — SIGNIFICANT CHANGE UP (ref 0.2–1)
BILIRUB SERPL-MCNC: 1.5 MG/DL — HIGH (ref 0.2–1.2)
BUN SERPL-MCNC: 10 MG/DL — SIGNIFICANT CHANGE UP (ref 7–23)
CALCIUM SERPL-MCNC: 9.5 MG/DL — SIGNIFICANT CHANGE UP (ref 8.4–10.5)
CHLORIDE SERPL-SCNC: 97 MMOL/L — SIGNIFICANT CHANGE UP (ref 96–108)
CO2 SERPL-SCNC: 24 MMOL/L — SIGNIFICANT CHANGE UP (ref 22–31)
CREAT SERPL-MCNC: 0.84 MG/DL — SIGNIFICANT CHANGE UP (ref 0.5–1.3)
EOSINOPHIL # BLD AUTO: 0.5 K/UL — SIGNIFICANT CHANGE UP (ref 0–0.5)
EOSINOPHIL NFR BLD AUTO: 5.2 % — SIGNIFICANT CHANGE UP (ref 0–6)
GLUCOSE BLDC GLUCOMTR-MCNC: 125 MG/DL — HIGH (ref 70–99)
GLUCOSE BLDC GLUCOMTR-MCNC: 135 MG/DL — HIGH (ref 70–99)
GLUCOSE BLDC GLUCOMTR-MCNC: 140 MG/DL — HIGH (ref 70–99)
GLUCOSE BLDC GLUCOMTR-MCNC: 183 MG/DL — HIGH (ref 70–99)
GLUCOSE SERPL-MCNC: 126 MG/DL — HIGH (ref 70–99)
HBA1C BLD-MCNC: 5.8 % — HIGH (ref 4–5.6)
HCT VFR BLD CALC: 30.9 % — LOW (ref 34.5–45)
HGB BLD-MCNC: 9.2 G/DL — LOW (ref 11.5–15.5)
INR BLD: 1.73 — HIGH (ref 0.88–1.16)
LYMPHOCYTES # BLD AUTO: 3.12 K/UL — SIGNIFICANT CHANGE UP (ref 1–3.3)
LYMPHOCYTES # BLD AUTO: 32.2 % — SIGNIFICANT CHANGE UP (ref 13–44)
MAGNESIUM SERPL-MCNC: 1.6 MG/DL — SIGNIFICANT CHANGE UP (ref 1.6–2.6)
MCHC RBC-ENTMCNC: 20.4 PG — LOW (ref 27–34)
MCHC RBC-ENTMCNC: 29.8 GM/DL — LOW (ref 32–36)
MCV RBC AUTO: 68.5 FL — LOW (ref 80–100)
MONOCYTES # BLD AUTO: 0.92 K/UL — HIGH (ref 0–0.9)
MONOCYTES NFR BLD AUTO: 9.5 % — SIGNIFICANT CHANGE UP (ref 2–14)
NEUTROPHILS # BLD AUTO: 4.89 K/UL — SIGNIFICANT CHANGE UP (ref 1.8–7.4)
NEUTROPHILS NFR BLD AUTO: 49.6 % — SIGNIFICANT CHANGE UP (ref 43–77)
NRBC # BLD: SIGNIFICANT CHANGE UP /100 WBCS (ref 0–0)
PHOSPHATE SERPL-MCNC: 2.9 MG/DL — SIGNIFICANT CHANGE UP (ref 2.5–4.5)
PLATELET # BLD AUTO: 276 K/UL — SIGNIFICANT CHANGE UP (ref 150–400)
POTASSIUM SERPL-MCNC: 3.9 MMOL/L — SIGNIFICANT CHANGE UP (ref 3.5–5.3)
POTASSIUM SERPL-SCNC: 3.9 MMOL/L — SIGNIFICANT CHANGE UP (ref 3.5–5.3)
PROT SERPL-MCNC: 6.4 G/DL — SIGNIFICANT CHANGE UP (ref 6–8.3)
PROTHROM AB SERPL-ACNC: 19.9 SEC — HIGH (ref 10–12.9)
RBC # BLD: 4.51 M/UL — SIGNIFICANT CHANGE UP (ref 3.8–5.2)
RBC # FLD: 23.3 % — HIGH (ref 10.3–14.5)
SODIUM SERPL-SCNC: 135 MMOL/L — SIGNIFICANT CHANGE UP (ref 135–145)
WBC # BLD: 9.69 K/UL — SIGNIFICANT CHANGE UP (ref 3.8–10.5)
WBC # FLD AUTO: 9.69 K/UL — SIGNIFICANT CHANGE UP (ref 3.8–10.5)

## 2019-12-30 PROCEDURE — 99223 1ST HOSP IP/OBS HIGH 75: CPT | Mod: GC,25

## 2019-12-30 PROCEDURE — 32551 INSERTION OF CHEST TUBE: CPT | Mod: GC

## 2019-12-30 PROCEDURE — 71045 X-RAY EXAM CHEST 1 VIEW: CPT | Mod: 26

## 2019-12-30 PROCEDURE — 99233 SBSQ HOSP IP/OBS HIGH 50: CPT | Mod: GC

## 2019-12-30 PROCEDURE — 71045 X-RAY EXAM CHEST 1 VIEW: CPT | Mod: 26,77,76

## 2019-12-30 RX ORDER — METOPROLOL TARTRATE 50 MG
50 TABLET ORAL ONCE
Refills: 0 | Status: COMPLETED | OUTPATIENT
Start: 2019-12-30 | End: 2019-12-30

## 2019-12-30 RX ORDER — DIPHENHYDRAMINE HCL 50 MG
25 CAPSULE ORAL ONCE
Refills: 0 | Status: COMPLETED | OUTPATIENT
Start: 2019-12-30 | End: 2019-12-30

## 2019-12-30 RX ORDER — MAGNESIUM SULFATE 500 MG/ML
1 VIAL (ML) INJECTION ONCE
Refills: 0 | Status: COMPLETED | OUTPATIENT
Start: 2019-12-30 | End: 2019-12-30

## 2019-12-30 RX ORDER — KETOROLAC TROMETHAMINE 30 MG/ML
30 SYRINGE (ML) INJECTION ONCE
Refills: 0 | Status: DISCONTINUED | OUTPATIENT
Start: 2019-12-30 | End: 2019-12-30

## 2019-12-30 RX ORDER — APIXABAN 2.5 MG/1
1 TABLET, FILM COATED ORAL
Qty: 60 | Refills: 0
Start: 2019-12-30 | End: 2020-01-29

## 2019-12-30 RX ORDER — APIXABAN 2.5 MG/1
1 TABLET, FILM COATED ORAL
Qty: 60 | Refills: 0
Start: 2019-12-30 | End: 2020-01-28

## 2019-12-30 RX ORDER — POTASSIUM CHLORIDE 20 MEQ
10 PACKET (EA) ORAL ONCE
Refills: 0 | Status: COMPLETED | OUTPATIENT
Start: 2019-12-30 | End: 2019-12-30

## 2019-12-30 RX ADMIN — BUDESONIDE AND FORMOTEROL FUMARATE DIHYDRATE 2 PUFF(S): 160; 4.5 AEROSOL RESPIRATORY (INHALATION) at 21:37

## 2019-12-30 RX ADMIN — Medication 10 MILLIEQUIVALENT(S): at 12:25

## 2019-12-30 RX ADMIN — Medication 400 MILLIGRAM(S): at 16:30

## 2019-12-30 RX ADMIN — Medication 50 MILLIGRAM(S): at 22:18

## 2019-12-30 RX ADMIN — ATORVASTATIN CALCIUM 20 MILLIGRAM(S): 80 TABLET, FILM COATED ORAL at 21:37

## 2019-12-30 RX ADMIN — Medication 100 GRAM(S): at 09:04

## 2019-12-30 RX ADMIN — Medication 2: at 22:18

## 2019-12-30 RX ADMIN — BUDESONIDE AND FORMOTEROL FUMARATE DIHYDRATE 2 PUFF(S): 160; 4.5 AEROSOL RESPIRATORY (INHALATION) at 09:03

## 2019-12-30 RX ADMIN — Medication 25 MILLIGRAM(S): at 00:57

## 2019-12-30 RX ADMIN — Medication 400 MILLIGRAM(S): at 22:36

## 2019-12-30 RX ADMIN — Medication 400 MILLIGRAM(S): at 15:20

## 2019-12-30 RX ADMIN — Medication 30 MILLIGRAM(S): at 13:00

## 2019-12-30 RX ADMIN — Medication 400 MILLIGRAM(S): at 21:37

## 2019-12-30 RX ADMIN — Medication 30 MILLIGRAM(S): at 12:24

## 2019-12-30 RX ADMIN — Medication 25 MILLIGRAM(S): at 07:54

## 2019-12-30 NOTE — PROGRESS NOTE ADULT - PROBLEM SELECTOR PLAN 3
Patient was scheduled for surgical follow-up in January, however unable to make it to appointment 2/2 worsening symptoms.   - Surgery and heme/onc consult as above    #IVC thrombus  - resume eliquis 5mg BID tonight

## 2019-12-30 NOTE — PROGRESS NOTE ADULT - SUBJECTIVE AND OBJECTIVE BOX
OVERNIGHT EVENTS: No acute events overnight    SUBJECTIVE / INTERVAL HPI: Patient seen and examined at bedside. No new acute complaints, endorsing SOB and upper extremity itching. denies: CP, palpitations, HA, abdominal pain, n/v. f/c.     Review of systems negative except as noted above.     VITAL SIGNS:  Vital Signs Last 24 Hrs  T(C): 36.7 (30 Dec 2019 05:36), Max: 37.2 (29 Dec 2019 21:28)  T(F): 98 (30 Dec 2019 05:36), Max: 99 (29 Dec 2019 21:28)  HR: 97 (30 Dec 2019 05:36) (92 - 100)  BP: 105/70 (30 Dec 2019 05:36) (97/66 - 112/72)  BP(mean): --  RR: 19 (30 Dec 2019 05:36) (18 - 19)  SpO2: 97% (30 Dec 2019 05:36) (97% - 100%)        PHYSICAL EXAM:    General: WDWN, NAD  HEENT: MMM  Neck:  -JVD  Cardiovascular: +S1/S2; RRR  Respiratory: Decreased breath sounds over RLL zone  Gastrointestinal: NTND. BS+4  Extremities: WWP; 2+ pitting edema bilaterally.   Vascular: 2+ radial, DP pulses B/L  Neurological: AAOx3; no focal deficits    MEDICATIONS:  MEDICATIONS  (STANDING):  atorvastatin 20 milliGRAM(s) Oral at bedtime  budesonide  80 MICROgram(s)/formoterol 4.5 MICROgram(s) Inhaler 2 Puff(s) Inhalation two times a day  dextrose 5%. 1000 milliLiter(s) (50 mL/Hr) IV Continuous <Continuous>  dextrose 50% Injectable 12.5 Gram(s) IV Push once  insulin lispro (HumaLOG) corrective regimen sliding scale   SubCutaneous Before meals and at bedtime    MEDICATIONS  (PRN):  dextrose 40% Gel 15 Gram(s) Oral once PRN Blood Glucose LESS THAN 70 milliGRAM(s)/deciliter  glucagon  Injectable 1 milliGRAM(s) IntraMuscular once PRN Glucose LESS THAN 70 milligrams/deciliter  ibuprofen  Tablet. 400 milliGRAM(s) Oral every 6 hours PRN Moderate Pain (4 - 6)      ALLERGIES:  Allergies    No Known Drug Allergies  shellfish (Hives)    Intolerances        LABS:                        9.2    9.69  )-----------( 276      ( 30 Dec 2019 07:35 )             30.9     12-30    135  |  97  |  10  ----------------------------<  126<H>  3.9   |  24  |  0.84    Ca    9.5      30 Dec 2019 07:35  Phos  2.9     12-30  Mg     1.6     12-30    TPro  6.4  /  Alb  3.4  /  TBili  1.5<H>  /  DBili  0.7<H>  /  AST  101<H>  /  ALT  231<H>  /  AlkPhos  173<H>  12-30    PT/INR - ( 30 Dec 2019 07:35 )   PT: 19.9 sec;   INR: 1.73          PTT - ( 30 Dec 2019 07:35 )  PTT:29.0 sec    CAPILLARY BLOOD GLUCOSE      POCT Blood Glucose.: 125 mg/dL (30 Dec 2019 12:22)          RADIOLOGY & ADDITIONAL TESTS: Reviewed.

## 2019-12-30 NOTE — DISCHARGE NOTE PROVIDER - NSDCFUSCHEDAPPT_GEN_ALL_CORE_FT
DEEDEE CLARKE ; 01/09/2020 ; [facility] [address] [place] [address] DEEDEE CLARKE ; 01/09/2020 ; [facility] [address] [place] [address] DEEDEE CLARKE ; 01/09/2020 ; [facility] [address] [place] [address] DEEDEE CLARKE ; 01/09/2020 ; [facility] [address] [place] [address] DEEDEE CLARKE ; 01/09/2020 ; [facility] [address] [place] [address]

## 2019-12-30 NOTE — PROGRESS NOTE ADULT - ATTENDING COMMENTS
S/p pleurex done today at IR, being monitored today, Pulmonary on board, apprec recs-plan to do further drainage tomorrow  Pain control with toradol  Controlled DM-A1C of 5.8  F/up transaminitis  BMI of 36.6 --> Obesity  Anticipate dc home tomorrow  Rest as above

## 2019-12-30 NOTE — PROGRESS NOTE ADULT - PROBLEM SELECTOR PLAN 10
1.       PCP Contacted on Admission: (Y/N) --> Name & Phone #:  2.       Date of Contact with PCP:  3.       PCP Contacted at Discharge: (Y/N)  4.       Summary of Handoff Given to PCP:  5.       Post-Discharge Appointment Date and Location: [facility]

## 2019-12-30 NOTE — DISCHARGE NOTE PROVIDER - PROVIDER TOKENS
PROVIDER:[TOKEN:[[id number]],SCHEDULEDAPPT:[01/02/2020],SCHEDULEDAPPTTIME:[01:30 PM]] PROVIDER:[TOKEN:[[id number]],SCHEDULEDAPPT:[01/02/2020],SCHEDULEDAPPTTIME:[01:30 PM]],PROVIDER:[TOKEN:[[id number]]]

## 2019-12-30 NOTE — DISCHARGE NOTE PROVIDER - CARE PROVIDERS DIRECT ADDRESSES
,DirectAddress_Unknown ,DirectAddress_Unknown,[email or web address]

## 2019-12-30 NOTE — DISCHARGE NOTE NURSING/CASE MANAGEMENT/SOCIAL WORK - PATIENT PORTAL LINK FT
You can access the FollowMyHealth Patient Portal offered by [facility] by registering at the following website: [email or web address]. By joining [facility] FollowMyHealth portal, you will also be able to view your health information using other applications (apps) compatible with our system.

## 2019-12-30 NOTE — DISCHARGE NOTE PROVIDER - CARE PROVIDER_API CALL
Louis Thomason)  Hematology; Medical Oncology  [address], [address]  [place] [address]  Phone: [phone]  Fax: [phone]  Scheduled Appointment: 01/02/2020 01:30 PM Louis Thomason)  Hematology; Medical Oncology  [address], [address]  [place] [address]  Phone: [phone]  Fax: [phone]  Scheduled Appointment: 01/02/2020 01:30 PM    Nettie Novoa)  Critical Care Medicine; Pulmonary Disease  [address] [place] [address]  Phone: [phone]  Fax: [phone]  Follow Up Time:

## 2019-12-30 NOTE — CONSULT NOTE ADULT - PROBLEM SELECTOR RECOMMENDATION 9
-Pt w/ recurrent symptomatic pleural effusions, she notes symptomatic improvement post fluid drainage.   -Pt to go for pleurX catheter today   -Effusions have been exudative and cytology has been negative. 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     Plan:  -Right sided pleurX cathter placement   -Pt will follow up with Dr. Novoa in clinic (1/9/19 at 130 pm) for a check up and for suture removal.   -Chest X-Ray in AM.

## 2019-12-30 NOTE — CONSULT NOTE ADULT - SUBJECTIVE AND OBJECTIVE BOX
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 Was briefly on Eliquis for thrombus, held for potential procedure. On recent admission patient was recommended for PleurX but refused the procedure, was discharged w plan for surgery f/u in mid January. Pt now returns with increasing shortness of breath at rest as well as lower extremity swelling. CXR in ED shows reaccumulation of the previous pleural effusion. Patient also states significant lower extremity edema, similar to discharge but slightly worse.    Additional Pulmonary HPI:  This patient is known to the pulmonary service (Dr. Novoa). The patient has had 2 recent admissions, the first admission she underwent a thoracentesis which showed a weakly exudative pleural fluid, and on last admission she had recurrence of that effusion and had a chest tube placed. The fluid was again an exudate, cytology we negative both times. 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 The patient was offered a pleurX last admission but did not want to go through with the procedure and the rate of reaccumulation of fluid was monitored with the chest tube in place and clamped and it was felt that with close outpatient follow up she could either undergo a repeat outpatient thoracentesis or be scheduled for an outpatient pleurX. The patient felt more SOB w/ exertion over the weekend with worsening leg swelling and thus came to the ED. She denies cough, wheeze, but does note NIETO. She notes some mild right sided tenderness near the site of prior chest tube placement, she denies abdominal pain, nausea, or vomiting.     REVIEW OF SYSTEMS:  A 12 point ROS was reviewed with the patient and was negative except for what is mentioned above.     Allergy and Immunologic: No hives or eczema    PAST MEDICAL & SURGICAL HISTORY:  Leiomyosarcoma  Uterine fibroid  Hyperlipidemia  Asthma  Diabetes  Hypertension  No significant past surgical history      FAMILY HISTORY:  No pertinent family history in first degree relatives      SOCIAL HISTORY:  Smoking Status: [ ] Current, [X ] Former, [ ] Never  Had smoked 1 pack daily prior    MEDICATIONS:  Pulmonary:  budesonide  80 MICROgram(s)/formoterol 4.5 MICROgram(s) Inhaler 2 Puff(s) Inhalation two times a day    Antimicrobials:    Anticoagulants:    Onc:    GI/:    Endocrine:  atorvastatin 20 milliGRAM(s) Oral at bedtime  dextrose 40% Gel 15 Gram(s) Oral once PRN  dextrose 50% Injectable 12.5 Gram(s) IV Push once  glucagon  Injectable 1 milliGRAM(s) IntraMuscular once PRN  insulin lispro (HumaLOG) corrective regimen sliding scale   SubCutaneous Before meals and at bedtime    Cardiac:    Other Medications:  dextrose 5%. 1000 milliLiter(s) IV Continuous <Continuous>  ibuprofen  Tablet. 400 milliGRAM(s) Oral every 6 hours PRN  potassium chloride    Tablet ER 10 milliEquivalent(s) Oral once      Allergies    No Known Drug Allergies  shellfish (Hives)    Intolerances        Vital Signs Last 24 Hrs  T(C): 36.7 (30 Dec 2019 05:36), Max: 37.2 (29 Dec 2019 21:28)  T(F): 98 (30 Dec 2019 05:36), Max: 99 (29 Dec 2019 21:28)  HR: 97 (30 Dec 2019 05:36) (72 - 100)  BP: 105/70 (30 Dec 2019 05:36) (97/66 - 112/77)  BP(mean): --  RR: 19 (30 Dec 2019 05:36) (18 - 19)  SpO2: 97% (30 Dec 2019 05:36) (97% - 100%)        PHYSICAL EXAM:  Constitutional: WDWN  Head: NC/AT  EENT: PERRL, anicteric sclera; oropharynx clear, MMM  Neck: supple, no appreciable JVD  Respiratory: CTA B/L; no W/R/R  Cardiovascular: +S1/S2, RRR  Gastrointestinal: soft, NT/ND; +BSx4  Extremities: WWP; no edema, clubbing or cyanosis  Vascular: 2+ radial, DP/PT pulses B/L  Neurological: AAOx3; no focal deficits    LABS:      CBC Full  -  ( 30 Dec 2019 07:35 )  WBC Count : 9.69 K/uL  RBC Count : 4.51 M/uL  Hemoglobin : 9.2 g/dL  Hematocrit : 30.9 %  Platelet Count - Automated : 276 K/uL  Mean Cell Volume : 68.5 fl  Mean Cell Hemoglobin : 20.4 pg  Mean Cell Hemoglobin Concentration : 29.8 gm/dL  Auto Neutrophil # : 4.89 K/uL  Auto Lymphocyte # : 3.12 K/uL  Auto Monocyte # : 0.92 K/uL  Auto Eosinophil # : 0.50 K/uL  Auto Basophil # : 0.09 K/uL  Auto Neutrophil % : 49.6 %  Auto Lymphocyte % : 32.2 %  Auto Monocyte % : 9.5 %  Auto Eosinophil % : 5.2 %  Auto Basophil % : 0.9 %    12-30    135  |  97  |  10  ----------------------------<  126<H>  3.9   |  24  |  0.84    Ca    9.5      30 Dec 2019 07:35  Phos  2.9     12-30  Mg     1.6     12-30    TPro  6.4  /  Alb  3.4  /  TBili  1.5<H>  /  DBili  0.7<H>  /  AST  101<H>  /  ALT  231<H>  /  AlkPhos  173<H>  12-30    PT/INR - ( 30 Dec 2019 07:35 )   PT: 19.9 sec;   INR: 1.73          PTT - ( 30 Dec 2019 07:35 )  PTT:29.0 sec                  RADIOLOGY & ADDITIONAL STUDIES: PULMONARY SERVICE INITIAL CONSULT NOTE    HPI:  Patient is a 38 y/o female w hx of htn, hld, obesity, dm, asthma, fibroids and recent admission for new pleural effusion 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 Was briefly on Eliquis for thrombus, held for potential procedure. On recent admission patient was recommended for PleurX but refused the procedure, was discharged w plan for surgery f/u in mid January. Pt now returns with increasing shortness of breath at rest as well as lower extremity swelling. CXR in ED shows reaccumulation of the previous pleural effusion. Patient also states significant lower extremity edema, similar to discharge but slightly worse.    Additional Pulmonary HPI:  This patient is known to the pulmonary service (Dr. Novoa). The patient has had 2 recent admissions, the first admission she underwent a thoracentesis which showed a weakly exudative pleural fluid, and on last admission she had recurrence of that effusion and had a chest tube placed. The fluid was again an exudate, cytology we negative both times. 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 The patient was offered a pleurX last admission but did not want to go through with the procedure and the rate of reaccumulation of fluid was monitored with the chest tube in place and clamped and it was felt that with close outpatient follow up she could either undergo a repeat outpatient thoracentesis or be scheduled for an outpatient pleurX. The patient felt more SOB w/ exertion over the weekend with worsening leg swelling and thus came to the ED. She denies cough, wheeze, but does note NIETO. She notes some mild right sided tenderness near the site of prior chest tube placement, she denies abdominal pain, nausea, or vomiting.     REVIEW OF SYSTEMS:  A 12 point ROS was reviewed with the patient and was negative except for what is mentioned above.     Allergy and Immunologic: No hives or eczema    PAST MEDICAL & SURGICAL HISTORY:  Leiomyosarcoma  Uterine fibroid  Hyperlipidemia  Asthma  Diabetes  Hypertension  No significant past surgical history      FAMILY HISTORY:  No pertinent family history in first degree relatives      SOCIAL HISTORY:  Smoking Status: [ ] Current, [X ] Former, [ ] Never  Had smoked 1 pack daily prior    MEDICATIONS:  Pulmonary:  budesonide  80 MICROgram(s)/formoterol 4.5 MICROgram(s) Inhaler 2 Puff(s) Inhalation two times a day    Antimicrobials:    Anticoagulants:    Onc:    GI/:    Endocrine:  atorvastatin 20 milliGRAM(s) Oral at bedtime  dextrose 40% Gel 15 Gram(s) Oral once PRN  dextrose 50% Injectable 12.5 Gram(s) IV Push once  glucagon  Injectable 1 milliGRAM(s) IntraMuscular once PRN  insulin lispro (HumaLOG) corrective regimen sliding scale   SubCutaneous Before meals and at bedtime    Cardiac:    Other Medications:  dextrose 5%. 1000 milliLiter(s) IV Continuous <Continuous>  ibuprofen  Tablet. 400 milliGRAM(s) Oral every 6 hours PRN  potassium chloride    Tablet ER 10 milliEquivalent(s) Oral once      Allergies    No Known Drug Allergies  shellfish (Hives)    Intolerances        Vital Signs Last 24 Hrs  T(C): 36.7 (30 Dec 2019 05:36), Max: 37.2 (29 Dec 2019 21:28)  T(F): 98 (30 Dec 2019 05:36), Max: 99 (29 Dec 2019 21:28)  HR: 97 (30 Dec 2019 05:36) (72 - 100)  BP: 105/70 (30 Dec 2019 05:36) (97/66 - 112/77)  BP(mean): --  RR: 19 (30 Dec 2019 05:36) (18 - 19)  SpO2: 97% (30 Dec 2019 05:36) (97% - 100%)        PHYSICAL EXAM:  Constitutional: WDWN  Head: NC/AT  EENT: PERRL, anicteric sclera; oropharynx clear, MMM  Neck: supple, no appreciable JVD  Respiratory: Decreased breath sounds in right lower lung zone, clear otherwise. Good respiratory effort.   Cardiovascular: +S1/S2, RRR  Gastrointestinal: soft, NT/ND; +BSx4  Extremities: WWP; no edema, clubbing or cyanosis  Vascular: 2+ radial, DP/PT pulses B/L  Neurological: AAOx3; no focal deficits    LABS:      CBC Full  -  ( 30 Dec 2019 07:35 )  WBC Count : 9.69 K/uL  RBC Count : 4.51 M/uL  Hemoglobin : 9.2 g/dL  Hematocrit : 30.9 %  Platelet Count - Automated : 276 K/uL  Mean Cell Volume : 68.5 fl  Mean Cell Hemoglobin : 20.4 pg  Mean Cell Hemoglobin Concentration : 29.8 gm/dL  Auto Neutrophil # : 4.89 K/uL  Auto Lymphocyte # : 3.12 K/uL  Auto Monocyte # : 0.92 K/uL  Auto Eosinophil # : 0.50 K/uL  Auto Basophil # : 0.09 K/uL  Auto Neutrophil % : 49.6 %  Auto Lymphocyte % : 32.2 %  Auto Monocyte % : 9.5 %  Auto Eosinophil % : 5.2 %  Auto Basophil % : 0.9 %    12-30    135  |  97  |  10  ----------------------------<  126<H>  3.9   |  24  |  0.84    Ca    9.5      30 Dec 2019 07:35  Phos  2.9     12-30  Mg     1.6     12-30    TPro  6.4  /  Alb  3.4  /  TBili  1.5<H>  /  DBili  0.7<H>  /  AST  101<H>  /  ALT  231<H>  /  AlkPhos  173<H>  12-30    PT/INR - ( 30 Dec 2019 07:35 )   PT: 19.9 sec;   INR: 1.73          PTT - ( 30 Dec 2019 07:35 )  PTT:29.0 sec                  RADIOLOGY & ADDITIONAL STUDIES:  Chest X-Ray

## 2019-12-30 NOTE — CONSULT NOTE ADULT - PROBLEM SELECTOR RECOMMENDATION 2
-Pt has significant atelectasis on POCUS. Also a moderate right sided pleural effusion (simple) was noted.   -Pt needs to have Incentive spirometry strongly enforced, needs to be OOB and to ambulate.

## 2019-12-30 NOTE — DISCHARGE NOTE PROVIDER - HOSPITAL COURSE
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 Patient returned to ED for new onset SOB since last admission again with reaccumulation of pleural effusion. 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 The patient was offered a pleurX last admission but did not want to go through with the procedure and the rate of reaccumulation of fluid was monitored with the chest tube in place and clamped and it was felt that with close outpatient follow up she could either undergo a repeat outpatient thoracentesis or be scheduled for an outpatient pleurX. The patient felt more SOB w/ exertion over the weekend with worsening leg swelling and thus came to the ED. Patient has no undergone PleurX catheter placement with pulmonary team.         Patient now medically optimized for discharge. @ time of VSS. Patient verbalized understanding of return precautions.             New management-Patient will need PleurX catheter drainage every other day with removal of 1L of fluid.

## 2019-12-30 NOTE — DISCHARGE NOTE PROVIDER - NSDCMRMEDTOKEN_GEN_ALL_CORE_FT
acetaminophen 325 mg oral tablet: 2 tab(s) orally every 6 hours, As needed, Temp greater or equal to 38C (100.4F), Mild Pain (1 - 3), Moderate Pain (4 - 6)  Advair HFA 45 mcg-21 mcg/inh inhalation aerosol: 2 puff(s) inhaled 2 times a day  atorvastatin 20 mg oral tablet: 1 tab(s) orally once a day  ferrous sulfate 325 mg (65 mg elemental iron) oral tablet: 1 tab(s) orally once a day  ibuprofen 600 mg oral tablet: 1 tab(s) orally every 6 hours MDD:2400 mg  lisinopril 20 mg oral tablet: 1 tab(s) orally 2 times a day  metFORMIN 500 mg oral tablet: 1 tab(s) orally 2 times a day  metoprolol tartrate 50 mg oral tablet: 1 tab(s) orally 2 times a day  Oxaydo 5 mg oral tablet: 1 tab(s) orally every 6 hours, As needed, Moderate Pain (4 - 6) MDD:4  Protonix 40 mg oral delayed release tablet: 1 tab(s) orally once a day (before a meal)  Vitamin D3 1000 intl units oral tablet: 1 tab(s) orally once a day acetaminophen 325 mg oral tablet: 2 tab(s) orally every 6 hours, As needed, Temp greater or equal to 38C (100.4F), Mild Pain (1 - 3), Moderate Pain (4 - 6)  Advair HFA 45 mcg-21 mcg/inh inhalation aerosol: 2 puff(s) inhaled 2 times a day  atorvastatin 20 mg oral tablet: 1 tab(s) orally once a day  Eliquis 5 mg oral tablet: 1 tab(s) orally 2 times a day   ferrous sulfate 325 mg (65 mg elemental iron) oral tablet: 1 tab(s) orally once a day  ibuprofen 600 mg oral tablet: 1 tab(s) orally every 6 hours MDD:2400 mg  metFORMIN 500 mg oral tablet: 1 tab(s) orally 2 times a day  metoprolol tartrate 50 mg oral tablet: 1 tab(s) orally 2 times a day  Oxaydo 5 mg oral tablet: 1 tab(s) orally every 6 hours, As needed, Moderate Pain (4 - 6) MDD:4  Protonix 40 mg oral delayed release tablet: 1 tab(s) orally once a day (before a meal)  Vitamin D3 1000 intl units oral tablet: 1 tab(s) orally once a day acetaminophen 325 mg oral tablet: 2 tab(s) orally every 6 hours, As needed, Temp greater or equal to 38C (100.4F), Mild Pain (1 - 3), Moderate Pain (4 - 6)  Advair HFA 45 mcg-21 mcg/inh inhalation aerosol: 2 puff(s) inhaled 2 times a day  atorvastatin 20 mg oral tablet: 1 tab(s) orally once a day  Compression stockin Pair both both legs  Eliquis 5 mg oral tablet: 1 tab(s) orally 2 times a day   ferrous sulfate 325 mg (65 mg elemental iron) oral tablet: 1 tab(s) orally once a day  ibuprofen 600 mg oral tablet: 1 tab(s) orally every 6 hours MDD:2400 mg  metFORMIN 500 mg oral tablet: 1 tab(s) orally 2 times a day  metoprolol tartrate 50 mg oral tablet: 1 tab(s) orally 2 times a day  Oxaydo 5 mg oral tablet: 1 tab(s) orally every 6 hours, As needed, Moderate Pain (4 - 6) MDD:4  Protonix 40 mg oral delayed release tablet: 1 tab(s) orally once a day (before a meal)  Vitamin D3 1000 intl units oral tablet: 1 tab(s) orally once a day

## 2019-12-30 NOTE — CONSULT NOTE ADULT - ATTENDING COMMENTS
Patient seen and examined with house-staff during bedside rounds.  Resident note read, including vitals, physical findings, laboratory data, and radiological reports.   Revisions included below.  Direct personal management at bed side and extensive interpretation of the data.  Plan was outlined and discussed in details with the housestaff.  Decision making of high complexity  Action taken for acute disease activity to reflect the level of care provided:  - medication reconciliation  - review laboratory data  discussed condition with Dr Howe  Discussed the condition with the patient  Explained indication for Pleurax benefit and alternatives.  Explained risk.  Follow fluid for further analysis  Start home care arrangement Patient seen and examined with house-staff during bedside rounds.  Resident note read, including vitals, physical findings, laboratory data, and radiological reports.   Revisions included below.  Direct personal management at bed side and extensive interpretation of the data.  Plan was outlined and discussed in details with the housestaff.  Decision making of high complexity  Action taken for acute disease activity to reflect the level of care provided:  - medication reconciliation  - review laboratory data  discussed condition with Dr Howe  Discussed the condition with the patient  Explained indication for Pleurax benefit and alternatives.  Explained risk.  She also has astham  Follow fluid for further analysis  Start home care arrangement

## 2019-12-30 NOTE — CONSULT NOTE ADULT - ASSESSMENT
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 Howe) showing leiomyosarcoma, readmitted with R pleural effusion and no PE s/p bedside chest tube placement 12/20 that has been removed, now presents with gradual increased work of breathing and bilateral leg swelling. General surgery consulted to rule out any contraindication to chest tube placement.    Recommendations:  - No contraindications to right chest tube placement from general surgery perspective  - Continue care per primary team  - Case discussed with chief resident
This is a 40 y/o woman with a recently diagnosed metastatic leiomyosarcoma who presents with recurrent symptomatic right sided pleural effusion.

## 2019-12-30 NOTE — PROGRESS NOTE ADULT - PROBLEM SELECTOR PLAN 1
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

## 2019-12-30 NOTE — BRIEF OPERATIVE NOTE - OPERATION/FINDINGS
Incomplete The right chest and upper abdomen were prepped and draped in the usual sterile fashion. Lidocaine 1% was used to infiltrate two areas; one in the right upper quadrant where the tube would exit and the other along the anterior axillary line in the seventh intercostal space. A small counterincision was made in the right upper quadrant area. Through the anterior axillary line area, the pleural space was accessed by Seldinger technique. The counterincision was made around the guidewire and then the PleurX catheter was tunneled from the right upper quadrant small incision to the one overlying the ribs. A sheath introducer was then passed over the wire and then the PleurX catheter was placed through the sheath introducer. There was good return of fluid. One liters of serous fluid was withdrawn slowly as the small counterincision was closed with a suture. The catheter was capped off, and sterile dressings were applied. The patient tolerated the procedure well without any complications. The patient was transferred to the recovery room in stable condition.

## 2019-12-31 VITALS
HEART RATE: 77 BPM | DIASTOLIC BLOOD PRESSURE: 76 MMHG | SYSTOLIC BLOOD PRESSURE: 111 MMHG | TEMPERATURE: 98 F | RESPIRATION RATE: 18 BRPM | OXYGEN SATURATION: 98 %

## 2019-12-31 LAB
ALBUMIN SERPL ELPH-MCNC: 3.1 G/DL — LOW (ref 3.3–5)
ALP SERPL-CCNC: 151 U/L — HIGH (ref 40–120)
ALT FLD-CCNC: 186 U/L — HIGH (ref 10–45)
ANION GAP SERPL CALC-SCNC: 12 MMOL/L — SIGNIFICANT CHANGE UP (ref 5–17)
AST SERPL-CCNC: 89 U/L — HIGH (ref 10–40)
BILIRUB SERPL-MCNC: 1.8 MG/DL — HIGH (ref 0.2–1.2)
BUN SERPL-MCNC: 12 MG/DL — SIGNIFICANT CHANGE UP (ref 7–23)
CALCIUM SERPL-MCNC: 9.2 MG/DL — SIGNIFICANT CHANGE UP (ref 8.4–10.5)
CHLORIDE SERPL-SCNC: 97 MMOL/L — SIGNIFICANT CHANGE UP (ref 96–108)
CO2 SERPL-SCNC: 23 MMOL/L — SIGNIFICANT CHANGE UP (ref 22–31)
CREAT SERPL-MCNC: 1.08 MG/DL — SIGNIFICANT CHANGE UP (ref 0.5–1.3)
GLUCOSE BLDC GLUCOMTR-MCNC: 166 MG/DL — HIGH (ref 70–99)
GLUCOSE BLDC GLUCOMTR-MCNC: 99 MG/DL — SIGNIFICANT CHANGE UP (ref 70–99)
GLUCOSE SERPL-MCNC: 98 MG/DL — SIGNIFICANT CHANGE UP (ref 70–99)
HCT VFR BLD CALC: 28.8 % — LOW (ref 34.5–45)
HGB BLD-MCNC: 8.6 G/DL — LOW (ref 11.5–15.5)
MAGNESIUM SERPL-MCNC: 1.8 MG/DL — SIGNIFICANT CHANGE UP (ref 1.6–2.6)
MCHC RBC-ENTMCNC: 20.2 PG — LOW (ref 27–34)
MCHC RBC-ENTMCNC: 29.9 GM/DL — LOW (ref 32–36)
MCV RBC AUTO: 67.6 FL — LOW (ref 80–100)
NRBC # BLD: 0 /100 WBCS — SIGNIFICANT CHANGE UP (ref 0–0)
PLATELET # BLD AUTO: 294 K/UL — SIGNIFICANT CHANGE UP (ref 150–400)
POTASSIUM SERPL-MCNC: 4.3 MMOL/L — SIGNIFICANT CHANGE UP (ref 3.5–5.3)
POTASSIUM SERPL-SCNC: 4.3 MMOL/L — SIGNIFICANT CHANGE UP (ref 3.5–5.3)
PROT SERPL-MCNC: 6 G/DL — SIGNIFICANT CHANGE UP (ref 6–8.3)
RBC # BLD: 4.26 M/UL — SIGNIFICANT CHANGE UP (ref 3.8–5.2)
RBC # FLD: 22.9 % — HIGH (ref 10.3–14.5)
SODIUM SERPL-SCNC: 132 MMOL/L — LOW (ref 135–145)
WBC # BLD: 8.24 K/UL — SIGNIFICANT CHANGE UP (ref 3.8–10.5)
WBC # FLD AUTO: 8.24 K/UL — SIGNIFICANT CHANGE UP (ref 3.8–10.5)

## 2019-12-31 PROCEDURE — 82962 GLUCOSE BLOOD TEST: CPT

## 2019-12-31 PROCEDURE — 86870 RBC ANTIBODY IDENTIFICATION: CPT

## 2019-12-31 PROCEDURE — 80048 BASIC METABOLIC PNL TOTAL CA: CPT

## 2019-12-31 PROCEDURE — 94640 AIRWAY INHALATION TREATMENT: CPT

## 2019-12-31 PROCEDURE — 86850 RBC ANTIBODY SCREEN: CPT

## 2019-12-31 PROCEDURE — 36415 COLL VENOUS BLD VENIPUNCTURE: CPT

## 2019-12-31 PROCEDURE — 71045 X-RAY EXAM CHEST 1 VIEW: CPT

## 2019-12-31 PROCEDURE — 93005 ELECTROCARDIOGRAM TRACING: CPT

## 2019-12-31 PROCEDURE — 80053 COMPREHEN METABOLIC PANEL: CPT

## 2019-12-31 PROCEDURE — 80076 HEPATIC FUNCTION PANEL: CPT

## 2019-12-31 PROCEDURE — 83735 ASSAY OF MAGNESIUM: CPT

## 2019-12-31 PROCEDURE — 84100 ASSAY OF PHOSPHORUS: CPT

## 2019-12-31 PROCEDURE — 83880 ASSAY OF NATRIURETIC PEPTIDE: CPT

## 2019-12-31 PROCEDURE — 85610 PROTHROMBIN TIME: CPT

## 2019-12-31 PROCEDURE — 85027 COMPLETE CBC AUTOMATED: CPT

## 2019-12-31 PROCEDURE — 83036 HEMOGLOBIN GLYCOSYLATED A1C: CPT

## 2019-12-31 PROCEDURE — 85730 THROMBOPLASTIN TIME PARTIAL: CPT

## 2019-12-31 PROCEDURE — 71045 X-RAY EXAM CHEST 1 VIEW: CPT | Mod: 26

## 2019-12-31 PROCEDURE — 86900 BLOOD TYPING SEROLOGIC ABO: CPT

## 2019-12-31 PROCEDURE — 99285 EMERGENCY DEPT VISIT HI MDM: CPT | Mod: 25

## 2019-12-31 PROCEDURE — 86901 BLOOD TYPING SEROLOGIC RH(D): CPT

## 2019-12-31 PROCEDURE — 99239 HOSP IP/OBS DSCHRG MGMT >30: CPT

## 2019-12-31 PROCEDURE — 71046 X-RAY EXAM CHEST 2 VIEWS: CPT

## 2019-12-31 PROCEDURE — 85025 COMPLETE CBC W/AUTO DIFF WBC: CPT

## 2019-12-31 RX ORDER — MAGNESIUM SULFATE 500 MG/ML
1 VIAL (ML) INJECTION ONCE
Refills: 0 | Status: DISCONTINUED | OUTPATIENT
Start: 2019-12-31 | End: 2019-12-31

## 2019-12-31 RX ORDER — LISINOPRIL 2.5 MG/1
1 TABLET ORAL
Qty: 0 | Refills: 0 | DISCHARGE

## 2019-12-31 RX ADMIN — BUDESONIDE AND FORMOTEROL FUMARATE DIHYDRATE 2 PUFF(S): 160; 4.5 AEROSOL RESPIRATORY (INHALATION) at 12:56

## 2019-12-31 RX ADMIN — Medication 400 MILLIGRAM(S): at 06:59

## 2019-12-31 RX ADMIN — Medication 2: at 13:00

## 2020-01-03 DIAGNOSIS — K76.6 PORTAL HYPERTENSION: ICD-10-CM

## 2020-01-03 DIAGNOSIS — E66.9 OBESITY, UNSPECIFIED: ICD-10-CM

## 2020-01-03 DIAGNOSIS — I10 ESSENTIAL (PRIMARY) HYPERTENSION: ICD-10-CM

## 2020-01-03 DIAGNOSIS — J90 PLEURAL EFFUSION, NOT ELSEWHERE CLASSIFIED: ICD-10-CM

## 2020-01-03 DIAGNOSIS — E78.5 HYPERLIPIDEMIA, UNSPECIFIED: ICD-10-CM

## 2020-01-03 DIAGNOSIS — I95.9 HYPOTENSION, UNSPECIFIED: ICD-10-CM

## 2020-01-03 DIAGNOSIS — I82.220 ACUTE EMBOLISM AND THROMBOSIS OF INFERIOR VENA CAVA: ICD-10-CM

## 2020-01-03 DIAGNOSIS — J94.8 OTHER SPECIFIED PLEURAL CONDITIONS: ICD-10-CM

## 2020-01-03 DIAGNOSIS — E87.1 HYPO-OSMOLALITY AND HYPONATREMIA: ICD-10-CM

## 2020-01-03 DIAGNOSIS — E11.9 TYPE 2 DIABETES MELLITUS WITHOUT COMPLICATIONS: ICD-10-CM

## 2020-01-03 DIAGNOSIS — J91.0 MALIGNANT PLEURAL EFFUSION: ICD-10-CM

## 2020-01-03 DIAGNOSIS — J45.20 MILD INTERMITTENT ASTHMA, UNCOMPLICATED: ICD-10-CM

## 2020-01-03 DIAGNOSIS — J98.11 ATELECTASIS: ICD-10-CM

## 2020-01-03 DIAGNOSIS — Z87.891 PERSONAL HISTORY OF NICOTINE DEPENDENCE: ICD-10-CM

## 2020-01-03 DIAGNOSIS — C49.4 MALIGNANT NEOPLASM OF CONNECTIVE AND SOFT TISSUE OF ABDOMEN: ICD-10-CM

## 2020-01-04 LAB
CULTURE RESULTS: SIGNIFICANT CHANGE UP
SPECIMEN SOURCE: SIGNIFICANT CHANGE UP

## 2020-01-06 ENCOUNTER — INPATIENT (INPATIENT)
Facility: HOSPITAL | Age: 40
LOS: 3 days | Discharge: OTHER INSTITUTION | DRG: 300 | End: 2020-01-10
Attending: INTERNAL MEDICINE | Admitting: INTERNAL MEDICINE
Payer: MEDICAID

## 2020-01-06 VITALS
RESPIRATION RATE: 18 BRPM | WEIGHT: 250 LBS | DIASTOLIC BLOOD PRESSURE: 86 MMHG | OXYGEN SATURATION: 100 % | HEART RATE: 103 BPM | SYSTOLIC BLOOD PRESSURE: 118 MMHG | HEIGHT: 65 IN | TEMPERATURE: 98 F

## 2020-01-06 LAB
ALBUMIN SERPL ELPH-MCNC: 3.3 G/DL — SIGNIFICANT CHANGE UP (ref 3.3–5)
ALP SERPL-CCNC: 164 U/L — HIGH (ref 40–120)
ALT FLD-CCNC: 103 U/L — HIGH (ref 10–45)
ANION GAP SERPL CALC-SCNC: 14 MMOL/L — SIGNIFICANT CHANGE UP (ref 5–17)
APTT BLD: 26 SEC — LOW (ref 27.5–36.3)
AST SERPL-CCNC: 99 U/L — HIGH (ref 10–40)
BASOPHILS # BLD AUTO: 0.09 K/UL — SIGNIFICANT CHANGE UP (ref 0–0.2)
BASOPHILS NFR BLD AUTO: 0.9 % — SIGNIFICANT CHANGE UP (ref 0–2)
BILIRUB SERPL-MCNC: 2 MG/DL — HIGH (ref 0.2–1.2)
BUN SERPL-MCNC: 18 MG/DL — SIGNIFICANT CHANGE UP (ref 7–23)
CALCIUM SERPL-MCNC: 9.3 MG/DL — SIGNIFICANT CHANGE UP (ref 8.4–10.5)
CHLORIDE SERPL-SCNC: 99 MMOL/L — SIGNIFICANT CHANGE UP (ref 96–108)
CO2 SERPL-SCNC: 23 MMOL/L — SIGNIFICANT CHANGE UP (ref 22–31)
CREAT SERPL-MCNC: 0.97 MG/DL — SIGNIFICANT CHANGE UP (ref 0.5–1.3)
EOSINOPHIL # BLD AUTO: 0.34 K/UL — SIGNIFICANT CHANGE UP (ref 0–0.5)
EOSINOPHIL NFR BLD AUTO: 3.6 % — SIGNIFICANT CHANGE UP (ref 0–6)
GLUCOSE SERPL-MCNC: 127 MG/DL — HIGH (ref 70–99)
HCG SERPL-ACNC: <0 MIU/ML — SIGNIFICANT CHANGE UP
HCT VFR BLD CALC: 33.9 % — LOW (ref 34.5–45)
HGB BLD-MCNC: 10 G/DL — LOW (ref 11.5–15.5)
INR BLD: 1.86 — HIGH (ref 0.88–1.16)
LYMPHOCYTES # BLD AUTO: 2.92 K/UL — SIGNIFICANT CHANGE UP (ref 1–3.3)
LYMPHOCYTES # BLD AUTO: 30.6 % — SIGNIFICANT CHANGE UP (ref 13–44)
MCHC RBC-ENTMCNC: 20.1 PG — LOW (ref 27–34)
MCHC RBC-ENTMCNC: 29.5 GM/DL — LOW (ref 32–36)
MCV RBC AUTO: 68.2 FL — LOW (ref 80–100)
MONOCYTES # BLD AUTO: 0.6 K/UL — SIGNIFICANT CHANGE UP (ref 0–0.9)
MONOCYTES NFR BLD AUTO: 6.3 % — SIGNIFICANT CHANGE UP (ref 2–14)
NEUTROPHILS # BLD AUTO: 5.33 K/UL — SIGNIFICANT CHANGE UP (ref 1.8–7.4)
NEUTROPHILS NFR BLD AUTO: 55.9 % — SIGNIFICANT CHANGE UP (ref 43–77)
PLATELET # BLD AUTO: 266 K/UL — SIGNIFICANT CHANGE UP (ref 150–400)
POTASSIUM SERPL-MCNC: 4.2 MMOL/L — SIGNIFICANT CHANGE UP (ref 3.5–5.3)
POTASSIUM SERPL-SCNC: 4.2 MMOL/L — SIGNIFICANT CHANGE UP (ref 3.5–5.3)
PROT SERPL-MCNC: 6.4 G/DL — SIGNIFICANT CHANGE UP (ref 6–8.3)
PROTHROM AB SERPL-ACNC: 21.6 SEC — HIGH (ref 10–12.9)
RBC # BLD: 4.97 M/UL — SIGNIFICANT CHANGE UP (ref 3.8–5.2)
RBC # FLD: 22.9 % — HIGH (ref 10.3–14.5)
SODIUM SERPL-SCNC: 136 MMOL/L — SIGNIFICANT CHANGE UP (ref 135–145)
TROPONIN T SERPL-MCNC: <0.01 NG/ML — SIGNIFICANT CHANGE UP (ref 0–0.01)
WBC # BLD: 9.53 K/UL — SIGNIFICANT CHANGE UP (ref 3.8–10.5)
WBC # FLD AUTO: 9.53 K/UL — SIGNIFICANT CHANGE UP (ref 3.8–10.5)

## 2020-01-06 PROCEDURE — 71046 X-RAY EXAM CHEST 2 VIEWS: CPT | Mod: 26

## 2020-01-06 PROCEDURE — 93979 VASCULAR STUDY: CPT | Mod: 26

## 2020-01-06 PROCEDURE — 99285 EMERGENCY DEPT VISIT HI MDM: CPT

## 2020-01-06 PROCEDURE — 93970 EXTREMITY STUDY: CPT | Mod: 26

## 2020-01-06 RX ORDER — ENOXAPARIN SODIUM 100 MG/ML
120 INJECTION SUBCUTANEOUS ONCE
Refills: 0 | Status: COMPLETED | OUTPATIENT
Start: 2020-01-06 | End: 2020-01-06

## 2020-01-06 RX ORDER — FUROSEMIDE 40 MG
40 TABLET ORAL ONCE
Refills: 0 | Status: DISCONTINUED | OUTPATIENT
Start: 2020-01-06 | End: 2020-01-06

## 2020-01-06 NOTE — ED ADULT NURSE NOTE - OBJECTIVE STATEMENT
patient complains of worsening swelling in her legs over the last few days. she has a pleurovax in her right chest to drain fluid from a mass that she is having an outpatient workup for. patient states that she normally drains the fluid every other day but had to drain extra yesterday because of increasing SOB. she states that the visiting nurse came today and was concerned about the increasing swelling in her legs and diminished lung sounds at the base of her right lung. patient complains of exertional SOB, denies chest pain, fever, chills. labs drawn and sent. pending MD acevedo

## 2020-01-06 NOTE — ED PROVIDER NOTE - PHYSICAL EXAMINATION
CONST: obese nontoxic NAD speaking in full sentences  HEAD: atraumatic  EYES: conjunctivae clear  ENT: mmm  NECK: supple, no jvd  CARD: rrr no murmurs  CHEST: ctab no r/r/w, no stridor/retractions/tripoding  ABD: soft, nd, nttp, no rebound/guarding  EXT: FROM, compartments soft, no crepitus, symmetric distal pulses intact  SKIN: warm, dry, no rash, 3+ pitting edema RLE to distal thigh and 2+ pitting edema LLE to proximal leg, cap refill <2sec  NEURO: a+ox3, 5/5 strength x4, gross sensation intact x4, normal gait

## 2020-01-06 NOTE — ED PROVIDER NOTE - CLINICAL SUMMARY MEDICAL DECISION MAKING FREE TEXT BOX
avss. nontoxic. NAD. no systemic sx. no active cp. no acute resp distress. found to have new BLE dvts since prior (12/18/2019) in setting of AC noncompliance. s/p lovenox. c/f extension of ivc thrombus to BLE. vascular consulted. reccs pending. will admit pending ct a/p w/ BLE runnoff to evaluate extent of thrombus. unable to get a hold of tayla wong/lee ann. dr parikh covering for dr nance will accept pt.

## 2020-01-06 NOTE — ED PROVIDER NOTE - PROGRESS NOTE DETAILS
unable to get a hold of drs. novosolac or lee ann. will admit to hospitalist. vascular consulted. will see pt.

## 2020-01-06 NOTE — ED ADULT TRIAGE NOTE - CHIEF COMPLAINT QUOTE
Patient sent in for increased edema to lower extremities. Patient also has right chest tube in place. Per patient, the chest tube was placed 3 weeks ago for "a mass that's causing my lungs to fill with fluid."

## 2020-01-06 NOTE — ED PROVIDER NOTE - OBJECTIVE STATEMENT
39F obesity, htn, hld, dm, asthma, fibroids, recently dx w/ leiomyosarcoma c/b ivc thrombus pending surigcal removal 1/2020 and ascites/pl effusion s/p chest tube, recently hospitalized for sob 2/2 recurrent pl effusion s/p chest tube removal discharged home w/ PleurX catheter requiring 1L draiange removal EOD (12/29/2019-12/31/2019), referred to ED by visiting home nurse who was concerned about R>LLE swelling. pt reports that she is not currently on eliquis or other AC. no fever/chills, no cp/sob, no palpitations, no prior leg dvt, no trauma, no leg pain/rash.    pcp: cyn drake  heme/onc: mary jane platt  pulm: smith nance

## 2020-01-07 ENCOUNTER — OUTPATIENT (OUTPATIENT)
Dept: OUTPATIENT SERVICES | Facility: HOSPITAL | Age: 40
LOS: 1 days | Discharge: HOME | End: 2020-01-07

## 2020-01-07 DIAGNOSIS — J45.909 UNSPECIFIED ASTHMA, UNCOMPLICATED: ICD-10-CM

## 2020-01-07 DIAGNOSIS — I10 ESSENTIAL (PRIMARY) HYPERTENSION: ICD-10-CM

## 2020-01-07 DIAGNOSIS — E78.5 HYPERLIPIDEMIA, UNSPECIFIED: ICD-10-CM

## 2020-01-07 DIAGNOSIS — C49.9 MALIGNANT NEOPLASM OF CONNECTIVE AND SOFT TISSUE, UNSPECIFIED: ICD-10-CM

## 2020-01-07 DIAGNOSIS — E11.9 TYPE 2 DIABETES MELLITUS WITHOUT COMPLICATIONS: ICD-10-CM

## 2020-01-07 DIAGNOSIS — R63.8 OTHER SYMPTOMS AND SIGNS CONCERNING FOOD AND FLUID INTAKE: ICD-10-CM

## 2020-01-07 DIAGNOSIS — J90 PLEURAL EFFUSION, NOT ELSEWHERE CLASSIFIED: ICD-10-CM

## 2020-01-07 DIAGNOSIS — Z91.89 OTHER SPECIFIED PERSONAL RISK FACTORS, NOT ELSEWHERE CLASSIFIED: ICD-10-CM

## 2020-01-07 DIAGNOSIS — C48.0 MALIGNANT NEOPLASM OF RETROPERITONEUM: ICD-10-CM

## 2020-01-07 DIAGNOSIS — I82.403 ACUTE EMBOLISM AND THROMBOSIS OF UNSPECIFIED DEEP VEINS OF LOWER EXTREMITY, BILATERAL: ICD-10-CM

## 2020-01-07 DIAGNOSIS — I80.10 PHLEBITIS AND THROMBOPHLEBITIS OF UNSPECIFIED FEMORAL VEIN: ICD-10-CM

## 2020-01-07 DIAGNOSIS — I82.413 ACUTE EMBOLISM AND THROMBOSIS OF FEMORAL VEIN, BILATERAL: ICD-10-CM

## 2020-01-07 LAB
ANION GAP SERPL CALC-SCNC: 15 MMOL/L — SIGNIFICANT CHANGE UP (ref 5–17)
APTT BLD: 36.4 SEC — HIGH (ref 27.5–36.3)
BUN SERPL-MCNC: 17 MG/DL — SIGNIFICANT CHANGE UP (ref 7–23)
CALCIUM SERPL-MCNC: 9.3 MG/DL — SIGNIFICANT CHANGE UP (ref 8.4–10.5)
CHLORIDE SERPL-SCNC: 97 MMOL/L — SIGNIFICANT CHANGE UP (ref 96–108)
CO2 SERPL-SCNC: 22 MMOL/L — SIGNIFICANT CHANGE UP (ref 22–31)
CREAT SERPL-MCNC: 0.89 MG/DL — SIGNIFICANT CHANGE UP (ref 0.5–1.3)
GLUCOSE BLDC GLUCOMTR-MCNC: 133 MG/DL — HIGH (ref 70–99)
GLUCOSE BLDC GLUCOMTR-MCNC: 147 MG/DL — HIGH (ref 70–99)
GLUCOSE BLDC GLUCOMTR-MCNC: 155 MG/DL — HIGH (ref 70–99)
GLUCOSE SERPL-MCNC: 128 MG/DL — HIGH (ref 70–99)
HCT VFR BLD CALC: 32.4 % — LOW (ref 34.5–45)
HGB BLD-MCNC: 9.9 G/DL — LOW (ref 11.5–15.5)
INR BLD: 2.05 — HIGH (ref 0.88–1.16)
MAGNESIUM SERPL-MCNC: 1.4 MG/DL — LOW (ref 1.6–2.6)
MCHC RBC-ENTMCNC: 20.2 PG — LOW (ref 27–34)
MCHC RBC-ENTMCNC: 30.6 GM/DL — LOW (ref 32–36)
MCV RBC AUTO: 66.3 FL — LOW (ref 80–100)
NRBC # BLD: 0 /100 WBCS — SIGNIFICANT CHANGE UP (ref 0–0)
PHOSPHATE SERPL-MCNC: 2.7 MG/DL — SIGNIFICANT CHANGE UP (ref 2.5–4.5)
PLATELET # BLD AUTO: 188 K/UL — SIGNIFICANT CHANGE UP (ref 150–400)
POTASSIUM SERPL-MCNC: 4.2 MMOL/L — SIGNIFICANT CHANGE UP (ref 3.5–5.3)
POTASSIUM SERPL-SCNC: 4.2 MMOL/L — SIGNIFICANT CHANGE UP (ref 3.5–5.3)
PROTHROM AB SERPL-ACNC: 23.9 SEC — HIGH (ref 10–12.9)
RBC # BLD: 4.89 M/UL — SIGNIFICANT CHANGE UP (ref 3.8–5.2)
RBC # FLD: 23.3 % — HIGH (ref 10.3–14.5)
SODIUM SERPL-SCNC: 134 MMOL/L — LOW (ref 135–145)
WBC # BLD: 10.05 K/UL — SIGNIFICANT CHANGE UP (ref 3.8–10.5)
WBC # FLD AUTO: 10.05 K/UL — SIGNIFICANT CHANGE UP (ref 3.8–10.5)

## 2020-01-07 PROCEDURE — 74177 CT ABD & PELVIS W/CONTRAST: CPT | Mod: 26

## 2020-01-07 PROCEDURE — 99223 1ST HOSP IP/OBS HIGH 75: CPT | Mod: GC

## 2020-01-07 PROCEDURE — 99222 1ST HOSP IP/OBS MODERATE 55: CPT | Mod: GC

## 2020-01-07 PROCEDURE — 71045 X-RAY EXAM CHEST 1 VIEW: CPT | Mod: 26

## 2020-01-07 RX ORDER — FERROUS SULFATE 325(65) MG
325 TABLET ORAL DAILY
Refills: 0 | Status: DISCONTINUED | OUTPATIENT
Start: 2020-01-07 | End: 2020-01-10

## 2020-01-07 RX ORDER — IRON SUCROSE 20 MG/ML
200 INJECTION, SOLUTION INTRAVENOUS EVERY 24 HOURS
Refills: 0 | Status: DISCONTINUED | OUTPATIENT
Start: 2020-01-07 | End: 2020-01-10

## 2020-01-07 RX ORDER — CHOLECALCIFEROL (VITAMIN D3) 125 MCG
1000 CAPSULE ORAL DAILY
Refills: 0 | Status: DISCONTINUED | OUTPATIENT
Start: 2020-01-07 | End: 2020-01-10

## 2020-01-07 RX ORDER — ENOXAPARIN SODIUM 100 MG/ML
120 INJECTION SUBCUTANEOUS EVERY 12 HOURS
Refills: 0 | Status: DISCONTINUED | OUTPATIENT
Start: 2020-01-07 | End: 2020-01-10

## 2020-01-07 RX ORDER — OXYCODONE HYDROCHLORIDE 5 MG/1
5 TABLET ORAL EVERY 6 HOURS
Refills: 0 | Status: DISCONTINUED | OUTPATIENT
Start: 2020-01-07 | End: 2020-01-09

## 2020-01-07 RX ORDER — PANTOPRAZOLE SODIUM 20 MG/1
40 TABLET, DELAYED RELEASE ORAL
Refills: 0 | Status: DISCONTINUED | OUTPATIENT
Start: 2020-01-07 | End: 2020-01-10

## 2020-01-07 RX ORDER — ATORVASTATIN CALCIUM 80 MG/1
20 TABLET, FILM COATED ORAL AT BEDTIME
Refills: 0 | Status: DISCONTINUED | OUTPATIENT
Start: 2020-01-07 | End: 2020-01-10

## 2020-01-07 RX ORDER — MAGNESIUM SULFATE 500 MG/ML
4 VIAL (ML) INJECTION ONCE
Refills: 0 | Status: COMPLETED | OUTPATIENT
Start: 2020-01-07 | End: 2020-01-07

## 2020-01-07 RX ORDER — BUDESONIDE AND FORMOTEROL FUMARATE DIHYDRATE 160; 4.5 UG/1; UG/1
2 AEROSOL RESPIRATORY (INHALATION)
Refills: 0 | Status: DISCONTINUED | OUTPATIENT
Start: 2020-01-07 | End: 2020-01-10

## 2020-01-07 RX ORDER — BUDESONIDE AND FORMOTEROL FUMARATE DIHYDRATE 160; 4.5 UG/1; UG/1
2 AEROSOL RESPIRATORY (INHALATION)
Refills: 0 | Status: DISCONTINUED | OUTPATIENT
Start: 2020-01-07 | End: 2020-01-07

## 2020-01-07 RX ORDER — INSULIN LISPRO 100/ML
VIAL (ML) SUBCUTANEOUS
Refills: 0 | Status: DISCONTINUED | OUTPATIENT
Start: 2020-01-07 | End: 2020-01-10

## 2020-01-07 RX ORDER — METOPROLOL TARTRATE 50 MG
50 TABLET ORAL
Refills: 0 | Status: DISCONTINUED | OUTPATIENT
Start: 2020-01-07 | End: 2020-01-10

## 2020-01-07 RX ORDER — ACETAMINOPHEN 500 MG
650 TABLET ORAL EVERY 6 HOURS
Refills: 0 | Status: DISCONTINUED | OUTPATIENT
Start: 2020-01-07 | End: 2020-01-10

## 2020-01-07 RX ADMIN — PANTOPRAZOLE SODIUM 40 MILLIGRAM(S): 20 TABLET, DELAYED RELEASE ORAL at 06:32

## 2020-01-07 RX ADMIN — Medication 650 MILLIGRAM(S): at 06:42

## 2020-01-07 RX ADMIN — BUDESONIDE AND FORMOTEROL FUMARATE DIHYDRATE 2 PUFF(S): 160; 4.5 AEROSOL RESPIRATORY (INHALATION) at 22:17

## 2020-01-07 RX ADMIN — ATORVASTATIN CALCIUM 20 MILLIGRAM(S): 80 TABLET, FILM COATED ORAL at 22:16

## 2020-01-07 RX ADMIN — Medication 100 GRAM(S): at 09:24

## 2020-01-07 RX ADMIN — Medication 650 MILLIGRAM(S): at 09:21

## 2020-01-07 RX ADMIN — IRON SUCROSE 110 MILLIGRAM(S): 20 INJECTION, SOLUTION INTRAVENOUS at 18:10

## 2020-01-07 RX ADMIN — Medication 50 MILLIGRAM(S): at 06:32

## 2020-01-07 RX ADMIN — OXYCODONE HYDROCHLORIDE 5 MILLIGRAM(S): 5 TABLET ORAL at 22:30

## 2020-01-07 RX ADMIN — Medication 50 MILLIGRAM(S): at 22:16

## 2020-01-07 RX ADMIN — Medication 325 MILLIGRAM(S): at 09:24

## 2020-01-07 RX ADMIN — OXYCODONE HYDROCHLORIDE 5 MILLIGRAM(S): 5 TABLET ORAL at 22:16

## 2020-01-07 RX ADMIN — ENOXAPARIN SODIUM 120 MILLIGRAM(S): 100 INJECTION SUBCUTANEOUS at 02:10

## 2020-01-07 RX ADMIN — Medication 1000 UNIT(S): at 09:23

## 2020-01-07 RX ADMIN — ENOXAPARIN SODIUM 120 MILLIGRAM(S): 100 INJECTION SUBCUTANEOUS at 13:56

## 2020-01-07 NOTE — H&P ADULT - NSHPLABSRESULTS_GEN_ALL_CORE
LABS:                        10.0   9.53  )-----------( 266      ( 06 Jan 2020 17:38 )             33.9     01-06    136  |  99  |  18  ----------------------------<  127<H>  4.2   |  23  |  0.97    Ca    9.3      06 Jan 2020 17:38    TPro  6.4  /  Alb  3.3  /  TBili  2.0<H>  /  DBili  x   /  AST  99<H>  /  ALT  103<H>  /  AlkPhos  164<H>  01-06    PT/INR - ( 06 Jan 2020 17:38 )   PT: 21.6 sec;   INR: 1.86          PTT - ( 06 Jan 2020 17:38 )  PTT:26.0 sec    CARDIAC MARKERS ( 06 Jan 2020 17:38 )  x     / <0.01 ng/mL / x     / x     / x          CAPILLARY BLOOD GLUCOSE          Serum Pro-Brain Natriuretic Peptide: 113 pg/mL (01-06-20 @ 17:38)      RADIOLOGY & ADDITIONAL TESTS: LABS:                        10.0   9.53  )-----------( 266      ( 06 Jan 2020 17:38 )             33.9     01-06    136  |  99  |  18  ----------------------------<  127<H>  4.2   |  23  |  0.97    Ca    9.3      06 Jan 2020 17:38    TPro  6.4  /  Alb  3.3  /  TBili  2.0<H>  /  DBili  x   /  AST  99<H>  /  ALT  103<H>  /  AlkPhos  164<H>  01-06    PT/INR - ( 06 Jan 2020 17:38 )   PT: 21.6 sec;   INR: 1.86          PTT - ( 06 Jan 2020 17:38 )  PTT:26.0 sec    CARDIAC MARKERS ( 06 Jan 2020 17:38 )  x     / <0.01 ng/mL / x     / x     / x          CAPILLARY BLOOD GLUCOSE          Serum Pro-Brain Natriuretic Peptide: 113 pg/mL (01-06-20 @ 17:38)      RADIOLOGY & ADDITIONAL TESTS:    CT Abdomen and Pelvis w/ IV Cont (01.07.20 @ 00:41)  IMPRESSION:  1. Large right pleural effusion with lower lobe atelectasis..  2. 15 cm tumor involving liver, IVC displacing and likely invading adjacent structures including right  kidney and head of pancreas.  3. IVC obstructed by tumor which enters right renal vein and extends as high as right atrium.  4. Additional details above.    US Duplex Venous Lower Ext Complete, Bilateral (01.06.20 @ 20:47)     IMPRESSION:  There is thrombus in the BILATERAL common femoral veins, saphenofemoral junctions, proximal deep femoral veins, and RIGHT proximal femoral vein.    The findings above were discussed with Dr. Martinez 1/6/2020 at 9:54 PM

## 2020-01-07 NOTE — PROGRESS NOTE ADULT - SUBJECTIVE AND OBJECTIVE BOX
INTERVAL HPI/OVERNIGHT EVENTS:  History reviewed and patient examined; Discussed patients status with Dr. Howe; Surgery may need to be done sooner with marked leg swelling and extensive thrombus      MEDICATIONS  (STANDING):  atorvastatin 20 milliGRAM(s) Oral at bedtime  budesonide  80 MICROgram(s)/formoterol 4.5 MICROgram(s) Inhaler 2 Puff(s) Inhalation two times a day  cholecalciferol 1000 Unit(s) Oral daily  enoxaparin Injectable 120 milliGRAM(s) SubCutaneous every 12 hours  ferrous    sulfate 325 milliGRAM(s) Oral daily  insulin lispro (HumaLOG) corrective regimen sliding scale   SubCutaneous Before meals and at bedtime  iron sucrose IVPB 200 milliGRAM(s) IV Intermittent every 24 hours  metoprolol tartrate 50 milliGRAM(s) Oral two times a day  pantoprazole    Tablet 40 milliGRAM(s) Oral before breakfast    MEDICATIONS  (PRN):  acetaminophen   Tablet .. 650 milliGRAM(s) Oral every 6 hours PRN Mild Pain (1-3)  oxyCODONE    IR 5 milliGRAM(s) Oral every 6 hours PRN Moderate Pain (4 - 6)      Allergies    No Known Drug Allergies  shellfish (Hives)    Intolerances        Vital Signs Last 24 Hrs  T(C): 36.7 (07 Jan 2020 15:29), Max: 37.1 (07 Jan 2020 08:12)  T(F): 98.1 (07 Jan 2020 15:29), Max: 98.7 (07 Jan 2020 08:12)  HR: 102 (07 Jan 2020 15:29) (84 - 102)  BP: 122/83 (07 Jan 2020 15:29) (95/66 - 122/83)  BP(mean): --  RR: 17 (07 Jan 2020 15:29) (16 - 18)  SpO2: 100% (07 Jan 2020 15:29) (96% - 100%)          Constitutional: Awake     Eyes: SEPIDEH    ENMT: Negative    Neck: Supple    Back:  no tenderness     Respiratory:  Decreased breath sounds    Cardiovascular: S1 S2    Gastrointestinal:  soft    Genitourinary:    Extremities:  marked edema of both legs    Vascular:    Neurological:    Skin:    Lymph Nodes:            LABS:                        9.9    10.05 )-----------( 188      ( 07 Jan 2020 07:49 )             32.4     01-07    134<L>  |  97  |  17  ----------------------------<  128<H>  4.2   |  22  |  0.89    Ca    9.3      07 Jan 2020 07:49  Phos  2.7     01-07  Mg     1.4     01-07    TPro  6.4  /  Alb  3.3  /  TBili  2.0<H>  /  DBili  x   /  AST  99<H>  /  ALT  103<H>  /  AlkPhos  164<H>  01-06    PT/INR - ( 07 Jan 2020 07:49 )   PT: 23.9 sec;   INR: 2.05          PTT - ( 07 Jan 2020 07:49 )  PTT:36.4 sec      RADIOLOGY & ADDITIONAL TESTS:

## 2020-01-07 NOTE — PROGRESS NOTE ADULT - SUBJECTIVE AND OBJECTIVE BOX
O/N Events: admitted to medicine.    Subjective: Patient seen at the bedside. This morning reporting right lower extremity swelling and pain in the lower back when walking. No nausea, vomiting, chest pain, shortness of breath.    VITALS  Vital Signs Last 24 Hrs  T(C): 36.7 (07 Jan 2020 15:29), Max: 37.1 (07 Jan 2020 08:12)  T(F): 98.1 (07 Jan 2020 15:29), Max: 98.7 (07 Jan 2020 08:12)  HR: 102 (07 Jan 2020 15:29) (84 - 103)  BP: 122/83 (07 Jan 2020 15:29) (95/66 - 122/83)  BP(mean): --  RR: 17 (07 Jan 2020 15:29) (16 - 18)  SpO2: 100% (07 Jan 2020 15:29) (96% - 100%)    CAPILLARY BLOOD GLUCOSE      POCT Blood Glucose.: 155 mg/dL (07 Jan 2020 13:54)      PHYSICAL EXAM  General appearance: sitting in the stretcher, tired appearing, no acute distress  HEENT: normocephalic/atraumatic, EOMI, sclera anicteric, uvula midline, MMM  Respiratory: decreased breath sounds in the RLL, otherwise breath sounds clear to auscultation throughout, no accessory muscle use, no wheezing  Cardiovascular: increased rate and regular rhythm, normal S1/S2, no murmurs, rubs or gallops appreciated  Gastrointestinal: soft, tender to palpation in the right abdomen, distended, normoactive bowel sounds  Extremities: RLE (no erythema noted, increased swelling compared to the LLE, skin breakdown and weeping at the right ankle and lower lateral leg, no purulence, ROM intake on knee flexion/extension and hip flexion, sensation to light touch intact, tender to palpation at the lateral ankle, negative straight leg raise test), LLE (no erythema, no skin breakdown, less swelling compared to the RLE, non-tender to palpation, ROM intact, sensation to LT intact)  Neurological: AOx3, no obvious focal deficits    MEDICATIONS  (STANDING):  atorvastatin 20 milliGRAM(s) Oral at bedtime  budesonide  80 MICROgram(s)/formoterol 4.5 MICROgram(s) Inhaler 2 Puff(s) Inhalation two times a day  cholecalciferol 1000 Unit(s) Oral daily  enoxaparin Injectable 120 milliGRAM(s) SubCutaneous every 12 hours  ferrous    sulfate 325 milliGRAM(s) Oral daily  insulin lispro (HumaLOG) corrective regimen sliding scale   SubCutaneous Before meals and at bedtime  metoprolol tartrate 50 milliGRAM(s) Oral two times a day  pantoprazole    Tablet 40 milliGRAM(s) Oral before breakfast    MEDICATIONS  (PRN):  acetaminophen   Tablet .. 650 milliGRAM(s) Oral every 6 hours PRN Mild Pain (1-3)  oxyCODONE    IR 5 milliGRAM(s) Oral every 6 hours PRN Moderate Pain (4 - 6)      No Known Drug Allergies  shellfish (Hives)      LABS                        9.9    10.05 )-----------( 188      ( 07 Jan 2020 07:49 )             32.4     01-07    134<L>  |  97  |  17  ----------------------------<  128<H>  4.2   |  22  |  0.89    Ca    9.3      07 Jan 2020 07:49  Phos  2.7     01-07  Mg     1.4     01-07    TPro  6.4  /  Alb  3.3  /  TBili  2.0<H>  /  DBili  x   /  AST  99<H>  /  ALT  103<H>  /  AlkPhos  164<H>  01-06    PT/INR - ( 07 Jan 2020 07:49 )   PT: 23.9 sec;   INR: 2.05          PTT - ( 07 Jan 2020 07:49 )  PTT:36.4 sec    CARDIAC MARKERS ( 06 Jan 2020 17:38 )  x     / <0.01 ng/mL / x     / x     / x            PROCEDURES/IMAGING: reviewed.

## 2020-01-07 NOTE — CONSULT NOTE ADULT - SUBJECTIVE AND OBJECTIVE BOX
Hematology Oncology Consult Note (Dr. Thomason )    40 y/o female w hx of htn, hld, obesity, dm, asthma, fibroids and recent admission for new pleural effusion found to have leiomyosarcoma in the IVC c/b thrombus, s/p chest tube. Patient returned to ED now for acute on chronic b/l lower extremity edema R>L which has been developing over the past week. She was seen and evaluated by her visiting nurse who recommended that she be evaluated in the ER. She denies pain in her lower extremities, numbness, tingling, pallor. Ultrasound was performed of the b/l lower extremities which demonstrated b/l DVT in the common femoral veins extending proximally w/ poor flow. Patient denies any new complaints of chest pain, worsening shortness of breath or pleurisy. She has had 1L drained from her Pleurx every other day by her visiting home nurse. She feels that it is inadequate as she has worsening orthopnea. She currently denies shortness of breath or dyspnea on room air.       PAST MEDICAL & SURGICAL HISTORY:  Leiomyosarcoma  Uterine fibroid  Hyperlipidemia  Asthma  Diabetes  Hypertension  No significant past surgical history      Allergies:  NKDA       Medications:  enoxaparin Injectable 120 milliGRAM(s) SubCutaneous every 12 hours  MEDICATIONS  (STANDING):  atorvastatin 20 milliGRAM(s) Oral at bedtime  budesonide  80 MICROgram(s)/formoterol 4.5 MICROgram(s) Inhaler 2 Puff(s) Inhalation two times a day  cholecalciferol 1000 Unit(s) Oral daily  enoxaparin Injectable 120 milliGRAM(s) SubCutaneous every 12 hours  ferrous    sulfate 325 milliGRAM(s) Oral daily  insulin lispro (HumaLOG) corrective regimen sliding scale   SubCutaneous Before meals and at bedtime  iron sucrose IVPB 200 milliGRAM(s) IV Intermittent every 24 hours  metoprolol tartrate 50 milliGRAM(s) Oral two times a day  pantoprazole    Tablet 40 milliGRAM(s) Oral before breakfast    MEDICATIONS  (PRN):  acetaminophen   Tablet .. 650 milliGRAM(s) Oral every 6 hours PRN Mild Pain (1-3)  oxyCODONE    IR 5 milliGRAM(s) Oral every 6 hours PRN Moderate Pain (4 - 6)      PHYSICAL EXAM:    T(F): 98.1 (01-07-20 @ 15:29), Max: 98.7 (01-07-20 @ 08:12)  HR: 102 (01-07-20 @ 15:29) (84 - 102)  BP: 122/83 (01-07-20 @ 15:29) (95/66 - 122/83)  RR: 17 (01-07-20 @ 15:29) (16 - 18)  SpO2: 100% (01-07-20 @ 15:29) (96% - 100%)  Wt(kg): --    Daily     Daily     GEN: resting, sitting up in bed, breathing on RA  HEENT: AT/NC, EOMI  NECK: supple  CVS: +S1S2  LUNG: decreased breath sounds RLL   GI: Tender+ R abd, +BS  EXT: LE swelling b/l, 2+  NEURO: aaox3         Labs:                          9.9    10.05 )-----------( 188      ( 07 Jan 2020 07:49 )             32.4     CBC Full  -  ( 07 Jan 2020 07:49 )  WBC Count : 10.05 K/uL  RBC Count : 4.89 M/uL  Hemoglobin : 9.9 g/dL  Hematocrit : 32.4 %  Platelet Count - Automated : 188 K/uL  Mean Cell Volume : 66.3 fl  Mean Cell Hemoglobin : 20.2 pg  Mean Cell Hemoglobin Concentration : 30.6 gm/dL  Auto Neutrophil # : x  Auto Lymphocyte # : x  Auto Monocyte # : x  Auto Eosinophil # : x  Auto Basophil # : x  Auto Neutrophil % : x  Auto Lymphocyte % : x  Auto Monocyte % : x  Auto Eosinophil % : x  Auto Basophil % : x    PT/INR - ( 07 Jan 2020 07:49 )   PT: 23.9 sec;   INR: 2.05          PTT - ( 07 Jan 2020 07:49 )  PTT:36.4 sec    01-07    134<L>  |  97  |  17  ----------------------------<  128<H>  4.2   |  22  |  0.89    Ca    9.3      07 Jan 2020 07:49  Phos  2.7     01-07  Mg     1.4     01-07    TPro  6.4  /  Alb  3.3  /  TBili  2.0<H>  /  DBili  x   /  AST  99<H>  /  ALT  103<H>  /  AlkPhos  164<H>  01-06      < from: CT Abdomen and Pelvis w/ IV Cont (01.07.20 @ 00:41) >    EXAM:  CT ABDOMEN AND PELVIS IC                          PROCEDURE DATE:  01/07/2020          INTERPRETATION:  CLINICAL INDICATION: 39-year-old with leiomyosarcoma; for detection venous thrombosis.          FINDINGS: CT of the abdomen and pelvis was performed with the administration of intravenous contrast. CT of the bilateral lower extremities was performed after the administration of intravenous contrast in the venous phase. Reconstructions were performed in the sagittal and coronal planes. Comparison is made to prior CT dated 12/4/2019 and prior MRI dated 7/18/2019.    Evaluation of the lower chest demonstrates enlarging large right pleural effusion. Catheter within the right pleural space. Normal heart size. Right basilar atelectasis. Evaluation of the abdomen demonstrates heterogeneous perfusion of the liver which is indented by a large mass within the intrahepatic IVC, unchanged since prior study, currently measuring 10.2 x 7.4 cm with tumor thrombus extending into the right renalvein and right atrium. There is thrombus partially occluding the infrahepatic IVC and extending into the bilateral common and external iliac veins. The spleen, pancreas, bilateral adrenal glands and left kidney are normal in appearance. There is stable mild right hydronephrosis secondary to right ureteral compression. Evaluation of the gastrointestinal tract demonstrates diverticulosis. Evaluation of the pelvis demonstrates enlarged myomatous uterus with submucosal and serosal myomas, including an exophytic myoma measuring 5 cm projecting into the left adnexal region. Bladder is collapsed. Small volume of fluid. Very severe diffuse anasarca. Pelvic venous congestion. Negative for adenopathy. Opacified aspects of the portal, splenic, superior mesenteric vein and left renal vein are patent. There is severe edematous infiltration obtaining tissues of the bilateral lower extremities. There is infiltrative soft tissue versus severe edematous infiltration in the retroperitoneum extending from the caval mass. Extensive venous collaterals within the pelvis. Minimal aortic vascular calcification. Evaluation of the osseous structures demonstrates no destructive lesion.      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    Enlarging large right pleural effusion.    < end of copied text >    < from: US Duplex Aorta/IVC/Iliac Limited (01.06.20 @ 23:42) >    INTERPRETATION:  US AORTA IVC ILIAC DUPLEX LIMITED dated 1/7/2020 4:11 AM    CLINICAL INFORMATION: r/o IVC dvt    TECHNIQUE: An ultrasound examination of the IVC is performed.    COMPARISON: CTV of the abdomen and pelvis dated 1/7/2019     FINDINGS:  Evaluation demonstrates large mass in the IVC which measures 14 x 0 x 6.2 x 8.8 cm, consistent with known leiomyosarcoma. The aorta is patent with normal phasic flow. Limited evaluation of the IVC due to the large mass, body habitus, and overlying bowel gas. Minimal flow is seen in the left, right, and main portal veins, as well as minimal flow within the right middle and left hepatic veins. Large right pleural effusion is noted.    IMPRESSION:  Limited evaluation of the IVC, portal and hepatic veins secondary due to a  large IVC mass, bowel gas, and body habitus. There is diminished flow within the portal veins identified.       < end of copied text >    < from: US Duplex Venous Lower Ext Complete, Bilateral (01.06.20 @ 20:47) >  INTERPRETATION:    VENOUS DUPLEX DOPPLER OF BOTH LOWER EXTREMITIES dated 1/6/2020 8:47 PM    INDICATION: Bilateral lower extremity swelling and pain, right greater than left, evaluate for DVT    TECHNIQUE: Duplex Doppler evaluation including gray-scale ultrasound imaging, color flow Doppler imaging, and Doppler spectral analysis of the veins of both lower extremities was performed.     COMPARISON: Lower extremity Doppler dated 12/18/2019    FINDINGS:    RIGHT LOWER EXTREMITY:  There is thrombus in the RIGHT saphenofemoral junction, common femoral vein, proximal deep femoral vein, and proximal femoral vein.    The RIGHT common the mid and distal femoral vein and popliteal vein are patent and free of thrombus. These veins are normally compressible and have normal phasic flow and augmentation response.    The paired RIGHT peroneal and posterior tibial calf veins are patent.      LEFT LOWER EXTREMITY:  There is thrombus in the LEFT saphenofemoral junction, common femoral vein, and proximal deep femoral vein.    There is again partial compressibility of the proximal left femoral vein secondary to eccentric wall thickening.    The LEFT popliteal vein is patent and free of thrombus, with normally compressibility and normal phasic flow and augmentation response..    The paired LEFT peroneal and posterior tibial calf veins are patent.      IMPRESSION:  There is thrombus in the BILATERAL common femoral veins, saphenofemoral junctions, proximal deep femoral veins, and RIGHT proximal femoral vein.    < end of copied text >    12/10     Surgical Final Report          Final Diagnosis    1. Retroperitoneal mass, biopsy:  - Leiomyosarcoma    2. Small into the abdominal mass, biopsy:  - Primarily blood with rare distorted spindle cells    3. Retroperitoneal mass, biopsy#2:  - Leiomyosarcoma    Note: This case was seen by Dr. Nico Hernandez at the [facility]  core laboratory.  At [facility] lab it was reassigned number [id number].  He noted that there was atypia, single cell apoptosis and scant  mitotic activity focally abnormal.  Immunohistochemical staining with desmin and smooth muscle myosin  are positive supporting the diagnosis.

## 2020-01-07 NOTE — H&P ADULT - HISTORY OF PRESENT ILLNESS
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 Patient returned to ED for new onset SOB since last admission again with reaccumulation of pleural effusion. 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 The patient was offered a pleurX last admission but did not want to go through with the procedure and the rate of reaccumulation of fluid was monitored with the chest tube in place and clamped and it was felt that with close outpatient follow up she could either undergo a repeat outpatient thoracentesis or be scheduled for an outpatient pleurX. The patient felt more SOB w/ exertion over the weekend with worsening leg swelling and thus came to the ED. Patient has no undergone PleurX catheter placement with pulmonary team. 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 Patient returned to ED now for acute on chronic b/l lower extremity edema R>L which has been developing over the past week. She was seen and evaluated by her visiting nurse who recommended that she be evaluated in the ER. She denies pain in her lower extremities, numbness, tingling, pallor. Ultrasound was performed of the b/l lower extremities which demonstrated b/l DVT in the common femoral veins extending proximally w/ poor flow. Patient denies any new complaints of chest pain, worsening shortness of breath or pleurisy. She has had 1L drained from her Pleurx every other day by her visiting home nurse. She feels that it is inadequate as she has worsening orthopnea. 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 Patient returned to ED now for acute on chronic b/l lower extremity edema R>L which has been developing over the past week. She was seen and evaluated by her visiting nurse who recommended that she be evaluated in the ER. She denies pain in her lower extremities, numbness, tingling, pallor. Ultrasound was performed of the b/l lower extremities which demonstrated b/l DVT in the common femoral veins extending proximally w/ poor flow. Patient denies any new complaints of chest pain, worsening shortness of breath or pleurisy. She has had 1L drained from her Pleurx every other day by her visiting home nurse. She feels that it is inadequate as she has worsening orthopnea. She currently denies shortness of breath or dyspnea on room air.

## 2020-01-07 NOTE — PROGRESS NOTE ADULT - ASSESSMENT
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

## 2020-01-07 NOTE — PROGRESS NOTE ADULT - PROBLEM SELECTOR PLAN 3
- discuss with pulmonology frequency of drainage from pleurx Right pleural effusion, drains 1 L every other day. Most recently drained today. Crackles at RLL. Saturating well on room air.   - pulmonology following, appreciate recs

## 2020-01-07 NOTE — H&P ADULT - NSICDXPASTMEDICALHX_GEN_ALL_CORE_FT
PAST MEDICAL HISTORY:  Asthma     Diabetes     Hyperlipidemia     Hypertension     Leiomyosarcoma     Uterine fibroid

## 2020-01-07 NOTE — PROGRESS NOTE ADULT - PROBLEM SELECTOR PLAN 8
no ivf  regular diet   full code  therapeutic ac  dispo: PORFIRIO no ivf  consistent carb diet  full code  Lovenox 120 mg BID  dispo: RMF

## 2020-01-07 NOTE — PROGRESS NOTE ADULT - PROBLEM SELECTOR PLAN 6
- hold metformin, [facility] inpatient History of DM. A1C on 12/30 5.8.   - hold metformin  - [facility] inpatient

## 2020-01-07 NOTE — CONSULT NOTE ADULT - ATTENDING COMMENTS
Newly diagnosed leiomyosarcoma with right pleff s/p IPC placement.  Would drains catheter daily, restart AC, will d/ case with sarcoma onc.

## 2020-01-07 NOTE — CONSULT NOTE ADULT - ASSESSMENT
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    #Advanced leiomyosarcoma - large intrahepatic IVC Mass w/ vascular tumor extension   #Acute femoral DVTs, bilateral   #Pleural Effusion, R sided  #Microcytic Anemia     -initiate IV Venofer 200 mg while inpatient (first dose now), give daily ( up to 5 doses)   -c/w Lovenox 120 mg q12 hr (renal funct noted)   -f/u pulm recs regarding PleurX drainage frequency  -patient has close follow up with Dr. Migel Baca at [facility] on Thursday Jan 9th at 11:30 am, discussed with patient and primary team the importance for her to follow up given the rare histology/diagnosis and extent of disease to discuss potential systemic treatment options, trials, and palliative care   -dvt ppx, c/w treatment dose lovenox       d/w Dr. Thomason

## 2020-01-07 NOTE — CONSULT NOTE ADULT - SUBJECTIVE AND OBJECTIVE BOX
PULMONARY SERVICE INITIAL CONSULT NOTE    HPI:  39F smoker with newly diagnosed leiomyosarcoma in the IVC ( s/p laparoscopy and mass biopsy confirming dx) c/b thrombus extending to the right atrial junction, s/p Pleur X catheter for recurrent right sided pleural effusion who has had multiple admission to [facility] recently. she was last discharged 7 dyas ago with PLeur X placement. She was on Eliquis earlier for IVC thrombus but stopped taking it after last discharge. She was supposed to see a Sarcoma specialist at [facility] but came to ER 1 day ago with increasing LE swelling and found to have bilateral DVT.   She is draining Pleur X every other day.     REVIEW OF SYSTEMS:  Constitutional: No fever, weight loss or fatigue  Eyes: No eye pain, visual disturbances, or discharge  ENMT:  No difficulty hearing, tinnitus, vertigo; No sinus or throat pain  Neck: No pain, stiffness or neck swelling  Respiratory: see HPI  Cardiovascular: No chest pain, palpitations, dizziness or leg swelling  Gastrointestinal: No abdominal or epigastric pain. No nausea, vomiting or hematemesis; No diarrhea or constipation. No melena or hematochezia.  Genitourinary: No dysuria, frequency, hematuria or incontinence  Neurological: No headaches, memory loss, loss of strength, numbness or tremors  Skin: No itching, burning, rashes or lesions   Lymph Nodes: No enlarged glands  Endocrine: No heat or cold intolerance; No hair loss  Musculoskeletal: No joint pain or swelling; No muscle, back or extremity pain  Psychiatric: No depression, anxiety, mood swings or difficulty sleeping  Heme/Lymph: No easy bruising or bleeding gums  Allergy and Immunologic: No hives or eczema    PAST MEDICAL & SURGICAL HISTORY:  Leiomyosarcoma  Uterine fibroid  Hyperlipidemia  Asthma  Diabetes  Hypertension  No significant past surgical history      FAMILY HISTORY:  No pertinent family history in first degree relatives      SOCIAL HISTORY:  Smoking Status: [ ] Current, [ ] Former, [ ] Never  Pack Years:    MEDICATIONS:  Pulmonary:  budesonide  80 MICROgram(s)/formoterol 4.5 MICROgram(s) Inhaler 2 Puff(s) Inhalation two times a day    Antimicrobials:    Anticoagulants:  enoxaparin Injectable 120 milliGRAM(s) SubCutaneous every 12 hours    Onc:    GI/:  pantoprazole    Tablet 40 milliGRAM(s) Oral before breakfast    Endocrine:  atorvastatin 20 milliGRAM(s) Oral at bedtime  insulin lispro (HumaLOG) corrective regimen sliding scale   SubCutaneous Before meals and at bedtime    Cardiac:  metoprolol tartrate 50 milliGRAM(s) Oral two times a day    Other Medications:  acetaminophen   Tablet .. 650 milliGRAM(s) Oral every 6 hours PRN  cholecalciferol 1000 Unit(s) Oral daily  ferrous    sulfate 325 milliGRAM(s) Oral daily  iron sucrose IVPB 200 milliGRAM(s) IV Intermittent every 24 hours  oxyCODONE    IR 5 milliGRAM(s) Oral every 6 hours PRN      Allergies    No Known Drug Allergies  shellfish (Hives)    Intolerances        Vital Signs Last 24 Hrs  T(C): 37.2 (07 Jan 2020 20:34), Max: 37.2 (07 Jan 2020 20:34)  T(F): 98.9 (07 Jan 2020 20:34), Max: 98.9 (07 Jan 2020 20:34)  HR: 86 (07 Jan 2020 20:34) (86 - 102)  BP: 122/87 (07 Jan 2020 20:34) (95/66 - 122/87)  BP(mean): --  RR: 16 (07 Jan 2020 20:34) (16 - 18)  SpO2: 100% (07 Jan 2020 20:34) (96% - 100%)        PHYSICAL EXAM:  Constitutional: obese, comfortable.   Neck: supple, no appreciable JVD  Respiratory: decrease4 breath sounds right base.   Cardiovascular: +S1/S2, RRR  Gastrointestinal: soft, NT/ND; +BSx4  Extremities: swollen tight LE.   Vascular: 2+ radial, DP/PT pulses B/L  Neurological: AAOx3; no focal deficits    LABS:      CBC Full  -  ( 07 Jan 2020 07:49 )  WBC Count : 10.05 K/uL  RBC Count : 4.89 M/uL  Hemoglobin : 9.9 g/dL  Hematocrit : 32.4 %  Platelet Count - Automated : 188 K/uL  Mean Cell Volume : 66.3 fl  Mean Cell Hemoglobin : 20.2 pg  Mean Cell Hemoglobin Concentration : 30.6 gm/dL  Auto Neutrophil # : x  Auto Lymphocyte # : x  Auto Monocyte # : x  Auto Eosinophil # : x  Auto Basophil # : x  Auto Neutrophil % : x  Auto Lymphocyte % : x  Auto Monocyte % : x  Auto Eosinophil % : x  Auto Basophil % : x    01-07    134<L>  |  97  |  17  ----------------------------<  128<H>  4.2   |  22  |  0.89    Ca    9.3      07 Jan 2020 07:49  Phos  2.7     01-07  Mg     1.4     01-07    TPro  6.4  /  Alb  3.3  /  TBili  2.0<H>  /  DBili  x   /  AST  99<H>  /  ALT  103<H>  /  AlkPhos  164<H>  01-06    PT/INR - ( 07 Jan 2020 07:49 )   PT: 23.9 sec;   INR: 2.05          PTT - ( 07 Jan 2020 07:49 )  PTT:36.4 sec      RADIOLOGY & ADDITIONAL STUDIES:    CT abdomen reviewed.

## 2020-01-07 NOTE — CONSULT NOTE ADULT - SUBJECTIVE AND OBJECTIVE BOX
Vascular Attending: Dr. 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 Patient returned to ED now for acute on chronic b/l lower extremity edema R>L which has been developing over the past week. She was seen and evaluated by her visiting nurse who recommended that she be evaluated in the ER. She denies pain in her lower extremities, numbness, tingling, pallor. Ultrasound was performed of the b/l lower extremities which demonstrated b/l DVT in the common femoral veins extending proximally w/ poor flow. Patient denies any new complaints of chest pain, worsening shortness of breath or pleurisy. She has had 1L drained from her Pleurx every other day by her visiting home nurse. She feels that it is inadequate as she has worsening orthopnea. She currently denies shortness of breath or dyspnea on room air. (07 Jan 2020 01:19)    Vascular Surgery:   39F smoker with a PMHx of DM, HTN, HLD, s/p bilateral uterine artery embolization in 8/2019 due to fibroids, recently hospitalized for sob 2/2 recurrent pl effusion s/p chest tube placement and discharged home with PleurX catheter requiring and pancreatic/duodenal soft tissue mass with large enhancing mass within the intrahepatic IVC extending to the right atrial junction s/p laparoscopy and mass biopsy 10/10/19, now admitted with worsening b/l LE edema. Pt states edema has been present for several weeks but noticed that RLE was more swollen than LLE yesterday. CT was performed which confirmed abdominal mass invading IVC, LE duplex US confirmed b/l common femoral, saphenous junction, proximal deep femoral and right femoral vein. Pt states she was taken off Eliquis two days after she was last discharged by her general surgeon. She denies chest pain, SOB, dizziness, fever, or chill.       PAST MEDICAL & SURGICAL HISTORY:  Leiomyosarcoma  Uterine fibroid  Hyperlipidemia  Asthma  Diabetes  Hypertension  No significant past surgical history    MEDICATIONS  (STANDING):  atorvastatin 20 milliGRAM(s) Oral at bedtime  budesonide 160 MICROgram(s)/formoterol 4.5 MICROgram(s) Inhaler 2 Puff(s) Inhalation two times a day  cholecalciferol 1000 Unit(s) Oral daily  enoxaparin Injectable 120 milliGRAM(s) SubCutaneous every 12 hours  ferrous    sulfate 325 milliGRAM(s) Oral daily  insulin lispro (HumaLOG) corrective regimen sliding scale   SubCutaneous Before meals and at bedtime  metoprolol tartrate 50 milliGRAM(s) Oral two times a day  pantoprazole    Tablet 40 milliGRAM(s) Oral before breakfast    MEDICATIONS  (PRN):  acetaminophen   Tablet .. 650 milliGRAM(s) Oral every 6 hours PRN Moderate Pain (4 - 6)  oxyCODONE    IR 5 milliGRAM(s) Oral every 6 hours PRN Moderate Pain (4 - 6)    Allergies    No Known Drug Allergies  shellfish (Hives)    Intolerances    SOCIAL HISTORY:    FAMILY HISTORY:  No pertinent family history in first degree relatives    Vital Signs Last 24 Hrs  T(C): 36.8 (07 Jan 2020 01:31), Max: 36.9 (06 Jan 2020 18:50)  T(F): 98.3 (07 Jan 2020 01:31), Max: 98.4 (06 Jan 2020 18:50)  HR: 100 (07 Jan 2020 01:31) (84 - 103)  BP: 115/81 (07 Jan 2020 01:31) (103/70 - 118/86)  BP(mean): --  RR: 16 (07 Jan 2020 01:31) (16 - 18)  SpO2: 100% (07 Jan 2020 01:31) (100% - 100%)    PHYSICAL EXAM:  Constitutional: NAD, awake  Respiratory: Unlabored breathing, CTA, decrease breath sounds on right  Cardiovascular: RRR  Extremities: B/L LE swelling, 2+ pitting, lateral aspect of right ankle with erythema or open wounds.       LABS:                        10.0   9.53  )-----------( 266      ( 06 Jan 2020 17:38 )             33.9     01-06    136  |  99  |  18  ----------------------------<  127<H>  4.2   |  23  |  0.97    Ca    9.3      06 Jan 2020 17:38    TPro  6.4  /  Alb  3.3  /  TBili  2.0<H>  /  DBili  x   /  AST  99<H>  /  ALT  103<H>  /  AlkPhos  164<H>  01-06    PT/INR - ( 06 Jan 2020 17:38 )   PT: 21.6 sec;   INR: 1.86          PTT - ( 06 Jan 2020 17:38 )  PTT:26.0 sec      RADIOLOGY & ADDITIONAL STUDIES Vascular Attending: Dr. 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 Patient returned to ED now for acute on chronic b/l lower extremity edema R>L which has been developing over the past week. She was seen and evaluated by her visiting nurse who recommended that she be evaluated in the ER. She denies pain in her lower extremities, numbness, tingling, pallor. Ultrasound was performed of the b/l lower extremities which demonstrated b/l DVT in the common femoral veins extending proximally w/ poor flow. Patient denies any new complaints of chest pain, worsening shortness of breath or pleurisy. She has had 1L drained from her Pleurx every other day by her visiting home nurse. She feels that it is inadequate as she has worsening orthopnea. She currently denies shortness of breath or dyspnea on room air. (07 Jan 2020 01:19)    Vascular Surgery:   39F smoker with a PMHx of DM, HTN, HLD, s/p bilateral uterine artery embolization in 8/2019 due to fibroids, recently hospitalized for sob 2/2 recurrent pleural effusion s/p chest tube placement and discharged home with PleurX catheter,  pancreatic/duodenal soft tissue mass with large enhancing mass within the intrahepatic IVC extending to the right atrial junction s/p laparoscopy and mass biopsy 10/10/19, now admitted with worsening b/l LE edema. Pt states edema has been present for several weeks but noticed that RLE was more swollen than LLE yesterday. CT was performed which confirmed abdominal mass invading IVC, LE duplex US was performed confirmed b/l common femoral, saphenous junction, proximal deep femoral and right femoral vein. Pt states she was taken off Eliquis two days after she was last discharged by her general surgeon. She denies chest pain, SOB, dizziness, fever, or chill.     PAST MEDICAL & SURGICAL HISTORY:  Leiomyosarcoma  Uterine fibroid  Hyperlipidemia  Asthma  Diabetes  Hypertension  No significant past surgical history    MEDICATIONS  (STANDING):  atorvastatin 20 milliGRAM(s) Oral at bedtime  budesonide 160 MICROgram(s)/formoterol 4.5 MICROgram(s) Inhaler 2 Puff(s) Inhalation two times a day  cholecalciferol 1000 Unit(s) Oral daily  enoxaparin Injectable 120 milliGRAM(s) SubCutaneous every 12 hours  ferrous    sulfate 325 milliGRAM(s) Oral daily  insulin lispro (HumaLOG) corrective regimen sliding scale   SubCutaneous Before meals and at bedtime  metoprolol tartrate 50 milliGRAM(s) Oral two times a day  pantoprazole    Tablet 40 milliGRAM(s) Oral before breakfast    MEDICATIONS  (PRN):  acetaminophen   Tablet .. 650 milliGRAM(s) Oral every 6 hours PRN Moderate Pain (4 - 6)  oxyCODONE    IR 5 milliGRAM(s) Oral every 6 hours PRN Moderate Pain (4 - 6)    Allergies    No Known Drug Allergies  shellfish (Hives)    Intolerances    SOCIAL HISTORY:    FAMILY HISTORY:  No pertinent family history in first degree relatives    Vital Signs Last 24 Hrs  T(C): 36.8 (07 Jan 2020 01:31), Max: 36.9 (06 Jan 2020 18:50)  T(F): 98.3 (07 Jan 2020 01:31), Max: 98.4 (06 Jan 2020 18:50)  HR: 100 (07 Jan 2020 01:31) (84 - 103)  BP: 115/81 (07 Jan 2020 01:31) (103/70 - 118/86)  BP(mean): --  RR: 16 (07 Jan 2020 01:31) (16 - 18)  SpO2: 100% (07 Jan 2020 01:31) (100% - 100%)    PHYSICAL EXAM:  Constitutional: NAD, awake  Respiratory: Unlabored breathing, CTA, decrease breath sounds on right  Cardiovascular: RRR  Extremities: B/L LE swelling, 2+ pitting, lateral aspect of right ankle with erythema or open wounds.       LABS:                        10.0   9.53  )-----------( 266      ( 06 Jan 2020 17:38 )             33.9     01-06    136  |  99  |  18  ----------------------------<  127<H>  4.2   |  23  |  0.97    Ca    9.3      06 Jan 2020 17:38    TPro  6.4  /  Alb  3.3  /  TBili  2.0<H>  /  DBili  x   /  AST  99<H>  /  ALT  103<H>  /  AlkPhos  164<H>  01-06    PT/INR - ( 06 Jan 2020 17:38 )   PT: 21.6 sec;   INR: 1.86          PTT - ( 06 Jan 2020 17:38 )  PTT:26.0 sec      RADIOLOGY & ADDITIONAL STUDIES

## 2020-01-07 NOTE — PROGRESS NOTE ADULT - PROBLEM SELECTOR PLAN 7
- previously on anti hypertensive. continue to monitor off bp medications History of HTN. Previously on anti-hypertensives. Normotensive this admission.  - monitor BPs

## 2020-01-07 NOTE — PROGRESS NOTE ADULT - PROBLEM SELECTOR PLAN 5
- not in exacerbation, therapeutic exchange for advair History of asthma. Not in exacerbation, no wheezing on exam, good oxygen saturation on room air.   - continue Symbicort (therapeutic interchange with home advair)

## 2020-01-07 NOTE — H&P ADULT - ASSESSMENT
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

## 2020-01-07 NOTE — CONSULT NOTE ADULT - ASSESSMENT
40 y/o woman with a recently diagnosed leiomyosarcoma with  recurrent symptomatic right sided pleural effusion now s/p Pleur X placement presenting with acute bilateral proximal DVT.       Acute DVT :   likely in setting of malignancy and low flow due to total IVC obstruction with tumor and thrombus.   - Agree with therapeutic doses of lovenox.  - leg elevation  - compression bandages.   - given total IVC obstruction , unlikely to embolize to PE.   - Rest per vascular.     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   S/p pleurX 12/30/2019.   cytology negative for malignancy  Currently draining every other day about 1 L.   - would start doing every day while here.   - Incentive spirometry.    Leiomyosarcoma  - consult heme  onc  - needs ot follow up with J sarcoma specialist.

## 2020-01-07 NOTE — PROGRESS NOTE ADULT - PROBLEM SELECTOR PLAN 2
- known leiomyosarcoma  - novoselac consult  - vascular consult Recently diagnosed leiomyosarcoma. Has an appointment with a leiomyosarcoma specialist on Thursday (see provider hand off).  - heme/onc following - appreciate recs - starting IV iron tonight  - vascular consulted - appreciate recs

## 2020-01-07 NOTE — PROGRESS NOTE ADULT - PROBLEM SELECTOR PLAN 1
Patient presenting with acute VTE of b/l femoral veins. Previously was meant to be discharged on eliquis, however, she did not continue the medication due to confusion about whether or not to continue it. She now presents with b/l femoral VTE and lower extremity edema.   - continue Lovenox 120 mg bid   - vascular consulted, appreciate recs - no intervention at this time, continue AC, elevate, compression  - Patient presenting with acute VTE of b/l femoral veins. Previously was meant to be discharged on Eliquis, however, she did not continue the medication due to confusion about whether or not to continue it. She now presents with b/l femoral VTE and lower extremity edema.   - continue Lovenox 120 mg bid   - vascular consulted, appreciate recs - no intervention at this time, continue AC, elevate, compression  - pulm consulted - f/u recs

## 2020-01-07 NOTE — H&P ADULT - NSHPPHYSICALEXAM_GEN_ALL_CORE
Vital Signs Last 24 Hrs  T(C): 36.8 (07 Jan 2020 01:31), Max: 36.9 (06 Jan 2020 18:50)  T(F): 98.3 (07 Jan 2020 01:31), Max: 98.4 (06 Jan 2020 18:50)  HR: 100 (07 Jan 2020 01:31) (84 - 103)  BP: 115/81 (07 Jan 2020 01:31) (103/70 - 118/86)  BP(mean): --  RR: 16 (07 Jan 2020 01:31) (16 - 18)  SpO2: 100% (07 Jan 2020 01:31) (100% - 100%)    PHYSICAL EXAM:      Constitutional: NAD, well-groomed, well-developed  HEENT: PERRLA, EOMI, Normal Hearing, MMM  Neck: No LAD, No JVD  Back: Normal spine flexure, No CVA tenderness  Respiratory: decreased breath sounds right lung base. pleurx in place dressing c/d/i   Cardiovascular: S1 and S2, RRR, no M/G/R  Gastrointestinal: BS+, soft, NT/ND. obese   Extremities: B/l pitting edema R>L. 3+R, 2+ L. Extending to thighs b/l  Vascular: 2+ peripheral pulses  Neurological: A/O x 3, no focal deficits  Psychiatric: Normal mood, normal affect  Musculoskeletal: 5/5 strength b/l upper and lower extremities  Skin: No rashes

## 2020-01-07 NOTE — CONSULT NOTE ADULT - ASSESSMENT
39F smoker with a PMHx of DM, HTN, HLD, s/p bilateral uterine artery embolization in 8/2019 due to fibroids, recently hospitalized for sob 2/2 recurrent pl effusion s/p chest tube placement and discharged home with PleurX catheter requiring and pancreatic/duodenal soft tissue mass with large enhancing mass within the intrahepatic IVC extending to the right atrial junction s/p laparoscopy and mass biopsy 10/10/19    Start heparin gtt @10a  Keep NPO  IVF  compression/elevation  Plan discussed with vascular surgery chief on [place] 39F smoker with a PMHx of DM, HTN, HLD, s/p bilateral uterine artery embolization in 8/2019 due to fibroids, recently hospitalized for sob 2/2 recurrent pl effusion s/p chest tube placement and discharged home with PleurX catheter, pancreatic/duodenal soft tissue mass with large enhancing mass within the intrahepatic IVC extending to the right atrial junction s/p laparoscopy and mass biopsy 10/10/19 now with B/l LE DVT    Start heparin gtt @10am  Keep NPO  IVF  compression/elevation  Plan discussed with vascular surgery chief on call 39F smoker with a PMHx of DM, HTN, HLD, s/p bilateral uterine artery embolization in 8/2019 due to fibroids, recently hospitalized for sob 2/2 recurrent pl effusion s/p chest tube placement and discharged home with PleurX catheter, pancreatic/duodenal soft tissue mass with large enhancing mass within the intrahepatic IVC extending to the right atrial junction s/p laparoscopy and mass biopsy 10/10/19 now with B/l LE DVT    Start heparin gtt @10am  Keep NPO  IVF  compression/elevation  Plan discussed with vascular surgery chief on call    Vasc surg chief addendum: new b/l DVT. Wasn't taking Eliquis at home. Here, please anticoagulate, compress/elevate both legs. No vasc surg intervention at this time. Discussed with Dr. Shea. 39F smoker with a PMHx of DM, HTN, HLD, s/p bilateral uterine artery embolization in 8/2019 due to fibroids, recently hospitalized for sob 2/2 recurrent pl effusion s/p chest tube placement and discharged home with PleurX catheter, pancreatic/duodenal soft tissue mass with large enhancing mass within the intrahepatic IVC extending to the right atrial junction s/p laparoscopy and mass biopsy 10/10/19 now with B/l LE DVT    Start heparin gtt @10am  Can feed from vascular standpoint, however recommend checking with pulm to make sure no intervention planned  IVF  compression/elevation  Plan discussed with vascular surgery chief on call    Vasc surg chief addendum: new b/l DVT. Wasn't taking Eliquis at home. Here, please anticoagulate, compress/elevate both legs. No vasc surg intervention at this time. Discussed with Dr. Shea.

## 2020-01-08 DIAGNOSIS — D25.9 LEIOMYOMA OF UTERUS, UNSPECIFIED: ICD-10-CM

## 2020-01-08 LAB
ANION GAP SERPL CALC-SCNC: 13 MMOL/L — SIGNIFICANT CHANGE UP (ref 5–17)
APTT BLD: 41.4 SEC — HIGH (ref 27.5–36.3)
BUN SERPL-MCNC: 16 MG/DL — SIGNIFICANT CHANGE UP (ref 7–23)
CALCIUM SERPL-MCNC: 8.7 MG/DL — SIGNIFICANT CHANGE UP (ref 8.4–10.5)
CHLORIDE SERPL-SCNC: 100 MMOL/L — SIGNIFICANT CHANGE UP (ref 96–108)
CO2 SERPL-SCNC: 24 MMOL/L — SIGNIFICANT CHANGE UP (ref 22–31)
CREAT SERPL-MCNC: 0.85 MG/DL — SIGNIFICANT CHANGE UP (ref 0.5–1.3)
GLUCOSE BLDC GLUCOMTR-MCNC: 108 MG/DL — HIGH (ref 70–99)
GLUCOSE BLDC GLUCOMTR-MCNC: 115 MG/DL — HIGH (ref 70–99)
GLUCOSE BLDC GLUCOMTR-MCNC: 121 MG/DL — HIGH (ref 70–99)
GLUCOSE BLDC GLUCOMTR-MCNC: 127 MG/DL — HIGH (ref 70–99)
GLUCOSE SERPL-MCNC: 118 MG/DL — HIGH (ref 70–99)
HCT VFR BLD CALC: 30.5 % — LOW (ref 34.5–45)
HGB BLD-MCNC: 9.2 G/DL — LOW (ref 11.5–15.5)
INR BLD: 2.31 — HIGH (ref 0.88–1.16)
MAGNESIUM SERPL-MCNC: 1.8 MG/DL — SIGNIFICANT CHANGE UP (ref 1.6–2.6)
MCHC RBC-ENTMCNC: 20.4 PG — LOW (ref 27–34)
MCHC RBC-ENTMCNC: 30.2 GM/DL — LOW (ref 32–36)
MCV RBC AUTO: 67.5 FL — LOW (ref 80–100)
NRBC # BLD: 0 /100 WBCS — SIGNIFICANT CHANGE UP (ref 0–0)
PLATELET # BLD AUTO: 159 K/UL — SIGNIFICANT CHANGE UP (ref 150–400)
POTASSIUM SERPL-MCNC: 4.8 MMOL/L — SIGNIFICANT CHANGE UP (ref 3.5–5.3)
POTASSIUM SERPL-SCNC: 4.8 MMOL/L — SIGNIFICANT CHANGE UP (ref 3.5–5.3)
PROTHROM AB SERPL-ACNC: 27 SEC — HIGH (ref 10–12.9)
RBC # BLD: 4.52 M/UL — SIGNIFICANT CHANGE UP (ref 3.8–5.2)
RBC # FLD: 22.3 % — HIGH (ref 10.3–14.5)
SODIUM SERPL-SCNC: 137 MMOL/L — SIGNIFICANT CHANGE UP (ref 135–145)
WBC # BLD: 11.62 K/UL — HIGH (ref 3.8–10.5)
WBC # FLD AUTO: 11.62 K/UL — HIGH (ref 3.8–10.5)

## 2020-01-08 PROCEDURE — 99233 SBSQ HOSP IP/OBS HIGH 50: CPT | Mod: GC

## 2020-01-08 RX ADMIN — IRON SUCROSE 110 MILLIGRAM(S): 20 INJECTION, SOLUTION INTRAVENOUS at 17:16

## 2020-01-08 RX ADMIN — ENOXAPARIN SODIUM 120 MILLIGRAM(S): 100 INJECTION SUBCUTANEOUS at 14:34

## 2020-01-08 RX ADMIN — ATORVASTATIN CALCIUM 20 MILLIGRAM(S): 80 TABLET, FILM COATED ORAL at 21:55

## 2020-01-08 RX ADMIN — PANTOPRAZOLE SODIUM 40 MILLIGRAM(S): 20 TABLET, DELAYED RELEASE ORAL at 06:00

## 2020-01-08 RX ADMIN — ENOXAPARIN SODIUM 120 MILLIGRAM(S): 100 INJECTION SUBCUTANEOUS at 02:26

## 2020-01-08 RX ADMIN — BUDESONIDE AND FORMOTEROL FUMARATE DIHYDRATE 2 PUFF(S): 160; 4.5 AEROSOL RESPIRATORY (INHALATION) at 21:55

## 2020-01-08 RX ADMIN — Medication 50 MILLIGRAM(S): at 17:23

## 2020-01-08 RX ADMIN — Medication 325 MILLIGRAM(S): at 12:04

## 2020-01-08 RX ADMIN — OXYCODONE HYDROCHLORIDE 5 MILLIGRAM(S): 5 TABLET ORAL at 14:38

## 2020-01-08 RX ADMIN — Medication 1000 UNIT(S): at 12:04

## 2020-01-08 RX ADMIN — OXYCODONE HYDROCHLORIDE 5 MILLIGRAM(S): 5 TABLET ORAL at 11:58

## 2020-01-08 RX ADMIN — BUDESONIDE AND FORMOTEROL FUMARATE DIHYDRATE 2 PUFF(S): 160; 4.5 AEROSOL RESPIRATORY (INHALATION) at 12:04

## 2020-01-08 RX ADMIN — Medication 50 MILLIGRAM(S): at 05:59

## 2020-01-08 NOTE — PROGRESS NOTE ADULT - PROBLEM SELECTOR PLAN 1
Patient presenting with acute VTE of b/l femoral veins. Previously was meant to be discharged on Eliquis, however, she did not continue the medication due to confusion about whether or not to continue it. She now presents with b/l femoral VTE and lower extremity edema.   - continue Lovenox 120 mg bid   - vascular consulted, appreciate recs - no intervention at this time, continue AC, elevate, compression  - pulm consulted - f/u recs Patient presenting with acute VTE of b/l femoral veins. Previously was meant to be discharged on Eliquis, however, she did not continue the medication due to confusion about whether or not to continue it. She now presents with b/l femoral VTE and lower extremity edema.   - continue Lovenox 120 mg bid; considering eliquis on DC. F/u Heme onc recs  - vascular consulted, appreciate recs - no intervention at this time, continue AC, elevate, compression  - pulm consulted - f/u recs

## 2020-01-08 NOTE — PROGRESS NOTE ADULT - PROBLEM SELECTOR PLAN 6
History of DM. A1C on 12/30 5.8.   - hold metformin  - [facility], [facility] inpatient

## 2020-01-08 NOTE — PROGRESS NOTE ADULT - ASSESSMENT
39F smoker with a PMHx of DM, HTN, HLD, s/p bilateral uterine artery embolization in 8/2019 due to fibroids, recently hospitalized for sob 2/2 recurrent pl effusion s/p chest tube placement and discharged home with PleurX catheter, pancreatic/duodenal soft tissue mass with large enhancing mass within the intrahepatic IVC extending to the right atrial junction s/p laparoscopy and mass biopsy 10/10/19 now with B/l LE DVT (pt not taking Eliquis at home)    Continue heparin gtt  No acute vascular surgical intervention at this time  Continue compression/elevation of legs  Plan discussed with vascular surgery chief on call

## 2020-01-08 NOTE — PROGRESS NOTE ADULT - PROBLEM SELECTOR PLAN 5
History of asthma. Not in exacerbation, no wheezing on exam, good oxygen saturation on room air.   - continue Symbicort (therapeutic interchange with home advair)

## 2020-01-08 NOTE — PROGRESS NOTE ADULT - SUBJECTIVE AND OBJECTIVE BOX
DAILY PROGRESS NOTE:    S: 1L drained from PleurX this AM, patient otherwise stable.    O:    Vital Signs Last 24 Hrs  T(C): 37.1 (08 Jan 2020 05:33), Max: 37.2 (07 Jan 2020 20:34)  T(F): 98.8 (08 Jan 2020 05:33), Max: 98.9 (07 Jan 2020 20:34)  HR: 98 (08 Jan 2020 05:33) (86 - 102)  BP: 112/78 (08 Jan 2020 05:33) (105/66 - 122/87)  BP(mean): --  RR: 18 (08 Jan 2020 05:33) (16 - 18)  SpO2: 99% (08 Jan 2020 05:33) (96% - 100%)    I&O's Detail    PHYSICAL EXAM:  Constitutional: NAD, awake  Respiratory: Unlabored breathing, CTA, decrease breath sounds on right  Cardiovascular: RRR  Abd: soft, non-tender  Extremities: B/L LE swelling, 2+ pitting, lateral aspect of right ankle with erythema or open wounds.     LABS:                        9.2    11.62 )-----------( 159      ( 08 Jan 2020 05:52 )             30.5     01-08    137  |  100  |  16  ----------------------------<  118<H>  4.8   |  24  |  0.85    Ca    8.7      08 Jan 2020 05:52  Phos  2.7     01-07  Mg     1.8     01-08    TPro  6.4  /  Alb  3.3  /  TBili  2.0<H>  /  DBili  x   /  AST  99<H>  /  ALT  103<H>  /  AlkPhos  164<H>  01-06    PT/INR - ( 08 Jan 2020 05:52 )   PT: 27.0 sec;   INR: 2.31          PTT - ( 08 Jan 2020 05:52 )  PTT:41.4 sec      RADIOLOGY & ADDITIONAL STUDIES:

## 2020-01-08 NOTE — CHART NOTE - NSCHARTNOTEFT_GEN_A_CORE
The patients PleurX was drained with 1 L removed. The pleurX site looked clean and was without erythema or drainage. The sutures were removed and the site was cleaned and fresh dressing was applied. The patient had her pleurX drained last night as well in the ED with 1L removed, this indicates a significant amount of fluid present and rapid reaccumulation. The patient has been getting her PleurX drained at home every other day and notes she is usually SOB by the times she drains it. The patient will require more frequent drainage of her PleurX and will need to drain 1L daily in order to avoid dyspnea.

## 2020-01-08 NOTE — PROGRESS NOTE ADULT - PROBLEM SELECTOR PLAN 3
Right pleural effusion, drains 1 L every other day. Most recently drained today. Crackles at RLL. Saturating well on room air.   - pulmonology following, appreciate recs

## 2020-01-08 NOTE — PROGRESS NOTE ADULT - SUBJECTIVE AND OBJECTIVE BOX
INTERVAL HPI/OVERNIGHT EVENTS:  Interim reviewed; Patient is anxious about going home with large amount of blood clots; Discussed with Dr. Finn and she feels patient needs to see a sarcoma specialist and names given to patient;   Will continue Lovenox for now; Pleural fluid drained ny pulmonary this morning:      MEDICATIONS  (STANDING):  atorvastatin 20 milliGRAM(s) Oral at bedtime  budesonide  80 MICROgram(s)/formoterol 4.5 MICROgram(s) Inhaler 2 Puff(s) Inhalation two times a day  cholecalciferol 1000 Unit(s) Oral daily  enoxaparin Injectable 120 milliGRAM(s) SubCutaneous every 12 hours  ferrous    sulfate 325 milliGRAM(s) Oral daily  insulin lispro (HumaLOG) corrective regimen sliding scale   SubCutaneous Before meals and at bedtime  iron sucrose IVPB 200 milliGRAM(s) IV Intermittent every 24 hours  metoprolol tartrate 50 milliGRAM(s) Oral two times a day  pantoprazole    Tablet 40 milliGRAM(s) Oral before breakfast    MEDICATIONS  (PRN):  acetaminophen   Tablet .. 650 milliGRAM(s) Oral every 6 hours PRN Mild Pain (1-3)  oxyCODONE    IR 5 milliGRAM(s) Oral every 6 hours PRN Moderate Pain (4 - 6)      Allergies    No Known Drug Allergies  shellfish (Hives)    Intolerances        Vital Signs Last 24 Hrs  T(C): 37.1 (08 Jan 2020 05:33), Max: 37.2 (07 Jan 2020 20:34)  T(F): 98.8 (08 Jan 2020 05:33), Max: 98.9 (07 Jan 2020 20:34)  HR: 98 (08 Jan 2020 05:33) (86 - 102)  BP: 112/78 (08 Jan 2020 05:33) (105/66 - 122/87)  BP(mean): --  RR: 18 (08 Jan 2020 05:33) (16 - 18)  SpO2: 99% (08 Jan 2020 05:33) (96% - 100%)          Constitutional: Awakew    Eyes: SEPIDEH    ENMT: Negative    Neck: Supple    Back:  no tenderness     Respiratory:  decreased breath sounds on right    Cardiovascular: S1 S2    Gastrointestinal: soft    Genitourinary:    Extremities:  marked edema    Vascular:    Neurological:    Skin:    Lymph Nodes:            LABS:                        9.2    11.62 )-----------( 159      ( 08 Jan 2020 05:52 )             30.5     01-08    137  |  100  |  16  ----------------------------<  118<H>  4.8   |  24  |  0.85    Ca    8.7      08 Jan 2020 05:52  Phos  2.7     01-07  Mg     1.8     01-08    TPro  6.4  /  Alb  3.3  /  TBili  2.0<H>  /  DBili  x   /  AST  99<H>  /  ALT  103<H>  /  AlkPhos  164<H>  01-06    PT/INR - ( 08 Jan 2020 05:52 )   PT: 27.0 sec;   INR: 2.31          PTT - ( 08 Jan 2020 05:52 )  PTT:41.4 sec      RADIOLOGY & ADDITIONAL TESTS:

## 2020-01-08 NOTE — PROGRESS NOTE ADULT - SUBJECTIVE AND OBJECTIVE BOX
O/N Events: admitted to medicine.    Subjective: Patient seen at the bedside. This morning reporting right lower extremity swelling and pain in the lower back when walking. No nausea, vomiting, chest pain, shortness of breath.    VITALS  Vital Signs Last 24 Hrs  T(C): 36.7 (07 Jan 2020 15:29), Max: 37.1 (07 Jan 2020 08:12)  T(F): 98.1 (07 Jan 2020 15:29), Max: 98.7 (07 Jan 2020 08:12)  HR: 102 (07 Jan 2020 15:29) (84 - 103)  BP: 122/83 (07 Jan 2020 15:29) (95/66 - 122/83)  BP(mean): --  RR: 17 (07 Jan 2020 15:29) (16 - 18)  SpO2: 100% (07 Jan 2020 15:29) (96% - 100%)    CAPILLARY BLOOD GLUCOSE      POCT Blood Glucose.: 155 mg/dL (07 Jan 2020 13:54)      PHYSICAL EXAM  General appearance: sitting in the stretcher, tired appearing, no acute distress  HEENT: normocephalic/atraumatic, EOMI, sclera anicteric, uvula midline, MMM  Respiratory: decreased breath sounds in the RLL, otherwise breath sounds clear to auscultation throughout, no accessory muscle use, no wheezing  Cardiovascular: increased rate and regular rhythm, normal S1/S2, no murmurs, rubs or gallops appreciated  Gastrointestinal: soft, tender to palpation in the right abdomen, distended, normoactive bowel sounds  Extremities: RLE (no erythema noted, increased swelling compared to the LLE, skin breakdown and weeping at the right ankle and lower lateral leg, no purulence, ROM intake on knee flexion/extension and hip flexion, sensation to light touch intact, tender to palpation at the lateral ankle, negative straight leg raise test), LLE (no erythema, no skin breakdown, less swelling compared to the RLE, non-tender to palpation, ROM intact, sensation to LT intact)  Neurological: AOx3, no obvious focal deficits    MEDICATIONS  (STANDING):  atorvastatin 20 milliGRAM(s) Oral at bedtime  budesonide  80 MICROgram(s)/formoterol 4.5 MICROgram(s) Inhaler 2 Puff(s) Inhalation two times a day  cholecalciferol 1000 Unit(s) Oral daily  enoxaparin Injectable 120 milliGRAM(s) SubCutaneous every 12 hours  ferrous    sulfate 325 milliGRAM(s) Oral daily  insulin lispro (HumaLOG) corrective regimen sliding scale   SubCutaneous Before meals and at bedtime  metoprolol tartrate 50 milliGRAM(s) Oral two times a day  pantoprazole    Tablet 40 milliGRAM(s) Oral before breakfast    MEDICATIONS  (PRN):  acetaminophen   Tablet .. 650 milliGRAM(s) Oral every 6 hours PRN Mild Pain (1-3)  oxyCODONE    IR 5 milliGRAM(s) Oral every 6 hours PRN Moderate Pain (4 - 6)      No Known Drug Allergies  shellfish (Hives)      LABS                        9.9    10.05 )-----------( 188      ( 07 Jan 2020 07:49 )             32.4     01-07    134<L>  |  97  |  17  ----------------------------<  128<H>  4.2   |  22  |  0.89    Ca    9.3      07 Jan 2020 07:49  Phos  2.7     01-07  Mg     1.4     01-07    TPro  6.4  /  Alb  3.3  /  TBili  2.0<H>  /  DBili  x   /  AST  99<H>  /  ALT  103<H>  /  AlkPhos  164<H>  01-06    PT/INR - ( 07 Jan 2020 07:49 )   PT: 23.9 sec;   INR: 2.05          PTT - ( 07 Jan 2020 07:49 )  PTT:36.4 sec    CARDIAC MARKERS ( 06 Jan 2020 17:38 )  x     / <0.01 ng/mL / x     / x     / x            PROCEDURES/IMAGING: reviewed. Internal Medicine Progress Note  Hospital Course:  Ms. 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 On 1/7, full dose lovenox was started. Heme/Onc consulted for management of anticoagulation. One liter drained from pleurex. Vascular consulted but thrombi did not warrant intervention. On 1/8, anticoagulation was continued.     O/N Events: NAEON    Subjective:   Patient endorses pain at pleurex site and pain in bilateral lower extremities with swelling.     Vital Signs Last 24 Hrs  T(C): 36.7 (08 Jan 2020 12:53), Max: 37.2 (07 Jan 2020 20:34)  T(F): 98 (08 Jan 2020 12:53), Max: 98.9 (07 Jan 2020 20:34)  HR: 103 (08 Jan 2020 12:53) (86 - 103)  BP: 96/69 (08 Jan 2020 12:53) (96/69 - 122/87)  RR: 18 (08 Jan 2020 12:53) (16 - 18)  SpO2: 99% (08 Jan 2020 12:53) (99% - 100%)    CAPILLARY BLOOD GLUCOSE      POCT Blood Glucose.: 155 mg/dL (07 Jan 2020 13:54)      PHYSICAL EXAM  General appearance: sitting in the stretcher, tired appearing, no acute distress  HEENT: normocephalic/atraumatic, EOMI, sclera anicteric, uvula midline, MMM  Respiratory: decreased breath sounds in the RLL, otherwise breath sounds clear to auscultation throughout, no accessory muscle use, no wheezing  Cardiovascular: increased rate and regular rhythm, normal S1/S2, no murmurs, rubs or gallops appreciated  Gastrointestinal: soft, tender to palpation in the right abdomen, distended, normoactive bowel sounds  Extremities: RLE (no erythema noted, increased swelling compared to the LLE, skin breakdown and weeping at the right ankle and lower lateral leg, no purulence, ROM intake on knee flexion/extension and hip flexion, sensation to light touch intact, tender to palpation at the lateral ankle, negative straight leg raise test), LLE (no erythema, no skin breakdown, less swelling compared to the RLE, non-tender to palpation, ROM intact, sensation to LT intact), bandages on right LE  Neurological: AOx3, no obvious focal deficits    MEDICATIONS  (STANDING):  atorvastatin 20 milliGRAM(s) Oral at bedtime  budesonide  80 MICROgram(s)/formoterol 4.5 MICROgram(s) Inhaler 2 Puff(s) Inhalation two times a day  cholecalciferol 1000 Unit(s) Oral daily  enoxaparin Injectable 120 milliGRAM(s) SubCutaneous every 12 hours  ferrous    sulfate 325 milliGRAM(s) Oral daily  insulin lispro (HumaLOG) corrective regimen sliding scale   SubCutaneous Before meals and at bedtime  iron sucrose IVPB 200 milliGRAM(s) IV Intermittent every 24 hours  metoprolol tartrate 50 milliGRAM(s) Oral two times a day  pantoprazole    Tablet 40 milliGRAM(s) Oral before breakfast    MEDICATIONS  (PRN):  acetaminophen   Tablet .. 650 milliGRAM(s) Oral every 6 hours PRN Mild Pain (1-3)  oxyCODONE    IR 5 milliGRAM(s) Oral every 6 hours PRN Moderate Pain (4 - 6)        No Known Drug Allergies  shellfish (Hives)      LABS:                         9.2    11.62 )-----------( 159      ( 08 Jan 2020 05:52 )             30.5     01-08    137  |  100  |  16  ----------------------------<  118<H>  4.8   |  24  |  0.85    Ca    8.7      08 Jan 2020 05:52  Phos  2.7     01-07  Mg     1.8     01-08    TPro  6.4  /  Alb  3.3  /  TBili  2.0<H>  /  DBili  x   /  AST  99<H>  /  ALT  103<H>  /  AlkPhos  164<H>  01-06    PT/INR - ( 08 Jan 2020 05:52 )   PT: 27.0 sec;   INR: 2.31          PTT - ( 08 Jan 2020 05:52 )  PTT:41.4 sec    CARDIAC MARKERS ( 06 Jan 2020 17:38 )  x     / <0.01 ng/mL / x     / x     / x                RADIOLOGY, EKG & ADDITIONAL TESTS: Reviewed.         PROCEDURES/IMAGING: reviewed.

## 2020-01-08 NOTE — PROGRESS NOTE ADULT - ASSESSMENT
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 Ms. 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

## 2020-01-08 NOTE — PROGRESS NOTE ADULT - PROBLEM SELECTOR PLAN 2
Recently diagnosed leiomyosarcoma. Has an appointment with a leiomyosarcoma specialist on Thursday (see provider hand off).  - heme/onc following - appreciate recs - starting IV iron tonight  - vascular consulted - appreciate recs

## 2020-01-09 ENCOUNTER — TRANSCRIPTION ENCOUNTER (OUTPATIENT)
Age: 40
End: 2020-01-09

## 2020-01-09 ENCOUNTER — APPOINTMENT (OUTPATIENT)
Dept: PULMONOLOGY | Facility: CLINIC | Age: 40
End: 2020-01-09

## 2020-01-09 LAB
ANION GAP SERPL CALC-SCNC: 13 MMOL/L — SIGNIFICANT CHANGE UP (ref 5–17)
APTT BLD: 40.2 SEC — HIGH (ref 27.5–36.3)
BILIRUB SERPL-MCNC: 1.4 MG/DL — HIGH (ref 0.2–1.2)
BLD GP AB SCN SERPL QL: POSITIVE — SIGNIFICANT CHANGE UP
BUN SERPL-MCNC: 18 MG/DL — SIGNIFICANT CHANGE UP (ref 7–23)
CALCIUM SERPL-MCNC: 8.5 MG/DL — SIGNIFICANT CHANGE UP (ref 8.4–10.5)
CHLORIDE SERPL-SCNC: 100 MMOL/L — SIGNIFICANT CHANGE UP (ref 96–108)
CO2 SERPL-SCNC: 21 MMOL/L — LOW (ref 22–31)
CREAT SERPL-MCNC: 0.76 MG/DL — SIGNIFICANT CHANGE UP (ref 0.5–1.3)
GLUCOSE BLDC GLUCOMTR-MCNC: 121 MG/DL — HIGH (ref 70–99)
GLUCOSE BLDC GLUCOMTR-MCNC: 132 MG/DL — HIGH (ref 70–99)
GLUCOSE BLDC GLUCOMTR-MCNC: 163 MG/DL — HIGH (ref 70–99)
GLUCOSE BLDC GLUCOMTR-MCNC: 166 MG/DL — HIGH (ref 70–99)
GLUCOSE SERPL-MCNC: 148 MG/DL — HIGH (ref 70–99)
HAPTOGLOB SERPL-MCNC: SIGNIFICANT CHANGE UP MG/DL (ref 34–200)
HCT VFR BLD CALC: 28.4 % — LOW (ref 34.5–45)
HGB BLD-MCNC: 8.6 G/DL — LOW (ref 11.5–15.5)
INR BLD: 2.08 — HIGH (ref 0.88–1.16)
LDH SERPL L TO P-CCNC: 606 U/L — HIGH (ref 50–242)
MCHC RBC-ENTMCNC: 20.6 PG — LOW (ref 27–34)
MCHC RBC-ENTMCNC: 30.3 GM/DL — LOW (ref 32–36)
MCV RBC AUTO: 68.1 FL — LOW (ref 80–100)
NRBC # BLD: SIGNIFICANT CHANGE UP /100 WBCS (ref 0–0)
PLATELET # BLD AUTO: 136 K/UL — LOW (ref 150–400)
POTASSIUM SERPL-MCNC: 4.2 MMOL/L — SIGNIFICANT CHANGE UP (ref 3.5–5.3)
POTASSIUM SERPL-SCNC: 4.2 MMOL/L — SIGNIFICANT CHANGE UP (ref 3.5–5.3)
PROTHROM AB SERPL-ACNC: 24.3 SEC — HIGH (ref 10–12.9)
RBC # BLD: 4.17 M/UL — SIGNIFICANT CHANGE UP (ref 3.8–5.2)
RBC # FLD: 22.7 % — HIGH (ref 10.3–14.5)
RH IG SCN BLD-IMP: POSITIVE — SIGNIFICANT CHANGE UP
SODIUM SERPL-SCNC: 134 MMOL/L — LOW (ref 135–145)
WBC # BLD: 11.16 K/UL — HIGH (ref 3.8–10.5)
WBC # FLD AUTO: 11.16 K/UL — HIGH (ref 3.8–10.5)

## 2020-01-09 PROCEDURE — 99232 SBSQ HOSP IP/OBS MODERATE 35: CPT | Mod: GC

## 2020-01-09 PROCEDURE — 86077 PHYS BLOOD BANK SERV XMATCH: CPT

## 2020-01-09 RX ORDER — POLYETHYLENE GLYCOL 3350 17 G/17G
17 POWDER, FOR SOLUTION ORAL EVERY 12 HOURS
Refills: 0 | Status: DISCONTINUED | OUTPATIENT
Start: 2020-01-09 | End: 2020-01-10

## 2020-01-09 RX ORDER — CYCLOBENZAPRINE HYDROCHLORIDE 10 MG/1
10 TABLET, FILM COATED ORAL THREE TIMES A DAY
Refills: 0 | Status: DISCONTINUED | OUTPATIENT
Start: 2020-01-09 | End: 2020-01-10

## 2020-01-09 RX ORDER — LANOLIN ALCOHOL/MO/W.PET/CERES
3 CREAM (GRAM) TOPICAL AT BEDTIME
Refills: 0 | Status: DISCONTINUED | OUTPATIENT
Start: 2020-01-09 | End: 2020-01-10

## 2020-01-09 RX ORDER — SENNA PLUS 8.6 MG/1
2 TABLET ORAL DAILY
Refills: 0 | Status: DISCONTINUED | OUTPATIENT
Start: 2020-01-09 | End: 2020-01-10

## 2020-01-09 RX ORDER — DIPHENHYDRAMINE HCL 50 MG
25 CAPSULE ORAL ONCE
Refills: 0 | Status: COMPLETED | OUTPATIENT
Start: 2020-01-09 | End: 2020-01-09

## 2020-01-09 RX ADMIN — Medication 650 MILLIGRAM(S): at 00:01

## 2020-01-09 RX ADMIN — Medication 650 MILLIGRAM(S): at 00:50

## 2020-01-09 RX ADMIN — ATORVASTATIN CALCIUM 20 MILLIGRAM(S): 80 TABLET, FILM COATED ORAL at 21:37

## 2020-01-09 RX ADMIN — Medication 1000 UNIT(S): at 11:34

## 2020-01-09 RX ADMIN — Medication 2: at 09:50

## 2020-01-09 RX ADMIN — BUDESONIDE AND FORMOTEROL FUMARATE DIHYDRATE 2 PUFF(S): 160; 4.5 AEROSOL RESPIRATORY (INHALATION) at 21:37

## 2020-01-09 RX ADMIN — POLYETHYLENE GLYCOL 3350 17 GRAM(S): 17 POWDER, FOR SOLUTION ORAL at 13:03

## 2020-01-09 RX ADMIN — Medication 650 MILLIGRAM(S): at 14:00

## 2020-01-09 RX ADMIN — IRON SUCROSE 110 MILLIGRAM(S): 20 INJECTION, SOLUTION INTRAVENOUS at 17:14

## 2020-01-09 RX ADMIN — Medication 50 MILLIGRAM(S): at 17:14

## 2020-01-09 RX ADMIN — BUDESONIDE AND FORMOTEROL FUMARATE DIHYDRATE 2 PUFF(S): 160; 4.5 AEROSOL RESPIRATORY (INHALATION) at 09:52

## 2020-01-09 RX ADMIN — Medication 50 MILLIGRAM(S): at 06:02

## 2020-01-09 RX ADMIN — Medication 3 MILLIGRAM(S): at 22:48

## 2020-01-09 RX ADMIN — ENOXAPARIN SODIUM 120 MILLIGRAM(S): 100 INJECTION SUBCUTANEOUS at 13:06

## 2020-01-09 RX ADMIN — Medication 25 MILLIGRAM(S): at 00:16

## 2020-01-09 RX ADMIN — ENOXAPARIN SODIUM 120 MILLIGRAM(S): 100 INJECTION SUBCUTANEOUS at 02:30

## 2020-01-09 RX ADMIN — Medication 325 MILLIGRAM(S): at 11:34

## 2020-01-09 RX ADMIN — PANTOPRAZOLE SODIUM 40 MILLIGRAM(S): 20 TABLET, DELAYED RELEASE ORAL at 06:02

## 2020-01-09 RX ADMIN — SENNA PLUS 2 TABLET(S): 8.6 TABLET ORAL at 13:03

## 2020-01-09 RX ADMIN — Medication 650 MILLIGRAM(S): at 13:03

## 2020-01-09 RX ADMIN — Medication 2: at 18:33

## 2020-01-09 NOTE — PHYSICAL THERAPY INITIAL EVALUATION ADULT - MANUAL MUSCLE TESTING RESULTS, REHAB EVAL
Patient's muscle strength grossly at least 3+/5 based on functional assessment of bed mobility, transfers, and ambulation

## 2020-01-09 NOTE — PROGRESS NOTE ADULT - PROBLEM SELECTOR PLAN 6
Known history of DM with HbA1C of 5.8 on 12/30/19 and Metformin as home medication        -MISS while inpatient Known history of DM with HbA1C of 5.8 on 12/30/19 and Metformin as home medication    -MISS while inpatient

## 2020-01-09 NOTE — HISTORY OF PRESENT ILLNESS
[de-identified] : 39 year old female presenting to [facility] for oncologic evaluation. Patient took herself to [facility] on 12/4/2019 due to progressively worsening shortness of breath, non-radiating substernal chest pressure (aggravated when she lays on her left side), intermittent R sided flank/lower back pain (worse with food). She has experienced intermittent vaginal bleeding since her uterine artery embolization procedure in August 2019, s/p 1 unit of blood at [facility] in November 2019, but denies heavy bleeding at that time. She also noted mild night sweats, decreased appetite, constipation, increased dizziness/lightheadedness (especially with ambulation). \par \par Further work-up was completed; CT chest, adomen/pelvis on 12/4/2019 revealed the following: Large enhancing mass within the intrahepatic IVC extending to the right atrial junction, Suspect bland thrombus of the infrarenal IVC and left iliac veins and large right pleural effusion. Patient was started on anticoagulation and on 12/5/2019, she underwent thoracentesis for left pleural effusion. On 12/10/19, she underwent laparoscopy and IVC mass biopsy; pathology confirmed leiomyosarcoma. Patient was discharged on 12/12/2019 with Eliquis but due to misunderstanding, she did not take it. She returned to [facility] on 12/17/2019 and she was diagnosed with bilateral femoral vein thromboses and bilateral lower extremity edema. She was discharged from the hospital on 12/26/2019. \par \par On 12/29/2019, patient returned to [facility] with increasing shortness of breath at rest as well as lower extremity swelling. CXR in ED shows reaccumulation of the previous pleural effusion and worsening lower extremity edema. On her previous admission, she was recommended Pleurx but did not want to go through with the procedure. She underwent the PleurX catheter insertion on 12/30/2019. \par \par Past medical history includes DM, HTN, HLD, uterine fibroids,  pityriasis rosea, herpes.\par Past surgical history includes bilateral uterine artery embolization (August 2019). \par Family history: bullous pemphigoid (mother), colon cancer (mother), pancreatic cancer (father), breast cancer (father). \par Social history: She drinks alcohol socially, is an active tobacco user, and denies illicit drug use. [de-identified] : Final Diagnosis (12/10/2019): \par \par 1. Retroperitoneal mass, biopsy: Leiomyosarcoma\par 2. Small into the abdominal mass, biopsy: Primarily blood with rare distorted spindle cells\par 3. Retroperitoneal mass, biopsy # 2: Leiomyosarcoma\par \par There was atypia, single cell apoptosis and scant mitotic activity focally abnormal. Immunohistochemical staining with desmin and smooth muscle myosin are positive supporting the diagnosis. [de-identified] : leiomyosarcoma

## 2020-01-09 NOTE — PROGRESS NOTE ADULT - PROBLEM SELECTOR PLAN 7
Known history of HTN but not currently on anti-hypertensives        -Monitor BP as she is currently normotensive Known history of HTN but not currently on anti-hypertensives    -Monitor BP as she is currently normotensive

## 2020-01-09 NOTE — CHART NOTE - NSCHARTNOTEFT_GEN_A_CORE
The PleurX was drained today, only 200cc output. Site was clean, no erythema or discharge, non tender. A clean dressing was applied.

## 2020-01-09 NOTE — PHYSICAL THERAPY INITIAL EVALUATION ADULT - IMPAIRMENTS FOUND, PT EVAL
gross motor/gait, locomotion, and balance/aerobic capacity/endurance/integumentary integrity/muscle strength

## 2020-01-09 NOTE — PROGRESS NOTE ADULT - PROBLEM SELECTOR PLAN 1
Patient was discharged on Eliquis last admisson but did not take it due to misunderstanding. Now here with bilateral femoral vein thromboses and bilateral lower extremity edema. Vascular surgery not planning any intervention at this time.        -Continue Lovenox 120mg BID    -Discharge on Eliquis vs other pending heme/onc recommendations    -Follow up further pulmonology recommendations Patient was discharged on Eliquis last admisson but did not take it due to misunderstanding. Now here with bilateral femoral vein thromboses and bilateral lower extremity edema. Vascular surgery not planning any intervention at this time.    -Continue Lovenox 120mg BID  -Discharge on Eliquis vs other pending heme/onc recommendations  -Follow up further pulmonology recommendations

## 2020-01-09 NOTE — PHYSICAL THERAPY INITIAL EVALUATION ADULT - PLANNED THERAPY INTERVENTIONS, PT EVAL
strengthening/transfer training/balance training/gait training/bed mobility training/postural re-education

## 2020-01-09 NOTE — PROGRESS NOTE ADULT - PROBLEM SELECTOR PLAN 3
Has a right pleural effusion that is draining 1L every day and causing dyspnea        -Follow up further pulmonology recommendations regarding PleurX Has a right pleural effusion that is draining 1L every day and causing dyspnea    -Follow up further pulmonology recommendations regarding PleurX    #Muscle Spams - Near PleurX site    -Flexeril

## 2020-01-09 NOTE — PROGRESS NOTE ADULT - PROBLEM SELECTOR PLAN 9
CATARACTS, OU - VISUALLY SIGNIFICANT. SCHEDULE _OS_ FIRST THEN LATER IN _OD_ DISCUSSED OPTION OF _STANDARD_______________VS Alina Halo WITH ASTIG CORRECTION (LENSX VS MANUAL LRI). PATIENT UNDERSTANDS AND DESIRES ___standard with  manual LRI __________________________. 1) PCP Contacted on Admission: (Y/N) --> Name & Phone #:  2) Date of Contact with PCP:  3) PCP Contacted at Discharge: (Y/N)  4) Summary of Handoff Given to PCP:   5) Post-Discharge Appointment Date and Location:

## 2020-01-09 NOTE — PHYSICAL THERAPY INITIAL EVALUATION ADULT - GENERAL OBSERVATIONS, REHAB EVAL
Received standing independently outside of bathroom waiting for her neighbor, with +ACE bandage wraps to bilateral LEs, +hep lock, on room air, in no apparent distress. Patient appears to be resting comfortably

## 2020-01-09 NOTE — PROGRESS NOTE ADULT - ASSESSMENT
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    #Advanced leiomyosarcoma - large intrahepatic IVC Mass w/ vascular tumor extension   #Acute femoral DVTs, bilateral   #Pleural Effusion, R sided  #Microcytic Anemia     -initiate IV Venofer 200 mg while inpatient (first dose now), give daily ( up to 5 doses)   -c/w Lovenox 120 mg q12 hr (renal funct noted)   -f/u pulm recs regarding PleurX drainage frequency  -patient has close follow up with [facility], will be expediting outpatient follow up with Dr. Seb Chaves (currently schd for Wed 1/22, anticipate moving up ), discussed with patient and primary team the importance for her to follow up given the rare histology/diagnosis and extent of disease to discuss potential systemic treatment options, invasive interventions, clinical trials, and palliative care   -dvt ppx, c/w treatment dose Lovenox       d/w Dr. 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    #Advanced leiomyosarcoma - large intrahepatic IVC Mass w/ vascular tumor extension   #Acute femoral DVTs, bilateral   #Pleural Effusion, R sided  #Microcytic Anemia     -initiate IV Venofer 200 mg while inpatient (first dose now), give daily ( up to 5 doses)   -c/w Lovenox 120 mg q12 hr (renal funct noted)   -c/w PleurX drainage   -patient has close follow up with [facility] with Dr. Seb Chaves (now scheduled for Tuesday 1/14 at 3 PM),discussed with patient and primary team the importance for her to follow up given the rare histology/diagnosis and extent of disease to discuss potential systemic treatment options, invasive interventions, clinical trials, and palliative care  -Mediport placement and TTE prior to discharge    -dvt ppx, c/w treatment dose Lovenox        d/w Dr. Thomason

## 2020-01-09 NOTE — PHYSICAL THERAPY INITIAL EVALUATION ADULT - ADDITIONAL COMMENTS
Patient lives with her boyfriend in an elevator apartment building with 7 or 8 steps with unilateral handrail to enter. Prior to admission, patient was independent for all functional mobility and ADLs without assistive device. Denies history of falls. Denies home health assistance

## 2020-01-09 NOTE — PROGRESS NOTE ADULT - SUBJECTIVE AND OBJECTIVE BOX
PULMONARY CONSULT FOLLOW-UP NOTE    INTERVAL HISTORY:   Reviewed chart and overnight events; patient seen and examined at bedside.  No acute events overnight. No bleeding or bruising noted. No fevers. PleurX drained, breathing comfortably on RA.     MEDICATIONS  (STANDING):  atorvastatin 20 milliGRAM(s) Oral at bedtime  budesonide  80 MICROgram(s)/formoterol 4.5 MICROgram(s) Inhaler 2 Puff(s) Inhalation two times a day  cholecalciferol 1000 Unit(s) Oral daily  enoxaparin Injectable 120 milliGRAM(s) SubCutaneous every 12 hours  ferrous    sulfate 325 milliGRAM(s) Oral daily  insulin lispro (HumaLOG) corrective regimen sliding scale   SubCutaneous Before meals and at bedtime  iron sucrose IVPB 200 milliGRAM(s) IV Intermittent every 24 hours  metoprolol tartrate 50 milliGRAM(s) Oral two times a day  pantoprazole    Tablet 40 milliGRAM(s) Oral before breakfast    MEDICATIONS  (PRN):  acetaminophen   Tablet .. 650 milliGRAM(s) Oral every 6 hours PRN Mild Pain (1-3)  cyclobenzaprine 10 milliGRAM(s) Oral three times a day PRN Muscle Spasm  polyethylene glycol 3350 17 Gram(s) Oral every 12 hours PRN Constipation  senna 2 Tablet(s) Oral daily PRN Constipation    Allergies    No Known Drug Allergies  shellfish (Hives)    Intolerances      Vital Signs Last 24 Hrs  T(C): 36.7 (09 Jan 2020 16:20), Max: 36.8 (09 Jan 2020 05:42)  T(F): 98.1 (09 Jan 2020 16:20), Max: 98.2 (09 Jan 2020 05:42)  HR: 103 (09 Jan 2020 16:20) (89 - 103)  BP: 131/90 (09 Jan 2020 16:20) (102/69 - 131/90)  BP(mean): --  RR: 19 (09 Jan 2020 16:20) (18 - 19)  SpO2: 100% (09 Jan 2020 16:20) (99% - 100%)    PHYSICAL EXAM:  Constitutional: obese, comfortable.   Neck: supple, no appreciable JVD  Respiratory: decrease4 breath sounds right base.   Cardiovascular: +S1/S2, RRR  Gastrointestinal: soft, NT/ND; +BSx4  Extremities: swollen tight LE.   Vascular: 2+ radial, DP/PT pulses B/L  Neurological: AAOx3; no focal deficit      LABS:                        8.6    11.16 )-----------( 136      ( 09 Jan 2020 06:40 )             28.4   01-09    134<L>  |  100  |  18  ----------------------------<  148<H>  4.2   |  21<L>  |  0.76    Ca    8.5      09 Jan 2020 06:40  Mg     1.8     01-08    TPro  x   /  Alb  x   /  TBili  1.4<H>  /  DBili  x   /  AST  x   /  ALT  x   /  AlkPhos  x   01-09

## 2020-01-09 NOTE — PHYSICAL THERAPY INITIAL EVALUATION ADULT - CRITERIA FOR SKILLED THERAPEUTIC INTERVENTIONS
risk reduction/prevention/rehab potential/functional limitations in following categories/impairments found/therapy frequency/anticipated discharge recommendation

## 2020-01-09 NOTE — PROGRESS NOTE ADULT - ASSESSMENT
Ms. Etienne is a 39-year-old female with a past medical history of HTN, HLD, DM, asthma, obesity, and fibroids who was recently diagnosed with leiomyosarcoma in her IVC complicated by thrombus now here for bilateral thrombi of the lower extremities.

## 2020-01-09 NOTE — PROGRESS NOTE ADULT - PROBLEM SELECTOR PLAN 4
Has a known history of HLD taking Lipitor at home        -Continue Lipitor Has a known history of HLD taking Lipitor at home    -Continue Lipitor

## 2020-01-09 NOTE — PROGRESS NOTE ADULT - PROBLEM SELECTOR PLAN 2
Recently diagnosed and has an appointment with a specialist. Vascular not recommending any intervention at this time and heme/onc following        -Follow up further heme/onc recommendations Recently diagnosed and has an appointment with a specialist. Vascular not recommending any intervention at this time and heme/onc following    -Follow up further heme/onc recommendations

## 2020-01-09 NOTE — PROGRESS NOTE ADULT - ASSESSMENT
40 y/o woman with a recently diagnosed leiomyosarcoma with  recurrent symptomatic right sided pleural effusion now s/p Pleur X placement presenting with acute bilateral proximal DVT.     Acute DVT :   Likely in setting of malignancy and low flow due to total IVC obstruction with tumor and thrombus.   - Agree with therapeutic doses of lovenox.    - leg elevation, compression bandages.     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   S/p pleurX 12/30/2019. Pleural fluid cytology negative for malignancy   Currently draining every other day about 1 L.   S/p 1L & 200 ml in last 2 days.   - Will see if she needs everyday or every other  day drainage.   - Incentive spirometry & ambulate.       Leiomyosarcoma  We have spoken to Sarcoma specialist  Dr. Seb Chaves @ [facility] about the case  He would like to know if this tumor is resectable ( curative or palliatively) or not , which will help him plan her options of medical treatment of tumor.   - Would recommend reaching out to Surgery teams to comment on this before discharge if possible. 40 y/o woman with a recently diagnosed leiomyosarcoma with  recurrent symptomatic right sided pleural effusion now s/p Pleur X placement presenting with acute bilateral proximal DVT.     Acute DVT :   Likely in setting of malignancy and low flow due to total IVC obstruction with tumor and thrombus.   - Agree with therapeutic doses of lovenox.    - leg elevation, compression bandages.     Recurrent  right sided pleural effusion:   Likely due to portal hypertension from IVC mass.   S/p pleurX 12/30/2019. Pleural fluid cytology negative for malignancy   Currently draining every other day about 1 L.   S/p 1L & 200 ml in last 2 days.   - Will see if she needs everyday or every other  day drainage.   - Incentive spirometry & ambulate.       Leiomyosarcoma  We have spoken to Sarcoma specialist  Dr. Seb Chaves @ [facility] about the case  He would like to know if this tumor is resectable (  palliatively) or not , which will help him plan her options of medical treatment of tumor.   - Would recommend reaching out to Surgery teams to comment on this before discharge if possible.

## 2020-01-09 NOTE — PROGRESS NOTE ADULT - SUBJECTIVE AND OBJECTIVE BOX
Stable. Heme/Onc Progress Note (Dr. Thomason )    Interval History: Pt seen and examined. No acute events overnight. No bleeding or bruising noted. No fevers. PleurX drained, breathing comfortably on RA.       Allergies    No Known Drug Allergies  shellfish (Hives)    Intolerances        Medications:  MEDICATIONS  (STANDING):  atorvastatin 20 milliGRAM(s) Oral at bedtime  budesonide  80 MICROgram(s)/formoterol 4.5 MICROgram(s) Inhaler 2 Puff(s) Inhalation two times a day  cholecalciferol 1000 Unit(s) Oral daily  enoxaparin Injectable 120 milliGRAM(s) SubCutaneous every 12 hours  ferrous    sulfate 325 milliGRAM(s) Oral daily  insulin lispro (HumaLOG) corrective regimen sliding scale   SubCutaneous Before meals and at bedtime  iron sucrose IVPB 200 milliGRAM(s) IV Intermittent every 24 hours  metoprolol tartrate 50 milliGRAM(s) Oral two times a day  pantoprazole    Tablet 40 milliGRAM(s) Oral before breakfast    MEDICATIONS  (PRN):  acetaminophen   Tablet .. 650 milliGRAM(s) Oral every 6 hours PRN Mild Pain (1-3)  cyclobenzaprine 10 milliGRAM(s) Oral three times a day PRN Muscle Spasm  polyethylene glycol 3350 17 Gram(s) Oral every 12 hours PRN Constipation  senna 2 Tablet(s) Oral daily PRN Constipation    enoxaparin Injectable 120 milliGRAM(s) SubCutaneous every 12 hours      PHYSICAL EXAM:    T(F): 98.2 (01-09-20 @ 05:42), Max: 98.2 (01-09-20 @ 05:42)  HR: 100 (01-09-20 @ 05:42) (89 - 100)  BP: 119/83 (01-09-20 @ 05:42) (102/69 - 119/83)  RR: 18 (01-09-20 @ 05:42) (18 - 18)  SpO2: 99% (01-09-20 @ 05:42) (99% - 100%)  Wt(kg): --    Daily     Daily     GEN: resting, sitting up in bed, breathing on RA  HEENT: AT/NC, EOMI  NECK: supple  CVS: +S1S2  LUNG: decreased breath sounds RLL   GI: Tender+ R abd, +BS  EXT: LE swelling b/l, 2+, legs wrapped   NEURO: aaox3         Labs:                          8.6    11.16 )-----------( 136      ( 09 Jan 2020 06:40 )             28.4     CBC Full  -  ( 09 Jan 2020 06:40 )  WBC Count : 11.16 K/uL  RBC Count : 4.17 M/uL  Hemoglobin : 8.6 g/dL  Hematocrit : 28.4 %  Platelet Count - Automated : 136 K/uL  Mean Cell Volume : 68.1 fl  Mean Cell Hemoglobin : 20.6 pg  Mean Cell Hemoglobin Concentration : 30.3 gm/dL  Auto Neutrophil # : x  Auto Lymphocyte # : x  Auto Monocyte # : x  Auto Eosinophil # : x  Auto Basophil # : x  Auto Neutrophil % : x  Auto Lymphocyte % : x  Auto Monocyte % : x  Auto Eosinophil % : x  Auto Basophil % : x    PT/INR - ( 09 Jan 2020 06:40 )   PT: 24.3 sec;   INR: 2.08          PTT - ( 09 Jan 2020 06:40 )  PTT:40.2 sec    01-09    134<L>  |  100  |  18  ----------------------------<  148<H>  4.2   |  21<L>  |  0.76    Ca    8.5      09 Jan 2020 06:40  Mg     1.8     01-08    TPro  x   /  Alb  x   /  TBili  1.4<H>  /  DBili  x   /  AST  x   /  ALT  x   /  AlkPhos  x   01-09          < from: CT Abdomen and Pelvis w/ IV Cont (01.07.20 @ 00:41) >    EXAM:  CT ABDOMEN AND PELVIS IC                          PROCEDURE DATE:  01/07/2020          INTERPRETATION:  CLINICAL INDICATION: 39-year-old with leiomyosarcoma; for detection venous thrombosis.          FINDINGS: CT of the abdomen and pelvis was performed with the administration of intravenous contrast. CT of the bilateral lower extremities was performed after the administration of intravenous contrast in the venous phase. Reconstructions were performed in the sagittal and coronal planes. Comparison is made to prior CT dated 12/4/2019 and prior MRI dated 7/18/2019.    Evaluation of the lower chest demonstrates enlarging large right pleural effusion. Catheter within the right pleural space. Normal heart size. Right basilar atelectasis. Evaluation of the abdomen demonstrates heterogeneous perfusion of the liver which is indented by a large mass within the intrahepatic IVC, unchanged since prior study, currently measuring 10.2 x 7.4 cm with tumor thrombus extending into the right renalvein and right atrium. There is thrombus partially occluding the infrahepatic IVC and extending into the bilateral common and external iliac veins. The spleen, pancreas, bilateral adrenal glands and left kidney are normal in appearance. There is stable mild right hydronephrosis secondary to right ureteral compression. Evaluation of the gastrointestinal tract demonstrates diverticulosis. Evaluation of the pelvis demonstrates enlarged myomatous uterus with submucosal and serosal myomas, including an exophytic myoma measuring 5 cm projecting into the left adnexal region. Bladder is collapsed. Small volume of fluid. Very severe diffuse anasarca. Pelvic venous congestion. Negative for adenopathy. Opacified aspects of the portal, splenic, superior mesenteric vein and left renal vein are patent. There is severe edematous infiltration obtaining tissues of the bilateral lower extremities. There is infiltrative soft tissue versus severe edematous infiltration in the retroperitoneum extending from the caval mass. Extensive venous collaterals within the pelvis. Minimal aortic vascular calcification. Evaluation of the osseous structures demonstrates no destructive lesion.      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    Enlarging large right pleural effusion.    < end of copied text >    < from: US Duplex Aorta/IVC/Iliac Limited (01.06.20 @ 23:42) >    INTERPRETATION:  US AORTA IVC ILIAC DUPLEX LIMITED dated 1/7/2020 4:11 AM    CLINICAL INFORMATION: r/o IVC dvt    TECHNIQUE: An ultrasound examination of the IVC is performed.    COMPARISON: CTV of the abdomen and pelvis dated 1/7/2019     FINDINGS:  Evaluation demonstrates large mass in the IVC which measures 14 x 0 x 6.2 x 8.8 cm, consistent with known leiomyosarcoma. The aorta is patent with normal phasic flow. Limited evaluation of the IVC due to the large mass, body habitus, and overlying bowel gas. Minimal flow is seen in the left, right, and main portal veins, as well as minimal flow within the right middle and left hepatic veins. Large right pleural effusion is noted.    IMPRESSION:  Limited evaluation of the IVC, portal and hepatic veins secondary due to a  large IVC mass, bowel gas, and body habitus. There is diminished flow within the portal veins identified.       < end of copied text >    < from: US Duplex Venous Lower Ext Complete, Bilateral (01.06.20 @ 20:47) >  INTERPRETATION:    VENOUS DUPLEX DOPPLER OF BOTH LOWER EXTREMITIES dated 1/6/2020 8:47 PM    INDICATION: Bilateral lower extremity swelling and pain, right greater than left, evaluate for DVT    TECHNIQUE: Duplex Doppler evaluation including gray-scale ultrasound imaging, color flow Doppler imaging, and Doppler spectral analysis of the veins of both lower extremities was performed.     COMPARISON: Lower extremity Doppler dated 12/18/2019    FINDINGS:    RIGHT LOWER EXTREMITY:  There is thrombus in the RIGHT saphenofemoral junction, common femoral vein, proximal deep femoral vein, and proximal femoral vein.    The RIGHT common the mid and distal femoral vein and popliteal vein are patent and free of thrombus. These veins are normally compressible and have normal phasic flow and augmentation response.    The paired RIGHT peroneal and posterior tibial calf veins are patent.      LEFT LOWER EXTREMITY:  There is thrombus in the LEFT saphenofemoral junction, common femoral vein, and proximal deep femoral vein.    There is again partial compressibility of the proximal left femoral vein secondary to eccentric wall thickening.    The LEFT popliteal vein is patent and free of thrombus, with normally compressibility and normal phasic flow and augmentation response..    The paired LEFT peroneal and posterior tibial calf veins are patent.      IMPRESSION:  There is thrombus in the BILATERAL common femoral veins, saphenofemoral junctions, proximal deep femoral veins, and RIGHT proximal femoral vein.    < end of copied text >    12/10     Surgical Final Report          Final Diagnosis    1. Retroperitoneal mass, biopsy:  - Leiomyosarcoma    2. Small into the abdominal mass, biopsy:  - Primarily blood with rare distorted spindle cells    3. Retroperitoneal mass, biopsy#2:  - Leiomyosarcoma    Note: This case was seen by Dr. Nico Hernandez at the [facility]  core laboratory.  At [facility] lab it was reassigned number [id number]  [id number].  He noted that there was atypia, single cell apoptosis and scant  mitotic activity focally abnormal.  Immunohistochemical staining with desmin and smooth muscle myosin  are positive supporting the diagnosis.

## 2020-01-09 NOTE — PROGRESS NOTE ADULT - SUBJECTIVE AND OBJECTIVE BOX
OVERNIGHT EVENTS:    SUBJECTIVE: Patient seen and examined at the bedside.     Vital Signs Last 12 Hrs  T(F): 98.2 (01-09-20 @ 05:42), Max: 98.2 (01-09-20 @ 05:42)  HR: 100 (01-09-20 @ 05:42) (89 - 100)  BP: 119/83 (01-09-20 @ 05:42) (102/69 - 119/83)  BP(mean): --  RR: 18 (01-09-20 @ 05:42) (18 - 18)  SpO2: 99% (01-09-20 @ 05:42) (99% - 100%)  I&O's Summary      PHYSICAL EXAM:  General: In no acute distress, resting comfortably in bed  HEENT: NCAT, PERRL, EOMI, no conjunctival pallor or scleral icterus, MMM  Neck: Supple, no JVD  Respiratory: Clear to auscultation bilaterally with no wheezes, rales, or rhonchi appreciated  Cardiovascular: RRR, normal S1 and S2, no murmurs, rubs, or gallops appreciated  Vascular: 2+ radial and DP pulses  Abdomen: Soft, NT/ND. Bowel sounds present in all four quadrants with no guarding, rebound tenderness, or palpable masses  Extremities: Warm and well perfused. No clubbing, cyanosis, or edema noted  Skin: No gross skin abnormalities or rashes noted  Neuro: AAOx3 with no cranial nerve deficits. Strength and sensation intact throughout.    LABS:                        8.6    11.16 )-----------( 136      ( 09 Jan 2020 06:40 )             28.4     01-09    134<L>  |  100  |  18  ----------------------------<  148<H>  4.2   |  21<L>  |  0.76    Ca    8.5      09 Jan 2020 06:40  Mg     1.8     01-08      PT/INR - ( 09 Jan 2020 06:40 )   PT: 24.3 sec;   INR: 2.08          PTT - ( 09 Jan 2020 06:40 )  PTT:40.2 sec      RADIOLOGY & ADDITIONAL TESTS: Reviewed.    MEDICATIONS  (STANDING):  atorvastatin 20 milliGRAM(s) Oral at bedtime  budesonide  80 MICROgram(s)/formoterol 4.5 MICROgram(s) Inhaler 2 Puff(s) Inhalation two times a day  cholecalciferol 1000 Unit(s) Oral daily  enoxaparin Injectable 120 milliGRAM(s) SubCutaneous every 12 hours  ferrous    sulfate 325 milliGRAM(s) Oral daily  insulin lispro (HumaLOG) corrective regimen sliding scale   SubCutaneous Before meals and at bedtime  iron sucrose IVPB 200 milliGRAM(s) IV Intermittent every 24 hours  metoprolol tartrate 50 milliGRAM(s) Oral two times a day  pantoprazole    Tablet 40 milliGRAM(s) Oral before breakfast    MEDICATIONS  (PRN):  acetaminophen   Tablet .. 650 milliGRAM(s) Oral every 6 hours PRN Mild Pain (1-3)  cyclobenzaprine 10 milliGRAM(s) Oral three times a day PRN Muscle Spasm  oxyCODONE    IR 5 milliGRAM(s) Oral every 6 hours PRN Moderate Pain (4 - 6)      Allergies    No Known Drug Allergies  shellfish (Hives)    Intolerances OVERNIGHT EVENTS: Tylenol and Benadryl overnight    SUBJECTIVE: Patient seen and examined at the bedside. She says she feels well overall but still has lower extremity pressure from the swelling. I told her she would likely need elevation and ACE compression bandaging. She is also complaining of some muscle spams near her PleurX site.    Vital Signs Last 12 Hrs  T(F): 98.2 (01-09-20 @ 05:42), Max: 98.2 (01-09-20 @ 05:42)  HR: 100 (01-09-20 @ 05:42) (89 - 100)  BP: 119/83 (01-09-20 @ 05:42) (102/69 - 119/83)  BP(mean): --  RR: 18 (01-09-20 @ 05:42) (18 - 18)  SpO2: 99% (01-09-20 @ 05:42) (99% - 100%)  I&O's Summary      PHYSICAL EXAM:  General: In no acute distress, resting comfortably in bed  HEENT: NCAT, PERRL, EOMI, no conjunctival pallor or scleral icterus, MMM  Neck: Supple, no JVD  Respiratory: Clear to auscultation bilaterally with no wheezes, rales, or rhonchi appreciated  Cardiovascular: RRR, normal S1 and S2, no murmurs, rubs, or gallops appreciated  Vascular: 2+ radial and DP pulses  Abdomen: Soft, NT/ND. Bowel sounds present in all four quadrants with no guarding, rebound tenderness, or palpable masses  Extremities: Warm and well perfused. No clubbing, cyanosis, or edema noted  Skin: No gross skin abnormalities or rashes noted  Neuro: AAOx3 with no cranial nerve deficits. Strength and sensation intact throughout.    LABS:                        8.6    11.16 )-----------( 136      ( 09 Jan 2020 06:40 )             28.4     01-09    134<L>  |  100  |  18  ----------------------------<  148<H>  4.2   |  21<L>  |  0.76    Ca    8.5      09 Jan 2020 06:40  Mg     1.8     01-08      PT/INR - ( 09 Jan 2020 06:40 )   PT: 24.3 sec;   INR: 2.08          PTT - ( 09 Jan 2020 06:40 )  PTT:40.2 sec      RADIOLOGY & ADDITIONAL TESTS: Reviewed.    MEDICATIONS  (STANDING):  atorvastatin 20 milliGRAM(s) Oral at bedtime  budesonide  80 MICROgram(s)/formoterol 4.5 MICROgram(s) Inhaler 2 Puff(s) Inhalation two times a day  cholecalciferol 1000 Unit(s) Oral daily  enoxaparin Injectable 120 milliGRAM(s) SubCutaneous every 12 hours  ferrous    sulfate 325 milliGRAM(s) Oral daily  insulin lispro (HumaLOG) corrective regimen sliding scale   SubCutaneous Before meals and at bedtime  iron sucrose IVPB 200 milliGRAM(s) IV Intermittent every 24 hours  metoprolol tartrate 50 milliGRAM(s) Oral two times a day  pantoprazole    Tablet 40 milliGRAM(s) Oral before breakfast    MEDICATIONS  (PRN):  acetaminophen   Tablet .. 650 milliGRAM(s) Oral every 6 hours PRN Mild Pain (1-3)  cyclobenzaprine 10 milliGRAM(s) Oral three times a day PRN Muscle Spasm  oxyCODONE    IR 5 milliGRAM(s) Oral every 6 hours PRN Moderate Pain (4 - 6)      Allergies    No Known Drug Allergies  shellfish (Hives)    Intolerances OVERNIGHT EVENTS: Tylenol and Benadryl overnight    SUBJECTIVE: Patient seen and examined at the bedside. She says she feels well overall but still has lower extremity pressure from the swelling. I told her she would likely need elevation and ACE compression bandaging. She is also complaining of some muscle spams near her PleurX site.    Vital Signs Last 12 Hrs  T(F): 98.2 (01-09-20 @ 05:42), Max: 98.2 (01-09-20 @ 05:42)  HR: 100 (01-09-20 @ 05:42) (89 - 100)  BP: 119/83 (01-09-20 @ 05:42) (102/69 - 119/83)  BP(mean): --  RR: 18 (01-09-20 @ 05:42) (18 - 18)  SpO2: 99% (01-09-20 @ 05:42) (99% - 100%)  I&O's Summary      PHYSICAL EXAM:  General: In no acute distress, resting comfortably in bed  HEENT: NCAT, PERRL, EOMI, no conjunctival pallor or scleral icterus, MMM  Neck: Supple, no JVD  Respiratory: Clear to auscultation bilaterally with no wheezes, rales, or rhonchi appreciated  Cardiovascular: RRR, normal S1 and S2, no murmurs, rubs, or gallops appreciated  Vascular: 2+ radial and DP pulses  Abdomen: Soft, NT/ND. Bowel sounds present in all four quadrants with no guarding, rebound tenderness, or palpable masses  Extremities: Warm and well perfused. No clubbing, cyanosis, but 2+ pitting edema, RLE wrapped with towel due to fluid leakage  Skin: No gross skin abnormalities or rashes noted  Neuro: AAOx3 with no cranial nerve deficits. Strength and sensation intact throughout.    LABS:                        8.6    11.16 )-----------( 136      ( 09 Jan 2020 06:40 )             28.4     01-09    134<L>  |  100  |  18  ----------------------------<  148<H>  4.2   |  21<L>  |  0.76    Ca    8.5      09 Jan 2020 06:40  Mg     1.8     01-08      PT/INR - ( 09 Jan 2020 06:40 )   PT: 24.3 sec;   INR: 2.08          PTT - ( 09 Jan 2020 06:40 )  PTT:40.2 sec      RADIOLOGY & ADDITIONAL TESTS: Reviewed.    MEDICATIONS  (STANDING):  atorvastatin 20 milliGRAM(s) Oral at bedtime  budesonide  80 MICROgram(s)/formoterol 4.5 MICROgram(s) Inhaler 2 Puff(s) Inhalation two times a day  cholecalciferol 1000 Unit(s) Oral daily  enoxaparin Injectable 120 milliGRAM(s) SubCutaneous every 12 hours  ferrous    sulfate 325 milliGRAM(s) Oral daily  insulin lispro (HumaLOG) corrective regimen sliding scale   SubCutaneous Before meals and at bedtime  iron sucrose IVPB 200 milliGRAM(s) IV Intermittent every 24 hours  metoprolol tartrate 50 milliGRAM(s) Oral two times a day  pantoprazole    Tablet 40 milliGRAM(s) Oral before breakfast    MEDICATIONS  (PRN):  acetaminophen   Tablet .. 650 milliGRAM(s) Oral every 6 hours PRN Mild Pain (1-3)  cyclobenzaprine 10 milliGRAM(s) Oral three times a day PRN Muscle Spasm  oxyCODONE    IR 5 milliGRAM(s) Oral every 6 hours PRN Moderate Pain (4 - 6)      Allergies    No Known Drug Allergies  shellfish (Hives)    Intolerances

## 2020-01-09 NOTE — PROGRESS NOTE ADULT - PROBLEM SELECTOR PLAN 5
Patient has a history of asthma, takes Advair, and is not currently in exacerbation        -Continue Symbicort as therapeutic interchange Patient has a history of asthma, takes Advair, and is not currently in exacerbation    -Continue Symbicort as therapeutic interchange

## 2020-01-09 NOTE — DISCHARGE NOTE NURSING/CASE MANAGEMENT/SOCIAL WORK - PATIENT PORTAL LINK FT
You can access the FollowMyHealth Patient Portal offered by [facility] by registering at the following website: [email or web address]. By joining [employer]’s FollowMyHealth portal, you will also be able to view your health information using other applications (apps) compatible with our system.

## 2020-01-09 NOTE — PHYSICAL THERAPY INITIAL EVALUATION ADULT - PERTINENT HX OF CURRENT PROBLEM, REHAB EVAL
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 Patient returned to ED now for acute on chronic b/l lower extremity edema R>L which has been developing over the past week.

## 2020-01-09 NOTE — PROGRESS NOTE ADULT - PROBLEM SELECTOR PLAN 8
F: None  E: Replete PRN  N: Carb Consistent  GI PPX: None  DVT PPX: Lovenox 120mg  Code: Full  Dispo: [facility]AN

## 2020-01-10 ENCOUNTER — INPATIENT (INPATIENT)
Facility: HOSPITAL | Age: 40
LOS: 5 days | Discharge: HOME HEALTH | End: 2020-01-16
Attending: STUDENT IN AN ORGANIZED HEALTH CARE EDUCATION/TRAINING PROGRAM | Admitting: STUDENT IN AN ORGANIZED HEALTH CARE EDUCATION/TRAINING PROGRAM
Payer: MEDICAID

## 2020-01-10 ENCOUNTER — TRANSCRIPTION ENCOUNTER (OUTPATIENT)
Age: 40
End: 2020-01-10

## 2020-01-10 VITALS
HEART RATE: 100 BPM | DIASTOLIC BLOOD PRESSURE: 95 MMHG | TEMPERATURE: 98 F | RESPIRATION RATE: 19 BRPM | SYSTOLIC BLOOD PRESSURE: 141 MMHG | OXYGEN SATURATION: 100 %

## 2020-01-10 VITALS
WEIGHT: 260.15 LBS | HEIGHT: 65 IN | HEART RATE: 103 BPM | TEMPERATURE: 98 F | RESPIRATION RATE: 14 BRPM | SYSTOLIC BLOOD PRESSURE: 131 MMHG | OXYGEN SATURATION: 100 % | DIASTOLIC BLOOD PRESSURE: 91 MMHG

## 2020-01-10 DIAGNOSIS — C80.1 MALIGNANT (PRIMARY) NEOPLASM, UNSPECIFIED: ICD-10-CM

## 2020-01-10 LAB
ANION GAP SERPL CALC-SCNC: 9 MMOL/L — SIGNIFICANT CHANGE UP (ref 5–17)
BASOPHILS # BLD AUTO: 0.26 K/UL — HIGH (ref 0–0.2)
BASOPHILS NFR BLD AUTO: 2.6 % — HIGH (ref 0–2)
BUN SERPL-MCNC: 12 MG/DL — SIGNIFICANT CHANGE UP (ref 7–23)
CALCIUM SERPL-MCNC: 8.3 MG/DL — LOW (ref 8.4–10.5)
CHLORIDE SERPL-SCNC: 100 MMOL/L — SIGNIFICANT CHANGE UP (ref 96–108)
CO2 SERPL-SCNC: 21 MMOL/L — LOW (ref 22–31)
CREAT SERPL-MCNC: 0.7 MG/DL — SIGNIFICANT CHANGE UP (ref 0.5–1.3)
EOSINOPHIL # BLD AUTO: 0.26 K/UL — SIGNIFICANT CHANGE UP (ref 0–0.5)
EOSINOPHIL NFR BLD AUTO: 2.6 % — SIGNIFICANT CHANGE UP (ref 0–6)
GLUCOSE BLDC GLUCOMTR-MCNC: 115 MG/DL — HIGH (ref 70–99)
GLUCOSE BLDC GLUCOMTR-MCNC: 157 MG/DL — HIGH (ref 70–99)
GLUCOSE SERPL-MCNC: 123 MG/DL — HIGH (ref 70–99)
HAPTOGLOB SERPL-MCNC: SIGNIFICANT CHANGE UP MG/DL (ref 34–200)
HCT VFR BLD CALC: 32.4 % — LOW (ref 34.5–45)
HGB BLD-MCNC: 9.5 G/DL — LOW (ref 11.5–15.5)
LYMPHOCYTES # BLD AUTO: 3.04 K/UL — SIGNIFICANT CHANGE UP (ref 1–3.3)
LYMPHOCYTES # BLD AUTO: 30.5 % — SIGNIFICANT CHANGE UP (ref 13–44)
MAGNESIUM SERPL-MCNC: 1.6 MG/DL — SIGNIFICANT CHANGE UP (ref 1.6–2.6)
MCHC RBC-ENTMCNC: 20.3 PG — LOW (ref 27–34)
MCHC RBC-ENTMCNC: 29.3 GM/DL — LOW (ref 32–36)
MCV RBC AUTO: 69.4 FL — LOW (ref 80–100)
MONOCYTES # BLD AUTO: 0.52 K/UL — SIGNIFICANT CHANGE UP (ref 0–0.9)
MONOCYTES NFR BLD AUTO: 5.2 % — SIGNIFICANT CHANGE UP (ref 2–14)
NEUTROPHILS # BLD AUTO: 5.89 K/UL — SIGNIFICANT CHANGE UP (ref 1.8–7.4)
NEUTROPHILS NFR BLD AUTO: 59.1 % — SIGNIFICANT CHANGE UP (ref 43–77)
PLATELET # BLD AUTO: 118 K/UL — LOW (ref 150–400)
POTASSIUM SERPL-MCNC: SIGNIFICANT CHANGE UP MMOL/L (ref 3.5–5.3)
POTASSIUM SERPL-SCNC: SIGNIFICANT CHANGE UP MMOL/L (ref 3.5–5.3)
RBC # BLD: 4.67 M/UL — SIGNIFICANT CHANGE UP (ref 3.8–5.2)
RBC # FLD: 23.8 % — HIGH (ref 10.3–14.5)
SODIUM SERPL-SCNC: 130 MMOL/L — LOW (ref 135–145)
WBC # BLD: 9.97 K/UL — SIGNIFICANT CHANGE UP (ref 3.8–10.5)
WBC # FLD AUTO: 9.97 K/UL — SIGNIFICANT CHANGE UP (ref 3.8–10.5)

## 2020-01-10 PROCEDURE — 86850 RBC ANTIBODY SCREEN: CPT

## 2020-01-10 PROCEDURE — 84702 CHORIONIC GONADOTROPIN TEST: CPT

## 2020-01-10 PROCEDURE — 83880 ASSAY OF NATRIURETIC PEPTIDE: CPT

## 2020-01-10 PROCEDURE — 97162 PT EVAL MOD COMPLEX 30 MIN: CPT

## 2020-01-10 PROCEDURE — 84484 ASSAY OF TROPONIN QUANT: CPT

## 2020-01-10 PROCEDURE — 93306 TTE W/DOPPLER COMPLETE: CPT

## 2020-01-10 PROCEDURE — 80048 BASIC METABOLIC PNL TOTAL CA: CPT

## 2020-01-10 PROCEDURE — 99223 1ST HOSP IP/OBS HIGH 75: CPT

## 2020-01-10 PROCEDURE — 86901 BLOOD TYPING SEROLOGIC RH(D): CPT

## 2020-01-10 PROCEDURE — 99285 EMERGENCY DEPT VISIT HI MDM: CPT | Mod: 25

## 2020-01-10 PROCEDURE — 82247 BILIRUBIN TOTAL: CPT

## 2020-01-10 PROCEDURE — 83010 ASSAY OF HAPTOGLOBIN QUANT: CPT

## 2020-01-10 PROCEDURE — 71045 X-RAY EXAM CHEST 1 VIEW: CPT

## 2020-01-10 PROCEDURE — 85730 THROMBOPLASTIN TIME PARTIAL: CPT

## 2020-01-10 PROCEDURE — 86880 COOMBS TEST DIRECT: CPT

## 2020-01-10 PROCEDURE — 83735 ASSAY OF MAGNESIUM: CPT

## 2020-01-10 PROCEDURE — 74177 CT ABD & PELVIS W/CONTRAST: CPT

## 2020-01-10 PROCEDURE — 84100 ASSAY OF PHOSPHORUS: CPT

## 2020-01-10 PROCEDURE — 71046 X-RAY EXAM CHEST 2 VIEWS: CPT

## 2020-01-10 PROCEDURE — 94640 AIRWAY INHALATION TREATMENT: CPT

## 2020-01-10 PROCEDURE — 85610 PROTHROMBIN TIME: CPT

## 2020-01-10 PROCEDURE — 93979 VASCULAR STUDY: CPT

## 2020-01-10 PROCEDURE — 93970 EXTREMITY STUDY: CPT

## 2020-01-10 PROCEDURE — 99239 HOSP IP/OBS DSCHRG MGMT >30: CPT | Mod: GC

## 2020-01-10 PROCEDURE — 93306 TTE W/DOPPLER COMPLETE: CPT | Mod: 26

## 2020-01-10 PROCEDURE — 85027 COMPLETE CBC AUTOMATED: CPT

## 2020-01-10 PROCEDURE — 80053 COMPREHEN METABOLIC PANEL: CPT

## 2020-01-10 PROCEDURE — 85025 COMPLETE CBC W/AUTO DIFF WBC: CPT

## 2020-01-10 PROCEDURE — 82962 GLUCOSE BLOOD TEST: CPT

## 2020-01-10 PROCEDURE — 83615 LACTATE (LD) (LDH) ENZYME: CPT

## 2020-01-10 PROCEDURE — 36415 COLL VENOUS BLD VENIPUNCTURE: CPT

## 2020-01-10 PROCEDURE — 86870 RBC ANTIBODY IDENTIFICATION: CPT

## 2020-01-10 RX ORDER — METOPROLOL TARTRATE 50 MG
1 TABLET ORAL
Qty: 0 | Refills: 0 | DISCHARGE

## 2020-01-10 RX ORDER — BUDESONIDE AND FORMOTEROL FUMARATE DIHYDRATE 160; 4.5 UG/1; UG/1
1 AEROSOL RESPIRATORY (INHALATION)
Qty: 0 | Refills: 0 | DISCHARGE
Start: 2020-01-10

## 2020-01-10 RX ORDER — IRON SUCROSE 20 MG/ML
10 INJECTION, SOLUTION INTRAVENOUS
Qty: 0 | Refills: 0 | DISCHARGE
Start: 2020-01-10

## 2020-01-10 RX ORDER — MAGNESIUM SULFATE 500 MG/ML
4 VIAL (ML) INJECTION ONCE
Refills: 0 | Status: COMPLETED | OUTPATIENT
Start: 2020-01-10 | End: 2020-01-10

## 2020-01-10 RX ORDER — ACETAMINOPHEN 500 MG
2 TABLET ORAL
Qty: 0 | Refills: 0 | DISCHARGE
Start: 2020-01-10

## 2020-01-10 RX ORDER — CHOLECALCIFEROL (VITAMIN D3) 125 MCG
1 CAPSULE ORAL
Qty: 0 | Refills: 0 | DISCHARGE

## 2020-01-10 RX ORDER — CHOLECALCIFEROL (VITAMIN D3) 125 MCG
1000 CAPSULE ORAL
Qty: 0 | Refills: 0 | DISCHARGE
Start: 2020-01-10

## 2020-01-10 RX ORDER — ENOXAPARIN SODIUM 100 MG/ML
120 INJECTION SUBCUTANEOUS
Qty: 0 | Refills: 0 | DISCHARGE
Start: 2020-01-10

## 2020-01-10 RX ORDER — LANOLIN ALCOHOL/MO/W.PET/CERES
1 CREAM (GRAM) TOPICAL
Qty: 0 | Refills: 0 | DISCHARGE
Start: 2020-01-10

## 2020-01-10 RX ORDER — ATORVASTATIN CALCIUM 80 MG/1
1 TABLET, FILM COATED ORAL
Qty: 0 | Refills: 0 | DISCHARGE

## 2020-01-10 RX ORDER — POLYETHYLENE GLYCOL 3350 17 G/17G
17 POWDER, FOR SOLUTION ORAL
Qty: 0 | Refills: 0 | DISCHARGE
Start: 2020-01-10

## 2020-01-10 RX ORDER — CYCLOBENZAPRINE HYDROCHLORIDE 10 MG/1
1 TABLET, FILM COATED ORAL
Qty: 0 | Refills: 0 | DISCHARGE
Start: 2020-01-10

## 2020-01-10 RX ORDER — PANTOPRAZOLE SODIUM 20 MG/1
1 TABLET, DELAYED RELEASE ORAL
Qty: 0 | Refills: 0 | DISCHARGE
Start: 2020-01-10

## 2020-01-10 RX ORDER — LANOLIN ALCOHOL/MO/W.PET/CERES
3 CREAM (GRAM) TOPICAL AT BEDTIME
Refills: 0 | Status: DISCONTINUED | OUTPATIENT
Start: 2020-01-10 | End: 2020-01-16

## 2020-01-10 RX ORDER — ATORVASTATIN CALCIUM 80 MG/1
1 TABLET, FILM COATED ORAL
Qty: 0 | Refills: 0 | DISCHARGE
Start: 2020-01-10

## 2020-01-10 RX ORDER — ENOXAPARIN SODIUM 100 MG/ML
120 INJECTION SUBCUTANEOUS EVERY 12 HOURS
Refills: 0 | Status: DISCONTINUED | OUTPATIENT
Start: 2020-01-10 | End: 2020-01-10

## 2020-01-10 RX ORDER — FERROUS SULFATE 325(65) MG
1 TABLET ORAL
Qty: 0 | Refills: 0 | DISCHARGE

## 2020-01-10 RX ORDER — FLUTICASONE PROPIONATE AND SALMETEROL 50; 250 UG/1; UG/1
2 POWDER ORAL; RESPIRATORY (INHALATION)
Qty: 0 | Refills: 0 | DISCHARGE

## 2020-01-10 RX ORDER — METOPROLOL TARTRATE 50 MG
1 TABLET ORAL
Qty: 0 | Refills: 0 | DISCHARGE
Start: 2020-01-10

## 2020-01-10 RX ORDER — SENNA PLUS 8.6 MG/1
2 TABLET ORAL
Qty: 0 | Refills: 0 | DISCHARGE
Start: 2020-01-10

## 2020-01-10 RX ORDER — METFORMIN HYDROCHLORIDE 850 MG/1
1 TABLET ORAL
Qty: 0 | Refills: 0 | DISCHARGE

## 2020-01-10 RX ORDER — CYCLOBENZAPRINE HYDROCHLORIDE 10 MG/1
10 TABLET, FILM COATED ORAL THREE TIMES A DAY
Refills: 0 | Status: DISCONTINUED | OUTPATIENT
Start: 2020-01-10 | End: 2020-01-16

## 2020-01-10 RX ADMIN — BUDESONIDE AND FORMOTEROL FUMARATE DIHYDRATE 2 PUFF(S): 160; 4.5 AEROSOL RESPIRATORY (INHALATION) at 12:30

## 2020-01-10 RX ADMIN — Medication 100 GRAM(S): at 17:06

## 2020-01-10 RX ADMIN — Medication 1000 UNIT(S): at 14:47

## 2020-01-10 RX ADMIN — ENOXAPARIN SODIUM 120 MILLIGRAM(S): 100 INJECTION SUBCUTANEOUS at 16:43

## 2020-01-10 RX ADMIN — PANTOPRAZOLE SODIUM 40 MILLIGRAM(S): 20 TABLET, DELAYED RELEASE ORAL at 05:39

## 2020-01-10 RX ADMIN — CYCLOBENZAPRINE HYDROCHLORIDE 10 MILLIGRAM(S): 10 TABLET, FILM COATED ORAL at 06:07

## 2020-01-10 RX ADMIN — Medication 325 MILLIGRAM(S): at 12:31

## 2020-01-10 RX ADMIN — Medication 50 MILLIGRAM(S): at 05:39

## 2020-01-10 RX ADMIN — Medication 50 MILLIGRAM(S): at 17:19

## 2020-01-10 NOTE — DISCHARGE NOTE PROVIDER - HOSPITAL COURSE
39-year-old female with a past medical history of HTN, HLD, DM, asthma, obesity, and fibroids who was recently diagnosed with leiomyosarcoma in her IVC complicated by thrombus now here for bilateral thrombi of the lower extremities.         Problem List/Main Diagnoses (system-based):    1. DVT - Lovenox for anticoagulation     2. Leiomyosarcoma - Transfer to [facility] for chemo port and chemo induction    3. Pleural Effusion - PleurX with daily drainage    4. HLD - Lipitor    5. Asthma - Symbicort     6. DM - MISS    7. HTN - Monitored BP as she was normotensive        Inpatient treatment course: PleurX placed for pleural effusion. No surgical intervention at this time therefore transfer to [facility] for chemo induction with Dr. Seb Hernandez    New medications: Lovenox at 120mg BID for AC    Labs to be followed outpatient: None    Exam to be followed outpatient: PleurX drainage

## 2020-01-10 NOTE — PROGRESS NOTE ADULT - PROBLEM SELECTOR PLAN 1
Patient was discharged on Eliquis last admission but did not take it due to misunderstanding. Now here with bilateral femoral vein thromboses and bilateral lower extremity edema. Vascular surgery not planning any intervention at this time.    -Continue Lovenox 120mg BID although evening dose held 1/10 for possible port  -Discharge on Eliquis vs other pending heme/onc recommendations  -Follow up further pulmonology recommendations

## 2020-01-10 NOTE — PROGRESS NOTE ADULT - PROBLEM SELECTOR PLAN 3
Has a right pleural effusion status post PleurX draining 200cc-1L per day    -Drained 200cc yesterday 1/9  -Follow up further pulmonology recommendations regarding PleurX    #Muscle Spams - Near PleurX site    -Flexeril PRN

## 2020-01-10 NOTE — DISCHARGE NOTE PROVIDER - NSDCMRMEDTOKEN_GEN_ALL_CORE_FT
acetaminophen 325 mg oral tablet: 2 tab(s) orally every 6 hours, As needed, Mild Pain (1-3)  atorvastatin 20 mg oral tablet: 1 tab(s) orally once a day (at bedtime)  budesonide-formoterol 80 mcg-4.5 mcg/inh inhalation aerosol: 1 application inhaled once a day  cholecalciferol oral tablet: 1000 unit(s) orally once a day  Compression stockin Pair both both legs  cyclobenzaprine 10 mg oral tablet: 1 tab(s) orally 3 times a day, As needed, Muscle Spasm  enoxaparin: 120 milligram(s) subcutaneous 2 times a day  iron sucrose 20 mg/mL intravenous solution: 10 milliliter(s) intravenous every 24 hours  melatonin 3 mg oral tablet: 1 tab(s) orally once a day (at bedtime)  metoprolol tartrate 50 mg oral tablet: 1 tab(s) orally 2 times a day  pantoprazole 40 mg oral delayed release tablet: 1 tab(s) orally once a day (before a meal)  polyethylene glycol 3350 oral powder for reconstitution: 17 gram(s) orally every 12 hours, As needed, Constipation  senna oral tablet: 2 tab(s) orally once a day, As needed, Constipation

## 2020-01-10 NOTE — DISCHARGE NOTE PROVIDER - NSDCCAREPROVSEEN_GEN_ALL_CORE_FT
Ewa Webb Vicki Webb seen and examined with house-staff during bedside rounds.  Resident note read, including vitals, physical findings, laboratory data, and radiological reports.   Revisions included below.  Direct personal management at bed side and extensive interpretation of the data.  Plan was outlined and discussed in details with the housestaff.  Decision making of high complexity  Action taken for acute disease activity to reflect the level of care provided:  - medication reconciliation  - review laboratory data

## 2020-01-10 NOTE — PROGRESS NOTE ADULT - SUBJECTIVE AND OBJECTIVE BOX
Heme/Onc Progress      Interval History: Pt feels better this am. Considerably less SOB, less LE edema.  No bleeding or bruising noted. No fevers. PleurX drained       Allergies    No Known Drug Allergies  shellfish (Hives)    Intolerances        Medications:  MEDICATIONS  (STANDING):  atorvastatin 20 milliGRAM(s) Oral at bedtime  budesonide  80 MICROgram(s)/formoterol 4.5 MICROgram(s) Inhaler 2 Puff(s) Inhalation two times a day  cholecalciferol 1000 Unit(s) Oral daily  enoxaparin Injectable 120 milliGRAM(s) SubCutaneous every 12 hours  ferrous    sulfate 325 milliGRAM(s) Oral daily  insulin lispro (HumaLOG) corrective regimen sliding scale   SubCutaneous Before meals and at bedtime  iron sucrose IVPB 200 milliGRAM(s) IV Intermittent every 24 hours  metoprolol tartrate 50 milliGRAM(s) Oral two times a day  pantoprazole    Tablet 40 milliGRAM(s) Oral before breakfast    MEDICATIONS  (PRN):  acetaminophen   Tablet .. 650 milliGRAM(s) Oral every 6 hours PRN Mild Pain (1-3)  cyclobenzaprine 10 milliGRAM(s) Oral three times a day PRN Muscle Spasm  polyethylene glycol 3350 17 Gram(s) Oral every 12 hours PRN Constipation  senna 2 Tablet(s) Oral daily PRN Constipation    enoxaparin Injectable 120 milliGRAM(s) SubCutaneous every 12 hours      PHYSICAL EXAM:  Vital Signs Last 24 Hrs  T(C): 36.9 (10 Scott 2020 05:38), Max: 36.9 (10 Scott 2020 05:38)  T(F): 98.5 (10 Scott 2020 05:38), Max: 98.5 (10 Scott 2020 05:38)  HR: 101 (10 Scott 2020 05:38) (99 - 103)  BP: 110/74 (10 Scott 2020 05:38) (110/74 - 131/90)  BP(mean): --  RR: 18 (10 Scott 2020 05:38) (18 - 19)  SpO2: 99% (10 Scott 2020 05:38) (99% - 100%)     GEN: resting, sitting up in bed, breathing on RA  HEENT: AT/NC, EOMI  NECK: supple  CVS: +S1S2  LUNG: decreased breath sounds RLL   GI: Tender+ R abd, +BS  EXT: LE swelling b/l, 2+, legs wrapped   NEURO: aaox3         Labs:                        8.6    11.16 )-----------( 136      ( 09 Jan 2020 06:40 )             28.4         CBC Full  -  ( 09 Jan 2020 06:40 )  WBC Count : 11.16 K/uL  RBC Count : 4.17 M/uL  Hemoglobin : 8.6 g/dL  Hematocrit : 28.4 %  Platelet Count - Automated : 136 K/uL  Mean Cell Volume : 68.1 fl  Mean Cell Hemoglobin : 20.6 pg  Mean Cell Hemoglobin Concentration : 30.3 gm/dL  Auto Neutrophil # : x  Auto Lymphocyte # : x  Auto Monocyte # : x  Auto Eosinophil # : x  Auto Basophil # : x  Auto Neutrophil % : x  Auto Lymphocyte % : x  Auto Monocyte % : x  Auto Eosinophil % : x  Auto Basophil % : x        01-09    134<L>  |  100  |  18  ----------------------------<  148<H>  4.2   |  21<L>  |  0.76    Ca    8.5      09 Jan 2020 06:40    TPro  x   /  Alb  x   /  TBili  1.4<H>  /  DBili  x   /  AST  x   /  ALT  x   /  AlkPhos  x   01-09        PT/INR - ( 09 Jan 2020 06:40 )   PT: 24.3 sec;   INR: 2.08          PTT - ( 09 Jan 2020 06:40 )  PTT:40.2 sec          < from: CT Abdomen and Pelvis w/ IV Cont (01.07.20 @ 00:41) >    EXAM:  CT ABDOMEN AND PELVIS IC                          PROCEDURE DATE:  01/07/2020          INTERPRETATION:  CLINICAL INDICATION: 39-year-old with leiomyosarcoma; for detection venous thrombosis.          FINDINGS: CT of the abdomen and pelvis was performed with the administration of intravenous contrast. CT of the bilateral lower extremities was performed after the administration of intravenous contrast in the venous phase. Reconstructions were performed in the sagittal and coronal planes. Comparison is made to prior CT dated 12/4/2019 and prior MRI dated 7/18/2019.    Evaluation of the lower chest demonstrates enlarging large right pleural effusion. Catheter within the right pleural space. Normal heart size. Right basilar atelectasis. Evaluation of the abdomen demonstrates heterogeneous perfusion of the liver which is indented by a large mass within the intrahepatic IVC, unchanged since prior study, currently measuring 10.2 x 7.4 cm with tumor thrombus extending into the right renalvein and right atrium. There is thrombus partially occluding the infrahepatic IVC and extending into the bilateral common and external iliac veins. The spleen, pancreas, bilateral adrenal glands and left kidney are normal in appearance. There is stable mild right hydronephrosis secondary to right ureteral compression. Evaluation of the gastrointestinal tract demonstrates diverticulosis. Evaluation of the pelvis demonstrates enlarged myomatous uterus with submucosal and serosal myomas, including an exophytic myoma measuring 5 cm projecting into the left adnexal region. Bladder is collapsed. Small volume of fluid. Very severe diffuse anasarca. Pelvic venous congestion. Negative for adenopathy. Opacified aspects of the portal, splenic, superior mesenteric vein and left renal vein are patent. There is severe edematous infiltration obtaining tissues of the bilateral lower extremities. There is infiltrative soft tissue versus severe edematous infiltration in the retroperitoneum extending from the caval mass. Extensive venous collaterals within the pelvis. Minimal aortic vascular calcification. Evaluation of the osseous structures demonstrates no destructive lesion.      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    Enlarging large right pleural effusion.    < end of copied text >    < from: US Duplex Aorta/IVC/Iliac Limited (01.06.20 @ 23:42) >    INTERPRETATION:  US AORTA IVC ILIAC DUPLEX LIMITED dated 1/7/2020 4:11 AM    CLINICAL INFORMATION: r/o IVC dvt    TECHNIQUE: An ultrasound examination of the IVC is performed.    COMPARISON: CTV of the abdomen and pelvis dated 1/7/2019     FINDINGS:  Evaluation demonstrates large mass in the IVC which measures 14 x 0 x 6.2 x 8.8 cm, consistent with known leiomyosarcoma. The aorta is patent with normal phasic flow. Limited evaluation of the IVC due to the large mass, body habitus, and overlying bowel gas. Minimal flow is seen in the left, right, and main portal veins, as well as minimal flow within the right middle and left hepatic veins. Large right pleural effusion is noted.    IMPRESSION:  Limited evaluation of the IVC, portal and hepatic veins secondary due to a  large IVC mass, bowel gas, and body habitus. There is diminished flow within the portal veins identified.       < end of copied text >    < from: US Duplex Venous Lower Ext Complete, Bilateral (01.06.20 @ 20:47) >  INTERPRETATION:    VENOUS DUPLEX DOPPLER OF BOTH LOWER EXTREMITIES dated 1/6/2020 8:47 PM    INDICATION: Bilateral lower extremity swelling and pain, right greater than left, evaluate for DVT    TECHNIQUE: Duplex Doppler evaluation including gray-scale ultrasound imaging, color flow Doppler imaging, and Doppler spectral analysis of the veins of both lower extremities was performed.     COMPARISON: Lower extremity Doppler dated 12/18/2019    FINDINGS:    RIGHT LOWER EXTREMITY:  There is thrombus in the RIGHT saphenofemoral junction, common femoral vein, proximal deep femoral vein, and proximal femoral vein.    The RIGHT common the mid and distal femoral vein and popliteal vein are patent and free of thrombus. These veins are normally compressible and have normal phasic flow and augmentation response.    The paired RIGHT peroneal and posterior tibial calf veins are patent.      LEFT LOWER EXTREMITY:  There is thrombus in the LEFT saphenofemoral junction, common femoral vein, and proximal deep femoral vein.    There is again partial compressibility of the proximal left femoral vein secondary to eccentric wall thickening.    The LEFT popliteal vein is patent and free of thrombus, with normally compressibility and normal phasic flow and augmentation response..    The paired LEFT peroneal and posterior tibial calf veins are patent.      IMPRESSION:  There is thrombus in the BILATERAL common femoral veins, saphenofemoral junctions, proximal deep femoral veins, and RIGHT proximal femoral vein.    < end of copied text >    12/10     Surgical Final Report          Final Diagnosis    1. Retroperitoneal mass, biopsy:  - Leiomyosarcoma    2. Small into the abdominal mass, biopsy:  - Primarily blood with rare distorted spindle cells    3. Retroperitoneal mass, biopsy#2:  - Leiomyosarcoma    Note: This case was seen by Dr. Nico Hernandez at the [facility]  core laboratory.  At [facility] lab it was reassigned number [id number]  [id number].  He noted that there was atypia, single cell apoptosis and scant  mitotic activity focally abnormal.  Immunohistochemical staining with desmin and smooth muscle myosin  are positive supporting the diagnosis.

## 2020-01-10 NOTE — DIETITIAN INITIAL EVALUATION ADULT. - OTHER INFO
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 At time of exam pt sitting at edge of bed wrapping LE. Plan for chemo-port placement today 1/10. Denies n/v/d + constipation, started on bowel regimen, skin is intact, no chewing/ swallowing issues impacting intake. Fair-good tolerance to diet, requesting change to regular diet as only pre-DM, BS have been well managed per pt, will discuss with team. Notes UBW 100kg on admit 115kg likely in setting of 2+ edema. Reports intolerance to shellfish, not true allergy per pt. Reviewed/ reinforced cst cho diet with pt, receptive. Will continue to follow per protocol.

## 2020-01-10 NOTE — DISCHARGE NOTE PROVIDER - NSDCCPTREATMENT_GEN_ALL_CORE_FT
PRINCIPAL PROCEDURE  Procedure: CT chest w con  Findings and Treatment: Evaluation of the lower chest demonstrates enlarging large right pleural effusion. Catheter within the right pleural space. Normal heart size. Right basilar atelectasis. Evaluation of the abdomen demonstrates heterogeneous perfusion of the liver which is indented by a large mass within the intrahepatic IVC, unchanged since prior study, currently measuring 10.2 x 7.4 cm with tumor thrombus extending into the right renalvein and right atrium. There is thrombus partially occluding the infrahepatic IVC and extending into the bilateral common and external iliac veins. The spleen, pancreas, bilateral adrenal glands and left kidney are normal in appearance. There is stable mild right hydronephrosis secondary to right ureteral compression. Evaluation of the gastrointestinal tract demonstrates diverticulosis. Evaluation of the pelvis demonstrates enlarged myomatous uterus with submucosal and serosal myomas, including an exophytic myoma measuring 5 cm projecting into the left adnexal region. Bladder is collapsed. Small volume of fluid. Very severe diffuse anasarca. Pelvic venous congestion. Negative for adenopathy. Opacified aspects of the portal, splenic, superior mesenteric vein and left renal vein are patent. There is severe edematous infiltration obtaining tissues of the bilateral lower extremities. There is infiltrative soft tissue versus severe edematous infiltration in the retroperitoneum extending from the caval mass. Extensive venous collaterals within the pelvis. Minimal aortic vascular calcification. Evaluation of the osseous structures demonstrates no destructive lesion.

## 2020-01-10 NOTE — PROGRESS NOTE ADULT - SUBJECTIVE AND OBJECTIVE BOX
OVERNIGHT EVENTS: Given Melatonin overnight for insomnia and Lovenox held for post placement today.    SUBJECTIVE: Patient seen and examined at the bedside. She states that she is having bothersome back spasms but that she just received Flexeril and will let me know if she does not feel better. She says that her breathing feels improved this morning.    Vital Signs Last 12 Hrs  T(F): 98.5 (01-10-20 @ 05:38), Max: 98.5 (01-10-20 @ 05:38)  HR: 101 (01-10-20 @ 05:38) (99 - 101)  BP: 110/74 (01-10-20 @ 05:38) (110/74 - 120/83)  BP(mean): --  RR: 18 (01-10-20 @ 05:38) (18 - 18)  SpO2: 99% (01-10-20 @ 05:38) (99% - 100%)  I&O's Summary      PHYSICAL EXAM:  General: In no acute distress, resting comfortably in bed  HEENT: NCAT, PERRL, EOMI, no conjunctival pallor, MMM  Neck: Supple, no JVD  Respiratory: Clear to auscultation bilaterally with no wheezes, rales, or rhonchi appreciated  Cardiovascular: RRR, normal S1 and S2, no murmurs, rubs, or gallops appreciated  Vascular: 2+ radial and DP pulses  Abdomen: Soft, NT/ND. Bowel sounds present in all four quadrants with no guarding, rebound tenderness, or palpable masses  Extremities: Warm and well perfused. No clubbing, cyanosis, but bilateral 2+ pitting edema with ACE bandage in place.  Skin: No gross skin abnormalities or rashes noted other than PleurX site that is without erythema or drainage. Dressing is C/D/I.  Neuro: AAOx3 with no cranial nerve deficits. Strength and sensation intact throughout.    LABS:                        9.5    9.97  )-----------( 118      ( 10 Scott 2020 06:36 )             32.4     01-10    130<L>  |  100  |  12  ----------------------------<  123<H>  see note   |  21<L>  |  0.70    Ca    8.3<L>      10 Scott 2020 06:36  Mg     1.6     01-10    TPro  x   /  Alb  x   /  TBili  1.4<H>  /  DBili  x   /  AST  x   /  ALT  x   /  AlkPhos  x   01-09    PT/INR - ( 09 Jan 2020 06:40 )   PT: 24.3 sec;   INR: 2.08          PTT - ( 09 Jan 2020 06:40 )  PTT:40.2 sec      RADIOLOGY & ADDITIONAL TESTS: Reviewed.    MEDICATIONS  (STANDING):  atorvastatin 20 milliGRAM(s) Oral at bedtime  budesonide  80 MICROgram(s)/formoterol 4.5 MICROgram(s) Inhaler 2 Puff(s) Inhalation two times a day  cholecalciferol 1000 Unit(s) Oral daily  enoxaparin Injectable 120 milliGRAM(s) SubCutaneous every 12 hours  ferrous    sulfate 325 milliGRAM(s) Oral daily  insulin lispro (HumaLOG) corrective regimen sliding scale   SubCutaneous Before meals and at bedtime  iron sucrose IVPB 200 milliGRAM(s) IV Intermittent every 24 hours  magnesium sulfate  IVPB 4 Gram(s) IV Intermittent once  melatonin 3 milliGRAM(s) Oral at bedtime  metoprolol tartrate 50 milliGRAM(s) Oral two times a day  pantoprazole    Tablet 40 milliGRAM(s) Oral before breakfast    MEDICATIONS  (PRN):  acetaminophen   Tablet .. 650 milliGRAM(s) Oral every 6 hours PRN Mild Pain (1-3)  cyclobenzaprine 10 milliGRAM(s) Oral three times a day PRN Muscle Spasm  polyethylene glycol 3350 17 Gram(s) Oral every 12 hours PRN Constipation  senna 2 Tablet(s) Oral daily PRN Constipation      Allergies    No Known Drug Allergies  shellfish (Hives)    Intolerances

## 2020-01-10 NOTE — PROGRESS NOTE ADULT - ATTENDING COMMENTS
Please obtain from surgery information about palliative resectability of IVC mass.  If not feasible, plan for tx to [facility] for inpatient chemo.
Patient seen and examined; Patient with extensive thrombosis with Budd Chiari ; Also lower extremity clot; She was placed back on lovenox and Dr. Howe will see patient tomorrow; May need surgery at an earlier date
Will keep in hospital today; Dr. Howe to see patient today;  Likely discharge tomorrow; Will see specialist on [place]
Patient seen and examined with house-staff during bedside rounds.  Resident note read, including vitals, physical findings, laboratory data, and radiological reports.   Revisions included below.  Direct personal management at bed side and extensive interpretation of the data.  Plan was outlined and discussed in details with the housestaff.  Decision making of high complexity  Action taken for acute disease activity to reflect the level of care provided:  - medication reconciliation  - review laboratory data  ECHO was normal  I discussed with Oncology at [facility] and the hospitalist.  Port to be done at [facility]  She is accepted for transfer and awaiting bed.  She I sstable.  Discussed with patient
Patient seen and examined with house-staff during bedside rounds.  Resident note read, including vitals, physical findings, laboratory data, and radiological reports.   Revisions included below.  Direct personal management at bed side and extensive interpretation of the data.  Plan was outlined and discussed in details with the housestaff.  Decision making of high complexity  Action taken for acute disease activity to reflect the level of care provided:  - medication reconciliation  - review laboratory kim  I discussed the case with , Huma, and [place].  Hold on surgery for for now. Patient is draining from the pleura spray should drink 200 cc today. Pulmonary status improved off her drainage of the malignant pleural effusion. Continue Lovenox. Blood pressure is controlled. Hemoglobin is stable. Blood sugar is controlled. Will discuss referral to another facility for treatment

## 2020-01-10 NOTE — DIETITIAN INITIAL EVALUATION ADULT. - ADD RECOMMEND
1. Trend wts 2. Manage pain prn 3. Monitor and replete lytes 4. Reinforce ed 5. Honor food preferences

## 2020-01-10 NOTE — PROGRESS NOTE ADULT - ASSESSMENT
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    #Advanced leiomyosarcoma - large intrahepatic IVC Mass w/ vascular tumor extension   #Acute femoral DVTs, bilateral   #Pleural Effusion, R sided  #Microcytic Anemia     Clinically better  S/p IV iron  PleurX drained- significantly less SOB  To continue anticoagulation with Lovenox for B/L LE edema   follow up with [facility] with Dr. Seb Chaves (now scheduled for Tuesday 1/14 at 3 PM) to discuss potential systemic treatment options, invasive interventions, clinical trials, and palliative care      Thank you    Louis Thomason MD  [phone]

## 2020-01-10 NOTE — PROGRESS NOTE ADULT - PROBLEM SELECTOR PLAN 5
Patient has a history of asthma, takes Advair, and is not currently in exacerbation    -Continue Symbicort as therapeutic interchange

## 2020-01-10 NOTE — PROCEDURE NOTE - NSPOSTCAREGUIDE_GEN_A_CORE
Instructed patient/caregiver regarding signs and symptoms of infection/Keep the cast/splint/dressing clean and dry/Care for catheter as per unit/ICU protocols/Verbal/written post procedure instructions were given to patient/caregiver/Instructed patient/caregiver to follow-up with primary care physician

## 2020-01-10 NOTE — PROGRESS NOTE ADULT - PROBLEM SELECTOR PROBLEM 1
Leiomyosarcoma
Pleural effusion
Acute deep vein thrombosis (DVT) of femoral vein of both lower extremities

## 2020-01-10 NOTE — CHART NOTE - NSCHARTNOTEFT_GEN_A_CORE
Upon Nutritional Assessment by the Registered Dietitian your patient was determined to meet criteria / has evidence of the following diagnosis/diagnoses:          [ ]  Mild Protein Calorie Malnutrition        [ ]  Moderate Protein Calorie Malnutrition        [ ] Severe Protein Calorie Malnutrition        [ ] Unspecified Protein Calorie Malnutrition        [ ] Underweight / BMI <19        [x ] Morbid Obesity / BMI > 40      Findings as based on:  •  Comprehensive nutrition assessment and consultation    Treatment:    The following diet has been recommended:    Regular diet as feasible (A1C 5.8)       PROVIDER Section:     By signing this assessment you are acknowledging and agree with the diagnosis/diagnoses assigned by the Registered Dietitian    Comments:

## 2020-01-10 NOTE — PROGRESS NOTE ADULT - PROBLEM SELECTOR PLAN 2
Recently diagnosed and has an appointment with Dr. Seb Chaves on 1/14. Surgery (Dr. Howe) and Vascular (Dr. Shea) are discussing possible palliative resection prior to chemotherapy initiation. Pending port placement today 1/10.    -Follow up further heme/onc recommendations  -Follow up Surgery/Vascular Surgery recommendations  -Monitor post port placement

## 2020-01-10 NOTE — DISCHARGE NOTE PROVIDER - NSDCCPCAREPLAN_GEN_ALL_CORE_FT
PRINCIPAL DISCHARGE DIAGNOSIS  Diagnosis: DVT, bilateral lower limbs  Assessment and Plan of Treatment: You came to the emergency department and were found to have clots in both of your legs for which you were started on anticoagulation with lovenox. You will continue with this medication at [facility].      SECONDARY DISCHARGE DIAGNOSES  Diagnosis: Malignancy  Assessment and Plan of Treatment: You have a leiomyosarcoma which was recently diagnosed and you will continue to received care at [facility] under Dr. Hernandez who will coordinate your chemo port placement and chemotherapy sessions.

## 2020-01-10 NOTE — PROGRESS NOTE ADULT - PROBLEM SELECTOR PLAN 6
Known history of DM with HbA1C of 5.8 on 12/30/19 and Metformin as home medication    -MISS while inpatient

## 2020-01-10 NOTE — PROGRESS NOTE ADULT - PROBLEM SELECTOR PLAN 7
Known history of HTN but not currently on anti-hypertensives    -Monitor BP as she is currently normotensive

## 2020-01-10 NOTE — DIETITIAN INITIAL EVALUATION ADULT. - PROBLEM SELECTOR PLAN 1
- Patient w/ acute VTE of b/l femoral vein. Previously was meant to be discharged on eliquis. She did not take her eliquis, there was confusion as multiple providers had differeing opinions. She now presents with b/l femoral VTE and asymptomatic lower extremity edema.   - lovenox 120 mg bid   - vascular consult

## 2020-01-10 NOTE — DIETITIAN INITIAL EVALUATION ADULT. - ENERGY NEEDS
Ideal body weight used for calculations as pt >120% of IBW.   .4kg, IBW 56kg, 201% IBW, ht 65", BMI 41.6   Nutrient needs based on [facility] standards of care for maintenance in adults, adjusted for age, onc needs, fluid per team

## 2020-01-11 DIAGNOSIS — E11.9 TYPE 2 DIABETES MELLITUS WITHOUT COMPLICATIONS: ICD-10-CM

## 2020-01-11 DIAGNOSIS — I10 ESSENTIAL (PRIMARY) HYPERTENSION: ICD-10-CM

## 2020-01-11 DIAGNOSIS — S90.519A ABRASION, UNSPECIFIED ANKLE, INITIAL ENCOUNTER: ICD-10-CM

## 2020-01-11 DIAGNOSIS — Z02.9 ENCOUNTER FOR ADMINISTRATIVE EXAMINATIONS, UNSPECIFIED: ICD-10-CM

## 2020-01-11 DIAGNOSIS — E78.5 HYPERLIPIDEMIA, UNSPECIFIED: ICD-10-CM

## 2020-01-11 DIAGNOSIS — C49.9 MALIGNANT NEOPLASM OF CONNECTIVE AND SOFT TISSUE, UNSPECIFIED: ICD-10-CM

## 2020-01-11 DIAGNOSIS — I82.413 ACUTE EMBOLISM AND THROMBOSIS OF FEMORAL VEIN, BILATERAL: ICD-10-CM

## 2020-01-11 DIAGNOSIS — J90 PLEURAL EFFUSION, NOT ELSEWHERE CLASSIFIED: ICD-10-CM

## 2020-01-11 LAB
ACANTHOCYTES BLD QL SMEAR: SLIGHT — SIGNIFICANT CHANGE UP
ALBUMIN SERPL ELPH-MCNC: 2.7 G/DL — LOW (ref 3.3–5)
ALP SERPL-CCNC: 168 U/L — HIGH (ref 40–120)
ALT FLD-CCNC: 60 U/L — HIGH (ref 4–33)
ANION GAP SERPL CALC-SCNC: 9 MMO/L — SIGNIFICANT CHANGE UP (ref 7–14)
ANISOCYTOSIS BLD QL: SIGNIFICANT CHANGE UP
AST SERPL-CCNC: 60 U/L — HIGH (ref 4–32)
BASOPHILS # BLD AUTO: 0.18 K/UL — SIGNIFICANT CHANGE UP (ref 0–0.2)
BASOPHILS NFR BLD AUTO: 2 % — SIGNIFICANT CHANGE UP (ref 0–2)
BASOPHILS NFR SPEC: 1.8 % — SIGNIFICANT CHANGE UP (ref 0–2)
BILIRUB SERPL-MCNC: 1.4 MG/DL — HIGH (ref 0.2–1.2)
BLASTS # FLD: 0 % — SIGNIFICANT CHANGE UP (ref 0–0)
BUN SERPL-MCNC: 10 MG/DL — SIGNIFICANT CHANGE UP (ref 7–23)
BURR CELLS BLD QL SMEAR: PRESENT — SIGNIFICANT CHANGE UP
CALCIUM SERPL-MCNC: 8.2 MG/DL — LOW (ref 8.4–10.5)
CHLORIDE SERPL-SCNC: 100 MMOL/L — SIGNIFICANT CHANGE UP (ref 98–107)
CO2 SERPL-SCNC: 22 MMOL/L — SIGNIFICANT CHANGE UP (ref 22–31)
CREAT SERPL-MCNC: 0.68 MG/DL — SIGNIFICANT CHANGE UP (ref 0.5–1.3)
ELLIPTOCYTES BLD QL SMEAR: SLIGHT — SIGNIFICANT CHANGE UP
EOSINOPHIL # BLD AUTO: 0.34 K/UL — SIGNIFICANT CHANGE UP (ref 0–0.5)
EOSINOPHIL NFR BLD AUTO: 3.9 % — SIGNIFICANT CHANGE UP (ref 0–6)
EOSINOPHIL NFR FLD: 4.5 % — SIGNIFICANT CHANGE UP (ref 0–6)
GIANT PLATELETS BLD QL SMEAR: PRESENT — SIGNIFICANT CHANGE UP
GLUCOSE BLDC GLUCOMTR-MCNC: 118 MG/DL — HIGH (ref 70–99)
GLUCOSE BLDC GLUCOMTR-MCNC: 157 MG/DL — HIGH (ref 70–99)
GLUCOSE BLDC GLUCOMTR-MCNC: 159 MG/DL — HIGH (ref 70–99)
GLUCOSE BLDC GLUCOMTR-MCNC: 163 MG/DL — HIGH (ref 70–99)
GLUCOSE SERPL-MCNC: 130 MG/DL — HIGH (ref 70–99)
HCT VFR BLD CALC: 29.1 % — LOW (ref 34.5–45)
HGB BLD-MCNC: 8.9 G/DL — LOW (ref 11.5–15.5)
HYPOCHROMIA BLD QL: SIGNIFICANT CHANGE UP
IMM GRANULOCYTES NFR BLD AUTO: 0.5 % — SIGNIFICANT CHANGE UP (ref 0–1.5)
LYMPHOCYTES # BLD AUTO: 3.43 K/UL — HIGH (ref 1–3.3)
LYMPHOCYTES # BLD AUTO: 39 % — SIGNIFICANT CHANGE UP (ref 13–44)
LYMPHOCYTES NFR SPEC AUTO: 25.2 % — SIGNIFICANT CHANGE UP (ref 13–44)
MACROCYTES BLD QL: SLIGHT — SIGNIFICANT CHANGE UP
MAGNESIUM SERPL-MCNC: 1.8 MG/DL — SIGNIFICANT CHANGE UP (ref 1.6–2.6)
MCHC RBC-ENTMCNC: 20.6 PG — LOW (ref 27–34)
MCHC RBC-ENTMCNC: 30.6 % — LOW (ref 32–36)
MCV RBC AUTO: 67.2 FL — LOW (ref 80–100)
METAMYELOCYTES # FLD: 0 % — SIGNIFICANT CHANGE UP (ref 0–1)
MICROCYTES BLD QL: SIGNIFICANT CHANGE UP
MONOCYTES # BLD AUTO: 1.14 K/UL — HIGH (ref 0–0.9)
MONOCYTES NFR BLD AUTO: 13 % — SIGNIFICANT CHANGE UP (ref 2–14)
MONOCYTES NFR BLD: 6.3 % — SIGNIFICANT CHANGE UP (ref 2–9)
MYELOCYTES NFR BLD: 0 % — SIGNIFICANT CHANGE UP (ref 0–0)
NEUTROPHIL AB SER-ACNC: 60.4 % — SIGNIFICANT CHANGE UP (ref 43–77)
NEUTROPHILS # BLD AUTO: 3.66 K/UL — SIGNIFICANT CHANGE UP (ref 1.8–7.4)
NEUTROPHILS NFR BLD AUTO: 41.6 % — LOW (ref 43–77)
NEUTS BAND # BLD: 0 % — SIGNIFICANT CHANGE UP (ref 0–6)
NRBC # BLD: 1 /100WBC — SIGNIFICANT CHANGE UP
NRBC # FLD: 0.05 K/UL — SIGNIFICANT CHANGE UP (ref 0–0)
OTHER - HEMATOLOGY %: 0 — SIGNIFICANT CHANGE UP
OVALOCYTES BLD QL SMEAR: SLIGHT — SIGNIFICANT CHANGE UP
PHOSPHATE SERPL-MCNC: 2.1 MG/DL — LOW (ref 2.5–4.5)
PLATELET # BLD AUTO: 121 K/UL — LOW (ref 150–400)
PLATELET COUNT - ESTIMATE: SIGNIFICANT CHANGE UP
PMV BLD: SIGNIFICANT CHANGE UP FL (ref 7–13)
POIKILOCYTOSIS BLD QL AUTO: SIGNIFICANT CHANGE UP
POLYCHROMASIA BLD QL SMEAR: SLIGHT — SIGNIFICANT CHANGE UP
POTASSIUM SERPL-MCNC: 3.7 MMOL/L — SIGNIFICANT CHANGE UP (ref 3.5–5.3)
POTASSIUM SERPL-SCNC: 3.7 MMOL/L — SIGNIFICANT CHANGE UP (ref 3.5–5.3)
PROMYELOCYTES # FLD: 0 % — SIGNIFICANT CHANGE UP (ref 0–0)
PROT SERPL-MCNC: 5.5 G/DL — LOW (ref 6–8.3)
RBC # BLD: 4.33 M/UL — SIGNIFICANT CHANGE UP (ref 3.8–5.2)
RBC # FLD: 24.8 % — HIGH (ref 10.3–14.5)
SCHISTOCYTES BLD QL AUTO: SLIGHT — SIGNIFICANT CHANGE UP
SMUDGE CELLS # BLD: PRESENT — SIGNIFICANT CHANGE UP
SODIUM SERPL-SCNC: 131 MMOL/L — LOW (ref 135–145)
TARGETS BLD QL SMEAR: SLIGHT — SIGNIFICANT CHANGE UP
VARIANT LYMPHS # BLD: 1.8 % — SIGNIFICANT CHANGE UP
WBC # BLD: 8.79 K/UL — SIGNIFICANT CHANGE UP (ref 3.8–10.5)
WBC # FLD AUTO: 8.79 K/UL — SIGNIFICANT CHANGE UP (ref 3.8–10.5)

## 2020-01-11 PROCEDURE — 99223 1ST HOSP IP/OBS HIGH 75: CPT

## 2020-01-11 PROCEDURE — 99233 SBSQ HOSP IP/OBS HIGH 50: CPT

## 2020-01-11 RX ORDER — DEXTROSE 50 % IN WATER 50 %
12.5 SYRINGE (ML) INTRAVENOUS ONCE
Refills: 0 | Status: DISCONTINUED | OUTPATIENT
Start: 2020-01-11 | End: 2020-01-16

## 2020-01-11 RX ORDER — ENOXAPARIN SODIUM 100 MG/ML
120 INJECTION SUBCUTANEOUS
Refills: 0 | Status: DISCONTINUED | OUTPATIENT
Start: 2020-01-11 | End: 2020-01-13

## 2020-01-11 RX ORDER — INSULIN LISPRO 100/ML
VIAL (ML) SUBCUTANEOUS
Refills: 0 | Status: DISCONTINUED | OUTPATIENT
Start: 2020-01-11 | End: 2020-01-12

## 2020-01-11 RX ORDER — INSULIN LISPRO 100/ML
VIAL (ML) SUBCUTANEOUS AT BEDTIME
Refills: 0 | Status: DISCONTINUED | OUTPATIENT
Start: 2020-01-11 | End: 2020-01-12

## 2020-01-11 RX ORDER — ATORVASTATIN CALCIUM 80 MG/1
20 TABLET, FILM COATED ORAL AT BEDTIME
Refills: 0 | Status: DISCONTINUED | OUTPATIENT
Start: 2020-01-11 | End: 2020-01-16

## 2020-01-11 RX ORDER — PANTOPRAZOLE SODIUM 20 MG/1
40 TABLET, DELAYED RELEASE ORAL
Refills: 0 | Status: DISCONTINUED | OUTPATIENT
Start: 2020-01-11 | End: 2020-01-16

## 2020-01-11 RX ORDER — SENNA PLUS 8.6 MG/1
2 TABLET ORAL DAILY
Refills: 0 | Status: DISCONTINUED | OUTPATIENT
Start: 2020-01-11 | End: 2020-01-16

## 2020-01-11 RX ORDER — DEXTROSE 50 % IN WATER 50 %
25 SYRINGE (ML) INTRAVENOUS ONCE
Refills: 0 | Status: DISCONTINUED | OUTPATIENT
Start: 2020-01-11 | End: 2020-01-16

## 2020-01-11 RX ORDER — DEXTROSE 50 % IN WATER 50 %
15 SYRINGE (ML) INTRAVENOUS ONCE
Refills: 0 | Status: DISCONTINUED | OUTPATIENT
Start: 2020-01-11 | End: 2020-01-16

## 2020-01-11 RX ORDER — GLUCAGON INJECTION, SOLUTION 0.5 MG/.1ML
1 INJECTION, SOLUTION SUBCUTANEOUS ONCE
Refills: 0 | Status: DISCONTINUED | OUTPATIENT
Start: 2020-01-11 | End: 2020-01-16

## 2020-01-11 RX ORDER — METOPROLOL TARTRATE 50 MG
50 TABLET ORAL
Refills: 0 | Status: DISCONTINUED | OUTPATIENT
Start: 2020-01-11 | End: 2020-01-16

## 2020-01-11 RX ORDER — POLYETHYLENE GLYCOL 3350 17 G/17G
17 POWDER, FOR SOLUTION ORAL EVERY 12 HOURS
Refills: 0 | Status: DISCONTINUED | OUTPATIENT
Start: 2020-01-11 | End: 2020-01-16

## 2020-01-11 RX ORDER — SODIUM,POTASSIUM PHOSPHATES 278-250MG
1 POWDER IN PACKET (EA) ORAL
Refills: 0 | Status: COMPLETED | OUTPATIENT
Start: 2020-01-11 | End: 2020-01-13

## 2020-01-11 RX ORDER — ACETAMINOPHEN 500 MG
650 TABLET ORAL EVERY 6 HOURS
Refills: 0 | Status: DISCONTINUED | OUTPATIENT
Start: 2020-01-11 | End: 2020-01-16

## 2020-01-11 RX ORDER — OXYCODONE AND ACETAMINOPHEN 5; 325 MG/1; MG/1
1 TABLET ORAL EVERY 6 HOURS
Refills: 0 | Status: DISCONTINUED | OUTPATIENT
Start: 2020-01-11 | End: 2020-01-16

## 2020-01-11 RX ADMIN — Medication 1: at 12:30

## 2020-01-11 RX ADMIN — Medication 1 TABLET(S): at 11:13

## 2020-01-11 RX ADMIN — Medication 1 TABLET(S): at 21:42

## 2020-01-11 RX ADMIN — PANTOPRAZOLE SODIUM 40 MILLIGRAM(S): 20 TABLET, DELAYED RELEASE ORAL at 06:22

## 2020-01-11 RX ADMIN — ENOXAPARIN SODIUM 120 MILLIGRAM(S): 100 INJECTION SUBCUTANEOUS at 06:22

## 2020-01-11 RX ADMIN — Medication 1: at 18:02

## 2020-01-11 RX ADMIN — CYCLOBENZAPRINE HYDROCHLORIDE 10 MILLIGRAM(S): 10 TABLET, FILM COATED ORAL at 00:13

## 2020-01-11 RX ADMIN — ATORVASTATIN CALCIUM 20 MILLIGRAM(S): 80 TABLET, FILM COATED ORAL at 21:39

## 2020-01-11 RX ADMIN — ENOXAPARIN SODIUM 120 MILLIGRAM(S): 100 INJECTION SUBCUTANEOUS at 16:49

## 2020-01-11 RX ADMIN — Medication 50 MILLIGRAM(S): at 16:49

## 2020-01-11 RX ADMIN — Medication 50 MILLIGRAM(S): at 06:22

## 2020-01-11 RX ADMIN — Medication 3 MILLIGRAM(S): at 00:13

## 2020-01-11 RX ADMIN — Medication 1 TABLET(S): at 16:49

## 2020-01-11 RX ADMIN — CYCLOBENZAPRINE HYDROCHLORIDE 10 MILLIGRAM(S): 10 TABLET, FILM COATED ORAL at 06:22

## 2020-01-11 RX ADMIN — Medication 650 MILLIGRAM(S): at 12:23

## 2020-01-11 RX ADMIN — Medication 650 MILLIGRAM(S): at 11:22

## 2020-01-11 NOTE — CONSULT NOTE ADULT - ASSESSMENT
39F with hx of iron def anemia, uterine fibroids, s/p bilateral uterine artery embolization in 8/2019 due to fibroids, Pt has had 4 admissions to [facility] in the past month.  She initially was admitted to [facility] in early December with dyspnea, and was found to have R pleural effusion.  Imaging revealed large RP mass infiltrating IVC and extending to RA.  Pt initially had chest tube with fluid analysis showing nonmalignant cells.  Fluid re-accumulated after removal of chest tube, so pt eventually had Pleurx catheter placed on 1/9.  Biopsy of retroperitoneal mass showed leiomyosarcoma.  Of note Pt was admitted most recently 10/6-10/10 for bilateral leg swelling and was found to have DVT's.  She was started on Lovenox. Plan was  follow up with [facility] with Dr. Seb Chaves on Tuesday 1/14  to discuss potential systemic treatment options but patient transferred to [facility] to start treatment. Patient did not get the port yesterday because she was not NPO.     #Advanced leiomyosarcoma - large intrahepatic IVC Mass w/ vascular tumor extension   #Acute femoral DVTs, bilateral   #Pleural Effusion, R sided  #Microcytic Anemia     -Appreciate pulmonary recs  -Please call IR to schedule for Mediport as soon as possible  -Will c/w Pleurx at this point and assess the drainage after the chemo  -Plan to start Doxorubicin/Dacarbazine after port placement  -c/w Lovenox for acute DVT    Jessica Tellez PGY4  Heme/Onc fellow

## 2020-01-11 NOTE — H&P ADULT - ASSESSMENT
40 y/o F with HTN, HLD, DM, asthma, fibroids, recently diagnosed leiomyosarcoma and DVT transferred from [place] for initiation of chemotherapy.

## 2020-01-11 NOTE — H&P ADULT - NSHPREVIEWOFSYSTEMS_GEN_ALL_CORE
Review of Systems:   CONSTITUTIONAL: No fever or chills  EYES: No eye pain, visual disturbances, or discharge  ENMT:  No difficulty hearing, no throat pain  NECK: No pain or stiffness  RESPIRATORY: No cough, No shortness of breath  CARDIOVASCULAR: No chest pain, palpitations, dizziness, or leg swelling  GASTROINTESTINAL: No abdominal pain, nausea, vomiting or diarrhea  GENITOURINARY: No dysuria, or hematuria  NEUROLOGICAL: No headaches, weakness, or numbness  SKIN: No rashes, or lesions   LYMPH NODES: No enlarged glands  ENDOCRINE: No heat or cold intolerance  MUSCULOSKELETAL: back spasm as above  PSYCHIATRIC: No depression or anxiety  HEME/LYMPH: No easy bruising, or bleeding  ALLERGY AND IMMUNOLOGIC: No hives or eczema

## 2020-01-11 NOTE — CONSULT NOTE ADULT - SUBJECTIVE AND OBJECTIVE BOX
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  s/p thoracentesis 12/05 now s/p diagnostic laparoscopy and liver bx.      REVIEW OF SYSTEMS:    CONSTITUTIONAL: No weakness, fevers or chills  EYES/ENT: No visual changes, no throat pain   RESPIRATORY: No cough, wheezing, hemoptysis; No shortness of breath  CARDIOVASCULAR: No chest pain or palpitations  GASTROINTESTINAL: No abdominal pain, nausea, vomiting, or hematemesis; No diarrhea or constipation. No melena or hematochezia.  GENITOURINARY: No dysuria, frequency or hematuria  MUSCULOSKELETAL: No joint pain.  NEUROLOGICAL: No dizziness, numbness, or weakness  SKIN: No itching, burning, rashes, or lesions   All other review of systems is negative unless indicated above.  Allergies    No Known Drug Allergies  shellfish (Hives)    Intolerances        PAST MEDICAL & SURGICAL HISTORY:  Leiomyosarcoma  Uterine fibroid  Hyperlipidemia  Asthma  Diabetes  Hypertension  No significant past surgical history      FAMILY HISTORY:  No pertinent family history in first degree relatives      Social History:          VITAL SIGNS:  Vital Signs Last 24 Hrs  T(C): 36.2 (11 Jan 2020 13:53), Max: 36.9 (10 Scott 2020 22:46)  T(F): 97.2 (11 Jan 2020 13:53), Max: 98.5 (10 Scott 2020 22:46)  HR: 88 (11 Jan 2020 13:53) (88 - 103)  BP: 125/64 (11 Jan 2020 13:53) (125/64 - 141/95)  BP(mean): --  RR: 16 (11 Jan 2020 13:53) (14 - 19)  SpO2: 99% (11 Jan 2020 13:53) (99% - 100%)      PHYSICAL EXAM:     GENERAL: no acute distress  HEENT: EOMI, neck supple  RESPIRATORY: LCTAB/L, no rhonchi, rales, or wheezing  CARDIOVASCULAR: RRR, no murmurs, gallops, rubs  ABDOMINAL: soft, non-tender, non-distended, positive bowel sounds   EXTREMITIES: no clubbing, cyanosis, or edema  NEUROLOGICAL: alert and oriented x 3, non-focal  SKIN: no rashes or lesions   MUSCULOSKELETAL: no gross joint deformity                          8.9    8.79  )-----------( 121      ( 11 Jan 2020 07:00 )             29.1     01-11    131<L>  |  100  |  10  ----------------------------<  130<H>  3.7   |  22  |  0.68    Ca    8.2<L>      11 Jan 2020 07:00  Phos  2.1     01-11  Mg     1.8     01-11    TPro  5.5<L>  /  Alb  2.7<L>  /  TBili  1.4<H>  /  DBili  x   /  AST  60<H>  /  ALT  60<H>  /  AlkPhos  168<H>  01-11      MEDICATIONS  (STANDING):  atorvastatin 20 milliGRAM(s) Oral at bedtime  dextrose 50% Injectable 12.5 Gram(s) IV Push once  dextrose 50% Injectable 25 Gram(s) IV Push once  dextrose 50% Injectable 25 Gram(s) IV Push once  enoxaparin Injectable 120 milliGRAM(s) SubCutaneous two times a day  insulin lispro (HumaLOG) corrective regimen sliding scale   SubCutaneous three times a day before meals  insulin lispro (HumaLOG) corrective regimen sliding scale   SubCutaneous at bedtime  melatonin 3 milliGRAM(s) Oral at bedtime  metoprolol tartrate 50 milliGRAM(s) Oral two times a day  pantoprazole    Tablet 40 milliGRAM(s) Oral before breakfast  potassium acid phosphate/sodium acid phosphate tablet (K-PHOS No. 2) 1 Tablet(s) Oral four times a day with meals HPI:  39F with hx of iron def anemia, uterine fibroids, s/p bilateral uterine artery embolization in 8/2019 due to fibroids, Pt has had 4 admissions to [facility] in the past month.  She initially was admitted to [facility] in early December with dyspnea, and was found to have R pleural effusion.  Imaging revealed large RP mass infiltrating IVC and extending to RA.  Pt initially had chest tube with fluid analysis showing nonmalignant cells.  Fluid re-accumulated after removal of chest tube, so pt eventually had Pleurx catheter placed on 1/9.  Biopsy of retroperitoneal mass showed leiomyosarcoma.  Of note Pt was admitted most recently 10/6-10/10 for bilateral leg swelling and was found to have DVT's.  She was started on Lovenox. Plan was  follow up with [facility] with Dr. Seb Chaves on Tuesday 1/14  to discuss potential systemic treatment options but patient transferred to [facility] to start treatment. Patient did not get the port yesterday because she was not NPO. Patient reports no acute complaint, her main complaint is occasional back spasm started after Pleurx.           REVIEW OF SYSTEMS:    CONSTITUTIONAL: No weakness, fevers or chills  EYES/ENT: No visual changes, no throat pain   RESPIRATORY: No cough, wheezing, hemoptysis; No shortness of breath  CARDIOVASCULAR: No chest pain or palpitations  GASTROINTESTINAL: No abdominal pain, nausea, vomiting, or hematemesis; No diarrhea or constipation. No melena or hematochezia.  GENITOURINARY: No dysuria, frequency or hematuria  MUSCULOSKELETAL: No joint pain.  NEUROLOGICAL: No dizziness, numbness, or weakness  SKIN: No itching, burning, rashes, or lesions   All other review of systems is negative unless indicated above.  Allergies    No Known Drug Allergies  shellfish (Hives)    Intolerances        PAST MEDICAL & SURGICAL HISTORY:  Leiomyosarcoma  Uterine fibroid  Hyperlipidemia  Asthma  Diabetes  Hypertension  No significant past surgical history      FAMILY HISTORY:  No pertinent family history in first degree relatives      Social History:          VITAL SIGNS:  Vital Signs Last 24 Hrs  T(C): 36.2 (11 Jan 2020 13:53), Max: 36.9 (10 Scott 2020 22:46)  T(F): 97.2 (11 Jan 2020 13:53), Max: 98.5 (10 Scott 2020 22:46)  HR: 88 (11 Jan 2020 13:53) (88 - 103)  BP: 125/64 (11 Jan 2020 13:53) (125/64 - 141/95)  BP(mean): --  RR: 16 (11 Jan 2020 13:53) (14 - 19)  SpO2: 99% (11 Jan 2020 13:53) (99% - 100%)      PHYSICAL EXAM:   CONSTITUTIONAL: Obese AA woman. NAD, well-developed  RESPIRATORY: Normal respiratory effort; lungs are clear to auscultation bilaterally  CARDIOVASCULAR: Regular rate and rhythm, normal S1 and S2, no murmur/rub/gallop; No lower extremity edema; Peripheral pulses are 2+ bilaterally  ABDOMEN: Nontender to palpation, normoactive bowel sounds, no rebound/guarding; No hepatosplenomegaly  MUSCLOSKELETAL: no clubbing or cyanosis of digits; no joint swelling or tenderness to palpation  PSYCH: A+O to person, place, and time; affect appropriate  SKIN: R sided pleurx catheter  EXTREMITIES: B/L LE bandaged, removed. RLE ankle with erythematous abrasions. Not weeping. No vesicles seen. RLE [place] with abrasions and surrounding bruising.                           8.9    8.79  )-----------( 121      ( 11 Jan 2020 07:00 )             29.1     01-11    131<L>  |  100  |  10  ----------------------------<  130<H>  3.7   |  22  |  0.68    Ca    8.2<L>      11 Jan 2020 07:00  Phos  2.1     01-11  Mg     1.8     01-11    TPro  5.5<L>  /  Alb  2.7<L>  /  TBili  1.4<H>  /  DBili  x   /  AST  60<H>  /  ALT  60<H>  /  AlkPhos  168<H>  01-11      MEDICATIONS  (STANDING):  atorvastatin 20 milliGRAM(s) Oral at bedtime  dextrose 50% Injectable 12.5 Gram(s) IV Push once  dextrose 50% Injectable 25 Gram(s) IV Push once  dextrose 50% Injectable 25 Gram(s) IV Push once  enoxaparin Injectable 120 milliGRAM(s) SubCutaneous two times a day  insulin lispro (HumaLOG) corrective regimen sliding scale   SubCutaneous three times a day before meals  insulin lispro (HumaLOG) corrective regimen sliding scale   SubCutaneous at bedtime  melatonin 3 milliGRAM(s) Oral at bedtime  metoprolol tartrate 50 milliGRAM(s) Oral two times a day  pantoprazole    Tablet 40 milliGRAM(s) Oral before breakfast  potassium acid phosphate/sodium acid phosphate tablet (K-PHOS No. 2) 1 Tablet(s) Oral four times a day with meals

## 2020-01-11 NOTE — H&P ADULT - NSHPLABSRESULTS_GEN_ALL_CORE
01-10    130<L>  |  100  |  12  ----------------------------<  123<H>  see note   |  21<L>  |  0.70    Ca    8.3<L>      10 Scott 2020 06:36  Mg     1.6     01-10    TPro  x   /  Alb  x   /  TBili  1.4<H>  /  DBili  x   /  AST  x   /  ALT  x   /  AlkPhos  x   01-09                            9.5    9.97  )-----------( 118      ( 10 Scott 2020 06:36 )             32.4             PT/INR - ( 09 Jan 2020 06:40 )   PT: 24.3 sec;   INR: 2.08          PTT - ( 09 Jan 2020 06:40 )  PTT:40.2 sec

## 2020-01-11 NOTE — H&P ADULT - NSHPPHYSICALEXAM_GEN_ALL_CORE
Vital Signs Last 24 Hrs  T(C): 36.9 (10 Scott 2020 22:46), Max: 36.9 (10 Scott 2020 05:38)  T(F): 98.5 (10 Scott 2020 22:46), Max: 98.5 (10 Scott 2020 05:38)  HR: 103 (10 Scott 2020 22:46) (98 - 103)  BP: 131/91 (10 Scott 2020 22:46) (110/74 - 141/95)  BP(mean): --  RR: 14 (10 Scott 2020 22:46) (14 - 19)  SpO2: 100% (10 Scott 2020 22:46) (99% - 100%)    PHYSICAL EXAM:  GENERAL: No Acute Distress  EYES: conjunctiva and sclera clear  ENMT: Moist mucous membranes   NECK: Supple  PULMONARY: Clear to auscultation bilaterally  CARDIAC: Regular rate and rhythm; No murmurs, rubs, or gallops  GASTROINTESTINAL: Abdomen soft, Nontender, Nondistended; Bowel sounds normal  EXTREMITIES:   No clubbing, cyanosis.  Bilateral lower extremity edema  PSYCH: Normal Affect, Normal Behavior  NEUROLOGY:   - Mental status A&O x 3  SKIN: R sided pleurx catheter  MUSCULOSKELETAL: No joint swelling

## 2020-01-11 NOTE — PROGRESS NOTE ADULT - PROBLEM SELECTOR PLAN 8
1) PCP Contacted on Admission: (Y/N) -->   2) Date of Contact with PCP:  3) PCP Contacted at Discharge: (Y/N, N/A)  4) Summary of Handoff Given to PCP:   5) Post-Discharge Appointment Date and Location:

## 2020-01-11 NOTE — PROGRESS NOTE ADULT - SUBJECTIVE AND OBJECTIVE BOX
PROGRESS NOTE:   Patient is a 39y old  Female who presents with a chief complaint of Chemotherapy (11 Jan 2020 03:17)    SUBJECTIVE / OVERNIGHT EVENTS: The patient was seen and examined at bedside. Reports that she has cuts on her leg and his concerned if it is herpes given her history. Denies fevers or chills.     REVIEW OF SYSTEMS:    CONSTITUTIONAL: No weakness, fevers or chills  EYES/ENT: No visual changes;  No vertigo or throat pain   NECK: No pain or stiffness  RESPIRATORY: No cough, wheezing, hemoptysis; No shortness of breath  CARDIOVASCULAR: No chest pain or palpitations  GASTROINTESTINAL: No abdominal or epigastric pain. No nausea, vomiting, or hematemesis; No diarrhea or constipation. No melena or hematochezia.  GENITOURINARY: No dysuria, frequency or hematuria  NEUROLOGICAL: No numbness or weakness  SKIN: skin weeping.     MEDICATIONS  (STANDING):  atorvastatin 20 milliGRAM(s) Oral at bedtime  dextrose 50% Injectable 12.5 Gram(s) IV Push once  dextrose 50% Injectable 25 Gram(s) IV Push once  dextrose 50% Injectable 25 Gram(s) IV Push once  enoxaparin Injectable 120 milliGRAM(s) SubCutaneous two times a day  insulin lispro (HumaLOG) corrective regimen sliding scale   SubCutaneous three times a day before meals  insulin lispro (HumaLOG) corrective regimen sliding scale   SubCutaneous at bedtime  melatonin 3 milliGRAM(s) Oral at bedtime  metoprolol tartrate 50 milliGRAM(s) Oral two times a day  pantoprazole    Tablet 40 milliGRAM(s) Oral before breakfast  potassium acid phosphate/sodium acid phosphate tablet (K-PHOS No. 2) 1 Tablet(s) Oral four times a day with meals    MEDICATIONS  (PRN):  acetaminophen   Tablet .. 650 milliGRAM(s) Oral every 6 hours PRN Mild Pain (1 - 3), Moderate Pain (4 - 6)  cyclobenzaprine 10 milliGRAM(s) Oral three times a day PRN Muscle Spasm  dextrose 40% Gel 15 Gram(s) Oral once PRN Blood Glucose LESS THAN 70 milliGRAM(s)/deciliter  glucagon  Injectable 1 milliGRAM(s) IntraMuscular once PRN Glucose LESS THAN 70 milligrams/deciliter  oxycodone    5 mG/acetaminophen 325 mG 1 Tablet(s) Oral every 6 hours PRN Severe Pain (7 - 10)  polyethylene glycol 3350 17 Gram(s) Oral every 12 hours PRN Constipation  senna 2 Tablet(s) Oral daily PRN Constipation    CAPILLARY BLOOD GLUCOSE  POCT Blood Glucose.: 159 mg/dL (11 Jan 2020 12:25)  POCT Blood Glucose.: 118 mg/dL (11 Jan 2020 08:44)  POCT Blood Glucose.: 157 mg/dL (10 Scott 2020 18:14)    I&O's Summary    10 Scott 2020 07:01  -  11 Jan 2020 07:00  --------------------------------------------------------  IN: 0 mL / OUT: 900 mL / NET: -900 mL    PHYSICAL EXAM:  Vital Signs Last 24 Hrs  T(C): 36.7 (11 Jan 2020 06:18), Max: 36.9 (10 Scott 2020 22:46)  T(F): 98 (11 Jan 2020 06:18), Max: 98.5 (10 Scott 2020 22:46)  HR: 100 (11 Jan 2020 06:18) (98 - 103)  BP: 125/85 (11 Jan 2020 06:18) (117/79 - 141/95)  BP(mean): --  RR: 16 (11 Jan 2020 06:18) (14 - 19)  SpO2: 100% (11 Jan 2020 06:18) (100% - 100%)    CONSTITUTIONAL: Obese AA woman. NAD, well-developed  RESPIRATORY: Normal respiratory effort; lungs are clear to auscultation bilaterally  CARDIOVASCULAR: Regular rate and rhythm, normal S1 and S2, no murmur/rub/gallop; No lower extremity edema; Peripheral pulses are 2+ bilaterally  ABDOMEN: Nontender to palpation, normoactive bowel sounds, no rebound/guarding; No hepatosplenomegaly  MUSCLOSKELETAL: no clubbing or cyanosis of digits; no joint swelling or tenderness to palpation  PSYCH: A+O to person, place, and time; affect appropriate  SKIN: R sided pleurx catheter  EXTREMITIES: B/L LE bandaged, removed. RLE ankle with erythematous abrasions. Not weeping. No vesicles seen. RLE [place] with abrasions and surrounding bruising.     LABS:                        8.9    8.79  )-----------( 121      ( 11 Jan 2020 07:00 )             29.1     01-11    131<L>  |  100  |  10  ----------------------------<  130<H>  3.7   |  22  |  0.68    Ca    8.2<L>      11 Jan 2020 07:00  Phos  2.1     01-11  Mg     1.8     01-11    TPro  5.5<L>  /  Alb  2.7<L>  /  TBili  1.4<H>  /  DBili  x   /  AST  60<H>  /  ALT  60<H>  /  AlkPhos  168<H>  01-11      RADIOLOGY & ADDITIONAL TESTS:  Results Reviewed: Reviewed   Imaging Personally Reviewed: Reviewed   Electrocardiogram Personally Reviewed:    COORDINATION OF CARE:  Care Discussed with Consultants/Other Providers [Y/N]: Pending Oncology recommendations   Prior or Outpatient Records Reviewed [Y/N]:

## 2020-01-11 NOTE — CHART NOTE - NSCHARTNOTEFT_GEN_A_CORE
Patient Age: 39    Patient Gender: female    Procedure (including site/side if known): mediport placement for chemo    Diagnosis/Indication: chemo initiation    Interventional Radiology Attending Physician:    Ordering Attending Physician: Dr. 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 yes      Attending/Resident/NP/PA    Contact:   Valeria De La Vega NP                            Date:    1/11/2020                                time: [id number]

## 2020-01-11 NOTE — PROGRESS NOTE ADULT - PROBLEM SELECTOR PLAN 6
1) PCP Contacted on Admission: (Y/N) -->   2) Date of Contact with PCP:  3) PCP Contacted at Discharge: (Y/N, N/A)  4) Summary of Handoff Given to PCP:   5) Post-Discharge Appointment Date and Location: Controlled. A1C 5.8  Continue ISS

## 2020-01-11 NOTE — PROGRESS NOTE ADULT - PROBLEM SELECTOR PLAN 1
Oncology recommendations pending.   IR consult for port placement. Oncology recommendations pending re: chemotherapy initiation. Made aware.   IR consult for port placement.  Pain control: Percocet PRN, Tylenol PRN, Flexeril PRN for spasms. Bowel regimen.

## 2020-01-12 LAB
ANION GAP SERPL CALC-SCNC: 11 MMO/L — SIGNIFICANT CHANGE UP (ref 7–14)
APTT BLD: 38.9 SEC — HIGH (ref 27.5–36.3)
BUN SERPL-MCNC: 8 MG/DL — SIGNIFICANT CHANGE UP (ref 7–23)
CALCIUM SERPL-MCNC: 8.3 MG/DL — LOW (ref 8.4–10.5)
CHLORIDE SERPL-SCNC: 101 MMOL/L — SIGNIFICANT CHANGE UP (ref 98–107)
CO2 SERPL-SCNC: 24 MMOL/L — SIGNIFICANT CHANGE UP (ref 22–31)
CREAT SERPL-MCNC: 0.72 MG/DL — SIGNIFICANT CHANGE UP (ref 0.5–1.3)
GLUCOSE BLDC GLUCOMTR-MCNC: 104 MG/DL — HIGH (ref 70–99)
GLUCOSE BLDC GLUCOMTR-MCNC: 152 MG/DL — HIGH (ref 70–99)
GLUCOSE BLDC GLUCOMTR-MCNC: 162 MG/DL — HIGH (ref 70–99)
GLUCOSE SERPL-MCNC: 109 MG/DL — HIGH (ref 70–99)
HCT VFR BLD CALC: 29 % — LOW (ref 34.5–45)
HGB BLD-MCNC: 9 G/DL — LOW (ref 11.5–15.5)
INR BLD: 1.65 — HIGH (ref 0.88–1.17)
MAGNESIUM SERPL-MCNC: 1.6 MG/DL — SIGNIFICANT CHANGE UP (ref 1.6–2.6)
MCHC RBC-ENTMCNC: 20.7 PG — LOW (ref 27–34)
MCHC RBC-ENTMCNC: 31 % — LOW (ref 32–36)
MCV RBC AUTO: 66.8 FL — LOW (ref 80–100)
NRBC # FLD: 0.02 K/UL — SIGNIFICANT CHANGE UP (ref 0–0)
PHOSPHATE SERPL-MCNC: 2.2 MG/DL — LOW (ref 2.5–4.5)
PLATELET # BLD AUTO: 114 K/UL — LOW (ref 150–400)
PMV BLD: SIGNIFICANT CHANGE UP FL (ref 7–13)
POTASSIUM SERPL-MCNC: 3.9 MMOL/L — SIGNIFICANT CHANGE UP (ref 3.5–5.3)
POTASSIUM SERPL-SCNC: 3.9 MMOL/L — SIGNIFICANT CHANGE UP (ref 3.5–5.3)
PROTHROM AB SERPL-ACNC: 19.1 SEC — HIGH (ref 9.8–13.1)
RBC # BLD: 4.34 M/UL — SIGNIFICANT CHANGE UP (ref 3.8–5.2)
RBC # FLD: 25.2 % — HIGH (ref 10.3–14.5)
SODIUM SERPL-SCNC: 136 MMOL/L — SIGNIFICANT CHANGE UP (ref 135–145)
WBC # BLD: 6.98 K/UL — SIGNIFICANT CHANGE UP (ref 3.8–10.5)
WBC # FLD AUTO: 6.98 K/UL — SIGNIFICANT CHANGE UP (ref 3.8–10.5)

## 2020-01-12 PROCEDURE — 99233 SBSQ HOSP IP/OBS HIGH 50: CPT

## 2020-01-12 PROCEDURE — 99221 1ST HOSP IP/OBS SF/LOW 40: CPT

## 2020-01-12 RX ORDER — POTASSIUM PHOSPHATE, MONOBASIC POTASSIUM PHOSPHATE, DIBASIC 236; 224 MG/ML; MG/ML
15 INJECTION, SOLUTION INTRAVENOUS ONCE
Refills: 0 | Status: COMPLETED | OUTPATIENT
Start: 2020-01-12 | End: 2020-01-12

## 2020-01-12 RX ORDER — MAGNESIUM SULFATE 500 MG/ML
1 VIAL (ML) INJECTION ONCE
Refills: 0 | Status: COMPLETED | OUTPATIENT
Start: 2020-01-12 | End: 2020-01-12

## 2020-01-12 RX ORDER — BUDESONIDE AND FORMOTEROL FUMARATE DIHYDRATE 160; 4.5 UG/1; UG/1
2 AEROSOL RESPIRATORY (INHALATION)
Refills: 0 | Status: DISCONTINUED | OUTPATIENT
Start: 2020-01-12 | End: 2020-01-16

## 2020-01-12 RX ADMIN — BUDESONIDE AND FORMOTEROL FUMARATE DIHYDRATE 2 PUFF(S): 160; 4.5 AEROSOL RESPIRATORY (INHALATION) at 22:46

## 2020-01-12 RX ADMIN — ENOXAPARIN SODIUM 120 MILLIGRAM(S): 100 INJECTION SUBCUTANEOUS at 05:57

## 2020-01-12 RX ADMIN — PANTOPRAZOLE SODIUM 40 MILLIGRAM(S): 20 TABLET, DELAYED RELEASE ORAL at 05:57

## 2020-01-12 RX ADMIN — Medication 1 TABLET(S): at 22:46

## 2020-01-12 RX ADMIN — Medication 1 TABLET(S): at 13:13

## 2020-01-12 RX ADMIN — Medication 50 MILLIGRAM(S): at 17:11

## 2020-01-12 RX ADMIN — Medication 50 MILLIGRAM(S): at 05:57

## 2020-01-12 RX ADMIN — Medication 1 TABLET(S): at 09:25

## 2020-01-12 RX ADMIN — POTASSIUM PHOSPHATE, MONOBASIC POTASSIUM PHOSPHATE, DIBASIC 62.5 MILLIMOLE(S): 236; 224 INJECTION, SOLUTION INTRAVENOUS at 13:13

## 2020-01-12 RX ADMIN — ATORVASTATIN CALCIUM 20 MILLIGRAM(S): 80 TABLET, FILM COATED ORAL at 22:46

## 2020-01-12 RX ADMIN — Medication 100 GRAM(S): at 09:25

## 2020-01-12 RX ADMIN — Medication 1 TABLET(S): at 17:11

## 2020-01-12 RX ADMIN — ENOXAPARIN SODIUM 120 MILLIGRAM(S): 100 INJECTION SUBCUTANEOUS at 17:11

## 2020-01-12 RX ADMIN — CYCLOBENZAPRINE HYDROCHLORIDE 10 MILLIGRAM(S): 10 TABLET, FILM COATED ORAL at 06:02

## 2020-01-12 NOTE — CONSULT NOTE ADULT - ATTENDING COMMENTS
Patient seen/examined. Recommend compression/elevation of bilateral lower extremities and continued anticoagulation.
Case discussed with intended future medical oncologist, Dr. Chaves.  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 The latter is most frequently used in gynecologic origin LMS.   Patient is relatively stable.  Main complaint is pain at the insertion site of her Pleurex.   Will ask pulmonary to assess and consider reposition or replace as indicated.  She will likely need this in for the next couple months so as to remove recurrent fluid, until the underlying disease begins to show response.

## 2020-01-12 NOTE — PROGRESS NOTE ADULT - PROBLEM SELECTOR PLAN 8
1) PCP Contacted on Admission: (Y/N) -->   2) Date of Contact with PCP:  3) PCP Contacted at Discharge: (Y/N, N/A)  4) Summary of Handoff Given to PCP:   5) Post-Discharge Appointment Date and Location: Dispo: Pending chemotherapy    1) PCP Contacted on Admission: (Y/N) -->   2) Date of Contact with PCP:  3) PCP Contacted at Discharge: (Y/N, N/A)  4) Summary of Handoff Given to PCP:   5) Post-Discharge Appointment Date and Location:

## 2020-01-12 NOTE — ADVANCED PRACTICE NURSE CONSULT - ASSESSMENT
A&Ox4, sitting in the edge of the bed.     Bilateral lower extremities with scattered areas of hyper and hypopigmentation. Lichenification. Dry, flaky skin. Thin, shiny, taught skin. Overgrown polish toenails.  Severe non pitting edema from knees to foot. Capillary refill <3 seconds. DP/PT pulses non palpable secondary to edema, with  monophasic doppler sounds.    Right medial lower leg area of hyperpigmentation and non blanching erythema measuring 5.1tsq4trwx3.2cm. Non draining (Patient reports site of previous Herpes type 2 outbreak, patient reports that she had seen a dermatologist in the past and no recent outbreak).  patient that at this time will monitor for tissue type changes.     Right lateral lower leg venous ulcer- 3.3lij4dir5.2cm. Tissue type presenting as areas of denuded epidermis over area of hyperpigmentation , irregular borders. Small serous drainage No odor. Periwound skin with hyperpigmentation. No increased warmth, no erythema, no induration. Goals of care: reduce/contol bioburden, maintain a moist environment for wound healing, monitor for tissue type changes.     Right lateral malleolus area of hyperpigmentation and linear ulceration presenting like scratches)- entire affected area measuring 7wlo4vfu5.2cm. Tissue type presenting as linear area of denuded epidermis exposing pale pink dermis and fibrinous tissue. Periwound skin with hyperpigmentation. No increased warmth, no erythema, no induration. Goals of care: reduce/contol bioburden, maintain a moist environment for wound healing, monitor for tissue type changes. A&Ox4, sitting in the edge of the bed.     Bilateral lower extremities with scattered areas of hyper and hypopigmentation. Lichenification. Dry, flaky skin. Thin, shiny, taught skin. Overgrown polish toenails.  Severe non pitting edema from knees to foot. Capillary refill <3 seconds. DP/PT pulses non palpable secondary to edema, with  monophasic doppler sounds.    Right medial lower leg area of hyperpigmentation and non blanching erythema measuring 5.8jly1aibl3.2cm. Non draining (Patient reports site of previous Herpes type 2 outbreak, patient reports that she had seen a dermatologist in the past and no recent outbreak).  patient that at this time will monitor for tissue type changes.     Right lateral lower leg venous ulcer- 3.6hyr7lgy4.2cm. Tissue type presenting as areas of denuded epidermis over area of hyperpigmentation , irregular borders. Small serous drainage No odor. Periwound skin with hyperpigmentation. No increased warmth, no erythema, no induration. Goals of care: reduce/contol bioburden, maintain a moist environment for wound healing, monitor for tissue type changes.     Right lateral malleolus area of hyperpigmentation and linear ulceration presenting like scratches)- entire affected area measuring 2vbj2iyz0.2cm. Tissue type presenting as linear area of denuded epidermis exposing pale pink dermis and fibrinous tissue. Periwound skin with hyperpigmentation. No increased warmth, no erythema, no induration. Goals of care: reduce/contol bioburden, maintain a moist environment for wound healing, monitor for tissue type changes.    Findings discussed with Primary RN.

## 2020-01-12 NOTE — PROGRESS NOTE ADULT - PROBLEM SELECTOR PLAN 1
Oncology recommendations pending re: chemotherapy initiation. Made aware.   IR consult for port placement.  Pain control: Percocet PRN, Tylenol PRN, Flexeril PRN for spasms. Bowel regimen. Oncology recommendations appreciated: Pending port placement by IR tomorrow then will initiate chemo (possible regimen: doxorubicin and Dacarbazine)  Pain control: Percocet PRN, Tylenol PRN, Flexeril PRN for spasms. Bowel regimen.

## 2020-01-12 NOTE — CHART NOTE - NSCHARTNOTEFT_GEN_A_CORE
gerri c/o shortness of breath, vitals stable, breath sounds diminished  drain 1L from pleurex now   d/w Attending and RN

## 2020-01-12 NOTE — ADVANCED PRACTICE NURSE CONSULT - REASON FOR CONSULT
Patient seen on skin care rounds after wound care referral received for assessment of skin impairment and recommendations of topical management. Chart reviewed: [place] 20, WBC 6.98, Hemoglobin A1C 5.8%, BMI 43.3kg/m2, CT of the ABD/pelvis reports probable bland thrombus in the bilateral common and external iliac veins. Patient interviewed, reports no previous hx of leg ulcers. Patient reports recently was in [facility] last week and received US duplex to r/o DVT's. Patient H/O HTN, HLD, DM, asthma, fibroids, recently diagnosed leiomyosarcoma and DVT transferred from [place] for initiation of chemotherapy.  Pt has had 4 admissions to [facility] in the past month.  She initially was admitted to [facility] in early December with dyspnea, and was found to have R pleural effusion.  Imaging revealed large RP mass infiltrating IVC and extending to RA.  Pt initially had chest tube with fluid analysis showing nonmalignant cells.  Fluid re-accumulated after removal of chest tube, so pt eventually had Pleurx catheter placed.  Biopsy of mass showed leiomyosarcoma.  Pt was admitted most recently 10/6-10/10 for bilateral leg swelling and was found to have DVT's.  She was started on Lovenox. Patient has being seen by Heme/Onc.

## 2020-01-12 NOTE — CONSULT NOTE ADULT - SUBJECTIVE AND OBJECTIVE BOX
VASCULAR SURGERY CONSULT NOTE  --------------------------------------------------------------------------------------------    Patient is a 39 year old female with a PMH of HTN, HLD, DM, asthma, fibroids, and recently diagnosed leiomyosarcoma in the RP area extending into the IVC and RA with associated right pleural effusion s/p pleurex.  She was originally at [facility] and transferred to [facility] to get mediport and start chemotherapy.  At [facility] a CT venogram was performed and showed thrombus partially occluding the infrahepatic IVC and extending into b/l common and external iliac veins.  There was a duplex that also showed thrombus in the bilateral femoral veins.  She noticed swelling in her legs for the past couple of months.  She denies pain in her legs with walking, but does state she has a fullness and heaviness feeling in her legs while walking.  Vascular surgery was consulted after wound care was unable to palpate pulses in b/l feet.      PAST MEDICAL & SURGICAL HISTORY:  Leiomyosarcoma  Uterine fibroid  Hyperlipidemia  Asthma  Diabetes  Hypertension  No significant past surgical history    FAMILY HISTORY:  No pertinent family history in first degree relatives    ALLERGIES: No Known Drug Allergies  shellfish (Hives)      CURRENT MEDICATIONS  MEDICATIONS (STANDING): atorvastatin 20 milliGRAM(s) Oral at bedtime  dextrose 50% Injectable 12.5 Gram(s) IV Push once  dextrose 50% Injectable 25 Gram(s) IV Push once  dextrose 50% Injectable 25 Gram(s) IV Push once  enoxaparin Injectable 120 milliGRAM(s) SubCutaneous two times a day  melatonin 3 milliGRAM(s) Oral at bedtime  metoprolol tartrate 50 milliGRAM(s) Oral two times a day  pantoprazole    Tablet 40 milliGRAM(s) Oral before breakfast  potassium acid phosphate/sodium acid phosphate tablet (K-PHOS No. 2) 1 Tablet(s) Oral four times a day with meals    MEDICATIONS (PRN):acetaminophen   Tablet .. 650 milliGRAM(s) Oral every 6 hours PRN Mild Pain (1 - 3), Moderate Pain (4 - 6)  cyclobenzaprine 10 milliGRAM(s) Oral three times a day PRN Muscle Spasm  dextrose 40% Gel 15 Gram(s) Oral once PRN Blood Glucose LESS THAN 70 milliGRAM(s)/deciliter  glucagon  Injectable 1 milliGRAM(s) IntraMuscular once PRN Glucose LESS THAN 70 milligrams/deciliter  oxycodone    5 mG/acetaminophen 325 mG 1 Tablet(s) Oral every 6 hours PRN Severe Pain (7 - 10)  polyethylene glycol 3350 17 Gram(s) Oral every 12 hours PRN Constipation  senna 2 Tablet(s) Oral daily PRN Constipation    --------------------------------------------------------------------------------------------    Vitals:   T(C): 36.9 (01-12-20 @ 14:05), Max: 36.9 (01-12-20 @ 14:05)  HR: 92 (01-12-20 @ 17:08) (82 - 97)  BP: 120/74 (01-12-20 @ 17:08) (102/70 - 128/83)  RR: 16 (01-12-20 @ 14:05) (16 - 18)  SpO2: 100% (01-12-20 @ 14:05) (96% - 100%)  CAPILLARY BLOOD GLUCOSE      POCT Blood Glucose.: 152 mg/dL (12 Jan 2020 12:38)  POCT Blood Glucose.: 104 mg/dL (12 Jan 2020 08:41)  POCT Blood Glucose.: 157 mg/dL (11 Jan 2020 22:19)  POCT Blood Glucose.: 163 mg/dL (11 Jan 2020 17:42)    CAPILLARY BLOOD GLUCOSE      POCT Blood Glucose.: 152 mg/dL (12 Jan 2020 12:38)  POCT Blood Glucose.: 104 mg/dL (12 Jan 2020 08:41)  POCT Blood Glucose.: 157 mg/dL (11 Jan 2020 22:19)  POCT Blood Glucose.: 163 mg/dL (11 Jan 2020 17:42)      Height (cm): 165.1 (01-10 @ 22:46)  Weight (kg): 118 (01-10 @ 22:46)  BMI (kg/m2): 43.3 (01-10 @ 22:46)  BSA (m2): 2.21 (01-10 @ 22:46)    PHYSICAL EXAM:  General: NAD, Lying in bed comfortably  Neuro: A+Ox3  HEENT: NC/AT  Cardio: Regular rate  Resp: Good effort, no accessory muscle use  Vascular: DP/PT signals b/l.  There are palpable femoral b/l.  Swelling in b/l lower extremities  Musculoskeletal: All 4 extremities moving spontaneously  --------------------------------------------------------------------------------------------    LABS  CBC (01-12 @ 06:00)                              9.0<L>                         6.98    )----------------(  114<L>     --    % Neutrophils, --    % Lymphocytes, ANC: --                                  29.0<L>  CBC (01-11 @ 07:00)                              8.9<L>                         8.79    )----------------(  121<L>     41.6<L>% Neutrophils, 39.0  % Lymphocytes, ANC: 3.66                                29.1<L>    BMP (01-12 @ 06:00)             136     |  101     |  8     		Ca++ --      Ca 8.3<L>             ---------------------------------( 109<H>		Mg 1.6                3.9     |  24      |  0.72  			Ph 2.2<L>  BMP (01-11 @ 07:00)             131<L>  |  100     |  10    		Ca++ --      Ca 8.2<L>             ---------------------------------( 130<H>		Mg 1.8                3.7     |  22      |  0.68  			Ph 2.1<L>    LFTs (01-11 @ 07:00)      TPro 5.5<L> / Alb 2.7<L> / TBili 1.4<H> / DBili -- / AST 60<H> / ALT 60<H> / AlkPhos 168<H>    Coags (01-12 @ 06:00)  aPTT 38.9<H> / INR 1.65<H> / PT 19.1<H>          --------------------------------------------------------------------------------------------    MICROBIOLOGY      --------------------------------------------------------------------------------------------    IMAGING    CT venogram:  FINDINGS: CT of the abdomen and pelvis was performed with the administration of intravenous contrast. CT of the bilateral lower extremities was performed after the administration of intravenous contrast in the venous phase. Reconstructions were performed in the sagittal and coronal planes. Comparison is made to prior CT dated 12/4/2019 and prior MRI dated 7/18/2019.    Evaluation of the lower chest demonstrates enlarging large right pleural effusion. Catheter within the right pleural space. Normal heart size. Right basilar atelectasis. Evaluation of the abdomen demonstrates heterogeneous perfusion of the liver which is indented by a large mass within the intrahepatic IVC, unchanged since prior study, currently measuring 10.2 x 7.4 cm with tumor thrombus extending into the right renal vein and right atrium. There is thrombus partially occluding the infrahepatic IVC and extending into the bilateral common and external iliac veins. The spleen, pancreas, bilateral adrenal glands and left kidney are normal in appearance. There is stable mild right hydronephrosis secondary to right ureteral compression. Evaluation of the gastrointestinal tract demonstrates diverticulosis. Evaluation of the pelvis demonstrates enlarged myomatous uterus with submucosal and serosal myomas, including an exophytic myoma measuring 5 cm projecting into the left adnexal region. Bladder is collapsed. Small volume of fluid. Very severe diffuse anasarca. Pelvic venous congestion. Negative for adenopathy. Opacified aspects of the portal, splenic, superior mesenteric vein and left renal vein are patent. There is severe edematous infiltration obtaining tissues of the bilateral lower extremities. There is infiltrative soft tissue versus severe edematous infiltration in the retroperitoneum extending from the caval mass. Extensive venous collaterals within the pelvis. Minimal aortic vascular calcification. Evaluation of the osseous structures demonstrates no destructive lesion.          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    Enlarging large right pleural effusion.      ASSESSMENT: Patient is a 39 year old female with a PMH of HTN, HLD, DM, asthma, fibroids, and recently diagnosed leiomyosarcoma in the RP area extending into the IVC and RA with associated right pleural effusion s/p pleurex now with extensive DVTs and nonpalpable pulses in the feet.    PLAN:   - There is no clinical evidence of arterial insufficiency in her b/l lower extremities, but are swollen throughout.  - Would obtain KIRSTY/PVRs to evaluate arterial flow.  - No urgent vascular surgical intervention required at this time.  - Plan discussed with fellow    Migel Trevino PGY3  Surgery Team C #[id number]

## 2020-01-12 NOTE — PROGRESS NOTE ADULT - SUBJECTIVE AND OBJECTIVE BOX
PROGRESS NOTE:   Patient is a 39y old  Female who presents with a chief complaint of Chemotherapy (11 Jan 2020 15:58)    SUBJECTIVE / OVERNIGHT EVENTS: The patient was seen and examined at bedside. No acute events overnight. Reports back spasm relieved with Flexeril.     REVIEW OF SYSTEMS:    CONSTITUTIONAL: No weakness, fevers or chills  EYES/ENT: No visual changes;  No vertigo or throat pain   NECK: No pain or stiffness  RESPIRATORY: No cough, wheezing, hemoptysis; No shortness of breath  BACK: spasm lower back   CARDIOVASCULAR: No chest pain or palpitations  GASTROINTESTINAL: No abdominal or epigastric pain. No nausea, vomiting, or hematemesis; No diarrhea or constipation. No melena or hematochezia.  GENITOURINARY: No dysuria, frequency or hematuria  NEUROLOGICAL: No numbness or weakness  SKIN: No itching, rashes    MEDICATIONS  (STANDING):  atorvastatin 20 milliGRAM(s) Oral at bedtime  dextrose 50% Injectable 12.5 Gram(s) IV Push once  dextrose 50% Injectable 25 Gram(s) IV Push once  dextrose 50% Injectable 25 Gram(s) IV Push once  enoxaparin Injectable 120 milliGRAM(s) SubCutaneous two times a day  insulin lispro (HumaLOG) corrective regimen sliding scale   SubCutaneous three times a day before meals  insulin lispro (HumaLOG) corrective regimen sliding scale   SubCutaneous at bedtime  melatonin 3 milliGRAM(s) Oral at bedtime  metoprolol tartrate 50 milliGRAM(s) Oral two times a day  pantoprazole    Tablet 40 milliGRAM(s) Oral before breakfast  potassium acid phosphate/sodium acid phosphate tablet (K-PHOS No. 2) 1 Tablet(s) Oral four times a day with meals  potassium phosphate IVPB 15 milliMole(s) IV Intermittent once    MEDICATIONS  (PRN):  acetaminophen   Tablet .. 650 milliGRAM(s) Oral every 6 hours PRN Mild Pain (1 - 3), Moderate Pain (4 - 6)  cyclobenzaprine 10 milliGRAM(s) Oral three times a day PRN Muscle Spasm  dextrose 40% Gel 15 Gram(s) Oral once PRN Blood Glucose LESS THAN 70 milliGRAM(s)/deciliter  glucagon  Injectable 1 milliGRAM(s) IntraMuscular once PRN Glucose LESS THAN 70 milligrams/deciliter  oxycodone    5 mG/acetaminophen 325 mG 1 Tablet(s) Oral every 6 hours PRN Severe Pain (7 - 10)  polyethylene glycol 3350 17 Gram(s) Oral every 12 hours PRN Constipation  senna 2 Tablet(s) Oral daily PRN Constipation    CAPILLARY BLOOD GLUCOSE    POCT Blood Glucose.: 104 mg/dL (12 Jan 2020 08:41)  POCT Blood Glucose.: 157 mg/dL (11 Jan 2020 22:19)  POCT Blood Glucose.: 163 mg/dL (11 Jan 2020 17:42)  POCT Blood Glucose.: 159 mg/dL (11 Jan 2020 12:25)    I&O's Summary    PHYSICAL EXAM:  Vital Signs Last 24 Hrs  T(C): 36.7 (12 Jan 2020 05:42), Max: 36.7 (12 Jan 2020 05:42)  T(F): 98.1 (12 Jan 2020 05:42), Max: 98.1 (12 Jan 2020 05:42)  HR: 96 (12 Jan 2020 05:42) (82 - 96)  BP: 102/70 (12 Jan 2020 05:42) (102/70 - 125/64)  BP(mean): --  RR: 18 (12 Jan 2020 05:42) (16 - 18)  SpO2: 96% (12 Jan 2020 05:42) (96% - 99%)    CONSTITUTIONAL: Obese AA woman. NAD, well-developed  RESPIRATORY: Normal respiratory effort; lungs are clear to auscultation bilaterally  CARDIOVASCULAR: Regular rate and rhythm, normal S1 and S2, no murmur/rub/gallop; No lower extremity edema; Peripheral pulses are 2+ bilaterally  ABDOMEN: Nontender to palpation, normoactive bowel sounds, no rebound/guarding; No hepatosplenomegaly  MUSCLOSKELETAL: no clubbing or cyanosis of digits; no joint swelling or tenderness to palpation  PSYCH: A+O to person, place, and time; affect appropriate  SKIN: R sided pleurx catheter c/d/i  EXTREMITIES: B/L LE bandaged.    LABS:                        9.0    6.98  )-----------( 114      ( 12 Jan 2020 06:00 )             29.0     01-12    136  |  101  |  8   ----------------------------<  109<H>  3.9   |  24  |  0.72    Ca    8.3<L>      12 Jan 2020 06:00  Phos  2.2     01-12  Mg     1.6     01-12    TPro  5.5<L>  /  Alb  2.7<L>  /  TBili  1.4<H>  /  DBili  x   /  AST  60<H>  /  ALT  60<H>  /  AlkPhos  168<H>  01-11    PT/INR - ( 12 Jan 2020 06:00 )   PT: 19.1 SEC;   INR: 1.65     PTT - ( 12 Jan 2020 06:00 )  PTT:38.9 SEC    RADIOLOGY & ADDITIONAL TESTS:  Results Reviewed: < from: US Duplex Venous Lower Ext Complete, Bilateral (01.06.20 @ 20:47) >  INTERPRETATION:    VENOUS DUPLEX DOPPLER OF BOTH LOWER EXTREMITIES dated 1/6/2020 8:47 PM    INDICATION: Bilateral lower extremity swelling and pain, right greater than left, evaluate for DVT    TECHNIQUE: Duplex Doppler evaluation including gray-scale ultrasound imaging, color flow Doppler imaging, and Doppler spectral analysis of the veins of both lower extremities was performed.     COMPARISON: Lower extremity Doppler dated 12/18/2019    FINDINGS:    RIGHT LOWER EXTREMITY:  There is thrombus in the RIGHT saphenofemoral junction, common femoral vein, proximal deep femoral vein, and proximal femoral vein.    The RIGHT common the mid and distal femoral vein and popliteal vein are patent and free of thrombus. These veins are normally compressible and have normal phasic flow and augmentation response.    The paired RIGHT peroneal and posterior tibial calf veins are patent.      LEFT LOWER EXTREMITY:  There is thrombus in the LEFT saphenofemoral junction, common femoral vein, and proximal deep femoral vein.    There is again partial compressibility of the proximal left femoral vein secondary to eccentric wall thickening.    The LEFT popliteal vein is patent and free of thrombus, with normally compressibility and normal phasic flow and augmentation response..    The paired LEFT peroneal and posterior tibial calf veins are patent.      IMPRESSION:  There is thrombus in the BILATERAL common femoral veins, saphenofemoral junctions, proximal deep femoral veins, and RIGHT proximal femoral vein.    Imaging Personally Reviewed:  Electrocardiogram Personally Reviewed:    COORDINATION OF CARE:  Care Discussed with Consultants/Other Providers [Y/N]:  Prior or Outpatient Records Reviewed [Y/N]: PROGRESS NOTE:   Patient is a 39y old  Female who presents with a chief complaint of Chemotherapy (11 Jan 2020 15:58)    SUBJECTIVE / OVERNIGHT EVENTS: The patient was seen and examined at bedside. No acute events overnight. Reports back spasm relieved with Flexeril.     REVIEW OF SYSTEMS:    CONSTITUTIONAL: No weakness, fevers or chills  EYES/ENT: No visual changes;  No vertigo or throat pain   NECK: No pain or stiffness  RESPIRATORY: No cough, wheezing, hemoptysis; No shortness of breath  BACK: spasm lower back   CARDIOVASCULAR: No chest pain or palpitations  GASTROINTESTINAL: No abdominal or epigastric pain. No nausea, vomiting, or hematemesis; No diarrhea or constipation. No melena or hematochezia.  GENITOURINARY: No dysuria, frequency or hematuria  NEUROLOGICAL: No numbness or weakness  SKIN: No itching, rashes    MEDICATIONS  (STANDING):  atorvastatin 20 milliGRAM(s) Oral at bedtime  dextrose 50% Injectable 12.5 Gram(s) IV Push once  dextrose 50% Injectable 25 Gram(s) IV Push once  dextrose 50% Injectable 25 Gram(s) IV Push once  enoxaparin Injectable 120 milliGRAM(s) SubCutaneous two times a day  insulin lispro (HumaLOG) corrective regimen sliding scale   SubCutaneous three times a day before meals  insulin lispro (HumaLOG) corrective regimen sliding scale   SubCutaneous at bedtime  melatonin 3 milliGRAM(s) Oral at bedtime  metoprolol tartrate 50 milliGRAM(s) Oral two times a day  pantoprazole    Tablet 40 milliGRAM(s) Oral before breakfast  potassium acid phosphate/sodium acid phosphate tablet (K-PHOS No. 2) 1 Tablet(s) Oral four times a day with meals  potassium phosphate IVPB 15 milliMole(s) IV Intermittent once    MEDICATIONS  (PRN):  acetaminophen   Tablet .. 650 milliGRAM(s) Oral every 6 hours PRN Mild Pain (1 - 3), Moderate Pain (4 - 6)  cyclobenzaprine 10 milliGRAM(s) Oral three times a day PRN Muscle Spasm  dextrose 40% Gel 15 Gram(s) Oral once PRN Blood Glucose LESS THAN 70 milliGRAM(s)/deciliter  glucagon  Injectable 1 milliGRAM(s) IntraMuscular once PRN Glucose LESS THAN 70 milligrams/deciliter  oxycodone    5 mG/acetaminophen 325 mG 1 Tablet(s) Oral every 6 hours PRN Severe Pain (7 - 10)  polyethylene glycol 3350 17 Gram(s) Oral every 12 hours PRN Constipation  senna 2 Tablet(s) Oral daily PRN Constipation    CAPILLARY BLOOD GLUCOSE    POCT Blood Glucose.: 104 mg/dL (12 Jan 2020 08:41)  POCT Blood Glucose.: 157 mg/dL (11 Jan 2020 22:19)  POCT Blood Glucose.: 163 mg/dL (11 Jan 2020 17:42)  POCT Blood Glucose.: 159 mg/dL (11 Jan 2020 12:25)    I&O's Summary    PHYSICAL EXAM:  Vital Signs Last 24 Hrs  T(C): 36.7 (12 Jan 2020 05:42), Max: 36.7 (12 Jan 2020 05:42)  T(F): 98.1 (12 Jan 2020 05:42), Max: 98.1 (12 Jan 2020 05:42)  HR: 96 (12 Jan 2020 05:42) (82 - 96)  BP: 102/70 (12 Jan 2020 05:42) (102/70 - 125/64)  BP(mean): --  RR: 18 (12 Jan 2020 05:42) (16 - 18)  SpO2: 96% (12 Jan 2020 05:42) (96% - 99%)    CONSTITUTIONAL: Obese AA woman. NAD, well-developed  RESPIRATORY: Normal respiratory effort; lungs are clear to auscultation bilaterally  CARDIOVASCULAR: Regular rate and rhythm, normal S1 and S2, no murmur/rub/gallop; b/l LE Edema  ABDOMEN: Nontender to palpation, normoactive bowel sounds, no rebound/guarding; No hepatosplenomegaly  MUSCLOSKELETAL: no clubbing or cyanosis of digits; no joint swelling or tenderness to palpation  PSYCH: A+O to person, place, and time; affect appropriate  SKIN: R sided pleurx catheter c/d/i  EXTREMITIES: B/L LE bandaged.    LABS:                        9.0    6.98  )-----------( 114      ( 12 Jan 2020 06:00 )             29.0     01-12    136  |  101  |  8   ----------------------------<  109<H>  3.9   |  24  |  0.72    Ca    8.3<L>      12 Jan 2020 06:00  Phos  2.2     01-12  Mg     1.6     01-12    TPro  5.5<L>  /  Alb  2.7<L>  /  TBili  1.4<H>  /  DBili  x   /  AST  60<H>  /  ALT  60<H>  /  AlkPhos  168<H>  01-11    PT/INR - ( 12 Jan 2020 06:00 )   PT: 19.1 SEC;   INR: 1.65     PTT - ( 12 Jan 2020 06:00 )  PTT:38.9 SEC    RADIOLOGY & ADDITIONAL TESTS:  Results Reviewed: < from: US Duplex Venous Lower Ext Complete, Bilateral (01.06.20 @ 20:47) >  INTERPRETATION:    VENOUS DUPLEX DOPPLER OF BOTH LOWER EXTREMITIES dated 1/6/2020 8:47 PM    INDICATION: Bilateral lower extremity swelling and pain, right greater than left, evaluate for DVT    TECHNIQUE: Duplex Doppler evaluation including gray-scale ultrasound imaging, color flow Doppler imaging, and Doppler spectral analysis of the veins of both lower extremities was performed.     COMPARISON: Lower extremity Doppler dated 12/18/2019    FINDINGS:    RIGHT LOWER EXTREMITY:  There is thrombus in the RIGHT saphenofemoral junction, common femoral vein, proximal deep femoral vein, and proximal femoral vein.    The RIGHT common the mid and distal femoral vein and popliteal vein are patent and free of thrombus. These veins are normally compressible and have normal phasic flow and augmentation response.    The paired RIGHT peroneal and posterior tibial calf veins are patent.      LEFT LOWER EXTREMITY:  There is thrombus in the LEFT saphenofemoral junction, common femoral vein, and proximal deep femoral vein.    There is again partial compressibility of the proximal left femoral vein secondary to eccentric wall thickening.    The LEFT popliteal vein is patent and free of thrombus, with normally compressibility and normal phasic flow and augmentation response..    The paired LEFT peroneal and posterior tibial calf veins are patent.      IMPRESSION:  There is thrombus in the BILATERAL common femoral veins, saphenofemoral junctions, proximal deep femoral veins, and RIGHT proximal femoral vein.    Imaging Personally Reviewed:  Electrocardiogram Personally Reviewed:    COORDINATION OF CARE:  Care Discussed with Consultants/Other Providers [Y/N]:  Prior or Outpatient Records Reviewed [Y/N]:

## 2020-01-12 NOTE — PROGRESS NOTE ADULT - PROBLEM SELECTOR PLAN 4
Lovenox BID Lovenox BID  Per wound care recommended Vascular consult to assess for arterial insufficiency. (0) independent

## 2020-01-12 NOTE — PROGRESS NOTE ADULT - ASSESSMENT
39 year old woman history of  HTN, HLD, DM (A1C 5.8), asthma, fibroids, recently diagnosed leiomyosarcoma and DVT transferred from [facility] for initiation of chemotherapy. 39 year old woman history of  HTN, HLD, DM (A1C 5.8), asthma, fibroids, recently diagnosed leiomyosarcoma and DVT transferred from [facility] for initiation of chemotherapy. Pending port placement.

## 2020-01-12 NOTE — CHART NOTE - NSCHARTNOTEFT_GEN_A_CORE
vascular consult called to eval for vascular insufficiency     edema bilateral lower extremities my exam: right dp/pt non palp, left dp/pt 1+ ,cap refill bilaterally <3 seconds , foot     mary/pvr pending, attempted to expedite test but vascular lab with no answer [phone]    f/u w/ vascular when results available. d/w Attending and RN

## 2020-01-12 NOTE — ADVANCED PRACTICE NURSE CONSULT - RECOMMEDATIONS
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 Pat dry. Apply Liquid barrier film to periwound skin. Place Foam AG w/o borders. Cover with Kerlix. Change daily.   If DVT R/O than may use ACE WRAPS over Kerlix to lower legs (50/50 technique). Change daily.     Sween 24 moisturizer to lower extremities, avoid open areas, daily.     Please contact Wound Care Service Line if we can be of further assistance (ext [phone]).

## 2020-01-13 DIAGNOSIS — Z29.9 ENCOUNTER FOR PROPHYLACTIC MEASURES, UNSPECIFIED: ICD-10-CM

## 2020-01-13 LAB
ALBUMIN SERPL ELPH-MCNC: 2.6 G/DL — LOW (ref 3.3–5)
ALP SERPL-CCNC: 160 U/L — HIGH (ref 40–120)
ALT FLD-CCNC: 47 U/L — HIGH (ref 4–33)
ANION GAP SERPL CALC-SCNC: 13 MMO/L — SIGNIFICANT CHANGE UP (ref 7–14)
APTT BLD: 37.4 SEC — HIGH (ref 27.5–36.3)
AST SERPL-CCNC: 45 U/L — HIGH (ref 4–32)
BILIRUB DIRECT SERPL-MCNC: 0.7 MG/DL — HIGH (ref 0.1–0.2)
BILIRUB SERPL-MCNC: 1.5 MG/DL — HIGH (ref 0.2–1.2)
BUN SERPL-MCNC: 8 MG/DL — SIGNIFICANT CHANGE UP (ref 7–23)
CALCIUM SERPL-MCNC: 8.5 MG/DL — SIGNIFICANT CHANGE UP (ref 8.4–10.5)
CHLORIDE SERPL-SCNC: 99 MMOL/L — SIGNIFICANT CHANGE UP (ref 98–107)
CO2 SERPL-SCNC: 22 MMOL/L — SIGNIFICANT CHANGE UP (ref 22–31)
CREAT SERPL-MCNC: 0.66 MG/DL — SIGNIFICANT CHANGE UP (ref 0.5–1.3)
GLUCOSE SERPL-MCNC: 135 MG/DL — HIGH (ref 70–99)
HCT VFR BLD CALC: 30.8 % — LOW (ref 34.5–45)
HGB BLD-MCNC: 9.4 G/DL — LOW (ref 11.5–15.5)
INR BLD: 1.55 — HIGH (ref 0.88–1.17)
MAGNESIUM SERPL-MCNC: 1.6 MG/DL — SIGNIFICANT CHANGE UP (ref 1.6–2.6)
MCHC RBC-ENTMCNC: 20.5 PG — LOW (ref 27–34)
MCHC RBC-ENTMCNC: 30.5 % — LOW (ref 32–36)
MCV RBC AUTO: 67.1 FL — LOW (ref 80–100)
NRBC # FLD: 0 K/UL — SIGNIFICANT CHANGE UP (ref 0–0)
PHOSPHATE SERPL-MCNC: 2.3 MG/DL — LOW (ref 2.5–4.5)
PLATELET # BLD AUTO: 116 K/UL — LOW (ref 150–400)
PMV BLD: SIGNIFICANT CHANGE UP FL (ref 7–13)
POTASSIUM SERPL-MCNC: 3.9 MMOL/L — SIGNIFICANT CHANGE UP (ref 3.5–5.3)
POTASSIUM SERPL-SCNC: 3.9 MMOL/L — SIGNIFICANT CHANGE UP (ref 3.5–5.3)
PROT SERPL-MCNC: 5.8 G/DL — LOW (ref 6–8.3)
PROTHROM AB SERPL-ACNC: 17.9 SEC — HIGH (ref 9.8–13.1)
RBC # BLD: 4.59 M/UL — SIGNIFICANT CHANGE UP (ref 3.8–5.2)
RBC # FLD: 26 % — HIGH (ref 10.3–14.5)
SODIUM SERPL-SCNC: 134 MMOL/L — LOW (ref 135–145)
WBC # BLD: 8.52 K/UL — SIGNIFICANT CHANGE UP (ref 3.8–10.5)
WBC # FLD AUTO: 8.52 K/UL — SIGNIFICANT CHANGE UP (ref 3.8–10.5)

## 2020-01-13 PROCEDURE — 99233 SBSQ HOSP IP/OBS HIGH 50: CPT

## 2020-01-13 PROCEDURE — 99232 SBSQ HOSP IP/OBS MODERATE 35: CPT

## 2020-01-13 RX ADMIN — Medication 1 TABLET(S): at 11:37

## 2020-01-13 RX ADMIN — PANTOPRAZOLE SODIUM 40 MILLIGRAM(S): 20 TABLET, DELAYED RELEASE ORAL at 06:28

## 2020-01-13 RX ADMIN — Medication 3 MILLIGRAM(S): at 21:58

## 2020-01-13 RX ADMIN — Medication 30 MILLILITER(S): at 01:19

## 2020-01-13 RX ADMIN — BUDESONIDE AND FORMOTEROL FUMARATE DIHYDRATE 2 PUFF(S): 160; 4.5 AEROSOL RESPIRATORY (INHALATION) at 21:57

## 2020-01-13 RX ADMIN — Medication 50 MILLIGRAM(S): at 06:28

## 2020-01-13 RX ADMIN — BUDESONIDE AND FORMOTEROL FUMARATE DIHYDRATE 2 PUFF(S): 160; 4.5 AEROSOL RESPIRATORY (INHALATION) at 11:37

## 2020-01-13 RX ADMIN — ATORVASTATIN CALCIUM 20 MILLIGRAM(S): 80 TABLET, FILM COATED ORAL at 21:58

## 2020-01-13 RX ADMIN — Medication 50 MILLIGRAM(S): at 17:12

## 2020-01-13 NOTE — CHART NOTE - NSCHARTNOTEFT_GEN_A_CORE
PRE-INTERVENTIONAL RADIOLOGY PROCEDURE NOTE    Patient Age: 39  Patient Gender: female  Procedure (including site / side if known): port placement for chemo  Diagnosis / Indication: leiomyosarcoma, DVT  Interventional Radiology Attending Physician: Dr Nguyen  Ordering Attending Physician: Dr Milner   Pertinent medical history: HTN HLD DM asthma  Pertinent labs: 1/13 platelet 116, INR 1.55  Patient and Family aware? Yes      Attending / Resident / NP / PA   Print Sign Brianna Cali NP   Contact #: pager [phone]

## 2020-01-13 NOTE — CONSULT NOTE ADULT - ASSESSMENT
40 y/o F with HTN, HLD, DM, asthma, fibroids, recently diagnosed leiomyosarcoma and DVT transferred from [place] for initiation of chemotherapy.      Plan:  Port placement in [facility]. 1/13  NPO after MN  Continue to hold Lovenox  [facility] ordered  Consent obtained 38 y/o F with HTN, HLD, DM, asthma, fibroids, recently diagnosed leiomyosarcoma and DVT transferred from [place] for initiation of chemotherapy.      Plan:  Port placement in [facility]. 1/13  NPO after MN  Continue to hold Lovenox  bHCG ordered  CBC, BMP, coags in AM  Consent obtained

## 2020-01-13 NOTE — CONSULT NOTE ADULT - SUBJECTIVE AND OBJECTIVE BOX
HPI:  40 y/o F with HTN, HLD, DM, asthma, fibroids, recently diagnosed leiomyosarcoma and DVT transferred from [place] for initiation of chemotherapy.  Pt has had 4 admissions to [facility] in the past month.  She initially was admitted to [facility] in early December with dyspnea, and was found to have R pleural effusion.  Imaging revealed large RP mass infiltrating IVC and extending to RA.  Pt initially had chest tube with fluid analysis showing nonmalignant cells.  Fluid re-accumulated after removal of chest tube, so pt eventually had Pleurx catheter placed.  Biopsy of mass showed leiomyosarcoma.  Pt was admitted most recently 10/6-10/10 for bilateral leg swelling and was found to have DVT's.  She was started on Lovenox.      Pt presently reports back spasms in lower back since placement of Pleurx with temporary relief from Flexeril.  She has occasional nausea.  No subjective fever or chills.  Dyspnea is improved after Pleurx placement.       PAST MEDICAL & SURGICAL HISTORY:  Leiomyosarcoma  Uterine fibroid  Hyperlipidemia  Asthma  Diabetes  Hypertension  No significant past surgical history      REVIEW OF SYSTEMS:    CONSTITUTIONAL: No weakness, fevers or chills  EYES/ENT: No visual changes, no throat pain   RESPIRATORY: No cough, wheezing, hemoptysis; No shortness of breath  CARDIOVASCULAR: No chest pain or palpitations  GASTROINTESTINAL: No abdominal pain, nausea, vomiting, or hematemesis; No diarrhea or constipation. No melena or hematochezia.  GENITOURINARY: No dysuria, frequency or hematuria  MUSCULOSKELETAL: No joint pain.  NEUROLOGICAL: No dizziness, numbness, or weakness  SKIN: No itching, burning, rashes, or lesions   All other review of systems is negative unless indicated above.          MEDICATIONS  (STANDING):  atorvastatin 20 milliGRAM(s) Oral at bedtime  budesonide  80 MICROgram(s)/formoterol 4.5 MICROgram(s) Inhaler 2 Puff(s) Inhalation two times a day  dextrose 50% Injectable 12.5 Gram(s) IV Push once  dextrose 50% Injectable 25 Gram(s) IV Push once  dextrose 50% Injectable 25 Gram(s) IV Push once  enoxaparin Injectable 120 milliGRAM(s) SubCutaneous two times a day  melatonin 3 milliGRAM(s) Oral at bedtime  metoprolol tartrate 50 milliGRAM(s) Oral two times a day  pantoprazole    Tablet 40 milliGRAM(s) Oral before breakfast    MEDICATIONS  (PRN):  acetaminophen   Tablet .. 650 milliGRAM(s) Oral every 6 hours PRN Mild Pain (1 - 3), Moderate Pain (4 - 6)  cyclobenzaprine 10 milliGRAM(s) Oral three times a day PRN Muscle Spasm  dextrose 40% Gel 15 Gram(s) Oral once PRN Blood Glucose LESS THAN 70 milliGRAM(s)/deciliter  glucagon  Injectable 1 milliGRAM(s) IntraMuscular once PRN Glucose LESS THAN 70 milligrams/deciliter  oxycodone    5 mG/acetaminophen 325 mG 1 Tablet(s) Oral every 6 hours PRN Severe Pain (7 - 10)  polyethylene glycol 3350 17 Gram(s) Oral every 12 hours PRN Constipation  senna 2 Tablet(s) Oral daily PRN Constipation      Allergies    No Known Drug Allergies  shellfish (Hives)    Intolerances        SOCIAL HISTORY:  smoking hx 1ppd x 20 years.  Quit Dec 2019        FAMILY HISTORY:  No pertinent family history in first degree relatives      Vital Signs Last 24 Hrs  T(C): 36.7 (13 Jan 2020 06:26), Max: 37 (12 Jan 2020 20:54)  T(F): 98.1 (13 Jan 2020 06:26), Max: 98.6 (12 Jan 2020 20:54)  HR: 100 (13 Jan 2020 06:26) (91 - 100)  BP: 123/79 (13 Jan 2020 06:26) (120/74 - 129/90)  BP(mean): --  RR: 20 (13 Jan 2020 06:26) (16 - 20)  SpO2: 100% (13 Jan 2020 06:26) (100% - 100%)        PHYSICAL EXAM:  	GENERAL: No Acute Distress  	EYES: conjunctiva and sclera clear  	ENMT: Moist mucous membranes   	NECK: Supple  	PULMONARY: Clear to auscultation bilaterally  	CARDIAC: Regular rate and rhythm; No murmurs, rubs, or gallops  	GASTROINTESTINAL: Abdomen soft, Nontender, Nondistended; Bowel sounds normal  	EXTREMITIES:   No clubbing, cyanosis.  Bilateral lower extremity edema  	PSYCH: Normal Affect, Normal Behavior  	NEUROLOGY:  Mental status A&O x 3  	SKIN: R sided pleurx catheter    MUSCULOSKELETAL: No joint swelling        LABS:                        9.4    8.52  )-----------( 116      ( 13 Jan 2020 07:00 )             30.8     01-13    134<L>  |  99  |  8   ----------------------------<  135<H>  3.9   |  22  |  0.66    Ca    8.5      13 Jan 2020 07:00  Phos  2.3     01-13  Mg     1.6     01-13    TPro  5.8<L>  /  Alb  2.6<L>  /  TBili  1.5<H>  /  DBili  0.7<H>  /  AST  45<H>  /  ALT  47<H>  /  AlkPhos  160<H>  01-13    PT/INR - ( 13 Jan 2020 07:00 )   PT: 17.9 SEC;   INR: 1.55          PTT - ( 13 Jan 2020 07:00 )  PTT:37.4 SEC      RADIOLOGY & ADDITIONAL STUDIES:    < from: CT Abdomen and Pelvis w/ IV Cont (01.07.20 @ 00:41) >  EXAM:  CT ABDOMEN AND PELVIS IC                          PROCEDURE DATE:  01/07/2020          INTERPRETATION:  CLINICAL INDICATION: 39-year-old with leiomyosarcoma; for detection venous thrombosis.          FINDINGS: CT of the abdomen and pelvis was performed with the administration of intravenous contrast. CT of the bilateral lower extremities was performed after the administration of intravenous contrast in the venous phase. Reconstructions were performed in the sagittal and coronal planes. Comparison is made to prior CT dated 12/4/2019 and prior MRI dated 7/18/2019.    Evaluation of the lower chest demonstrates enlarging large right pleural effusion. Catheter within the right pleural space. Normal heart size. Right basilar atelectasis. Evaluation of the abdomen demonstrates heterogeneous perfusion of the liver which is indented by a large mass within the intrahepatic IVC, unchanged since prior study, currently measuring 10.2 x 7.4 cm with tumor thrombus extending into the right renalvein and right atrium. There is thrombus partially occluding the infrahepatic IVC and extending into the bilateral common and external iliac veins. The spleen, pancreas, bilateral adrenal glands and left kidney are normal in appearance. There is stable mild right hydronephrosis secondary to right ureteral compression. Evaluation of the gastrointestinal tract demonstrates diverticulosis. Evaluation of the pelvis demonstrates enlarged myomatous uterus with submucosal and serosal myomas, including an exophytic myoma measuring 5 cm projecting into the left adnexal region. Bladder is collapsed. Small volume of fluid. Very severe diffuse anasarca. Pelvic venous congestion. Negative for adenopathy. Opacified aspects of the portal, splenic, superior mesenteric vein and left renal vein are patent. There is severe edematous infiltration obtaining tissues of the bilateral lower extremities. There is infiltrative soft tissue versus severe edematous infiltration in the retroperitoneum extending from the caval mass. Extensive venous collaterals within the pelvis. Minimal aortic vascular calcification. Evaluation of the osseous structures demonstrates no destructive lesion.          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    Enlarging large right pleural effusion.                      Thank you for the opportunity to participate in the care of this patient.    ERNIE GENAO M.D., RADIOLOGY RESIDENT  This document has been electronically signed.  GARRISON OSEI M.D., ATTENDING RADIOLOGIST  This document has been electronically signed. Jan 7 2020  8:30AM        < end of copied text >

## 2020-01-13 NOTE — PROGRESS NOTE ADULT - SUBJECTIVE AND OBJECTIVE BOX
Patient is a 39y old  Female who presents with a chief complaint of Chemotherapy (12 Jan 2020 17:25)    General: denies fevers, chills  Skin/Breast: denies rash   Ophthalmologic: denies blurry vision  ENMT: denies throat pain  Respiratory and Thorax: denies cough, denies shortness of breath  Cardiovascular: denies chest pain, palpitations. Denies LE swelling   Gastrointestinal: denies abdominal pain/ nausea/ vomiting/ diarrhea. Denies BRBPR/ melena   Genitourinary: Denies dysuria  Musculoskeletal: Denies mylagias   Neurological: Denies syncope  Psychiatric: Denies mood disturbance   Hematology/Lymphatics: denies bleeding/bruising. Denies skin lumps 	  Vital Signs Last 24 Hrs  T(C): 36.7 (13 Jan 2020 06:26), Max: 37 (12 Jan 2020 20:54)  T(F): 98.1 (13 Jan 2020 06:26), Max: 98.6 (12 Jan 2020 20:54)  HR: 100 (13 Jan 2020 06:26) (91 - 100)  BP: 123/79 (13 Jan 2020 06:26) (120/74 - 129/90)  BP(mean): --  RR: 20 (13 Jan 2020 06:26) (16 - 20)  SpO2: 100% (13 Jan 2020 06:26) (100% - 100%)  PHYSICAL EXAM:    GENERAL: NAD, AAOx3   HEAD:  NC/AT  EYES: EOMI, PERRLA, no scleral icterus  HEENT: Moist mucous membranes  LUNG: Clear to auscultation bilaterally; No rales, rhonchi, wheezing, or rubs  HEART: RRR; No murmurs, rubs, or gallops  ABDOMEN: +BS, ST/ND/NT  EXTREMITIES:  2+ Peripheral Pulses, No clubbing, cyanosis, or edema  LAD: no palpable adenopathy  01-13    134<L>  |  99  |  8   ----------------------------<  135<H>  3.9   |  22  |  0.66    Ca    8.5      13 Jan 2020 07:00  Phos  2.3     01-13  Mg     1.6     01-13    TPro  5.8<L>  /  Alb  2.6<L>  /  TBili  1.5<H>  /  DBili  0.7<H>  /  AST  45<H>  /  ALT  47<H>  /  AlkPhos  160<H>  01-13      CBC Full  -  ( 13 Jan 2020 07:00 )  WBC Count : 8.52 K/uL  RBC Count : 4.59 M/uL  Hemoglobin : 9.4 g/dL  Hematocrit : 30.8 %  Platelet Count - Automated : 116 K/uL  Mean Cell Volume : 67.1 fL  Mean Cell Hemoglobin : 20.5 pg  Mean Cell Hemoglobin Concentration : 30.5 %  Auto Neutrophil # : x  Auto Lymphocyte # : x  Auto Monocyte # : x  Auto Eosinophil # : x  Auto Basophil # : x  Auto Neutrophil % : x  Auto Lymphocyte % : x  Auto Monocyte % : x  Auto Eosinophil % : x  Auto Basophil % : x      LIVER FUNCTIONS - ( 13 Jan 2020 07:00 )  Alb: 2.6 g/dL / Pro: 5.8 g/dL / ALK PHOS: 160 u/L / ALT: 47 u/L / AST: 45 u/L / GGT: x             PT/INR - ( 13 Jan 2020 07:00 )   PT: 17.9 SEC;   INR: 1.55          PTT - ( 13 Jan 2020 07:00 )  PTT:37.4 SEC Afebrile overnight.  Reports anxious to get started with treatment.    Vital Signs Last 24 Hrs  T(C): 36.7 (13 Jan 2020 06:26), Max: 37 (12 Jan 2020 20:54)  T(F): 98.1 (13 Jan 2020 06:26), Max: 98.6 (12 Jan 2020 20:54)  HR: 100 (13 Jan 2020 06:26) (91 - 100)  BP: 123/79 (13 Jan 2020 06:26) (120/74 - 129/90)  BP(mean): --  RR: 20 (13 Jan 2020 06:26) (16 - 20)  SpO2: 100% (13 Jan 2020 06:26) (100% - 100%)  PHYSICAL EXAM:    GENERAL: NAD, AAOx3  HEAD:  NC/AT  EYES: EOMI, PERRLA, no scleral icterus  HEENT: Moist mucous membranes  LUNG: Clear to auscultation bilaterally; No rales, rhonchi, wheezing, or rubs  HEART: RRR; No murmurs, rubs, or gallops  ABDOMEN:  +palpable mass in abdomen  EXTREMITIES: +1 edema b/l LE, ace bandages on both legs  LAD: no palpable adenopathy  01-13    134<L>  |  99  |  8   ----------------------------<  135<H>  3.9   |  22  |  0.66    Ca    8.5      13 Jan 2020 07:00  Phos  2.3     01-13  Mg     1.6     01-13    TPro  5.8<L>  /  Alb  2.6<L>  /  TBili  1.5<H>  /  DBili  0.7<H>  /  AST  45<H>  /  ALT  47<H>  /  AlkPhos  160<H>  01-13      CBC Full  -  ( 13 Jan 2020 07:00 )  WBC Count : 8.52 K/uL  RBC Count : 4.59 M/uL  Hemoglobin : 9.4 g/dL  Hematocrit : 30.8 %  Platelet Count - Automated : 116 K/uL  Mean Cell Volume : 67.1 fL  Mean Cell Hemoglobin : 20.5 pg  Mean Cell Hemoglobin Concentration : 30.5 %  Auto Neutrophil # : x  Auto Lymphocyte # : x  Auto Monocyte # : x  Auto Eosinophil # : x  Auto Basophil # : x  Auto Neutrophil % : x  Auto Lymphocyte % : x  Auto Monocyte % : x  Auto Eosinophil % : x  Auto Basophil % : x      LIVER FUNCTIONS - ( 13 Jan 2020 07:00 )  Alb: 2.6 g/dL / Pro: 5.8 g/dL / ALK PHOS: 160 u/L / ALT: 47 u/L / AST: 45 u/L / GGT: x             PT/INR - ( 13 Jan 2020 07:00 )   PT: 17.9 SEC;   INR: 1.55          PTT - ( 13 Jan 2020 07:00 )  PTT:37.4 SEC

## 2020-01-13 NOTE — PROVIDER CONTACT NOTE (OTHER) - BACKGROUND
Pt. admitted with leiomyosarcoma with Pleurx catheter for pleural effusion. 1000L drained on 1/12/20 for subjective SOB. 900ml drained on 1/11/20.

## 2020-01-13 NOTE — PROGRESS NOTE ADULT - ASSESSMENT
39F with hx of iron def anemia, uterine fibroids, s/p bilateral uterine artery embolization in 8/2019 due to fibroids, with new diagnosis of leiomyosarcoma.  Plan to start inpatient treatment.    #Leiomyosarcoma  - large intrahepatic IVC mass with vascular tumor extension  - acute DVTs, c/w lovenox  - plan for port placement on 1/14  - will discuss plan with Dr. Chaves, however likely start decarbazine/doxorubicin after port placement    #Pleural Effusion  - negative for malignant cells    Lorneza Anguiano DO  Hematology/Oncology Fellow, PGY5  Pager: [phone] 39F with hx of iron def anemia, uterine fibroids, s/p bilateral uterine artery embolization in 8/2019 due to fibroids, with new diagnosis of leiomyosarcoma.  Plan to start inpatient treatment.    #Leiomyosarcoma  - CT A/P 1/7 showing again the large mass at the intrahepatic IVC with tumor thrombus extending into R renal vein and R atrium, probable bland thrombus within the b/l common and external iliac veins, resultant Budd Chiari syndrome and severe lower extremity edema  - CT Chest 12/17 showing pleural effusion, small pulmonay nodules not well visualized  - both ascitic fluid and pleural fluid thus far negative for malignant cells, however if reaccumulating still suspect.  - large intrahepatic IVC mass with vascular tumor extension  - acute DVTs, c/w lovenox  - plan for port placement on 1/14  - will start decarbazine/doxorubicin after port placement.  Consent obtained today.    #Pleural Effusion  - negative for malignant cells  - will continue to monitor    Lorenza Anguiano DO  Hematology/Oncology Fellow, PGY5  Pager: [phone]

## 2020-01-13 NOTE — PROGRESS NOTE ADULT - PROBLEM SELECTOR PLAN 8
Dispo: Pending chemotherapy    1) PCP Contacted on Admission: (Y/N) -->   2) Date of Contact with PCP:  3) PCP Contacted at Discharge: (Y/N, N/A)  4) Summary of Handoff Given to PCP:   5) Post-Discharge Appointment Date and Location: Lovenox  holding for pending IR procedure - last received on 1/12 PM -5pm  Dispo: Pending chemotherapy

## 2020-01-13 NOTE — PROGRESS NOTE ADULT - PROBLEM SELECTOR PLAN 1
Oncology recommendations appreciated: Pending port placement by IR 1/14 then will initiate chemo (possible regimen: doxorubicin and Dacarbazine)  Pain control: Percocet PRN, Tylenol PRN, Flexeril PRN for spasms. Bowel regimen.

## 2020-01-13 NOTE — PROGRESS NOTE ADULT - PROBLEM SELECTOR PLAN 4
Lovenox BID  Per wound care recommended Vascular consult to assess for arterial insufficiency.  Vascular recommends - KIRSTY/PVR - ordered 1/12 Lovenox BID - holding for pending IR procedure - last received on 1/12 PM -5pm   Per wound care recommended Vascular consult to assess for arterial insufficiency.  Vascular recommends - KIRSTY/PVR - ordered 1/12

## 2020-01-13 NOTE — PROGRESS NOTE ADULT - ASSESSMENT
39 year old woman history of  HTN, HLD, DM (A1C 5.8), asthma, fibroids, recently diagnosed leiomyosarcoma and DVT transferred from [facility] for initiation of chemotherapy. Pending port placement.

## 2020-01-13 NOTE — PROGRESS NOTE ADULT - SUBJECTIVE AND OBJECTIVE BOX
[facility]  Division of Hospital Medicine  Yashira Milner MD  Pager: [phone]      Patient is a 39y old  Female who presents with a chief complaint of Chemotherapy (13 Jan 2020 11:17)      SUBJECTIVE / OVERNIGHT EVENTS: The patient was seen and examined at bedside. No acute events overnight. Patient denies any medical complaints. NO SOB fever, chills. Anxious over IR procedure.   ADDITIONAL REVIEW OF SYSTEMS:    MEDICATIONS  (STANDING):  atorvastatin 20 milliGRAM(s) Oral at bedtime  budesonide  80 MICROgram(s)/formoterol 4.5 MICROgram(s) Inhaler 2 Puff(s) Inhalation two times a day  dextrose 50% Injectable 12.5 Gram(s) IV Push once  dextrose 50% Injectable 25 Gram(s) IV Push once  dextrose 50% Injectable 25 Gram(s) IV Push once  enoxaparin Injectable 120 milliGRAM(s) SubCutaneous two times a day  melatonin 3 milliGRAM(s) Oral at bedtime  metoprolol tartrate 50 milliGRAM(s) Oral two times a day  pantoprazole    Tablet 40 milliGRAM(s) Oral before breakfast    MEDICATIONS  (PRN):  acetaminophen   Tablet .. 650 milliGRAM(s) Oral every 6 hours PRN Mild Pain (1 - 3), Moderate Pain (4 - 6)  cyclobenzaprine 10 milliGRAM(s) Oral three times a day PRN Muscle Spasm  dextrose 40% Gel 15 Gram(s) Oral once PRN Blood Glucose LESS THAN 70 milliGRAM(s)/deciliter  glucagon  Injectable 1 milliGRAM(s) IntraMuscular once PRN Glucose LESS THAN 70 milligrams/deciliter  oxycodone    5 mG/acetaminophen 325 mG 1 Tablet(s) Oral every 6 hours PRN Severe Pain (7 - 10)  polyethylene glycol 3350 17 Gram(s) Oral every 12 hours PRN Constipation  senna 2 Tablet(s) Oral daily PRN Constipation      CAPILLARY BLOOD GLUCOSE      POCT Blood Glucose.: 162 mg/dL (12 Jan 2020 17:34)    I&O's Summary    12 Jan 2020 07:01  -  13 Jan 2020 07:00  --------------------------------------------------------  IN: 0 mL / OUT: 1000 mL / NET: -1000 mL        PHYSICAL EXAM:  Vital Signs Last 24 Hrs  T(C): 36.7 (13 Jan 2020 06:26), Max: 37 (12 Jan 2020 20:54)  T(F): 98.1 (13 Jan 2020 06:26), Max: 98.6 (12 Jan 2020 20:54)  HR: 100 (13 Jan 2020 06:26) (91 - 100)  BP: 123/79 (13 Jan 2020 06:26) (120/74 - 129/90)  BP(mean): --  RR: 20 (13 Jan 2020 06:26) (20 - 20)  SpO2: 100% (13 Jan 2020 06:26) (100% - 100%)  CONSTITUTIONAL: Young woman, obese, NAD, well-developed, well-groomed  EYES: PERRLA; conjunctiva and sclera clear  ENMT: Moist oral mucosa, no pharyngeal injection or exudates; normal dentition  NECK: Supple, no palpable masses; no thyromegaly  RESPIRATORY: Normal respiratory effort; lungs are clear to auscultation bilaterally  CARDIOVASCULAR: Regular rate and rhythm, normal S1 and S2, no murmur/rub/gallop; No lower extremity edema; Peripheral pulses are 2+ bilaterally  ABDOMEN: Nontender to palpation, normoactive bowel sounds, no rebound/guarding; No hepatosplenomegaly  MUSCULOSKELETAL:  Normal gait; no clubbing or cyanosis of digits; no joint swelling or tenderness to palpation  PSYCH: A+O to person, place, and time; affect appropriate  NEUROLOGY: CN 2-12 are intact and symmetric; no gross sensory deficits   SKIN: R sided pleurx catheter c/d/i  LABS:                        9.4    8.52  )-----------( 116      ( 13 Jan 2020 07:00 )             30.8     01-13    134<L>  |  99  |  8   ----------------------------<  135<H>  3.9   |  22  |  0.66    Ca    8.5      13 Jan 2020 07:00  Phos  2.3     01-13  Mg     1.6     01-13    TPro  5.8<L>  /  Alb  2.6<L>  /  TBili  1.5<H>  /  DBili  0.7<H>  /  AST  45<H>  /  ALT  47<H>  /  AlkPhos  160<H>  01-13    PT/INR - ( 13 Jan 2020 07:00 )   PT: 17.9 SEC;   INR: 1.55          PTT - ( 13 Jan 2020 07:00 )  PTT:37.4 SEC            RADIOLOGY & ADDITIONAL TESTS:  Results Reviewed:   Imaging Personally Reviewed:< from: VA Duplex Ext Veins Lower Comp, Bilat. (01.13.20 @ 08:57) >  Limited study (due to body habitus).  1. Acute, occlusive deep vein thromboses noted in right  external iliac and common femoral veins. Acute,  non-occlusive DVT also noted in the proximal right femoral  vein.  2. Acute, non-occlusive deep vein thrombosis noted in the  left common femoral vein.  Results communicated with Dr. MERISSA Kendrick on 01/13/2020 at  9:20 AM.  --------------------    < end of copied text >    Electrocardiogram Personally Reviewed:    COORDINATION OF CARE:  Care Discussed with Consultants/Other Providers [Y/N]:  Prior or Outpatient Records Reviewed [Y/N]:

## 2020-01-14 ENCOUNTER — APPOINTMENT (OUTPATIENT)
Dept: HEMATOLOGY ONCOLOGY | Facility: CLINIC | Age: 40
End: 2020-01-14

## 2020-01-14 ENCOUNTER — TRANSCRIPTION ENCOUNTER (OUTPATIENT)
Age: 40
End: 2020-01-14

## 2020-01-14 LAB
ALBUMIN SERPL ELPH-MCNC: 2.5 G/DL — LOW (ref 3.3–5)
ALP SERPL-CCNC: 150 U/L — HIGH (ref 40–120)
ALT FLD-CCNC: 43 U/L — HIGH (ref 4–33)
ANION GAP SERPL CALC-SCNC: 9 MMO/L — SIGNIFICANT CHANGE UP (ref 7–14)
AST SERPL-CCNC: 41 U/L — HIGH (ref 4–32)
BILIRUB DIRECT SERPL-MCNC: 0.6 MG/DL — HIGH (ref 0.1–0.2)
BILIRUB SERPL-MCNC: 1.2 MG/DL — SIGNIFICANT CHANGE UP (ref 0.2–1.2)
BUN SERPL-MCNC: 8 MG/DL — SIGNIFICANT CHANGE UP (ref 7–23)
CALCIUM SERPL-MCNC: 8.3 MG/DL — LOW (ref 8.4–10.5)
CHLORIDE SERPL-SCNC: 100 MMOL/L — SIGNIFICANT CHANGE UP (ref 98–107)
CO2 SERPL-SCNC: 23 MMOL/L — SIGNIFICANT CHANGE UP (ref 22–31)
CREAT SERPL-MCNC: 0.64 MG/DL — SIGNIFICANT CHANGE UP (ref 0.5–1.3)
D DIMER BLD IA.RAPID-MCNC: 1485 NG/ML — SIGNIFICANT CHANGE UP
FIBRINOGEN PPP-MCNC: 536.6 MG/DL — HIGH (ref 350–510)
GLUCOSE BLDC GLUCOMTR-MCNC: 176 MG/DL — HIGH (ref 70–99)
GLUCOSE SERPL-MCNC: 166 MG/DL — HIGH (ref 70–99)
HCG SERPL-ACNC: < 5 MIU/ML — SIGNIFICANT CHANGE UP
HCT VFR BLD CALC: 30.5 % — LOW (ref 34.5–45)
HGB BLD-MCNC: 9.3 G/DL — LOW (ref 11.5–15.5)
INR BLD: 1.44 — HIGH (ref 0.88–1.17)
LDH SERPL L TO P-CCNC: 399 U/L — HIGH (ref 135–225)
MAGNESIUM SERPL-MCNC: 1.6 MG/DL — SIGNIFICANT CHANGE UP (ref 1.6–2.6)
MANUAL SMEAR VERIFICATION: SIGNIFICANT CHANGE UP
MCHC RBC-ENTMCNC: 20.7 PG — LOW (ref 27–34)
MCHC RBC-ENTMCNC: 30.5 % — LOW (ref 32–36)
MCV RBC AUTO: 67.8 FL — LOW (ref 80–100)
NRBC # FLD: 0 K/UL — SIGNIFICANT CHANGE UP (ref 0–0)
PHOSPHATE SERPL-MCNC: 2.1 MG/DL — LOW (ref 2.5–4.5)
PLATELET # BLD AUTO: 113 K/UL — LOW (ref 150–400)
PMV BLD: SIGNIFICANT CHANGE UP FL (ref 7–13)
POTASSIUM SERPL-MCNC: 3.7 MMOL/L — SIGNIFICANT CHANGE UP (ref 3.5–5.3)
POTASSIUM SERPL-SCNC: 3.7 MMOL/L — SIGNIFICANT CHANGE UP (ref 3.5–5.3)
PROT SERPL-MCNC: 5.7 G/DL — LOW (ref 6–8.3)
PROTHROM AB SERPL-ACNC: 16.6 SEC — HIGH (ref 9.8–13.1)
RBC # BLD: 4.5 M/UL — SIGNIFICANT CHANGE UP (ref 3.8–5.2)
RBC # FLD: 25.8 % — HIGH (ref 10.3–14.5)
RETICS #: 175 K/UL — HIGH (ref 25–125)
RETICS/RBC NFR: 3.9 % — HIGH (ref 0.5–2.5)
SODIUM SERPL-SCNC: 132 MMOL/L — LOW (ref 135–145)
WBC # BLD: 8.17 K/UL — SIGNIFICANT CHANGE UP (ref 3.8–10.5)
WBC # FLD AUTO: 8.17 K/UL — SIGNIFICANT CHANGE UP (ref 3.8–10.5)

## 2020-01-14 PROCEDURE — 99233 SBSQ HOSP IP/OBS HIGH 50: CPT

## 2020-01-14 RX ORDER — HEPARIN SODIUM 5000 [USP'U]/ML
4500 INJECTION INTRAVENOUS; SUBCUTANEOUS EVERY 6 HOURS
Refills: 0 | Status: DISCONTINUED | OUTPATIENT
Start: 2020-01-14 | End: 2020-01-15

## 2020-01-14 RX ORDER — HEPARIN SODIUM 5000 [USP'U]/ML
9500 INJECTION INTRAVENOUS; SUBCUTANEOUS EVERY 6 HOURS
Refills: 0 | Status: DISCONTINUED | OUTPATIENT
Start: 2020-01-14 | End: 2020-01-15

## 2020-01-14 RX ORDER — HEPARIN SODIUM 5000 [USP'U]/ML
INJECTION INTRAVENOUS; SUBCUTANEOUS
Qty: 25000 | Refills: 0 | Status: DISCONTINUED | OUTPATIENT
Start: 2020-01-14 | End: 2020-01-15

## 2020-01-14 RX ADMIN — HEPARIN SODIUM 2100 UNIT(S)/HR: 5000 INJECTION INTRAVENOUS; SUBCUTANEOUS at 18:32

## 2020-01-14 RX ADMIN — Medication 650 MILLIGRAM(S): at 21:06

## 2020-01-14 RX ADMIN — BUDESONIDE AND FORMOTEROL FUMARATE DIHYDRATE 2 PUFF(S): 160; 4.5 AEROSOL RESPIRATORY (INHALATION) at 21:08

## 2020-01-14 RX ADMIN — BUDESONIDE AND FORMOTEROL FUMARATE DIHYDRATE 2 PUFF(S): 160; 4.5 AEROSOL RESPIRATORY (INHALATION) at 09:55

## 2020-01-14 RX ADMIN — Medication 650 MILLIGRAM(S): at 21:36

## 2020-01-14 RX ADMIN — ATORVASTATIN CALCIUM 20 MILLIGRAM(S): 80 TABLET, FILM COATED ORAL at 21:08

## 2020-01-14 RX ADMIN — Medication 50 MILLIGRAM(S): at 18:32

## 2020-01-14 NOTE — PROGRESS NOTE ADULT - PROBLEM SELECTOR PLAN 4
Lovenox BID - holding for pending IR procedure - last received on 1/12 PM -5pm   Per wound care recommended Vascular consult to assess for arterial insufficiency.  - Vascular recommends - KIRSTY/PVR - ordered 1/12  -

## 2020-01-14 NOTE — PROGRESS NOTE ADULT - ASSESSMENT
39 year old woman history of  HTN, HLD, DM (A1C 5.8), asthma, fibroids, recently diagnosed leiomyosarcoma and DVT transferred from [facility] [place] for initiation of chemotherapy. Pending port placement on 1/14, with plan for chemotherapy on 1/15.

## 2020-01-14 NOTE — PROGRESS NOTE ADULT - PROBLEM SELECTOR PLAN 8
Lovenox  holding for pending IR procedure - last received on 1/12 PM -5pm  Dispo: Pending chemotherapy and KIRSTY sara 1/16 or 1/17

## 2020-01-14 NOTE — DISCHARGE NOTE PROVIDER - CARE PROVIDER_API CALL
Seb Chaves)  Hematology; Medical Oncology  [address] [place] [address]  Phone: [phone]  Fax: [phone]  Follow Up Time: Seb Chaves)  Hematology; Medical Oncology  [address] [place] [address]  Phone: [phone]  Fax: [phone]  Follow Up Time:     Nettie Novoa)  Critical Care Medicine; Pulmonary Disease  [address] [place] [address]  Phone: [phone]  Fax: [phone]  Follow Up Time: Seb Chaves)  Hematology; Medical Oncology  [address] [place] [address]  Phone: [phone]  Fax: [phone]  Follow Up Time: 1 week    Nettie Novoa)  Critical Care Medicine; Pulmonary Disease  [address] [place] [address]  Phone: [phone]  Fax: [phone]  Follow Up Time: 1 week    Dr. Cho,   Primary care provider  Phone: (   )    -  Fax: (   )    -  Follow Up Time: 1 week

## 2020-01-14 NOTE — DISCHARGE NOTE PROVIDER - NSDCFUSCHEDAPPT_GEN_ALL_CORE_FT
DRUMMOR, LACRECIA ; 01/17/2020 ; [facility]  DRUMMOR, LACRECIA ; 01/17/2020 ; [facility] DRUMMOR, LACRECIA ; 01/17/2020 ; [facility]  DRUMMOR, LACRECIA ; 01/17/2020 ; [facility]  DRUMMOR, LACRECIA ; 01/21/2020 ; [facility]

## 2020-01-14 NOTE — DISCHARGE NOTE PROVIDER - NSDCMRMEDTOKEN_GEN_ALL_CORE_FT
acetaminophen 325 mg oral tablet: 2 tab(s) orally every 6 hours, As needed, Mild Pain (1-3)  atorvastatin 20 mg oral tablet: 1 tab(s) orally once a day (at bedtime)  budesonide-formoterol 80 mcg-4.5 mcg/inh inhalation aerosol: 1 application inhaled once a day  cholecalciferol oral tablet: 1000 unit(s) orally once a day  Compression stockin Pair both both legs  cyclobenzaprine 10 mg oral tablet: 1 tab(s) orally 3 times a day, As needed, Muscle Spasm  enoxaparin: 120 milligram(s) subcutaneous 2 times a day  iron sucrose 20 mg/mL intravenous solution: 10 milliliter(s) intravenous every 24 hours  melatonin 3 mg oral tablet: 1 tab(s) orally once a day (at bedtime)  metoprolol tartrate 50 mg oral tablet: 1 tab(s) orally 2 times a day  pantoprazole 40 mg oral delayed release tablet: 1 tab(s) orally once a day (before a meal)  polyethylene glycol 3350 oral powder for reconstitution: 17 gram(s) orally every 12 hours, As needed, Constipation  senna oral tablet: 2 tab(s) orally once a day, As needed, Constipation acetaminophen 325 mg oral tablet: 2 tab(s) orally every 6 hours, As needed, Mild Pain (1-3)  atorvastatin 20 mg oral tablet: 1 tab(s) orally once a day (at bedtime)  budesonide-formoterol 80 mcg-4.5 mcg/inh inhalation aerosol: 1 application inhaled once a day  cholecalciferol oral tablet: 1000 unit(s) orally once a day  Compression stockin Pair both both legs  cyclobenzaprine 10 mg oral tablet: 1 tab(s) orally 3 times a day, As needed, Muscle Spasm  enoxaparin: 120 milligram(s) subcutaneous 2 times a day  iron sucrose 20 mg/mL intravenous solution: 10 milliliter(s) intravenous every 24 hours  Lovenox 120 mg/0.8 mL injectable solution: 120 milligram(s) subcutaneously 2 times a day   *Price check, please page [phone]*  melatonin 3 mg oral tablet: 1 tab(s) orally once a day (at bedtime)  metoprolol tartrate 50 mg oral tablet: 1 tab(s) orally 2 times a day  pantoprazole 40 mg oral delayed release tablet: 1 tab(s) orally once a day (before a meal)  polyethylene glycol 3350 oral powder for reconstitution: 17 gram(s) orally every 12 hours, As needed, Constipation  senna oral tablet: 2 tab(s) orally once a day, As needed, Constipation acetaminophen 325 mg oral tablet: 2 tab(s) orally every 6 hours, As needed, Mild Pain (1-3)  atorvastatin 20 mg oral tablet: 1 tab(s) orally once a day (at bedtime)  budesonide-formoterol 80 mcg-4.5 mcg/inh inhalation aerosol: 1 application inhaled once a day  cholecalciferol oral tablet: 1000 unit(s) orally once a day  Compression stockin Pair both both legs  cyclobenzaprine 10 mg oral tablet: 1 tab(s) orally 3 times a day, As needed, Muscle Spasm, hold for oversedation MDD:3 tab  Lovenox 120 mg/0.8 mL injectable solution: 120 milligram(s) subcutaneously 2 times a day   *Price check, please page [phone]*  magnesium oxide 400 mg (241.3 mg elemental magnesium) oral tablet: 1 tab(s) orally once a day  melatonin 3 mg oral tablet: 1 tab(s) orally once a day (at bedtime)  metoprolol tartrate 50 mg oral tablet: 1 tab(s) orally 2 times a day  pantoprazole 40 mg oral delayed release tablet: 1 tab(s) orally once a day (before a meal)  polyethylene glycol 3350 oral powder for reconstitution: 17 gram(s) orally every 12 hours, As needed, Constipation  senna oral tablet: 2 tab(s) orally once a day, As needed, Constipation acetaminophen 325 mg oral tablet: 2 tab(s) orally every 6 hours, As needed, Mild Pain (1-3)  atorvastatin 20 mg oral tablet: 1 tab(s) orally once a day (at bedtime)  budesonide-formoterol 80 mcg-4.5 mcg/inh inhalation aerosol: 1 application inhaled once a day  cholecalciferol oral tablet: 1000 unit(s) orally once a day  Compression stockin Pair both both legs  cyclobenzaprine 10 mg oral tablet: 1 tab(s) orally 3 times a day, As needed, Muscle Spasm, hold for oversedation MDD:3 tab  Lovenox 120 mg/0.8 mL injectable solution: 120 milligram(s) subcutaneously 2 times a day   *Price check, please page [phone]*  magnesium oxide 400 mg (241.3 mg elemental magnesium) oral tablet: 1 tab(s) orally once a day  melatonin 3 mg oral tablet: 1 tab(s) orally once a day (at bedtime)  metoprolol tartrate 50 mg oral tablet: 1 tab(s) orally 2 times a day  pantoprazole 40 mg oral delayed release tablet: 1 tab(s) orally once a day (before a meal)  polyethylene glycol 3350 oral powder for reconstitution: 17 gram(s) orally every 12 hours, As needed, Constipation  senna oral tablet: 2 tab(s) orally once a day, As needed, Constipation acetaminophen 325 mg oral tablet: 2 tab(s) orally every 6 hours, As needed, Mild Pain (1-3)  atorvastatin 20 mg oral tablet: 1 tab(s) orally once a day (at bedtime)  budesonide-formoterol 80 mcg-4.5 mcg/inh inhalation aerosol: 1 application inhaled once a day  cholecalciferol oral tablet: 1000 unit(s) orally once a day  Compression stockin Pair both both legs  cyclobenzaprine 10 mg oral tablet: 1 tab(s) orally 3 times a day, As needed, Muscle Spasm, hold for oversedation MDD:3 tab  Lab work : Repeat basic metabolic panel, magnesium and phosphorus within 1 week of discharge from hospital  Lovenox 120 mg/0.8 mL injectable solution: 120 milligram(s) subcutaneously 2 times a day   *Price check, please page [id number]*  magnesium oxide 400 mg (241.3 mg elemental magnesium) oral tablet: 1 tab(s) orally once a day  melatonin 3 mg oral tablet: 1 tab(s) orally once a day (at bedtime)  metFORMIN 500 mg oral tablet, extended release: 1 tab(s) orally 2 times a day  metoprolol tartrate 50 mg oral tablet: 1 tab(s) orally 2 times a day, hold for systolic blood pressure less than 110 or heart rate less than 60  pantoprazole 40 mg oral delayed release tablet: 1 tab(s) orally once a day (before a meal)  polyethylene glycol 3350 oral powder for reconstitution: 17 gram(s) orally every 12 hours, As needed, Constipation  senna oral tablet: 2 tab(s) orally once a day, As needed, Constipation acetaminophen 325 mg oral tablet: 2 tab(s) orally every 6 hours, As needed, Mild Pain (1-3)  atorvastatin 20 mg oral tablet: 1 tab(s) orally once a day (at bedtime)  budesonide-formoterol 80 mcg-4.5 mcg/inh inhalation aerosol: 1 application inhaled once a day  cholecalciferol oral tablet: 1000 unit(s) orally once a day  Compression stockin Pair both both legs  cyclobenzaprine 10 mg oral tablet: 1 tab(s) orally 3 times a day, As needed, Muscle Spasm, hold for oversedation MDD:3 tab  Lab work : Repeat basic metabolic panel, magnesium and phosphorus within 1 week of discharge from hospital  Lovenox 120 mg/0.8 mL injectable solution: 120 milligram(s) subcutaneously 2 times a day   *Price check, please page [phone]*  magnesium oxide 400 mg (241.3 mg elemental magnesium) oral tablet: 1 tab(s) orally once a day  melatonin 3 mg oral tablet: 1 tab(s) orally once a day (at bedtime)  metFORMIN 500 mg oral tablet, extended release: 1 tab(s) orally 2 times a day  metoprolol tartrate 50 mg oral tablet: 1 tab(s) orally 2 times a day, hold for systolic blood pressure less than 110 or heart rate less than 60  pantoprazole 40 mg oral delayed release tablet: 1 tab(s) orally once a day (before a meal)  polyethylene glycol 3350 oral powder for reconstitution: 17 gram(s) orally every 12 hours, As needed, Constipation  senna oral tablet: 2 tab(s) orally once a day, As needed, Constipation

## 2020-01-14 NOTE — DISCHARGE NOTE PROVIDER - HOSPITAL COURSE
40 y/o F with HTN, HLD, DM, asthma, fibroids, recently diagnosed leiomyosarcoma and DVT transferred from [place] for initiation of chemotherapy.  Pleurx catheter placed for pleural effusion, nonmalignant cells.          Leiomyosarcoma    - oncology cs    - 1/14 port placement     - for chemo         Pleural effusion    - Pleurx cath in place    - drain 3x a week        HTN    - metoprolol         B/L LE DVT    - therapeutic lovenox        Arterial Insufficiency w/u     - KIRSTY PVR ----    - vascular consult: no intervention     - ACE wrap to B/L LE         DM    - hgba1c 5.8        d/c home 38 y/o F with HTN, HLD, DM, asthma, fibroids, recently diagnosed leiomyosarcoma and DVT transferred from [place] for initiation of chemotherapy.  Pleurx catheter placed for pleural effusion, nonmalignant cells.          Leiomyosarcoma    - oncology cs    - s/p port placement, received chemo on 1/15     - MRI abd w/ IV con: Leiomyosarcoma involving the inferior vena cava, right atrium, right hepatic vein, and right renal vein. Heterogeneous appearance of the liver with indeterminant T1 hyperintense lesion in segment IVb with possible enhancement. Follow-up contrast-enhanced MRI abdomen is recommended in 3 months.                Pleural effusion    - Pleurx cath in place.    - Per previous admission, PleurX and will need to drain 1L daily in order to avoid dyspnea.        HTN    - metoprolol         B/L LE DVT    - therapeutic lovenox        Arterial Insufficiency w/u     - vascular consult: no intervention     - ACE wrap to B/L LE         DM    - hgba1c 5.8        Dispo: Pt medically stable for discharge per Dr. Milner. Outpatient follow up with Dr. Seb Chaves 40 y/o F with HTN, HLD, DM, asthma, fibroids, recently diagnosed leiomyosarcoma and DVT transferred from [place] for initiation of chemotherapy.  Pleurx catheter placed for pleural effusion, nonmalignant cells.          Leiomyosarcoma    - oncology cs    - s/p port placement, received chemo on 1/15     - MRI abd w/ IV con: Leiomyosarcoma involving the inferior vena cava, right atrium, right hepatic vein, and right renal vein. Heterogeneous appearance of the liver with indeterminant T1 hyperintense lesion in segment IVb with possible enhancement. Follow-up contrast-enhanced MRI abdomen is recommended in 3 months.                Pleural effusion    - Pleurx cath in place.    - Per previous admission, PleurX and will need to drain 1L daily in order to avoid dyspnea.        HTN    - metoprolol         B/L LE DVT    - therapeutic lovenox        Arterial Insufficiency w/u     - vascular consult: no intervention     - ACE wrap to B/L LE         DM    - hgba1c 5.8    - ok to resume metformin per med attending on d/c         Dispo: Pt medically stable for discharge per Dr. Milner. Outpatient follow up with Dr. Seb Chaves

## 2020-01-14 NOTE — PROGRESS NOTE ADULT - PROBLEM SELECTOR PLAN 1
Oncology recommendations appreciated: Pending port placement by IR 1/14 then will initiate chemo (possible regimen: doxorubicin and Dacarbazine) on 1/15, with plan for neulastata on 1/16   Pain control: Percocet PRN, Tylenol PRN, Flexeril PRN for spasms. Bowel regimen.

## 2020-01-14 NOTE — DISCHARGE NOTE PROVIDER - NSDCCPCAREPLAN_GEN_ALL_CORE_FT
PRINCIPAL DISCHARGE DIAGNOSIS  Diagnosis: Leiomyosarcoma  Assessment and Plan of Treatment: Your port was placed and you received chemotherapy on 1/15/20. Please follow up with Dr. Seb Chaves at [facility] on 1/21/20 at 10:00am.   MRI abdomen with IV contrast: You had an MRI which showed leiomyosarcoma in the liver.  Follow-up contrast-enhanced MRI abdomen is recommended in 3 months. If you experience severe abdominal pain or fever, please call your doctor right aware or return to hospital.      SECONDARY DISCHARGE DIAGNOSES  Diagnosis: Acute deep vein thrombosis (DVT) of femoral vein of both lower extremities  Assessment and Plan of Treatment: Continue Lovenox as prescribed. Continue ace bandage wrap up to both thighs as directed to help with swelling. Monitor for signs/symptoms indicating worsening of disease, such as, easy bleeding/bruising, pale skin, fatigue, dizziness, increased heart rate, or chest pain.    Diagnosis: Essential (primary) hypertension  Assessment and Plan of Treatment: Continue current blood pressure medication regimen as directed. Monitor for any visual changes, headaches or dizziness.  Monitor blood pressure regularly.  Follow up with your PCP for further management for high blood pressure, please call to make appointment within 1 week of discharge    Diagnosis: Pleural effusion  Assessment and Plan of Treatment: Continue pleurx drainage as directed: up to 1L daily. Outpatient follow up with your lung doctor Dr. Novoa. Please call to make an appointment. PRINCIPAL DISCHARGE DIAGNOSIS  Diagnosis: Leiomyosarcoma  Assessment and Plan of Treatment: Your port was placed and you received chemotherapy on 1/15/20. Please follow up with Dr. Seb Chaves at [facility] on 1/21/20 at 10:00am.    Follow-up contrast-enhanced MRI abdomen is recommended in 3 months. If you experience severe abdominal pain or fever, please call your doctor right aware or return to hospital.      SECONDARY DISCHARGE DIAGNOSES  Diagnosis: Acute deep vein thrombosis (DVT) of femoral vein of both lower extremities  Assessment and Plan of Treatment: Continue Lovenox as prescribed. Continue ace bandage wrap up to both thighs as directed to help with swelling. Monitor for signs/symptoms indicating worsening of disease, such as, easy bleeding/bruising, pale skin, fatigue, dizziness, increased heart rate, or chest pain.    Diagnosis: Essential (primary) hypertension  Assessment and Plan of Treatment: Continue current blood pressure medication regimen as directed.  Your lisinopril was held. Monitor for any visual changes, headaches or dizziness.  Monitor blood pressure regularly.  Follow up with your PCP for further management for high blood pressure, please call to make appointment within 1 week of discharge    Diagnosis: Low serum magnesium level  Assessment and Plan of Treatment: Your magnesium level was 1.5 on day of discharge. Continue magnesium supplement for 2 more days, repeat lab work to check electrolytes in 1 week.    Diagnosis: Diabetes  Assessment and Plan of Treatment: Your A1C is 5.8%. Continue metformin. Continue consistent carbohydrate diet.  Monitor blood glucose levels throughout the day before meals and at bedtime.  Record blood sugars and bring to outpatient providers appointment in order to be reviewed by your doctor for management modifications.  Be aware of diabetes management symptoms including feeling cool and clammy may be related to low glucose levels.  Feeling hot and dry may indicate high glucose levels.  If  you feel these symptoms, check your blood sugar.  Make regular podiatry appointments in order to have feet checked for wounds and toe nails cut by a doctor to prevent infections.    Diagnosis: Pleural effusion  Assessment and Plan of Treatment: Continue pleurx drainage as directed: up to 1L daily. Outpatient follow up with your lung doctor Dr. Novoa. Please call to make an appointment.

## 2020-01-14 NOTE — PROGRESS NOTE ADULT - ASSESSMENT
39F with hx of iron def anemia, uterine fibroids, s/p bilateral uterine artery embolization in 8/2019 due to fibroids, with new diagnosis of leiomyosarcoma.      #Leiomyosarcoma  - CT A/P 1/7 showing again the large mass at the intrahepatic IVC with tumor thrombus extending into R renal vein and R atrium, probable bland thrombus within the b/l common and external iliac veins, resultant Budd Chiari syndrome and severe lower extremity edema  - CT Chest 12/17 showing pleural effusion, small pulmonay nodules not well visualized  - both ascitic fluid and pleural fluid thus far negative for malignant cells, however if reaccumulating still suspect.  - large intrahepatic IVC mass with vascular tumor extension  - acute DVTs, c/w lovenox  - plan for port placement on 1/14  - will start decarbazine/doxorubicin after port placement.  Consent obtained for chemo, however patient states she has no children and would like to consider seeing a  prior to initiating chemotherapy.  Will check in with her again later today, however if this is the case she will need outpt f/u with OB/Reproductive and Fertility Medicine [phone].   - if she does proceed with chemo, neulasta scheduled for 1/17    #Pleural Effusion  - negative for malignant cells  - will continue to monitor    Lorenza Anguiano DO  Hematology/Oncology Fellow, PGY5  Pager: [phone]

## 2020-01-14 NOTE — PROGRESS NOTE ADULT - SUBJECTIVE AND OBJECTIVE BOX
SUBJECTIVE: Pt seen and examined at bedside. Complains of painful swelling in her legs. Bilateral DVT noted on US.       MEDICATIONS  (STANDING):  atorvastatin 20 milliGRAM(s) Oral at bedtime  budesonide  80 MICROgram(s)/formoterol 4.5 MICROgram(s) Inhaler 2 Puff(s) Inhalation two times a day  dextrose 50% Injectable 12.5 Gram(s) IV Push once  dextrose 50% Injectable 25 Gram(s) IV Push once  dextrose 50% Injectable 25 Gram(s) IV Push once  melatonin 3 milliGRAM(s) Oral at bedtime  metoprolol tartrate 50 milliGRAM(s) Oral two times a day  pantoprazole    Tablet 40 milliGRAM(s) Oral before breakfast    MEDICATIONS  (PRN):  acetaminophen   Tablet .. 650 milliGRAM(s) Oral every 6 hours PRN Mild Pain (1 - 3), Moderate Pain (4 - 6)  cyclobenzaprine 10 milliGRAM(s) Oral three times a day PRN Muscle Spasm  dextrose 40% Gel 15 Gram(s) Oral once PRN Blood Glucose LESS THAN 70 milliGRAM(s)/deciliter  glucagon  Injectable 1 milliGRAM(s) IntraMuscular once PRN Glucose LESS THAN 70 milligrams/deciliter  oxycodone    5 mG/acetaminophen 325 mG 1 Tablet(s) Oral every 6 hours PRN Severe Pain (7 - 10)  polyethylene glycol 3350 17 Gram(s) Oral every 12 hours PRN Constipation  senna 2 Tablet(s) Oral daily PRN Constipation      OBJECTIVE:    Vital Signs Last 24 Hrs  T(C): 36.8 (14 Jan 2020 05:09), Max: 37 (13 Jan 2020 21:15)  T(F): 98.2 (14 Jan 2020 05:09), Max: 98.6 (13 Jan 2020 21:15)  HR: 104 (14 Jan 2020 05:09) (92 - 104)  BP: 106/65 (14 Jan 2020 05:09) (103/80 - 124/74)  BP(mean): --  RR: 16 (14 Jan 2020 05:09) (16 - 20)  SpO2: 100% (14 Jan 2020 05:09) (99% - 100%)    General Appearance: Resting comfortably, no acute distress  Chest: non-labored breathing, no respiratory distress  CV: Pulse regular presently  Abdomen: Soft, non-tender, non-distended  Extremities: warm, pitting edema in calfs, ace wrap up to ankle     I&O's Summary    13 Jan 2020 07:01  -  14 Jan 2020 07:00  --------------------------------------------------------  IN: 0 mL / OUT: 550 mL / NET: -550 mL      I&O's Detail    13 Jan 2020 07:01  -  14 Jan 2020 07:00  --------------------------------------------------------  IN:  Total IN: 0 mL    OUT:    Drain: 550 mL  Total OUT: 550 mL    Total NET: -550 mL            LABS:                        9.3    8.17  )-----------( 113      ( 14 Jan 2020 05:35 )             30.5     01-14    132<L>  |  100  |  8   ----------------------------<  166<H>  3.7   |  23  |  0.64    Ca    8.3<L>      14 Jan 2020 05:35  Phos  2.1     01-14  Mg     1.6     01-14    TPro  5.7<L>  /  Alb  2.5<L>  /  TBili  1.2  /  DBili  0.6<H>  /  AST  41<H>  /  ALT  43<H>  /  AlkPhos  150<H>  01-14    PT/INR - ( 14 Jan 2020 05:35 )   PT: 16.6 SEC;   INR: 1.44          PTT - ( 13 Jan 2020 07:00 )  PTT:37.4 SEC      RADIOLOGY & ADDITIONAL STUDIES:

## 2020-01-14 NOTE — DISCHARGE NOTE PROVIDER - CARE PROVIDERS DIRECT ADDRESSES
,[email or web address]

## 2020-01-14 NOTE — DISCHARGE NOTE PROVIDER - PROVIDER TOKENS
PROVIDER:[TOKEN:[[id number]]] PROVIDER:[TOKEN:[[id number]]],PROVIDER:[TOKEN:[[id number]]] PROVIDER:[TOKEN:[[id number]],FOLLOWUP:[1 week]],PROVIDER:[TOKEN:[[id number]],FOLLOWUP:[1 week]],FREE:[LAST:[Dr. Cho],PHONE:[(   )    -],FAX:[(   )    -],ADDRESS:[Primary care provider],FOLLOWUP:[1 week]]

## 2020-01-14 NOTE — PROGRESS NOTE ADULT - SUBJECTIVE AND OBJECTIVE BOX
Afebrile overnight.  For port placement today.    Hematology/Lymphatics: denies bleeding/bruising. Denies skin lumps 	  Vital Signs Last 24 Hrs  T(C): 36.8 (14 Jan 2020 05:09), Max: 37 (13 Jan 2020 21:15)  T(F): 98.2 (14 Jan 2020 05:09), Max: 98.6 (13 Jan 2020 21:15)  HR: 104 (14 Jan 2020 05:09) (92 - 104)  BP: 106/65 (14 Jan 2020 05:09) (103/80 - 124/74)  BP(mean): --  RR: 16 (14 Jan 2020 05:09) (16 - 20)  SpO2: 100% (14 Jan 2020 05:09) (99% - 100%)  PHYSICAL EXAM:    GENERAL: NAD, AAOx3  HEAD:  NC/AT  EYES: EOMI, PERRLA, no scleral icterus  HEENT: Moist mucous membranes  LUNG: Clear to auscultation bilaterally; No rales, rhonchi, wheezing, or rubs  HEART: RRR; No murmurs, rubs, or gallops  ABDOMEN:  +palpable mass in abdomen  EXTREMITIES: +1 edema b/l LE, ace bandages on both legs  LAD: no palpable adenopathy  01-14    132<L>  |  100  |  8   ----------------------------<  166<H>  3.7   |  23  |  0.64    Ca    8.3<L>      14 Jan 2020 05:35  Phos  2.1     01-14  Mg     1.6     01-14    TPro  5.7<L>  /  Alb  2.5<L>  /  TBili  1.2  /  DBili  0.6<H>  /  AST  41<H>  /  ALT  43<H>  /  AlkPhos  150<H>  01-14      CBC Full  -  ( 14 Jan 2020 05:35 )  WBC Count : 8.17 K/uL  RBC Count : 4.50 M/uL  Hemoglobin : 9.3 g/dL  Hematocrit : 30.5 %  Platelet Count - Automated : 113 K/uL  Mean Cell Volume : 67.8 fL  Mean Cell Hemoglobin : 20.7 pg  Mean Cell Hemoglobin Concentration : 30.5 %  Auto Neutrophil # : x  Auto Lymphocyte # : x  Auto Monocyte # : x  Auto Eosinophil # : x  Auto Basophil # : x  Auto Neutrophil % : x  Auto Lymphocyte % : x  Auto Monocyte % : x  Auto Eosinophil % : x  Auto Basophil % : x      LIVER FUNCTIONS - ( 14 Jan 2020 05:35 )  Alb: 2.5 g/dL / Pro: 5.7 g/dL / ALK PHOS: 150 u/L / ALT: 43 u/L / AST: 41 u/L / GGT: x             PT/INR - ( 14 Jan 2020 05:35 )   PT: 16.6 SEC;   INR: 1.44          PTT - ( 13 Jan 2020 07:00 )  PTT:37.4 SEC

## 2020-01-14 NOTE — PROGRESS NOTE ADULT - ASSESSMENT
Patient is a 39 year old female with a PMH of HTN, HLD, DM, asthma, fibroids, and recently diagnosed leiomyosarcoma in the RP area extending into the IVC and RA with associated right pleural effusion s/p pleurex now with extensive DVTs and nonpalpable pulses in the feet.    PLAN:   - US confirmed DVTs in bilateral lower extremities  - Recommend ACE wrap to level of thighs to help swelling  - Recommend AC for DVTs    Surgery Team C #[id number]

## 2020-01-14 NOTE — PROGRESS NOTE ADULT - SUBJECTIVE AND OBJECTIVE BOX
[facility]  Division of Hospital Medicine  Yashira Milner MD  Pager: [phone]      Patient is a 39y old  Female who presents with a chief complaint of Chemotherapy (13 Jan 2020 14:04)      SUBJECTIVE / OVERNIGHT EVENTS: This am, patient without any medical complaints. Denies any SOB, however, requesting pleurex drainage. Patient reports some bi-lateral leg cramping, which comes intermittently, awaiting PVR   ADDITIONAL REVIEW OF SYSTEMS:    MEDICATIONS  (STANDING):  atorvastatin 20 milliGRAM(s) Oral at bedtime  budesonide  80 MICROgram(s)/formoterol 4.5 MICROgram(s) Inhaler 2 Puff(s) Inhalation two times a day  dextrose 50% Injectable 12.5 Gram(s) IV Push once  dextrose 50% Injectable 25 Gram(s) IV Push once  dextrose 50% Injectable 25 Gram(s) IV Push once  melatonin 3 milliGRAM(s) Oral at bedtime  metoprolol tartrate 50 milliGRAM(s) Oral two times a day  pantoprazole    Tablet 40 milliGRAM(s) Oral before breakfast    MEDICATIONS  (PRN):  acetaminophen   Tablet .. 650 milliGRAM(s) Oral every 6 hours PRN Mild Pain (1 - 3), Moderate Pain (4 - 6)  cyclobenzaprine 10 milliGRAM(s) Oral three times a day PRN Muscle Spasm  dextrose 40% Gel 15 Gram(s) Oral once PRN Blood Glucose LESS THAN 70 milliGRAM(s)/deciliter  glucagon  Injectable 1 milliGRAM(s) IntraMuscular once PRN Glucose LESS THAN 70 milligrams/deciliter  oxycodone    5 mG/acetaminophen 325 mG 1 Tablet(s) Oral every 6 hours PRN Severe Pain (7 - 10)  polyethylene glycol 3350 17 Gram(s) Oral every 12 hours PRN Constipation  senna 2 Tablet(s) Oral daily PRN Constipation      CAPILLARY BLOOD GLUCOSE      POCT Blood Glucose.: 176 mg/dL (14 Jan 2020 02:15)    I&O's Summary    13 Jan 2020 07:01  -  14 Jan 2020 07:00  --------------------------------------------------------  IN: 0 mL / OUT: 550 mL / NET: -550 mL        PHYSICAL EXAM:  Vital Signs Last 24 Hrs  T(C): 36.8 (14 Jan 2020 05:09), Max: 37 (13 Jan 2020 21:15)  T(F): 98.2 (14 Jan 2020 05:09), Max: 98.6 (13 Jan 2020 21:15)  HR: 104 (14 Jan 2020 05:09) (92 - 104)  BP: 106/65 (14 Jan 2020 05:09) (103/80 - 124/74)  BP(mean): --  RR: 16 (14 Jan 2020 05:09) (16 - 20)  SpO2: 100% (14 Jan 2020 05:09) (99% - 100%)  CONSTITUTIONAL: Young woman, obese, NAD, well-developed, well-groomed  EYES: PERRLA; conjunctiva and sclera clear  ENMT: Moist oral mucosa, no pharyngeal injection or exudates; normal dentition  NECK: Supple, no palpable masses; no thyromegaly  RESPIRATORY: Normal respiratory effort; lungs are clear to auscultation bilaterally  CARDIOVASCULAR: Regular rate and rhythm, normal S1 and S2, no murmur/rub/gallop; No lower extremity edema; Peripheral pulses are 2+ bilaterally  ABDOMEN: Nontender to palpation, normoactive bowel sounds, no rebound/guarding; No hepatosplenomegaly  MUSCULOSKELETAL:  Normal gait; no clubbing or cyanosis of digits; no joint swelling or tenderness to palpation  PSYCH: A+O to person, place, and time; affect appropriate  NEUROLOGY: CN 2-12 are intact and symmetric; no gross sensory deficits   SKIN: R sided pleurx catheter c/d/i  LABS:                        9.3    8.17  )-----------( 113      ( 14 Jan 2020 05:35 )             30.5     01-14    132<L>  |  100  |  8   ----------------------------<  166<H>  3.7   |  23  |  0.64    Ca    8.3<L>      14 Jan 2020 05:35  Phos  2.1     01-14  Mg     1.6     01-14    TPro  5.7<L>  /  Alb  2.5<L>  /  TBili  1.2  /  DBili  0.6<H>  /  AST  41<H>  /  ALT  43<H>  /  AlkPhos  150<H>  01-14    PT/INR - ( 14 Jan 2020 05:35 )   PT: 16.6 SEC;   INR: 1.44          PTT - ( 13 Jan 2020 07:00 )  PTT:37.4 SEC            RADIOLOGY & ADDITIONAL TESTS:  Results Reviewed:   Imaging Personally Reviewed:  Electrocardiogram Personally Reviewed:    COORDINATION OF CARE:  Care Discussed with Consultants/Other Providers [Y/N] Yes, per discussion with Heme/Onc plan to start chemo on 1/15 and neulasta on 1/16 :  Prior or Outpatient Records Reviewed [Y/N]:

## 2020-01-15 LAB
ALBUMIN SERPL ELPH-MCNC: 2.7 G/DL — LOW (ref 3.3–5)
ALP SERPL-CCNC: 161 U/L — HIGH (ref 40–120)
ALT FLD-CCNC: 41 U/L — HIGH (ref 4–33)
ANION GAP SERPL CALC-SCNC: 10 MMO/L — SIGNIFICANT CHANGE UP (ref 7–14)
APTT BLD: 89 SEC — HIGH (ref 27.5–36.3)
AST SERPL-CCNC: 45 U/L — HIGH (ref 4–32)
BILIRUB DIRECT SERPL-MCNC: 0.8 MG/DL — HIGH (ref 0.1–0.2)
BILIRUB SERPL-MCNC: 1.5 MG/DL — HIGH (ref 0.2–1.2)
BUN SERPL-MCNC: 8 MG/DL — SIGNIFICANT CHANGE UP (ref 7–23)
CALCIUM SERPL-MCNC: 8.5 MG/DL — SIGNIFICANT CHANGE UP (ref 8.4–10.5)
CHLORIDE SERPL-SCNC: 100 MMOL/L — SIGNIFICANT CHANGE UP (ref 98–107)
CO2 SERPL-SCNC: 22 MMOL/L — SIGNIFICANT CHANGE UP (ref 22–31)
CREAT SERPL-MCNC: 0.61 MG/DL — SIGNIFICANT CHANGE UP (ref 0.5–1.3)
GLUCOSE SERPL-MCNC: 138 MG/DL — HIGH (ref 70–99)
HCG SERPL-ACNC: < 5 MIU/ML — SIGNIFICANT CHANGE UP
HCT VFR BLD CALC: 29.5 % — LOW (ref 34.5–45)
HCT VFR BLD CALC: 31.4 % — LOW (ref 34.5–45)
HGB BLD-MCNC: 9 G/DL — LOW (ref 11.5–15.5)
HGB BLD-MCNC: 9.5 G/DL — LOW (ref 11.5–15.5)
INR BLD: 1.52 — HIGH (ref 0.88–1.17)
MAGNESIUM SERPL-MCNC: 1.6 MG/DL — SIGNIFICANT CHANGE UP (ref 1.6–2.6)
MCHC RBC-ENTMCNC: 20.5 PG — LOW (ref 27–34)
MCHC RBC-ENTMCNC: 20.6 PG — LOW (ref 27–34)
MCHC RBC-ENTMCNC: 30.3 % — LOW (ref 32–36)
MCHC RBC-ENTMCNC: 30.5 % — LOW (ref 32–36)
MCV RBC AUTO: 67.4 FL — LOW (ref 80–100)
MCV RBC AUTO: 68 FL — LOW (ref 80–100)
NRBC # FLD: 0 K/UL — SIGNIFICANT CHANGE UP (ref 0–0)
NRBC # FLD: 0.02 K/UL — SIGNIFICANT CHANGE UP (ref 0–0)
PHOSPHATE SERPL-MCNC: 2.5 MG/DL — SIGNIFICANT CHANGE UP (ref 2.5–4.5)
PLATELET # BLD AUTO: 128 K/UL — LOW (ref 150–400)
PLATELET # BLD AUTO: 132 K/UL — LOW (ref 150–400)
PMV BLD: SIGNIFICANT CHANGE UP FL (ref 7–13)
PMV BLD: SIGNIFICANT CHANGE UP FL (ref 7–13)
POTASSIUM SERPL-MCNC: 3.9 MMOL/L — SIGNIFICANT CHANGE UP (ref 3.5–5.3)
POTASSIUM SERPL-SCNC: 3.9 MMOL/L — SIGNIFICANT CHANGE UP (ref 3.5–5.3)
PROT SERPL-MCNC: 6.2 G/DL — SIGNIFICANT CHANGE UP (ref 6–8.3)
PROTHROM AB SERPL-ACNC: 17.1 SEC — HIGH (ref 9.8–13.1)
RBC # BLD: 4.38 M/UL — SIGNIFICANT CHANGE UP (ref 3.8–5.2)
RBC # BLD: 4.62 M/UL — SIGNIFICANT CHANGE UP (ref 3.8–5.2)
RBC # FLD: 25.8 % — HIGH (ref 10.3–14.5)
RBC # FLD: 26.1 % — HIGH (ref 10.3–14.5)
SODIUM SERPL-SCNC: 132 MMOL/L — LOW (ref 135–145)
WBC # BLD: 7.55 K/UL — SIGNIFICANT CHANGE UP (ref 3.8–10.5)
WBC # BLD: 7.85 K/UL — SIGNIFICANT CHANGE UP (ref 3.8–10.5)
WBC # FLD AUTO: 7.55 K/UL — SIGNIFICANT CHANGE UP (ref 3.8–10.5)
WBC # FLD AUTO: 7.85 K/UL — SIGNIFICANT CHANGE UP (ref 3.8–10.5)

## 2020-01-15 PROCEDURE — 76937 US GUIDE VASCULAR ACCESS: CPT | Mod: 26

## 2020-01-15 PROCEDURE — 99233 SBSQ HOSP IP/OBS HIGH 50: CPT

## 2020-01-15 PROCEDURE — 36561 INSERT TUNNELED CV CATH: CPT

## 2020-01-15 PROCEDURE — 77001 FLUOROGUIDE FOR VEIN DEVICE: CPT | Mod: 26,GC

## 2020-01-15 RX ORDER — PALONOSETRON HYDROCHLORIDE 0.25 MG/5ML
0.25 INJECTION, SOLUTION INTRAVENOUS ONCE
Refills: 0 | Status: COMPLETED | OUTPATIENT
Start: 2020-01-15 | End: 2020-01-15

## 2020-01-15 RX ORDER — DOXORUBICIN HYDROCHLORIDE 2 MG/ML
83 INJECTION, SOLUTION INTRAVENOUS ONCE
Refills: 0 | Status: COMPLETED | OUTPATIENT
Start: 2020-01-15 | End: 2020-01-15

## 2020-01-15 RX ORDER — CHLORHEXIDINE GLUCONATE 213 G/1000ML
1 SOLUTION TOPICAL DAILY
Refills: 0 | Status: DISCONTINUED | OUTPATIENT
Start: 2020-01-15 | End: 2020-01-16

## 2020-01-15 RX ORDER — DEXAMETHASONE 0.5 MG/5ML
12 ELIXIR ORAL ONCE
Refills: 0 | Status: COMPLETED | OUTPATIENT
Start: 2020-01-15 | End: 2020-01-15

## 2020-01-15 RX ADMIN — Medication 12 MILLIGRAM(S): at 15:44

## 2020-01-15 RX ADMIN — Medication 50 MILLIGRAM(S): at 22:02

## 2020-01-15 RX ADMIN — DOXORUBICIN HYDROCHLORIDE 249 MILLIGRAM(S): 2 INJECTION, SOLUTION INTRAVENOUS at 15:45

## 2020-01-15 RX ADMIN — BUDESONIDE AND FORMOTEROL FUMARATE DIHYDRATE 2 PUFF(S): 160; 4.5 AEROSOL RESPIRATORY (INHALATION) at 22:03

## 2020-01-15 RX ADMIN — ATORVASTATIN CALCIUM 20 MILLIGRAM(S): 80 TABLET, FILM COATED ORAL at 22:02

## 2020-01-15 RX ADMIN — PALONOSETRON HYDROCHLORIDE 0.25 MILLIGRAM(S): 0.25 INJECTION, SOLUTION INTRAVENOUS at 15:48

## 2020-01-15 RX ADMIN — CYCLOBENZAPRINE HYDROCHLORIDE 10 MILLIGRAM(S): 10 TABLET, FILM COATED ORAL at 01:04

## 2020-01-15 RX ADMIN — Medication 3 MILLIGRAM(S): at 22:02

## 2020-01-15 RX ADMIN — PANTOPRAZOLE SODIUM 40 MILLIGRAM(S): 20 TABLET, DELAYED RELEASE ORAL at 07:44

## 2020-01-15 RX ADMIN — Medication 650 MILLIGRAM(S): at 23:02

## 2020-01-15 RX ADMIN — Medication 50 MILLIGRAM(S): at 07:44

## 2020-01-15 RX ADMIN — Medication 650 MILLIGRAM(S): at 22:02

## 2020-01-15 RX ADMIN — CHLORHEXIDINE GLUCONATE 1 APPLICATION(S): 213 SOLUTION TOPICAL at 15:48

## 2020-01-15 NOTE — PROGRESS NOTE ADULT - ASSESSMENT
39 year old woman history of  HTN, HLD, DM (A1C 5.8), asthma, fibroids, recently diagnosed leiomyosarcoma and DVT transferred from [facility] for initiation of chemotherapy. Pending port placement on 1/15, with plan for chemotherapy on 1/15.

## 2020-01-15 NOTE — PROGRESS NOTE ADULT - ATTENDING COMMENTS
I have reviewed the case with Dr. Anguiano, and agree with the assessment and plan of care as outlined in the note.    38 y/o F with recently diagnosed leiomyosarcoma involving the intrahepatic IVC with significant vascular extension. Plan is for initiation of inpatient chemotherapy. We will plan for dose reduced doxorubicin as discussed with Dr. Chaves given thrombocytopenia and elevated bilirubin. MRI abdomen to evaluate for biliary obstruction. Plan to start treatment once port placed by IR. Continue lovenox for DVTs.    Will continue to follow with you.
I have reviewed the case with Dr. Anguiano, and agree with the assessment and plan of care as outlined in the note.    40 y/o F with recently diagnosed leiomyosarcoma involving the intrahepatic IVC with tumor thrombus extending into the R atrium c/b Budd Chiari syndrome. Plan is for initiation of inpatient chemotherapy as discussed with her oncologist, Dr. Chaves. We discussed the rationale, potential toxicities and side effects of chemotherapy w/ doxorubicin and dacarbazine. Consent obtained. Plan to start treatment once port placed by IR. Continue lovenox for DVTs.    Will continue to follow with you.
35 minutes spent discharge planning.

## 2020-01-15 NOTE — PROGRESS NOTE ADULT - SUBJECTIVE AND OBJECTIVE BOX
[facility]  Division of Hospital Medicine  Yashira Milner MD  Pager: [phone]      Patient is a 39y old  Female who presents with a chief complaint of Chemotherapy (15 Scott 2020 11:29)      SUBJECTIVE / OVERNIGHT EVENTS: No medical complaints. Patient with continued Lower leg edema.   ADDITIONAL REVIEW OF SYSTEMS:    MEDICATIONS  (STANDING):  atorvastatin 20 milliGRAM(s) Oral at bedtime  budesonide  80 MICROgram(s)/formoterol 4.5 MICROgram(s) Inhaler 2 Puff(s) Inhalation two times a day  chlorhexidine 4% Liquid 1 Application(s) Topical daily  dextrose 50% Injectable 12.5 Gram(s) IV Push once  dextrose 50% Injectable 25 Gram(s) IV Push once  dextrose 50% Injectable 25 Gram(s) IV Push once  melatonin 3 milliGRAM(s) Oral at bedtime  metoprolol tartrate 50 milliGRAM(s) Oral two times a day  pantoprazole    Tablet 40 milliGRAM(s) Oral before breakfast    MEDICATIONS  (PRN):  acetaminophen   Tablet .. 650 milliGRAM(s) Oral every 6 hours PRN Mild Pain (1 - 3), Moderate Pain (4 - 6)  cyclobenzaprine 10 milliGRAM(s) Oral three times a day PRN Muscle Spasm  dextrose 40% Gel 15 Gram(s) Oral once PRN Blood Glucose LESS THAN 70 milliGRAM(s)/deciliter  glucagon  Injectable 1 milliGRAM(s) IntraMuscular once PRN Glucose LESS THAN 70 milligrams/deciliter  oxycodone    5 mG/acetaminophen 325 mG 1 Tablet(s) Oral every 6 hours PRN Severe Pain (7 - 10)  polyethylene glycol 3350 17 Gram(s) Oral every 12 hours PRN Constipation  senna 2 Tablet(s) Oral daily PRN Constipation      CAPILLARY BLOOD GLUCOSE        I&O's Summary    14 Jan 2020 07:01  -  15 Scott 2020 07:00  --------------------------------------------------------  IN: 0 mL / OUT: 1200 mL / NET: -1200 mL        PHYSICAL EXAM:  Vital Signs Last 24 Hrs  T(C): 36.8 (15 Scott 2020 06:03), Max: 36.8 (14 Jan 2020 18:17)  T(F): 98.3 (15 Scott 2020 06:03), Max: 98.3 (14 Jan 2020 21:46)  HR: 100 (15 Scott 2020 06:03) (89 - 110)  BP: 126/88 (15 Scott 2020 06:03) (104/67 - 139/95)  BP(mean): --  RR: 16 (15 Scott 2020 06:03) (16 - 17)  SpO2: 100% (15 Scott 2020 06:03) (100% - 100%)  CONSTITUTIONAL: Young woman, obese, NAD, well-developed, well-groomed  EYES: PERRLA; conjunctiva and sclera clear  ENMT: Moist oral mucosa, no pharyngeal injection or exudates; normal dentition  NECK: Supple, no palpable masses; no thyromegaly  RESPIRATORY: Normal respiratory effort; lungs are clear to auscultation bilaterally  CARDIOVASCULAR: Regular rate and rhythm, normal S1 and S2, no murmur/rub/gallop; 2+ pitting  lower extremity edema; Peripheral pulses are 2+ bilaterally  ABDOMEN: Nontender to palpation, normoactive bowel sounds, no rebound/guarding; No hepatosplenomegaly  MUSCULOSKELETAL:  Normal gait; no clubbing or cyanosis of digits; no joint swelling or tenderness to palpation  PSYCH: A+O to person, place, and time; affect appropriate  NEUROLOGY: CN 2-12 are intact and symmetric; no gross sensory deficits   SKIN: R sided pleurx catheter c/d/i  LABS:                        9.5    7.55  )-----------( 132      ( 15 Scott 2020 06:45 )             31.4     01-15    132<L>  |  100  |  8   ----------------------------<  138<H>  3.9   |  22  |  0.61    Ca    8.5      15 Scott 2020 06:45  Phos  2.5     01-15  Mg     1.6     01-15    TPro  6.2  /  Alb  2.7<L>  /  TBili  1.5<H>  /  DBili  0.8<H>  /  AST  45<H>  /  ALT  41<H>  /  AlkPhos  161<H>  01-15    PT/INR - ( 15 Scott 2020 06:45 )   PT: 17.1 SEC;   INR: 1.52          PTT - ( 15 Scott 2020 00:25 )  PTT:89.0 SEC            RADIOLOGY & ADDITIONAL TESTS:  Results Reviewed:   Imaging Personally Reviewed:  Electrocardiogram Personally Reviewed:    COORDINATION OF CARE:  Care Discussed with Consultants/Other Providers [Y/N]: Yes, oncology re: plan for chemotherapy   Prior or Outpatient Records Reviewed [Y/N]:

## 2020-01-15 NOTE — PROGRESS NOTE ADULT - PROBLEM SELECTOR PLAN 4
Lovenox BID - holding for pending IR procedure - last received on 1/12 PM -5pm   Per wound care recommended Vascular consult to assess for arterial insufficiency.  - Vascular recommends - continued DVt treatment   -

## 2020-01-15 NOTE — PROGRESS NOTE ADULT - ASSESSMENT
39F with hx of iron def anemia, uterine fibroids, s/p bilateral uterine artery embolization in 8/2019 due to fibroids, with new diagnosis of leiomyosarcoma.  Plan to start inpatient treatment.    #Leiomyosarcoma  - CT A/P 1/7 showing again the large mass at the intrahepatic IVC with tumor thrombus extending into R renal vein and R atrium, probable bland thrombus within the b/l common and external iliac veins, resultant Budd Chiari syndrome and severe lower extremity edema  - CT Chest 12/17 showing pleural effusion, small pulmonay nodules not well visualized  - both ascitic fluid and pleural fluid thus far negative for malignant cells, however if reaccumulating still suspect.  - large intrahepatic IVC mass with vascular tumor extension  - acute DVTs, c/w lovenox  - plan for port placement on 1/15  - will start doxorubicin today after port placement, dose reduced to 37.5 mg/m2 for bili.    - please obtain MRI of the abdomen to evaluate for any ductal obstruction    #Pleural Effusion  - negative for malignant cells  - will continue to monitor    Lorenza Anguiano DO  Hematology/Oncology Fellow, PGY5  Pager: [phone]

## 2020-01-15 NOTE — PHARMACOTHERAPY INTERVENTION NOTE - COMMENTS
Initial dose ordered for 75mg/m2 of doxorubicin. Since bilirubin was elevated recommended 50% dose reduction. New order changed to 37.5mg/m2

## 2020-01-15 NOTE — PROGRESS NOTE ADULT - SUBJECTIVE AND OBJECTIVE BOX
Vital Signs Last 24 Hrs  T(C): 36.8 (15 Scott 2020 06:03), Max: 36.8 (14 Jan 2020 18:17)  T(F): 98.3 (15 Scott 2020 06:03), Max: 98.3 (14 Jan 2020 21:46)  HR: 100 (15 Scott 2020 06:03) (89 - 110)  BP: 126/88 (15 Scott 2020 06:03) (104/67 - 139/95)  BP(mean): --  RR: 16 (15 Scott 2020 06:03) (16 - 17)  SpO2: 100% (15 Scott 2020 06:03) (100% - 100%)  PHYSICAL EXAM:    GENERAL: NAD, AAOx3   HEAD:  NC/AT  EYES: EOMI, PERRLA, no scleral icterus  HEENT: Moist mucous membranes  LUNG: Clear to auscultation bilaterally; No rales, rhonchi, wheezing, or rubs  HEART: RRR; No murmurs, rubs, or gallops  ABDOMEN: +BS, ST/ND/NT  EXTREMITIES:  2+ Peripheral Pulses, No clubbing, cyanosis, or edema  LAD: no palpable adenopathy  01-15    132<L>  |  100  |  8   ----------------------------<  138<H>  3.9   |  22  |  0.61    Ca    8.5      15 Scott 2020 06:45  Phos  2.5     01-15  Mg     1.6     01-15    TPro  6.2  /  Alb  2.7<L>  /  TBili  1.5<H>  /  DBili  0.8<H>  /  AST  45<H>  /  ALT  41<H>  /  AlkPhos  161<H>  01-15      CBC Full  -  ( 15 Scott 2020 06:45 )  WBC Count : 7.55 K/uL  RBC Count : 4.62 M/uL  Hemoglobin : 9.5 g/dL  Hematocrit : 31.4 %  Platelet Count - Automated : 132 K/uL  Mean Cell Volume : 68.0 fL  Mean Cell Hemoglobin : 20.6 pg  Mean Cell Hemoglobin Concentration : 30.3 %  Auto Neutrophil # : x  Auto Lymphocyte # : x  Auto Monocyte # : x  Auto Eosinophil # : x  Auto Basophil # : x  Auto Neutrophil % : x  Auto Lymphocyte % : x  Auto Monocyte % : x  Auto Eosinophil % : x  Auto Basophil % : x      LIVER FUNCTIONS - ( 15 Scott 2020 06:45 )  Alb: 2.7 g/dL / Pro: 6.2 g/dL / ALK PHOS: 161 u/L / ALT: 41 u/L / AST: 45 u/L / GGT: x             PT/INR - ( 15 Scott 2020 06:45 )   PT: 17.1 SEC;   INR: 1.52          PTT - ( 15 Scott 2020 00:25 )  PTT:89.0 SEC

## 2020-01-15 NOTE — PROGRESS NOTE ADULT - PROBLEM SELECTOR PLAN 8
Lovenox  holding for pending IR procedure - last received on 1/12 PM -5pm  Dispo: Pending chemotherapy and MRI of the abdomen likely  1/16th

## 2020-01-15 NOTE — PROGRESS NOTE ADULT - PROBLEM SELECTOR PLAN 1
Oncology recommendations appreciated:  port placement by IR 1/15 then will initiate chemo (possible regimen: doxorubicin and Dacarbazine) on 1/15, with plan for neulastata on 1/17 - as outpatient   [ ] Given rise in bilirubin, will MRI of the abdomen to evaluate for ductal obstruction   Pain control: Percocet PRN, Tylenol PRN, Flexeril PRN for spasms. Bowel regimen.

## 2020-01-16 ENCOUNTER — TRANSCRIPTION ENCOUNTER (OUTPATIENT)
Age: 40
End: 2020-01-16

## 2020-01-16 VITALS
RESPIRATION RATE: 16 BRPM | HEART RATE: 95 BPM | OXYGEN SATURATION: 100 % | SYSTOLIC BLOOD PRESSURE: 119 MMHG | TEMPERATURE: 97 F | DIASTOLIC BLOOD PRESSURE: 81 MMHG

## 2020-01-16 DIAGNOSIS — K76.6 PORTAL HYPERTENSION: ICD-10-CM

## 2020-01-16 DIAGNOSIS — D25.0 SUBMUCOUS LEIOMYOMA OF UTERUS: ICD-10-CM

## 2020-01-16 DIAGNOSIS — E78.5 HYPERLIPIDEMIA, UNSPECIFIED: ICD-10-CM

## 2020-01-16 DIAGNOSIS — I82.413 ACUTE EMBOLISM AND THROMBOSIS OF FEMORAL VEIN, BILATERAL: ICD-10-CM

## 2020-01-16 DIAGNOSIS — I80.8 PHLEBITIS AND THROMBOPHLEBITIS OF OTHER SITES: ICD-10-CM

## 2020-01-16 DIAGNOSIS — J98.11 ATELECTASIS: ICD-10-CM

## 2020-01-16 DIAGNOSIS — C49.9 MALIGNANT NEOPLASM OF CONNECTIVE AND SOFT TISSUE, UNSPECIFIED: ICD-10-CM

## 2020-01-16 DIAGNOSIS — Z79.84 LONG TERM (CURRENT) USE OF ORAL HYPOGLYCEMIC DRUGS: ICD-10-CM

## 2020-01-16 DIAGNOSIS — E66.9 OBESITY, UNSPECIFIED: ICD-10-CM

## 2020-01-16 DIAGNOSIS — D25.2 SUBSEROSAL LEIOMYOMA OF UTERUS: ICD-10-CM

## 2020-01-16 DIAGNOSIS — J45.909 UNSPECIFIED ASTHMA, UNCOMPLICATED: ICD-10-CM

## 2020-01-16 DIAGNOSIS — I82.409 ACUTE EMBOLISM AND THROMBOSIS OF UNSPECIFIED DEEP VEINS OF UNSPECIFIED LOWER EXTREMITY: ICD-10-CM

## 2020-01-16 DIAGNOSIS — E11.9 TYPE 2 DIABETES MELLITUS WITHOUT COMPLICATIONS: ICD-10-CM

## 2020-01-16 DIAGNOSIS — I10 ESSENTIAL (PRIMARY) HYPERTENSION: ICD-10-CM

## 2020-01-16 DIAGNOSIS — J90 PLEURAL EFFUSION, NOT ELSEWHERE CLASSIFIED: ICD-10-CM

## 2020-01-16 LAB
ALBUMIN SERPL ELPH-MCNC: 2.7 G/DL — LOW (ref 3.3–5)
ALP SERPL-CCNC: 128 U/L — HIGH (ref 40–120)
ALT FLD-CCNC: 31 U/L — SIGNIFICANT CHANGE UP (ref 4–33)
ANION GAP SERPL CALC-SCNC: 11 MMO/L — SIGNIFICANT CHANGE UP (ref 7–14)
AST SERPL-CCNC: 29 U/L — SIGNIFICANT CHANGE UP (ref 4–32)
BASOPHILS # BLD AUTO: 0 K/UL — SIGNIFICANT CHANGE UP (ref 0–0.2)
BASOPHILS NFR BLD AUTO: 0 % — SIGNIFICANT CHANGE UP (ref 0–2)
BILIRUB SERPL-MCNC: 1.2 MG/DL — SIGNIFICANT CHANGE UP (ref 0.2–1.2)
BUN SERPL-MCNC: 11 MG/DL — SIGNIFICANT CHANGE UP (ref 7–23)
CALCIUM SERPL-MCNC: 8.6 MG/DL — SIGNIFICANT CHANGE UP (ref 8.4–10.5)
CHLORIDE SERPL-SCNC: 101 MMOL/L — SIGNIFICANT CHANGE UP (ref 98–107)
CO2 SERPL-SCNC: 21 MMOL/L — LOW (ref 22–31)
CREAT SERPL-MCNC: 0.75 MG/DL — SIGNIFICANT CHANGE UP (ref 0.5–1.3)
EOSINOPHIL # BLD AUTO: 0 K/UL — SIGNIFICANT CHANGE UP (ref 0–0.5)
EOSINOPHIL NFR BLD AUTO: 0 % — SIGNIFICANT CHANGE UP (ref 0–6)
GLUCOSE SERPL-MCNC: 225 MG/DL — HIGH (ref 70–99)
HCT VFR BLD CALC: 30.3 % — LOW (ref 34.5–45)
HCT VFR BLD CALC: 30.3 % — LOW (ref 34.5–45)
HGB BLD-MCNC: 9.2 G/DL — LOW (ref 11.5–15.5)
HGB BLD-MCNC: 9.2 G/DL — LOW (ref 11.5–15.5)
IMM GRANULOCYTES NFR BLD AUTO: 0.5 % — SIGNIFICANT CHANGE UP (ref 0–1.5)
LDH SERPL L TO P-CCNC: 410 U/L — HIGH (ref 135–225)
LYMPHOCYTES # BLD AUTO: 0.8 K/UL — LOW (ref 1–3.3)
LYMPHOCYTES # BLD AUTO: 18.2 % — SIGNIFICANT CHANGE UP (ref 13–44)
MAGNESIUM SERPL-MCNC: 1.5 MG/DL — LOW (ref 1.6–2.6)
MCHC RBC-ENTMCNC: 20.5 PG — LOW (ref 27–34)
MCHC RBC-ENTMCNC: 20.5 PG — LOW (ref 27–34)
MCHC RBC-ENTMCNC: 30.4 % — LOW (ref 32–36)
MCHC RBC-ENTMCNC: 30.4 % — LOW (ref 32–36)
MCV RBC AUTO: 67.6 FL — LOW (ref 80–100)
MCV RBC AUTO: 67.6 FL — LOW (ref 80–100)
MONOCYTES # BLD AUTO: 0.19 K/UL — SIGNIFICANT CHANGE UP (ref 0–0.9)
MONOCYTES NFR BLD AUTO: 4.3 % — SIGNIFICANT CHANGE UP (ref 2–14)
NEUTROPHILS # BLD AUTO: 3.38 K/UL — SIGNIFICANT CHANGE UP (ref 1.8–7.4)
NEUTROPHILS NFR BLD AUTO: 77 % — SIGNIFICANT CHANGE UP (ref 43–77)
NRBC # FLD: 0 K/UL — SIGNIFICANT CHANGE UP (ref 0–0)
NRBC # FLD: 0 K/UL — SIGNIFICANT CHANGE UP (ref 0–0)
PHOSPHATE SERPL-MCNC: 2.9 MG/DL — SIGNIFICANT CHANGE UP (ref 2.5–4.5)
PLATELET # BLD AUTO: 114 K/UL — LOW (ref 150–400)
PLATELET # BLD AUTO: 114 K/UL — LOW (ref 150–400)
PMV BLD: SIGNIFICANT CHANGE UP FL (ref 7–13)
PMV BLD: SIGNIFICANT CHANGE UP FL (ref 7–13)
POTASSIUM SERPL-MCNC: 4.5 MMOL/L — SIGNIFICANT CHANGE UP (ref 3.5–5.3)
POTASSIUM SERPL-SCNC: 4.5 MMOL/L — SIGNIFICANT CHANGE UP (ref 3.5–5.3)
PROT SERPL-MCNC: 5.5 G/DL — LOW (ref 6–8.3)
RBC # BLD: 4.48 M/UL — SIGNIFICANT CHANGE UP (ref 3.8–5.2)
RBC # BLD: 4.48 M/UL — SIGNIFICANT CHANGE UP (ref 3.8–5.2)
RBC # FLD: 25.7 % — HIGH (ref 10.3–14.5)
RBC # FLD: 25.7 % — HIGH (ref 10.3–14.5)
SODIUM SERPL-SCNC: 133 MMOL/L — LOW (ref 135–145)
URATE SERPL-MCNC: 5.1 MG/DL — SIGNIFICANT CHANGE UP (ref 2.5–7)
WBC # BLD: 4.39 K/UL — SIGNIFICANT CHANGE UP (ref 3.8–10.5)
WBC # BLD: 4.39 K/UL — SIGNIFICANT CHANGE UP (ref 3.8–10.5)
WBC # FLD AUTO: 4.39 K/UL — SIGNIFICANT CHANGE UP (ref 3.8–10.5)
WBC # FLD AUTO: 4.39 K/UL — SIGNIFICANT CHANGE UP (ref 3.8–10.5)

## 2020-01-16 PROCEDURE — 74182 MRI ABDOMEN W/CONTRAST: CPT | Mod: 26

## 2020-01-16 PROCEDURE — 99239 HOSP IP/OBS DSCHRG MGMT >30: CPT

## 2020-01-16 RX ORDER — ENOXAPARIN SODIUM 100 MG/ML
120 INJECTION SUBCUTANEOUS
Qty: 60 | Refills: 0
Start: 2020-01-16 | End: 2020-02-14

## 2020-01-16 RX ORDER — CYCLOBENZAPRINE HYDROCHLORIDE 10 MG/1
1 TABLET, FILM COATED ORAL
Qty: 15 | Refills: 0
Start: 2020-01-16 | End: 2020-01-20

## 2020-01-16 RX ORDER — ENOXAPARIN SODIUM 100 MG/ML
120 INJECTION SUBCUTANEOUS
Refills: 0 | Status: DISCONTINUED | OUTPATIENT
Start: 2020-01-16 | End: 2020-01-16

## 2020-01-16 RX ORDER — MAGNESIUM OXIDE 400 MG ORAL TABLET 241.3 MG
400 TABLET ORAL DAILY
Refills: 0 | Status: DISCONTINUED | OUTPATIENT
Start: 2020-01-16 | End: 2020-01-16

## 2020-01-16 RX ORDER — MAGNESIUM OXIDE 400 MG ORAL TABLET 241.3 MG
1 TABLET ORAL
Qty: 2 | Refills: 0
Start: 2020-01-16 | End: 2020-01-17

## 2020-01-16 RX ORDER — METOPROLOL TARTRATE 50 MG
1 TABLET ORAL
Qty: 60 | Refills: 0
Start: 2020-01-16 | End: 2020-02-14

## 2020-01-16 RX ADMIN — Medication 50 MILLIGRAM(S): at 05:25

## 2020-01-16 RX ADMIN — CYCLOBENZAPRINE HYDROCHLORIDE 10 MILLIGRAM(S): 10 TABLET, FILM COATED ORAL at 14:37

## 2020-01-16 RX ADMIN — PANTOPRAZOLE SODIUM 40 MILLIGRAM(S): 20 TABLET, DELAYED RELEASE ORAL at 05:25

## 2020-01-16 RX ADMIN — Medication 650 MILLIGRAM(S): at 06:25

## 2020-01-16 RX ADMIN — ENOXAPARIN SODIUM 120 MILLIGRAM(S): 100 INJECTION SUBCUTANEOUS at 18:39

## 2020-01-16 RX ADMIN — CHLORHEXIDINE GLUCONATE 1 APPLICATION(S): 213 SOLUTION TOPICAL at 13:43

## 2020-01-16 RX ADMIN — MAGNESIUM OXIDE 400 MG ORAL TABLET 400 MILLIGRAM(S): 241.3 TABLET ORAL at 13:44

## 2020-01-16 RX ADMIN — Medication 50 MILLIGRAM(S): at 18:46

## 2020-01-16 RX ADMIN — Medication 650 MILLIGRAM(S): at 05:25

## 2020-01-16 RX ADMIN — BUDESONIDE AND FORMOTEROL FUMARATE DIHYDRATE 2 PUFF(S): 160; 4.5 AEROSOL RESPIRATORY (INHALATION) at 13:00

## 2020-01-16 RX ADMIN — POLYETHYLENE GLYCOL 3350 17 GRAM(S): 17 POWDER, FOR SOLUTION ORAL at 13:46

## 2020-01-16 NOTE — PROVIDER CONTACT NOTE (OTHER) - ACTION/TREATMENT ORDERED:
ACP made aware.
Awaiting treatment orders. Will continue to monitor.
No intervention ordered. Will continue to monitor and educate.

## 2020-01-16 NOTE — PROVIDER CONTACT NOTE (OTHER) - SITUATION
Patient is refusing bedtime fingerstick. Patient states A1C is 5.8. Patient educated on risk and benefits.

## 2020-01-16 NOTE — PROGRESS NOTE ADULT - PROBLEM SELECTOR PLAN 1
Oncology recommendations appreciated:  port placement by IR 1/15 then will initiate chemo (possible regimen: doxorubicin and Dacarbazine) on 1/15,   - bilirubin improved , will MRI of the abdomen reviewed and noted   Pain control: Percocet PRN, Tylenol PRN, Flexeril PRN for spasms. Bowel regimen.

## 2020-01-16 NOTE — DISCHARGE NOTE NURSING/CASE MANAGEMENT/SOCIAL WORK - PATIENT PORTAL LINK FT
You can access the FollowMyHealth Patient Portal offered by [facility] by registering at the following website: [email or web address]. By joining [facility]’s FollowMyHealth portal, you will also be able to view your health information using other applications (apps) compatible with our system.

## 2020-01-16 NOTE — PATIENT PROFILE ADULT - NSPROGENPREVTRANSF_GEN_A_NUR
Subjective     Joby Horan is a 33 y o  female who presents to the clinic 6 days status post Suction D&C for missed   Eating a regular diet without difficulty  Bowel movements are normal  Pain is controlled without any medications  The following portions of the patient's history were reviewed and updated as appropriate: allergies, current medications, past family history, past medical history, past social history, past surgical history and problem list states having some period like bleeding on some days and others it is very light  Not having any pain  Denies SI or HI   States she sees a therapist on a routine basis   Upset as she believes we had a direct cause to her miscarriage / states gets very emotional every time she comes into this office , thinks will be changing practices in the future     Review of Systems  Pertinent items are noted in HPI  Objective     /84   Wt 81 7 kg (180 lb 1 6 oz)   LMP 10/26/2019 (Exact Date)   BMI 32 94 kg/m²   General:  alert and oriented, in no acute distress/ visibly upset and crying    Abdomen: soft, bowel sounds active, non-tender         Assessment      Doing well postoperatively  Understandibly upset s/p D&C secondary to second miscarriage in a row   Operative findings again reviewed  Pathology report not available at time of visit (pending) / genetic testing (pending)   Plan     1  Will call patient with pathology results once resulted   2  Aware recommend following BHCG to non pregnant levels- BHCG collected today    3  Offered names of other providers in network if desired to have follow up at different office   4   Preconceptual counseling offered / states desires to go to [facility] no

## 2020-01-16 NOTE — PROGRESS NOTE ADULT - ASSESSMENT
39 year old woman history of  HTN, HLD, DM (A1C 5.8), asthma, fibroids, recently diagnosed leiomyosarcoma and DVT transferred from [facility] for initiation of chemotherapy. Pending port placement on 1/15, s/p hemotherapy on 1/15.

## 2020-01-16 NOTE — PROGRESS NOTE ADULT - REASON FOR ADMISSION
Chemotherapy

## 2020-01-17 ENCOUNTER — APPOINTMENT (OUTPATIENT)
Dept: INFUSION THERAPY | Facility: HOSPITAL | Age: 40
End: 2020-01-17

## 2020-01-21 ENCOUNTER — RESULT REVIEW (OUTPATIENT)
Age: 40
End: 2020-01-21

## 2020-01-21 ENCOUNTER — APPOINTMENT (OUTPATIENT)
Dept: HEMATOLOGY ONCOLOGY | Facility: CLINIC | Age: 40
End: 2020-01-21
Payer: MEDICAID

## 2020-01-21 ENCOUNTER — OUTPATIENT (OUTPATIENT)
Dept: OUTPATIENT SERVICES | Facility: HOSPITAL | Age: 40
LOS: 1 days | End: 2020-01-21
Payer: MEDICAID

## 2020-01-21 VITALS
HEART RATE: 121 BPM | HEIGHT: 65.75 IN | DIASTOLIC BLOOD PRESSURE: 83 MMHG | TEMPERATURE: 98.3 F | OXYGEN SATURATION: 100 % | BODY MASS INDEX: 40.41 KG/M2 | SYSTOLIC BLOOD PRESSURE: 113 MMHG | RESPIRATION RATE: 18 BRPM | WEIGHT: 248.46 LBS

## 2020-01-21 DIAGNOSIS — C48.0 MALIGNANT NEOPLASM OF RETROPERITONEUM: ICD-10-CM

## 2020-01-21 DIAGNOSIS — Z72.89 OTHER PROBLEMS RELATED TO LIFESTYLE: ICD-10-CM

## 2020-01-21 DIAGNOSIS — R79.89 OTHER SPECIFIED ABNORMAL FINDINGS OF BLOOD CHEMISTRY: ICD-10-CM

## 2020-01-21 DIAGNOSIS — C49.9 MALIGNANT NEOPLASM OF CONNECTIVE AND SOFT TISSUE, UNSPECIFIED: ICD-10-CM

## 2020-01-21 DIAGNOSIS — Z80.3 FAMILY HISTORY OF MALIGNANT NEOPLASM OF BREAST: ICD-10-CM

## 2020-01-21 DIAGNOSIS — Z87.891 PERSONAL HISTORY OF NICOTINE DEPENDENCE: ICD-10-CM

## 2020-01-21 LAB
APTT BLD: 38.3 SEC
BASOPHILS # BLD AUTO: 0 K/UL — SIGNIFICANT CHANGE UP (ref 0–0.2)
BASOPHILS NFR BLD AUTO: 1.2 % — SIGNIFICANT CHANGE UP (ref 0–2)
BLD GP AB SCN SERPL QL: NEGATIVE — SIGNIFICANT CHANGE UP
EOSINOPHIL # BLD AUTO: 0.1 K/UL — SIGNIFICANT CHANGE UP (ref 0–0.5)
EOSINOPHIL NFR BLD AUTO: 2.1 % — SIGNIFICANT CHANGE UP (ref 0–6)
HCT VFR BLD CALC: 32.2 % — LOW (ref 34.5–45)
HGB BLD-MCNC: 9.3 G/DL — LOW (ref 11.5–15.5)
INR PPP: 1.48 RATIO
LYMPHOCYTES # BLD AUTO: 1.6 K/UL — SIGNIFICANT CHANGE UP (ref 1–3.3)
LYMPHOCYTES # BLD AUTO: 42.8 % — SIGNIFICANT CHANGE UP (ref 13–44)
MCHC RBC-ENTMCNC: 21.1 PG — LOW (ref 27–34)
MCHC RBC-ENTMCNC: 29 G/DL — LOW (ref 32–36)
MCV RBC AUTO: 72.9 FL — LOW (ref 80–100)
MONOCYTES # BLD AUTO: 0 K/UL — SIGNIFICANT CHANGE UP (ref 0–0.9)
MONOCYTES NFR BLD AUTO: 0.7 % — LOW (ref 2–14)
NEUTROPHILS # BLD AUTO: 2 K/UL — SIGNIFICANT CHANGE UP (ref 1.8–7.4)
NEUTROPHILS NFR BLD AUTO: 53.2 % — SIGNIFICANT CHANGE UP (ref 43–77)
PLATELET # BLD AUTO: 19 K/UL — CRITICAL LOW (ref 150–400)
PT BLD: 16.9 SEC
RBC # BLD: 4.42 M/UL — SIGNIFICANT CHANGE UP (ref 3.8–5.2)
RBC # FLD: 22.1 % — HIGH (ref 10.3–14.5)
RH IG SCN BLD-IMP: POSITIVE — SIGNIFICANT CHANGE UP
RH IG SCN BLD-IMP: POSITIVE — SIGNIFICANT CHANGE UP
WBC # BLD: 3.8 K/UL — SIGNIFICANT CHANGE UP (ref 3.8–10.5)
WBC # FLD AUTO: 3.8 K/UL — SIGNIFICANT CHANGE UP (ref 3.8–10.5)

## 2020-01-21 PROCEDURE — 86901 BLOOD TYPING SEROLOGIC RH(D): CPT

## 2020-01-21 PROCEDURE — 86850 RBC ANTIBODY SCREEN: CPT

## 2020-01-21 PROCEDURE — 99214 OFFICE O/P EST MOD 30 MIN: CPT

## 2020-01-21 PROCEDURE — 86900 BLOOD TYPING SEROLOGIC ABO: CPT

## 2020-01-21 RX ORDER — LISINOPRIL 30 MG/1
TABLET ORAL
Refills: 0 | Status: DISCONTINUED | COMMUNITY
End: 2020-01-21

## 2020-01-21 RX ORDER — ENOXAPARIN SODIUM 150 MG/ML
120 INJECTION SUBCUTANEOUS TWICE DAILY
Qty: 30 | Refills: 0 | Status: ACTIVE | COMMUNITY
Start: 2020-01-21

## 2020-01-22 ENCOUNTER — RESULT REVIEW (OUTPATIENT)
Age: 40
End: 2020-01-22

## 2020-01-22 ENCOUNTER — APPOINTMENT (OUTPATIENT)
Dept: INFUSION THERAPY | Facility: HOSPITAL | Age: 40
End: 2020-01-22

## 2020-01-22 ENCOUNTER — APPOINTMENT (OUTPATIENT)
Dept: HEMATOLOGY ONCOLOGY | Facility: CLINIC | Age: 40
End: 2020-01-22

## 2020-01-22 LAB
ALBUMIN SERPL ELPH-MCNC: 2.7 G/DL
ALP BLD-CCNC: 192 U/L
ALT SERPL-CCNC: 27 U/L
ANION GAP SERPL CALC-SCNC: 10 MMOL/L
AST SERPL-CCNC: 25 U/L
BASOPHILS # BLD AUTO: 0.1 K/UL — SIGNIFICANT CHANGE UP (ref 0–0.2)
BASOPHILS NFR BLD AUTO: 1.3 % — SIGNIFICANT CHANGE UP (ref 0–2)
BILIRUB SERPL-MCNC: 1.6 MG/DL
BUN SERPL-MCNC: 13 MG/DL
CALCIUM SERPL-MCNC: 8.4 MG/DL
CHLORIDE SERPL-SCNC: 102 MMOL/L
CO2 SERPL-SCNC: 24 MMOL/L
CREAT SERPL-MCNC: 0.66 MG/DL
EOSINOPHIL # BLD AUTO: 0.1 K/UL — SIGNIFICANT CHANGE UP (ref 0–0.5)
EOSINOPHIL NFR BLD AUTO: 3.2 % — SIGNIFICANT CHANGE UP (ref 0–6)
FERRITIN SERPL-MCNC: 317 NG/ML
GLUCOSE SERPL-MCNC: 230 MG/DL
HCT VFR BLD CALC: 30.1 % — LOW (ref 34.5–45)
HGB BLD-MCNC: 9.3 G/DL — LOW (ref 11.5–15.5)
IRON SATN MFR SERPL: NORMAL %
IRON SERPL-MCNC: 306 UG/DL
LDH SERPL-CCNC: 437 U/L
LYMPHOCYTES # BLD AUTO: 2.1 K/UL — SIGNIFICANT CHANGE UP (ref 1–3.3)
LYMPHOCYTES # BLD AUTO: 44.6 % — HIGH (ref 13–44)
MCHC RBC-ENTMCNC: 22 PG — LOW (ref 27–34)
MCHC RBC-ENTMCNC: 30.9 G/DL — LOW (ref 32–36)
MCV RBC AUTO: 71.2 FL — LOW (ref 80–100)
MONOCYTES # BLD AUTO: 0 K/UL — SIGNIFICANT CHANGE UP (ref 0–0.9)
MONOCYTES NFR BLD AUTO: 0.9 % — LOW (ref 2–14)
NEUTROPHILS # BLD AUTO: 2.3 K/UL — SIGNIFICANT CHANGE UP (ref 1.8–7.4)
NEUTROPHILS NFR BLD AUTO: 50.1 % — SIGNIFICANT CHANGE UP (ref 43–77)
PLATELET # BLD AUTO: 19 K/UL — CRITICAL LOW (ref 150–400)
POTASSIUM SERPL-SCNC: 4.9 MMOL/L
PROT SERPL-MCNC: 5.4 G/DL
RBC # BLD: 4.23 M/UL — SIGNIFICANT CHANGE UP (ref 3.8–5.2)
RBC # FLD: 21.7 % — HIGH (ref 10.3–14.5)
SODIUM SERPL-SCNC: 137 MMOL/L
TIBC SERPL-MCNC: NORMAL UG/DL
UIBC SERPL-MCNC: <20 UG/DL
WBC # BLD: 4.6 K/UL — SIGNIFICANT CHANGE UP (ref 3.8–10.5)
WBC # FLD AUTO: 4.6 K/UL — SIGNIFICANT CHANGE UP (ref 3.8–10.5)

## 2020-01-23 ENCOUNTER — MESSAGE (OUTPATIENT)
Age: 40
End: 2020-01-23

## 2020-01-24 ENCOUNTER — APPOINTMENT (OUTPATIENT)
Dept: HEMATOLOGY ONCOLOGY | Facility: CLINIC | Age: 40
End: 2020-01-24

## 2020-01-24 ENCOUNTER — RESULT REVIEW (OUTPATIENT)
Age: 40
End: 2020-01-24

## 2020-01-24 ENCOUNTER — APPOINTMENT (OUTPATIENT)
Dept: INFUSION THERAPY | Facility: HOSPITAL | Age: 40
End: 2020-01-24

## 2020-01-24 LAB
BASOPHILS # BLD AUTO: 0 K/UL — SIGNIFICANT CHANGE UP (ref 0–0.2)
BASOPHILS NFR BLD AUTO: 0.8 % — SIGNIFICANT CHANGE UP (ref 0–2)
EOSINOPHIL # BLD AUTO: 0.1 K/UL — SIGNIFICANT CHANGE UP (ref 0–0.5)
EOSINOPHIL NFR BLD AUTO: 2.2 % — SIGNIFICANT CHANGE UP (ref 0–6)
HCT VFR BLD CALC: 28.2 % — LOW (ref 34.5–45)
HGB BLD-MCNC: 8.5 G/DL — LOW (ref 11.5–15.5)
LYMPHOCYTES # BLD AUTO: 1.9 K/UL — SIGNIFICANT CHANGE UP (ref 1–3.3)
LYMPHOCYTES # BLD AUTO: 48.6 % — HIGH (ref 13–44)
MCHC RBC-ENTMCNC: 21.5 PG — LOW (ref 27–34)
MCHC RBC-ENTMCNC: 30.2 G/DL — LOW (ref 32–36)
MCV RBC AUTO: 71.4 FL — LOW (ref 80–100)
MONOCYTES # BLD AUTO: 0.1 K/UL — SIGNIFICANT CHANGE UP (ref 0–0.9)
MONOCYTES NFR BLD AUTO: 2 % — SIGNIFICANT CHANGE UP (ref 2–14)
NEUTROPHILS # BLD AUTO: 1.8 K/UL — SIGNIFICANT CHANGE UP (ref 1.8–7.4)
NEUTROPHILS NFR BLD AUTO: 46.3 % — SIGNIFICANT CHANGE UP (ref 43–77)
PLATELET # BLD AUTO: 29 K/UL — LOW (ref 150–400)
RBC # BLD: 3.95 M/UL — SIGNIFICANT CHANGE UP (ref 3.8–5.2)
RBC # FLD: 21.5 % — HIGH (ref 10.3–14.5)
WBC # BLD: 3.9 K/UL — SIGNIFICANT CHANGE UP (ref 3.8–10.5)
WBC # FLD AUTO: 3.9 K/UL — SIGNIFICANT CHANGE UP (ref 3.8–10.5)

## 2020-01-24 RX ORDER — DIPHENHYDRAMINE HYDROCHLORIDE AND LIDOCAINE HYDROCHLORIDE AND ALUMINUM HYDROXIDE AND MAGNESIUM HYDRO
KIT EVERY 6 HOURS
Qty: 1 | Refills: 0 | Status: ACTIVE | COMMUNITY
Start: 2020-01-24 | End: 1900-01-01

## 2020-01-25 ENCOUNTER — APPOINTMENT (OUTPATIENT)
Dept: INFUSION THERAPY | Facility: HOSPITAL | Age: 40
End: 2020-01-25

## 2020-01-31 ENCOUNTER — RESULT REVIEW (OUTPATIENT)
Age: 40
End: 2020-01-31

## 2020-01-31 ENCOUNTER — APPOINTMENT (OUTPATIENT)
Dept: HEMATOLOGY ONCOLOGY | Facility: CLINIC | Age: 40
End: 2020-01-31
Payer: MEDICAID

## 2020-01-31 VITALS
HEART RATE: 108 BPM | RESPIRATION RATE: 18 BRPM | WEIGHT: 243.61 LBS | SYSTOLIC BLOOD PRESSURE: 130 MMHG | DIASTOLIC BLOOD PRESSURE: 70 MMHG | TEMPERATURE: 98.2 F | OXYGEN SATURATION: 98 % | BODY MASS INDEX: 39.62 KG/M2

## 2020-01-31 DIAGNOSIS — I82.409 ACUTE EMBOLISM AND THROMBOSIS OF UNSPECIFIED DEEP VEINS OF UNSPECIFIED LOWER EXTREMITY: ICD-10-CM

## 2020-01-31 LAB
ACANTHOCYTES BLD QL SMEAR: SLIGHT — SIGNIFICANT CHANGE UP
ALBUMIN SERPL ELPH-MCNC: 2.7 G/DL
ALP BLD-CCNC: 194 U/L
ALT SERPL-CCNC: 30 U/L
ANION GAP SERPL CALC-SCNC: 10 MMOL/L
ANISOCYTOSIS BLD QL: SIGNIFICANT CHANGE UP
AST SERPL-CCNC: 29 U/L
BASOPHILS NFR BLD AUTO: 5 % — HIGH (ref 0–2)
BILIRUB SERPL-MCNC: 1.3 MG/DL
BUN SERPL-MCNC: 6 MG/DL
CALCIUM SERPL-MCNC: 8.8 MG/DL
CHLORIDE SERPL-SCNC: 103 MMOL/L
CO2 SERPL-SCNC: 24 MMOL/L
CREAT SERPL-MCNC: 0.68 MG/DL
DACRYOCYTES BLD QL SMEAR: SLIGHT — SIGNIFICANT CHANGE UP
EOSINOPHIL NFR BLD AUTO: 2 % — SIGNIFICANT CHANGE UP (ref 0–6)
GLUCOSE SERPL-MCNC: 143 MG/DL
HCT VFR BLD CALC: 32.8 % — LOW (ref 34.5–45)
HGB BLD-MCNC: 10.1 G/DL — LOW (ref 11.5–15.5)
HYPOCHROMIA BLD QL: SIGNIFICANT CHANGE UP
LG PLATELETS BLD QL AUTO: SLIGHT — SIGNIFICANT CHANGE UP
LYMPHOCYTES # BLD AUTO: 69 % — HIGH (ref 13–44)
MACROCYTES BLD QL: SLIGHT — SIGNIFICANT CHANGE UP
MCHC RBC-ENTMCNC: 22.3 PG — LOW (ref 27–34)
MCHC RBC-ENTMCNC: 30.9 G/DL — LOW (ref 32–36)
MCV RBC AUTO: 72.1 FL — LOW (ref 80–100)
MICROCYTES BLD QL: SIGNIFICANT CHANGE UP
MONOCYTES # BLD AUTO: SIGNIFICANT CHANGE UP (ref 0–0.9)
MONOCYTES NFR BLD AUTO: 14 % — SIGNIFICANT CHANGE UP (ref 2–14)
NEUTROPHILS # BLD AUTO: 0.5 K/UL — LOW (ref 1.8–7.4)
NEUTROPHILS NFR BLD AUTO: 10 % — LOW (ref 43–77)
OVALOCYTES BLD QL SMEAR: SLIGHT — SIGNIFICANT CHANGE UP
PLAT MORPH BLD: NORMAL — SIGNIFICANT CHANGE UP
PLATELET # BLD AUTO: 342 K/UL — SIGNIFICANT CHANGE UP (ref 150–400)
POIKILOCYTOSIS BLD QL AUTO: SLIGHT — SIGNIFICANT CHANGE UP
POLYCHROMASIA BLD QL SMEAR: SLIGHT — SIGNIFICANT CHANGE UP
POTASSIUM SERPL-SCNC: 5 MMOL/L
PROT SERPL-MCNC: 5.2 G/DL
RBC # BLD: 4.55 M/UL — SIGNIFICANT CHANGE UP (ref 3.8–5.2)
RBC # FLD: 22.3 % — HIGH (ref 10.3–14.5)
RBC BLD AUTO: ABNORMAL
SCHISTOCYTES BLD QL AUTO: SLIGHT — SIGNIFICANT CHANGE UP
SODIUM SERPL-SCNC: 137 MMOL/L
SPHEROCYTES BLD QL SMEAR: SLIGHT — SIGNIFICANT CHANGE UP
TARGETS BLD QL SMEAR: SIGNIFICANT CHANGE UP
WBC # BLD: 4.4 K/UL — SIGNIFICANT CHANGE UP (ref 3.8–10.5)
WBC # FLD AUTO: 4.4 K/UL — SIGNIFICANT CHANGE UP (ref 3.8–10.5)

## 2020-01-31 PROCEDURE — 99215 OFFICE O/P EST HI 40 MIN: CPT

## 2020-02-03 ENCOUNTER — RESULT REVIEW (OUTPATIENT)
Age: 40
End: 2020-02-03

## 2020-02-03 ENCOUNTER — OUTPATIENT (OUTPATIENT)
Dept: OUTPATIENT SERVICES | Facility: HOSPITAL | Age: 40
LOS: 1 days | Discharge: HOME | End: 2020-02-03

## 2020-02-03 ENCOUNTER — APPOINTMENT (OUTPATIENT)
Dept: INFUSION THERAPY | Facility: HOSPITAL | Age: 40
End: 2020-02-03

## 2020-02-03 DIAGNOSIS — C48.0 MALIGNANT NEOPLASM OF RETROPERITONEUM: ICD-10-CM

## 2020-02-03 LAB
ANISOCYTOSIS BLD QL: SIGNIFICANT CHANGE UP
BASOPHILS # BLD AUTO: 0.1 K/UL — SIGNIFICANT CHANGE UP (ref 0–0.2)
BASOPHILS NFR BLD AUTO: 3 % — HIGH (ref 0–2)
DACRYOCYTES BLD QL SMEAR: SLIGHT — SIGNIFICANT CHANGE UP
ELLIPTOCYTES BLD QL SMEAR: SLIGHT — SIGNIFICANT CHANGE UP
EOSINOPHIL # BLD AUTO: 0.1 K/UL — SIGNIFICANT CHANGE UP (ref 0–0.5)
EOSINOPHIL NFR BLD AUTO: 1 % — SIGNIFICANT CHANGE UP (ref 0–6)
HCT VFR BLD CALC: 33.9 % — LOW (ref 34.5–45)
HGB BLD-MCNC: 10.3 G/DL — LOW (ref 11.5–15.5)
HYPOCHROMIA BLD QL: SIGNIFICANT CHANGE UP
LYMPHOCYTES # BLD AUTO: 3.1 K/UL — SIGNIFICANT CHANGE UP (ref 1–3.3)
LYMPHOCYTES # BLD AUTO: 45 % — HIGH (ref 13–44)
MACROCYTES BLD QL: SLIGHT — SIGNIFICANT CHANGE UP
MCHC RBC-ENTMCNC: 21.7 PG — LOW (ref 27–34)
MCHC RBC-ENTMCNC: 30.2 G/DL — LOW (ref 32–36)
MCV RBC AUTO: 71.9 FL — LOW (ref 80–100)
MICROCYTES BLD QL: SIGNIFICANT CHANGE UP
MONOCYTES # BLD AUTO: 1.6 K/UL — HIGH (ref 0–0.9)
MONOCYTES NFR BLD AUTO: 20 % — HIGH (ref 2–14)
NEUTROPHILS # BLD AUTO: 1.5 K/UL — LOW (ref 1.8–7.4)
NEUTROPHILS NFR BLD AUTO: 31 % — LOW (ref 43–77)
NRBC # BLD: 1 /100 — HIGH (ref 0–0)
OVALOCYTES BLD QL SMEAR: SLIGHT — SIGNIFICANT CHANGE UP
PLAT MORPH BLD: NORMAL — SIGNIFICANT CHANGE UP
PLATELET # BLD AUTO: 228 K/UL — SIGNIFICANT CHANGE UP (ref 150–400)
POIKILOCYTOSIS BLD QL AUTO: SIGNIFICANT CHANGE UP
POLYCHROMASIA BLD QL SMEAR: SLIGHT — SIGNIFICANT CHANGE UP
RBC # BLD: 4.73 M/UL — SIGNIFICANT CHANGE UP (ref 3.8–5.2)
RBC # FLD: 22.5 % — HIGH (ref 10.3–14.5)
RBC BLD AUTO: ABNORMAL
SCHISTOCYTES BLD QL AUTO: SLIGHT — SIGNIFICANT CHANGE UP
TARGETS BLD QL SMEAR: SIGNIFICANT CHANGE UP
WBC # BLD: 6.4 K/UL — SIGNIFICANT CHANGE UP (ref 3.8–10.5)
WBC # FLD AUTO: 6.4 K/UL — SIGNIFICANT CHANGE UP (ref 3.8–10.5)

## 2020-02-03 RX ORDER — PROCHLORPERAZINE MALEATE 10 MG/1
10 TABLET ORAL EVERY 6 HOURS
Qty: 20 | Refills: 6 | Status: ACTIVE | COMMUNITY
Start: 2020-02-03 | End: 1900-01-01

## 2020-02-04 ENCOUNTER — TRANSCRIPTION ENCOUNTER (OUTPATIENT)
Age: 40
End: 2020-02-04

## 2020-02-04 DIAGNOSIS — Z51.11 ENCOUNTER FOR ANTINEOPLASTIC CHEMOTHERAPY: ICD-10-CM

## 2020-02-04 DIAGNOSIS — Z51.89 ENCOUNTER FOR OTHER SPECIFIED AFTERCARE: ICD-10-CM

## 2020-02-04 DIAGNOSIS — C49.9 MALIGNANT NEOPLASM OF CONNECTIVE AND SOFT TISSUE, UNSPECIFIED: ICD-10-CM

## 2020-02-04 DIAGNOSIS — R11.2 NAUSEA WITH VOMITING, UNSPECIFIED: ICD-10-CM

## 2020-02-06 PROBLEM — C49.9 LEIOMYOSARCOMA: Status: ACTIVE | Noted: 2020-01-21

## 2020-02-06 PROBLEM — I82.409 DVT (DEEP VENOUS THROMBOSIS): Status: ACTIVE | Noted: 2020-01-21

## 2020-02-06 NOTE — CONSULT LETTER
[Dear  ___] : Dear  [unfilled], [Consult Letter:] : I had the pleasure of evaluating your patient, [unfilled]. [Please see my note below.] : Please see my note below. [Consult Closing:] : Thank you very much for allowing me to participate in the care of this patient.  If you have any questions, please do not hesitate to contact me. [Sincerely,] : Sincerely, [FreeTextEntry3] : Seb Chaves MD, FACP \par  of Medicine \par Division of Hematology/Oncology\par [facility]\par [facility] \par [address]\[place] [address]\par Tel: [phone]\par Fax: [phone]\par \par \par

## 2020-02-06 NOTE — HISTORY OF PRESENT ILLNESS
[Disease: _____________________] : Disease: [unfilled] [2 - Distress Level] : Distress Level: 2 [Date: ____________] : Patient's last distress assessment performed on [unfilled]. [N: ___] : N[unfilled] [M: ___] : M[unfilled] [AJCC Stage: ____] : AJCC Stage: [unfilled] [de-identified] : 39 year old female with PMH of DM, HTN, HLD, uterine fibroids, arterial insufficiency, Asthma, pityriasis rosea, herpes and s/p b/l uterine artery embolization 8/2019 for fibroids s/p 1 unit PRBC @ [facility] in 11/2019 with persistent intermittent vaginal bleeding since August who developed worsening SOB, non-radiating substernal chest pressure (aggravated when she lays on her left side), intermittent R sided flank/lower back pain (worse with food), mild night sweats, decreased appetite, constipation, increased dizziness/lightheadedness (especially with ambulation) and and presented to [facility] on 12/4/2019. A CT C/A/P on 12/4/2019 showed a large enhancing mass within the intrahepatic IVC extending to the right atrial junction, suspect bland thrombus of the infrarenal IVC and left iliac veins and large right pleural effusion. Patient was started on anticoagulation and on 12/5/2019, underwent thoracentesis for left pleural effusion. On 12/10/19, she underwent laparoscopy and IVC mass biopsy and pathology confirmed leiomyosarcoma. Patient was discharged on 12/12/2019 with Eliquis but due to misunderstanding, she did not take it. She returned to [facility] on 12/17/2019 and she was diagnosed with bilateral femoral vein thromboses and bilateral lower extremity edema and started on Lovenox as as per vascular surgery, only ACE wrap for the arteiral insufficiency. She was discharged from the hospital on 12/26/2019.  On 12/29/2019, patient returned to [facility] with increasing shortness of breath at rest as well as lower extremity swelling and a CXR in ER shows reaccumulation of the previous pleural effusion and worsening lower extremity edema. On her previous admission, she was recommended Pleurx but did not want to go through with the procedure. She underwent the PleurX catheter insertion on 12/30/2019 (need to drain 1L daily in order to avoid dyspnea).    She was transferred to [facility] from 1/10-1/16/2020 for chemotherapy initiation.  She had port placement and received chemotherapy (doxorubicin and Dexrezoxane) on 1/15/2020.  Her MRI abdomen showed Leiomyosarcoma involving the inferior vena cava, right atrium, right hepatic vein, and right renal vein. Heterogeneous appearance of the liver with indeterminant T1 hyperintense lesion in segment IVb with possible enhancement.\par \par Fhx: Mom - bullous pemphigoid and colon cancer; Dad - pancreatic cancer and breast cancer  \par Social: alcohol socially,  active smoker but  denies illicit drug use\par \par \par PMD:  Dr. Cho [FreeTextEntry1] : doxorubicin x 1  [de-identified] : Nuvia comes for follow up , tolerated cycle #1 doxorubicin , leg edema stable , no CP or SOB \par no nausea or bruising

## 2020-02-06 NOTE — PHYSICAL EXAM
[Restricted in physically strenuous activity but ambulatory and able to carry out work of a light or sedentary nature] : Status 1- Restricted in physically strenuous activity but ambulatory and able to carry out work of a light or sedentary nature, e.g., light house work, office work [Obese] : obese [Normal] : affect appropriate [de-identified] : dec BS on right base  [de-identified] : distended

## 2020-02-06 NOTE — REVIEW OF SYSTEMS
[Fatigue] : fatigue [SOB on Exertion] : shortness of breath during exertion [Negative] : Allergic/Immunologic [FreeTextEntry9] : LE edema

## 2020-02-06 NOTE — HISTORY OF PRESENT ILLNESS
[Disease: _____________________] : Disease: [unfilled] [AJCC Stage: ____] : AJCC Stage: [unfilled] [de-identified] : 39 year old female with PMH of DM, HTN, HLD, uterine fibroids, arterial insufficiency, Asthma, pityriasis rosea, herpes and s/p b/l uterine artery embolization 8/2019 for fibroids s/p 1 unit PRBC @ [facility] in 11/2019 with persistent intermittent vaginal bleeding since August who developed worsening SOB, non-radiating substernal chest pressure (aggravated when she lays on her left side), intermittent R sided flank/lower back pain (worse with food), mild night sweats, decreased appetite, constipation, increased dizziness/lightheadedness (especially with ambulation) and and presented to [facility] on 12/4/2019. A CT C/A/P on 12/4/2019 showed a large enhancing mass within the intrahepatic IVC extending to the right atrial junction, suspect bland thrombus of the infrarenal IVC and left iliac veins and large right pleural effusion. Patient was started on anticoagulation and on 12/5/2019, underwent thoracentesis for left pleural effusion. On 12/10/19, she underwent laparoscopy and IVC mass biopsy and pathology confirmed leiomyosarcoma. Patient was discharged on 12/12/2019 with Eliquis but due to misunderstanding, she did not take it. She returned to [facility] on 12/17/2019 and she was diagnosed with bilateral femoral vein thromboses and bilateral lower extremity edema and started on Lovenox as as per vascular surgery, only ACE wrap for the arteiral insufficiency. She was discharged from the hospital on 12/26/2019.  On 12/29/2019, patient returned to [facility] with increasing shortness of breath at rest as well as lower extremity swelling and a CXR in ER shows reaccumulation of the previous pleural effusion and worsening lower extremity edema. On her previous admission, she was recommended Pleurx but did not want to go through with the procedure. She underwent the PleurX catheter insertion on 12/30/2019 (need to drain 1L daily in order to avoid dyspnea).    She was transferred to [facility] from 1/10-1/16/2020 for chemotherapy initiation.  She had port placement and received chemotherapy (doxorubicin and Dexrezoxane) on 1/15/2020.  Her MRI abdomen showed Leiomyosarcoma involving the inferior vena cava, right atrium, right hepatic vein, and right renal vein. Heterogeneous appearance of the liver with indeterminant T1 hyperintense lesion in segment IVb with possible enhancement.\par \par Fhx: Mom - bullous pemphigoid and colon cancer; Dad - pancreatic cancer and breast cancer  \par Social: alcohol socially,  active smoker but  denies illicit drug use\par Sx:\par \par PMD:  Dr. Cho\par \par She received cycle #1 of doxorubicin 35mg/m2 inpatient (dose reduced due to bilirubin 1.6)

## 2020-02-10 ENCOUNTER — INPATIENT (INPATIENT)
Facility: HOSPITAL | Age: 40
LOS: 2 days | End: 2020-02-13
Attending: INTERNAL MEDICINE | Admitting: INTERNAL MEDICINE
Payer: MEDICAID

## 2020-02-10 VITALS — HEIGHT: 65 IN

## 2020-02-10 DIAGNOSIS — A41.9 SEPSIS, UNSPECIFIED ORGANISM: ICD-10-CM

## 2020-02-10 DIAGNOSIS — Z98.890 OTHER SPECIFIED POSTPROCEDURAL STATES: Chronic | ICD-10-CM

## 2020-02-10 DIAGNOSIS — R09.89 OTHER SPECIFIED SYMPTOMS AND SIGNS INVOLVING THE CIRCULATORY AND RESPIRATORY SYSTEMS: ICD-10-CM

## 2020-02-10 LAB
ACANTHOCYTES BLD QL SMEAR: SLIGHT — SIGNIFICANT CHANGE UP
ALBUMIN FLD-MCNC: < 1 G/DL — SIGNIFICANT CHANGE UP
ALBUMIN SERPL ELPH-MCNC: 1.9 G/DL — LOW (ref 3.3–5)
ALBUMIN SERPL ELPH-MCNC: 2.4 G/DL — LOW (ref 3.3–5)
ALP SERPL-CCNC: 100 U/L — SIGNIFICANT CHANGE UP (ref 40–120)
ALP SERPL-CCNC: 130 U/L — HIGH (ref 40–120)
ALT FLD-CCNC: 25 U/L — SIGNIFICANT CHANGE UP (ref 4–33)
ALT FLD-CCNC: 28 U/L — SIGNIFICANT CHANGE UP (ref 4–33)
ANION GAP SERPL CALC-SCNC: 15 MMO/L — HIGH (ref 7–14)
ANION GAP SERPL CALC-SCNC: 27 MMO/L — HIGH (ref 7–14)
ANISOCYTOSIS BLD QL: SIGNIFICANT CHANGE UP
APTT BLD: 41.7 SEC — HIGH (ref 27.5–36.3)
APTT BLD: 58.2 SEC — HIGH (ref 27.5–36.3)
AST SERPL-CCNC: 25 U/L — SIGNIFICANT CHANGE UP (ref 4–32)
AST SERPL-CCNC: 29 U/L — SIGNIFICANT CHANGE UP (ref 4–32)
B PERT DNA SPEC QL NAA+PROBE: NOT DETECTED — SIGNIFICANT CHANGE UP
BASE EXCESS BLDV CALC-SCNC: -17.5 MMOL/L — SIGNIFICANT CHANGE UP
BASE EXCESS BLDV CALC-SCNC: -7.1 MMOL/L — SIGNIFICANT CHANGE UP
BASOPHILS # BLD AUTO: 0 K/UL — SIGNIFICANT CHANGE UP (ref 0–0.2)
BASOPHILS # BLD AUTO: 0 K/UL — SIGNIFICANT CHANGE UP (ref 0–0.2)
BASOPHILS NFR BLD AUTO: 0 % — SIGNIFICANT CHANGE UP (ref 0–2)
BASOPHILS NFR BLD AUTO: 0 % — SIGNIFICANT CHANGE UP (ref 0–2)
BASOPHILS NFR SPEC: 1.2 % — SIGNIFICANT CHANGE UP (ref 0–2)
BILIRUB SERPL-MCNC: 2.1 MG/DL — HIGH (ref 0.2–1.2)
BILIRUB SERPL-MCNC: 3.1 MG/DL — HIGH (ref 0.2–1.2)
BLASTS # FLD: 0 % — SIGNIFICANT CHANGE UP (ref 0–0)
BLOOD GAS VENOUS - CREATININE: 1.15 MG/DL — SIGNIFICANT CHANGE UP (ref 0.5–1.3)
BLOOD GAS VENOUS - CREATININE: 1.5 MG/DL — HIGH (ref 0.5–1.3)
BODY FLUID TYPE: SIGNIFICANT CHANGE UP
BUN SERPL-MCNC: 30 MG/DL — HIGH (ref 7–23)
BUN SERPL-MCNC: 31 MG/DL — HIGH (ref 7–23)
BURR CELLS BLD QL SMEAR: PRESENT — SIGNIFICANT CHANGE UP
BURR CELLS BLD QL SMEAR: SLIGHT — SIGNIFICANT CHANGE UP
C DIFF TOX GENS STL QL NAA+PROBE: SIGNIFICANT CHANGE UP
C PNEUM DNA SPEC QL NAA+PROBE: NOT DETECTED — SIGNIFICANT CHANGE UP
CALCIUM SERPL-MCNC: 7.9 MG/DL — LOW (ref 8.4–10.5)
CALCIUM SERPL-MCNC: 8.3 MG/DL — LOW (ref 8.4–10.5)
CHLORIDE BLDV-SCNC: 105 MMOL/L — SIGNIFICANT CHANGE UP (ref 96–108)
CHLORIDE BLDV-SCNC: 108 MMOL/L — SIGNIFICANT CHANGE UP (ref 96–108)
CHLORIDE SERPL-SCNC: 100 MMOL/L — SIGNIFICANT CHANGE UP (ref 98–107)
CHLORIDE SERPL-SCNC: 103 MMOL/L — SIGNIFICANT CHANGE UP (ref 98–107)
CLARITY SPEC: SIGNIFICANT CHANGE UP
CO2 SERPL-SCNC: 18 MMOL/L — LOW (ref 22–31)
CO2 SERPL-SCNC: 7 MMOL/L — CRITICAL LOW (ref 22–31)
COLOR FLD: YELLOW — SIGNIFICANT CHANGE UP
CREAT SERPL-MCNC: 1.12 MG/DL — SIGNIFICANT CHANGE UP (ref 0.5–1.3)
CREAT SERPL-MCNC: 1.43 MG/DL — HIGH (ref 0.5–1.3)
EOSINOPHIL # BLD AUTO: 0 K/UL — SIGNIFICANT CHANGE UP (ref 0–0.5)
EOSINOPHIL # BLD AUTO: 0.01 K/UL — SIGNIFICANT CHANGE UP (ref 0–0.5)
EOSINOPHIL NFR BLD AUTO: 0 % — SIGNIFICANT CHANGE UP (ref 0–6)
EOSINOPHIL NFR BLD AUTO: 2.7 % — SIGNIFICANT CHANGE UP (ref 0–6)
EOSINOPHIL NFR FLD: 0 % — SIGNIFICANT CHANGE UP (ref 0–6)
FLUAV H1 2009 PAND RNA SPEC QL NAA+PROBE: NOT DETECTED — SIGNIFICANT CHANGE UP
FLUAV H1 RNA SPEC QL NAA+PROBE: NOT DETECTED — SIGNIFICANT CHANGE UP
FLUAV H3 RNA SPEC QL NAA+PROBE: NOT DETECTED — SIGNIFICANT CHANGE UP
FLUAV SUBTYP SPEC NAA+PROBE: NOT DETECTED — SIGNIFICANT CHANGE UP
FLUBV RNA SPEC QL NAA+PROBE: NOT DETECTED — SIGNIFICANT CHANGE UP
GAS PNL BLDV: 132 MMOL/L — LOW (ref 136–146)
GAS PNL BLDV: 135 MMOL/L — LOW (ref 136–146)
GIANT PLATELETS BLD QL SMEAR: PRESENT — SIGNIFICANT CHANGE UP
GLUCOSE BLDV-MCNC: 171 MG/DL — HIGH (ref 70–99)
GLUCOSE BLDV-MCNC: 67 MG/DL — LOW (ref 70–99)
GLUCOSE FLD-MCNC: 167 MG/DL — SIGNIFICANT CHANGE UP
GLUCOSE SERPL-MCNC: 166 MG/DL — HIGH (ref 70–99)
GLUCOSE SERPL-MCNC: 69 MG/DL — LOW (ref 70–99)
GRAM STN FLD: SIGNIFICANT CHANGE UP
HADV DNA SPEC QL NAA+PROBE: NOT DETECTED — SIGNIFICANT CHANGE UP
HCG SERPL-ACNC: < 5 MIU/ML — SIGNIFICANT CHANGE UP
HCO3 BLDV-SCNC: 11 MMOL/L — LOW (ref 20–27)
HCO3 BLDV-SCNC: 17 MMOL/L — LOW (ref 20–27)
HCOV PNL SPEC NAA+PROBE: SIGNIFICANT CHANGE UP
HCT VFR BLD CALC: 32.6 % — LOW (ref 34.5–45)
HCT VFR BLD CALC: 34.6 % — SIGNIFICANT CHANGE UP (ref 34.5–45)
HCT VFR BLDV CALC: 32.3 % — LOW (ref 34.5–45)
HCT VFR BLDV CALC: 33.5 % — LOW (ref 34.5–45)
HGB BLD-MCNC: 10 G/DL — LOW (ref 11.5–15.5)
HGB BLD-MCNC: 11 G/DL — LOW (ref 11.5–15.5)
HGB BLDV-MCNC: 10.5 G/DL — LOW (ref 11.5–15.5)
HGB BLDV-MCNC: 10.9 G/DL — LOW (ref 11.5–15.5)
HMPV RNA SPEC QL NAA+PROBE: NOT DETECTED — SIGNIFICANT CHANGE UP
HPIV1 RNA SPEC QL NAA+PROBE: NOT DETECTED — SIGNIFICANT CHANGE UP
HPIV2 RNA SPEC QL NAA+PROBE: NOT DETECTED — SIGNIFICANT CHANGE UP
HPIV3 RNA SPEC QL NAA+PROBE: NOT DETECTED — SIGNIFICANT CHANGE UP
HPIV4 RNA SPEC QL NAA+PROBE: NOT DETECTED — SIGNIFICANT CHANGE UP
HYPOCHROMIA BLD QL: SIGNIFICANT CHANGE UP
IMM GRANULOCYTES NFR BLD AUTO: 0 % — SIGNIFICANT CHANGE UP (ref 0–1.5)
IMM GRANULOCYTES NFR BLD AUTO: 2.7 % — HIGH (ref 0–1.5)
INR BLD: 2.15 — HIGH (ref 0.88–1.17)
INR BLD: 2.54 — HIGH (ref 0.88–1.17)
LACTATE BLDV-MCNC: 13 MMOL/L — CRITICAL HIGH (ref 0.5–2)
LACTATE BLDV-MCNC: 7.4 MMOL/L — CRITICAL HIGH (ref 0.5–2)
LDH SERPL L TO P-CCNC: 99 U/L — SIGNIFICANT CHANGE UP
LIDOCAIN IGE QN: 11.6 U/L — SIGNIFICANT CHANGE UP (ref 7–60)
LYMPHOCYTES # BLD AUTO: 0.34 K/UL — LOW (ref 1–3.3)
LYMPHOCYTES # BLD AUTO: 0.51 K/UL — LOW (ref 1–3.3)
LYMPHOCYTES # BLD AUTO: 91.9 % — HIGH (ref 13–44)
LYMPHOCYTES # BLD AUTO: 94.4 % — HIGH (ref 13–44)
LYMPHOCYTES NFR FLD: 11 % — SIGNIFICANT CHANGE UP
LYMPHOCYTES NFR SPEC AUTO: 91.3 % — HIGH (ref 13–44)
MACROCYTES BLD QL: SLIGHT — SIGNIFICANT CHANGE UP
MACROPHAGES # FLD: 13 % — SIGNIFICANT CHANGE UP
MAGNESIUM SERPL-MCNC: 1.5 MG/DL — LOW (ref 1.6–2.6)
MANUAL SMEAR VERIFICATION: SIGNIFICANT CHANGE UP
MCHC RBC-ENTMCNC: 21.2 PG — LOW (ref 27–34)
MCHC RBC-ENTMCNC: 21.4 PG — LOW (ref 27–34)
MCHC RBC-ENTMCNC: 30.7 % — LOW (ref 32–36)
MCHC RBC-ENTMCNC: 31.8 % — LOW (ref 32–36)
MCV RBC AUTO: 67.3 FL — LOW (ref 80–100)
MCV RBC AUTO: 69.2 FL — LOW (ref 80–100)
MESOTHL CELL # FLD: 2 % — SIGNIFICANT CHANGE UP
METAMYELOCYTES # FLD: 0 % — SIGNIFICANT CHANGE UP (ref 0–1)
MONOCYTES # BLD AUTO: 0.01 K/UL — SIGNIFICANT CHANGE UP (ref 0–0.9)
MONOCYTES # BLD AUTO: 0.01 K/UL — SIGNIFICANT CHANGE UP (ref 0–0.9)
MONOCYTES NFR BLD AUTO: 1.9 % — LOW (ref 2–14)
MONOCYTES NFR BLD AUTO: 2.7 % — SIGNIFICANT CHANGE UP (ref 2–14)
MONOCYTES NFR BLD: 0 % — LOW (ref 2–9)
MYELOCYTES NFR BLD: 0 % — SIGNIFICANT CHANGE UP (ref 0–0)
NEUTROPHIL AB SER-ACNC: 0 % — LOW (ref 43–77)
NEUTROPHILS # BLD AUTO: 0 K/UL — LOW (ref 1.8–7.4)
NEUTROPHILS # BLD AUTO: 0.02 K/UL — LOW (ref 1.8–7.4)
NEUTROPHILS NFR BLD AUTO: 0 % — LOW (ref 43–77)
NEUTROPHILS NFR BLD AUTO: 3.7 % — LOW (ref 43–77)
NEUTS BAND # BLD: 0 % — SIGNIFICANT CHANGE UP (ref 0–6)
NEUTS SEG NFR FLD MANUAL: 74 % — SIGNIFICANT CHANGE UP
NRBC # FLD: 0 K/UL — SIGNIFICANT CHANGE UP (ref 0–0)
NRBC # FLD: 0 K/UL — SIGNIFICANT CHANGE UP (ref 0–0)
OTHER - HEMATOLOGY %: 0 — SIGNIFICANT CHANGE UP
PCO2 BLDV: 31 MMHG — LOW (ref 41–51)
PCO2 BLDV: 47 MMHG — SIGNIFICANT CHANGE UP (ref 41–51)
PH BLDV: 7.13 PH — CRITICAL LOW (ref 7.32–7.43)
PH BLDV: 7.24 PH — LOW (ref 7.32–7.43)
PHOSPHATE SERPL-MCNC: 4.6 MG/DL — HIGH (ref 2.5–4.5)
PLATELET # BLD AUTO: 11 K/UL — CRITICAL LOW (ref 150–400)
PLATELET # BLD AUTO: 18 K/UL — CRITICAL LOW (ref 150–400)
PLATELET COUNT - ESTIMATE: SIGNIFICANT CHANGE UP
PMV BLD: SIGNIFICANT CHANGE UP FL (ref 7–13)
PMV BLD: SIGNIFICANT CHANGE UP FL (ref 7–13)
PO2 BLDV: 41 MMHG — HIGH (ref 35–40)
PO2 BLDV: < 24 MMHG — LOW (ref 35–40)
POIKILOCYTOSIS BLD QL AUTO: SIGNIFICANT CHANGE UP
POLYCHROMASIA BLD QL SMEAR: SIGNIFICANT CHANGE UP
POTASSIUM BLDV-SCNC: 4.8 MMOL/L — HIGH (ref 3.4–4.5)
POTASSIUM BLDV-SCNC: 4.8 MMOL/L — HIGH (ref 3.4–4.5)
POTASSIUM SERPL-MCNC: 4.6 MMOL/L — SIGNIFICANT CHANGE UP (ref 3.5–5.3)
POTASSIUM SERPL-MCNC: 5.1 MMOL/L — SIGNIFICANT CHANGE UP (ref 3.5–5.3)
POTASSIUM SERPL-SCNC: 4.6 MMOL/L — SIGNIFICANT CHANGE UP (ref 3.5–5.3)
POTASSIUM SERPL-SCNC: 5.1 MMOL/L — SIGNIFICANT CHANGE UP (ref 3.5–5.3)
PROMYELOCYTES # FLD: 0 % — SIGNIFICANT CHANGE UP (ref 0–0)
PROT FLD-MCNC: 1.3 G/DL — SIGNIFICANT CHANGE UP
PROT SERPL-MCNC: 4.2 G/DL — LOW (ref 6–8.3)
PROT SERPL-MCNC: 4.8 G/DL — LOW (ref 6–8.3)
PROTHROM AB SERPL-ACNC: 24.4 SEC — HIGH (ref 9.8–13.1)
PROTHROM AB SERPL-ACNC: 29.8 SEC — HIGH (ref 9.8–13.1)
RBC # BLD: 4.71 M/UL — SIGNIFICANT CHANGE UP (ref 3.8–5.2)
RBC # BLD: 5.14 M/UL — SIGNIFICANT CHANGE UP (ref 3.8–5.2)
RBC # FLD: 25.5 % — HIGH (ref 10.3–14.5)
RBC # FLD: 25.8 % — HIGH (ref 10.3–14.5)
RCV VOL RI: 1000 CELL/UL — HIGH (ref 0–5)
RSV RNA SPEC QL NAA+PROBE: NOT DETECTED — SIGNIFICANT CHANGE UP
RV+EV RNA SPEC QL NAA+PROBE: NOT DETECTED — SIGNIFICANT CHANGE UP
SAO2 % BLDV: 14.7 % — LOW (ref 60–85)
SAO2 % BLDV: 50 % — LOW (ref 60–85)
SMUDGE CELLS # BLD: PRESENT — SIGNIFICANT CHANGE UP
SODIUM SERPL-SCNC: 134 MMOL/L — LOW (ref 135–145)
SODIUM SERPL-SCNC: 136 MMOL/L — SIGNIFICANT CHANGE UP (ref 135–145)
SPECIMEN SOURCE: SIGNIFICANT CHANGE UP
TARGETS BLD QL SMEAR: SLIGHT — SIGNIFICANT CHANGE UP
TOTAL CELLS COUNTED, BODY FLUID: 100 CELLS — SIGNIFICANT CHANGE UP
TOTAL NUCLEATED CELL COUNT, BODY FLUID: 238 CELL/UL — HIGH (ref 0–5)
VARIANT LYMPHS # BLD: 7.5 % — SIGNIFICANT CHANGE UP
WBC # BLD: 0.37 K/UL — CRITICAL LOW (ref 3.8–10.5)
WBC # BLD: 0.54 K/UL — CRITICAL LOW (ref 3.8–10.5)
WBC # FLD AUTO: 0.37 K/UL — CRITICAL LOW (ref 3.8–10.5)
WBC # FLD AUTO: 0.54 K/UL — CRITICAL LOW (ref 3.8–10.5)

## 2020-02-10 PROCEDURE — 71045 X-RAY EXAM CHEST 1 VIEW: CPT | Mod: 26

## 2020-02-10 PROCEDURE — 99233 SBSQ HOSP IP/OBS HIGH 50: CPT

## 2020-02-10 RX ORDER — METFORMIN HYDROCHLORIDE 850 MG/1
1 TABLET ORAL
Qty: 0 | Refills: 0 | DISCHARGE

## 2020-02-10 RX ORDER — CHLORHEXIDINE GLUCONATE 213 G/1000ML
1 SOLUTION TOPICAL
Refills: 0 | Status: DISCONTINUED | OUTPATIENT
Start: 2020-02-10 | End: 2020-02-13

## 2020-02-10 RX ORDER — VANCOMYCIN HCL 1 G
1000 VIAL (EA) INTRAVENOUS ONCE
Refills: 0 | Status: COMPLETED | OUTPATIENT
Start: 2020-02-10 | End: 2020-02-10

## 2020-02-10 RX ORDER — PHENYLEPHRINE HYDROCHLORIDE 10 MG/ML
0.2 INJECTION INTRAVENOUS ONCE
Refills: 0 | Status: COMPLETED | OUTPATIENT
Start: 2020-02-10 | End: 2020-02-10

## 2020-02-10 RX ORDER — VANCOMYCIN HCL 1 G
1000 VIAL (EA) INTRAVENOUS EVERY 12 HOURS
Refills: 0 | Status: DISCONTINUED | OUTPATIENT
Start: 2020-02-11 | End: 2020-02-11

## 2020-02-10 RX ORDER — HEPARIN SODIUM 5000 [USP'U]/ML
9000 INJECTION INTRAVENOUS; SUBCUTANEOUS ONCE
Refills: 0 | Status: DISCONTINUED | OUTPATIENT
Start: 2020-02-10 | End: 2020-02-10

## 2020-02-10 RX ORDER — SODIUM BICARBONATE 1 MEQ/ML
0.14 SYRINGE (ML) INTRAVENOUS
Qty: 150 | Refills: 0 | Status: DISCONTINUED | OUTPATIENT
Start: 2020-02-10 | End: 2020-02-13

## 2020-02-10 RX ORDER — HEPARIN SODIUM 5000 [USP'U]/ML
4000 INJECTION INTRAVENOUS; SUBCUTANEOUS EVERY 6 HOURS
Refills: 0 | Status: DISCONTINUED | OUTPATIENT
Start: 2020-02-10 | End: 2020-02-11

## 2020-02-10 RX ORDER — SODIUM CHLORIDE 9 MG/ML
1000 INJECTION, SOLUTION INTRAVENOUS ONCE
Refills: 0 | Status: COMPLETED | OUTPATIENT
Start: 2020-02-10 | End: 2020-02-10

## 2020-02-10 RX ORDER — HEPARIN SODIUM 5000 [USP'U]/ML
9000 INJECTION INTRAVENOUS; SUBCUTANEOUS EVERY 6 HOURS
Refills: 0 | Status: DISCONTINUED | OUTPATIENT
Start: 2020-02-10 | End: 2020-02-11

## 2020-02-10 RX ORDER — PIPERACILLIN AND TAZOBACTAM 4; .5 G/20ML; G/20ML
3.38 INJECTION, POWDER, LYOPHILIZED, FOR SOLUTION INTRAVENOUS ONCE
Refills: 0 | Status: COMPLETED | OUTPATIENT
Start: 2020-02-10 | End: 2020-02-10

## 2020-02-10 RX ORDER — ACETAMINOPHEN 500 MG
975 TABLET ORAL ONCE
Refills: 0 | Status: COMPLETED | OUTPATIENT
Start: 2020-02-10 | End: 2020-02-10

## 2020-02-10 RX ORDER — ONDANSETRON 8 MG/1
4 TABLET, FILM COATED ORAL ONCE
Refills: 0 | Status: COMPLETED | OUTPATIENT
Start: 2020-02-10 | End: 2020-02-10

## 2020-02-10 RX ORDER — CEFEPIME 1 G/1
2000 INJECTION, POWDER, FOR SOLUTION INTRAMUSCULAR; INTRAVENOUS EVERY 8 HOURS
Refills: 0 | Status: DISCONTINUED | OUTPATIENT
Start: 2020-02-10 | End: 2020-02-11

## 2020-02-10 RX ORDER — VASOPRESSIN 20 [USP'U]/ML
0.04 INJECTION INTRAVENOUS
Qty: 50 | Refills: 0 | Status: DISCONTINUED | OUTPATIENT
Start: 2020-02-10 | End: 2020-02-13

## 2020-02-10 RX ORDER — PHENYLEPHRINE HYDROCHLORIDE 10 MG/ML
0.05 INJECTION INTRAVENOUS
Qty: 40 | Refills: 0 | Status: DISCONTINUED | OUTPATIENT
Start: 2020-02-10 | End: 2020-02-11

## 2020-02-10 RX ORDER — SODIUM BICARBONATE 1 MEQ/ML
50 SYRINGE (ML) INTRAVENOUS ONCE
Refills: 0 | Status: COMPLETED | OUTPATIENT
Start: 2020-02-10 | End: 2020-02-11

## 2020-02-10 RX ORDER — SODIUM CHLORIDE 9 MG/ML
2000 INJECTION, SOLUTION INTRAVENOUS ONCE
Refills: 0 | Status: COMPLETED | OUTPATIENT
Start: 2020-02-10 | End: 2020-02-10

## 2020-02-10 RX ORDER — FAMOTIDINE 10 MG/ML
20 INJECTION INTRAVENOUS ONCE
Refills: 0 | Status: COMPLETED | OUTPATIENT
Start: 2020-02-10 | End: 2020-02-10

## 2020-02-10 RX ORDER — HEPARIN SODIUM 5000 [USP'U]/ML
INJECTION INTRAVENOUS; SUBCUTANEOUS
Qty: 25000 | Refills: 0 | Status: DISCONTINUED | OUTPATIENT
Start: 2020-02-10 | End: 2020-02-11

## 2020-02-10 RX ORDER — ACETAMINOPHEN 500 MG
650 TABLET ORAL ONCE
Refills: 0 | Status: DISCONTINUED | OUTPATIENT
Start: 2020-02-10 | End: 2020-02-10

## 2020-02-10 RX ADMIN — PHENYLEPHRINE HYDROCHLORIDE 0.2 MILLIGRAM(S): 10 INJECTION INTRAVENOUS at 22:35

## 2020-02-10 RX ADMIN — SODIUM CHLORIDE 1000 MILLILITER(S): 9 INJECTION, SOLUTION INTRAVENOUS at 22:00

## 2020-02-10 RX ADMIN — CEFEPIME 100 MILLIGRAM(S): 1 INJECTION, POWDER, FOR SOLUTION INTRAMUSCULAR; INTRAVENOUS at 22:49

## 2020-02-10 RX ADMIN — SODIUM CHLORIDE 1000 MILLILITER(S): 9 INJECTION, SOLUTION INTRAVENOUS at 20:05

## 2020-02-10 RX ADMIN — Medication 1000 MILLIGRAM(S): at 19:00

## 2020-02-10 RX ADMIN — SODIUM CHLORIDE 2000 MILLILITER(S): 9 INJECTION, SOLUTION INTRAVENOUS at 19:45

## 2020-02-10 RX ADMIN — PIPERACILLIN AND TAZOBACTAM 3.38 GRAM(S): 4; .5 INJECTION, POWDER, LYOPHILIZED, FOR SOLUTION INTRAVENOUS at 17:20

## 2020-02-10 RX ADMIN — Medication 30 MILLILITER(S): at 16:00

## 2020-02-10 RX ADMIN — FAMOTIDINE 20 MILLIGRAM(S): 10 INJECTION INTRAVENOUS at 16:25

## 2020-02-10 RX ADMIN — Medication 250 MILLIGRAM(S): at 17:30

## 2020-02-10 RX ADMIN — Medication 50 MILLIEQUIVALENT(S): at 23:50

## 2020-02-10 RX ADMIN — SODIUM CHLORIDE 2000 MILLILITER(S): 9 INJECTION, SOLUTION INTRAVENOUS at 16:45

## 2020-02-10 RX ADMIN — ONDANSETRON 4 MILLIGRAM(S): 8 TABLET, FILM COATED ORAL at 16:25

## 2020-02-10 RX ADMIN — PHENYLEPHRINE HYDROCHLORIDE 2.07 MICROGRAM(S)/KG/MIN: 10 INJECTION INTRAVENOUS at 22:44

## 2020-02-10 RX ADMIN — PHENYLEPHRINE HYDROCHLORIDE 0.2 MILLIGRAM(S): 10 INJECTION INTRAVENOUS at 22:30

## 2020-02-10 RX ADMIN — Medication 975 MILLIGRAM(S): at 16:00

## 2020-02-10 RX ADMIN — Medication 975 MILLIGRAM(S): at 16:55

## 2020-02-10 RX ADMIN — PIPERACILLIN AND TAZOBACTAM 200 GRAM(S): 4; .5 INJECTION, POWDER, LYOPHILIZED, FOR SOLUTION INTRAVENOUS at 16:45

## 2020-02-10 NOTE — ED ADULT NURSE REASSESSMENT NOTE - NS ED NURSE REASSESS COMMENT FT1
break coverage RN: Pt aox4. pt remains tachycardic and tachypneic. ED attending Deanne aware of pts current VS. original 20g US IV insert to right arm infiltrated, which was removed. New 18g US IV insert to right arm initiated. Vancomycin restarted. Plan for tachycardia is repeat EKG and administer initial IVF. MD at bedside draining fluid from pt at current time. pt continues to c/o abdominal pain.

## 2020-02-10 NOTE — CONSULT NOTE ADULT - ASSESSMENT
39 year old female PMH metastatic leiomyosarcoma on chemo (onc: Dr. Seb Mancera, last chemo session 2 weeks ago), b/l DVT on lovenox BID, HTN, DM presents with tachypnea and tachycardia found to have thrombocytopenia and neutropenic fever.    #Neutropenic fever:  -Continue Cefepime and Vancomycin.  -Neutropenia and thrombocytopenia likely 2/2 chemo  -Would consult hematology/oncology as patient of Dr. Seb Mancera.  -F/u BCx, UCx, UA. RVP negative.  -Obtain CT CAP to assess for other possible source.  -Send GI PCR, o&p, clostridium difficile    #Tachypnea/Tachycardia  -Moderate effusion on POCUS on R.  -Attempting to drain to bulb.  -No evidence of cardiac tamponade physiology on POCUS, although with small pericardial effusion.   -Tachypnea/tachycardia likely 2/2 pleural effusion and anxiety. Would continue to drain.   -Patient on Lovenox full AC dosing at home, would consider starting heparin drip in case needs to be held for another pleurex/chest tube. 39 year old female PMH metastatic leiomyosarcoma on chemo (onc: Dr. Seb Mancera, last chemo session 2 weeks ago), b/l DVT on lovenox BID, HTN, DM presents with tachypnea and tachycardia found to have thrombocytopenia and neutropenic fever.    #Neutropenic fever:  -Continue Cefepime and Vancomycin.  -Neutropenia and thrombocytopenia likely 2/2 chemo  -Would consult hematology/oncology as patient of Dr. Seb Mancera.  -F/u BCx, UCx, UA. RVP negative.  -Obtain CT CAP to assess for other possible source.  -Send GI PCR, o&p, clostridium difficile    #Tachypnea/Tachycardia  -Moderate effusion on POCUS on R.  -Attempting to drain to bulb.  -No evidence of cardiac tamponade physiology on POCUS, although with small pericardial effusion.   -Tachypnea/tachycardia likely 2/2 pleural effusion and anxiety. Would continue to drain.   -Patient on Lovenox full AC dosing at home, would consider starting heparin drip in case needs to be held for another pleurex/chest tube.     Patient accepted to MICU.

## 2020-02-10 NOTE — ED PROVIDER NOTE - PROGRESS NOTE DETAILS
Discussed case w/ MICU and Hematology Dr. Anuj Kennedy, PGY-2: MICU at bedside evaluating pt Discussed case w/ MICU and Hematology (who state will see pt in AM) [place] [facility]2. Dr. Anuj Kennedy, PGY-2: pt hypotensive now to 80s/50s. Mentating well. MICU made aware and they are coming to eval pt now. Dr. Anuj Kennedy, PGY-2: [facility] without complication after unsuccessful attempts due to poor vascular access by RN team. RN at bedside, aware of new IV. Labs sent Dr. Anuj Kennedy, PGY-2: [facility]

## 2020-02-10 NOTE — H&P ADULT - HISTORY OF PRESENT ILLNESS
39 year old female PMH metastatic leiomyosarcoma on chemo (onc: Dr. Seb Mancera, last chemo session 2 weeks ago), b/l DVT on lovenox BID, HTN, DM presents with tachypnea and tachycardia. Patient has a R sided pleurex placed for recurrent pleural effusions. Afebrile at home, but patient more fatigued. Many episodes of diarrhea. Denies sick contacts, recent travel. In ER, vitals concerning for tachycardia, tachypnea, febrile, neutropenic. Received vancomycin, zosyn, and cefepime. MICU consulted for tachycardia and tachypnea. 39 year old woman with PMHx HTN, DM, HLD, Uterine fibroids w. b/l uterine artery embolization on 8/2019 w/ intermittent vaginal bleeding requiring 1u of prbc, arterial insufficiency, metastatic leiomyosarcoma that invades the intrahepatic IVC dx 12/2019 on chemo since 1/10, last chemo session 2 weeks ago), b/l DVT on lovenox BID, hx of recurrent right pleural effusion w/ PleurX 12/30/2019 presents with tachypnea and tachycardia. Afebrile at home, but patient more fatigued. Many episodes of diarrhea for the past 2 days. Denies sick contacts, recent travel. In ER, vitals concerning for tachycardia, tachypnea, febrile, neutropenic. Received vancomycin, zosyn, and cefepime. MICU consulted for tachycardia and tachypnea. 39 year old woman with PMHx HTN, DM, HLD, Uterine fibroids w. 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 She endorses brown watery diarrhea since saturday after eating burgers. Denied melena and hematochezia. Today, while walking to the bathroom started getting short of breath. This was not improving and they decided to call EMS. Denied fever, sick contacts or recent travel.     In the ED, initial VS were   found with neutropenic sepsis.  Received vancomycin, zosyn, and cefepime. MICU consulted for tachycardia and tachypnea. Patient was started on pressors in the ED. 39 year old woman with PMHx HTN, DM, HLD, Uterine fibroids w. 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 She endorses brown watery diarrhea since saturday after eating burgers. Denied melena and hematochezia. Today, while walking to the bathroom started getting short of breath. This was not improving and they decided to call EMS. Denied fever, sick contacts or recent travel.     In the ED, was found with neutropenic sepsis.  Received vancomycin, zosyn, and cefepime. MICU consulted for tachycardia and tachypnea. Patient was started on pressors in the ED.

## 2020-02-10 NOTE — ED PROVIDER NOTE - CARE PLAN
Principal Discharge DX:	Sepsis  Secondary Diagnosis:	Neutropenic fever  Secondary Diagnosis:	Thrombocytopenia

## 2020-02-10 NOTE — ED ADULT NURSE REASSESSMENT NOTE - NS ED NURSE REASSESS COMMENT FT1
Unable to obtain peripheral IV access at this time. MD Martinez notified and aware. Md to place US guided IV. Awaiting IV access and prior to ABX administration.

## 2020-02-10 NOTE — ED PROVIDER NOTE - PHYSICAL EXAMINATION
General: Alert and Oriented. Ill appearing  Head: Normocephalic and atraumatic.  Eyes: PERRLA with EOMI.  Cardiac: Normal S1 and S2 w/ tachycardia  Pulmonary: Decreased BS at right base. R sided catheter in place  Abdominal: Soft, mild diffuse tenderness  Neurologic: No focal sensory or motor deficits.  Skin: Color appropriate for race. Intact, warm, and well-perfused.

## 2020-02-10 NOTE — ED ADULT NURSE REASSESSMENT NOTE - NS ED NURSE REASSESS COMMENT FT1
PT A&Ox4. 18 G IV to L arm placed by ultrasound via MD dea. MD notified and aware: MICU Md at bedside to obtain new US guided peripheral IV access and obtain repeat lab work. Pt C/O continued SOB/ dyspnea. MICU MD notified and aware. Respiratory notified initiating High flow 02 at bedside. Family at bedside, comfort measures provided, will continue to monitor for safety and comfort.

## 2020-02-10 NOTE — H&P ADULT - ATTENDING COMMENTS
pt is a 40 yo female with metastatic leiomyosarcoma who presents with  hypotension and tachypnea. Pt started on bolus ns x1 and given  neosynephrine and high flow oxygen. Pt has hx right pleurex and drains  daily.  she drained 600 cc today.  Noted wbc 0.5, plts 18, cxr right sided  effusion, pleurex in place, Pt unable to lay flat for ct scan. she does not  want to get intubated unless she has stopped breathing or her heart has  stopped.  PE bp 80/40 rr 35 lungs rhonchi abdomen obese ext lower ext  edema. labs noted ph 7.13, bicarb 6,   pt critically ill with septic shock.

## 2020-02-10 NOTE — ED ADULT TRIAGE NOTE - CHIEF COMPLAINT QUOTE
Pt treated here for leiomyosarcoma.  Pt c/o abd pain since 0300h.  Pt found hypotensive and tachycardic with malaise and weakness.  Drain under right lung for fluid buildup due to cancer.  #22g L hand, 250mL NS, O2 8L NRB by EMS.  Notification direct to  25

## 2020-02-10 NOTE — H&P ADULT - NSICDXFAMILYHX_GEN_ALL_CORE_FT
FAMILY HISTORY:  No pertinent family history in first degree relatives FAMILY HISTORY:  FH: lung cancer, father  FH: pancreatic cancer, cousin  FH: stroke, mother  FH: type 2 diabetes, mother

## 2020-02-10 NOTE — H&P ADULT - NSHPREVIEWOFSYSTEMS_GEN_ALL_CORE
Constitutional: Fatigue. No Fever  Eyes: No recent vision problems  ENT: No congestion, ear pain, or sore throat.  Endocrine: No excess sweating, temperature intolerance  Cardiovascular: Shortness of breath and lightheadedness. No chest pain, palpitations,   Respiratory: No cough, congestion, or wheezing.  Gastrointestinal: abdominal pain, diarrhea. Denied nausea vomiting.   Genitourinary: No dysuria, hematuria  Musculoskeletal: No joint swelling, joint pain  Neurologic: No headache, dizziness, focal weakness  Skin: No rashes, hematoma, prupura

## 2020-02-10 NOTE — H&P ADULT - NSHPPHYSICALEXAM_GEN_ALL_CORE
Objective:   Vital Signs Last 24 Hrs  T(C): 36.8 (10 Feb 2020 20:58), Max: 38.7 (10 Feb 2020 15:45)  T(F): 98.2 (10 Feb 2020 20:58), Max: 101.6 (10 Feb 2020 15:45)  HR: 154 (10 Feb 2020 20:58) (143 - 160)  BP: 88/56 (10 Feb 2020 20:58) (80/54 - 136/100)  BP(mean): 54 (10 Feb 2020 20:58) (54 - 54)  RR: 26 (10 Feb 2020 20:58) (18 - 28)  SpO2: 100% (10 Feb 2020 20:58) (99% - 100%)    Physical Examination:   CONSTITUTIONAL: NAD, well-developed, well-groomed  EYES: PERRLA; conjunctiva and sclera clear  ENMT: Moist oral mucosa, no pharyngeal injection or exudates; normal dentition  NECK: Supple, no palpable masses; no thyromegaly  RESPIRATORY: Normal respiratory effort; lungs are clear to auscultation bilaterally  CARDIOVASCULAR: Regular rate and rhythm, normal S1 and S2, no murmur/rub/gallop; No lower extremity edema; Peripheral pulses are 2+ bilaterally  ABDOMEN: Nontender to palpation, normoactive bowel sounds, no rebound/guarding; No hepatosplenomegaly  MUSCULOSKELETAL:  Normal gait; no clubbing or cyanosis of digits; no joint swelling or tenderness to palpation  PSYCH: A+O to person, place, and time; affect appropriate  NEUROLOGY: CN 2-12 are intact and symmetric; no gross sensory deficits   SKIN: Bilateral LE wounds appreciated.

## 2020-02-10 NOTE — ED PROVIDER NOTE - OBJECTIVE STATEMENT
38 y/o F with HTN, HLD, DM, asthma, fibroids, recently diagnosed leiomyosarcoma (on radiation, chemo; last chemo 1/31) and DVT on lovenox, and pleurx catheter placed for R sided pleural effusion, presenting due to abdominal pain, multiple episodes of watery nonbloody diarrhea, and substernal burning chest pain,  for 1 day. Also c/o generalized malaise and weakness.    Per EMS initial  and BP 89/60.    Oncologist: Seb Chaves at [facility]

## 2020-02-10 NOTE — ED ADULT NURSE NOTE - OBJECTIVE STATEMENT
PT received into room 25 A&Ox4, ambulatory as notifications for tachycardia with 's. PT arrives to room tachycardic as noted, C/O myalgias, Abdominal pain, SOB. PT has PHX: leiomyosarcoma last chemo treatment 1/31/20, DM, HTN, HLD. PT arrives with 22 G IV to L hand placed by EMS with fluids infusing. MD at bedside for eval, VS as noted. 20 G IV placed to R arm by Md via ultrasound. PT has large watery bowel movement: stool studies collected and sent. Pt cleaned, turned and positioned for comfort PT received into room 25 A&Ox4, ambulatory as notifications for tachycardia with 's. PT arrives to room tachycardic as noted, C/O myalgias, Abdominal pain, SOB. PT has PHX: leiomyosarcoma last chemo treatment 1/31/20, DM, HTN, HLD. PT arrives with 22 G IV to L hand placed by EMS with fluids infusing. MD at bedside for eval, VS as noted. 20 G IV placed to R arm by Md via ultrasound. PT has large watery bowel movement: stool studies collected and sent. PT cleaned, turned and positioned for comfort. PT has pleurex drain to R flank dressing appears CDI. PT has arterial insufficiency to B/L Lower extremities. PT has healed ulcer to R medial ankle covered with clean dry dressing, PT has healed ulcer to L medial ankle covered with clean dry dressing. Comfort measures provided, call bell within reach, will conitnue to monitor for safety and comfort.

## 2020-02-10 NOTE — ED PROVIDER NOTE - CLINICAL SUMMARY MEDICAL DECISION MAKING FREE TEXT BOX
38 yo F leiomyosarcoma undergoing treatment, p/w multiple symptoms, ill appearing on exam. Suspect sepsis from multiple possible sources vs PE. Will obtain septic workup, CT chest, abdomen, pelvis, provide IVF and broad spectrum abx, admission.

## 2020-02-10 NOTE — CONSULT NOTE ADULT - SUBJECTIVE AND OBJECTIVE BOX
CHIEF COMPLAINT:    HPI:    PAST MEDICAL & SURGICAL HISTORY:  Leiomyosarcoma  Uterine fibroid  Hyperlipidemia  Asthma  Diabetes  Hypertension  No significant past surgical history      FAMILY HISTORY:  No pertinent family history in first degree relatives      SOCIAL HISTORY:  Smoking: __ packs x ___ years  EtOH Use:  Marital Status:  Occupation:  Recent Travel:  Country of Birth:  Advance Directives:    Allergies    No Known Drug Allergies  shellfish (Hives)    Intolerances        HOME MEDICATIONS:    REVIEW OF SYSTEMS:  Constitutional:   Eyes:  ENT:  CV:  Resp:  GI:  :  MSK:  Integumentary:  Neurological:  Psychiatric:  Endocrine:  Hematologic/Lymphatic:  Allergic/Immunologic:  [ ] All other systems negative  [ ] Unable to assess ROS because ________    OBJECTIVE:  ICU Vital Signs Last 24 Hrs  T(C): 36.7 (10 Feb 2020 17:36), Max: 38.7 (10 Feb 2020 15:45)  T(F): 98 (10 Feb 2020 17:36), Max: 101.6 (10 Feb 2020 15:45)  HR: 160 (10 Feb 2020 17:36) (143 - 160)  BP: 136/100 (10 Feb 2020 17:36) (98/66 - 136/100)  BP(mean): --  ABP: --  ABP(mean): --  RR: 28 (10 Feb 2020 17:36) (18 - 28)  SpO2: 100% (10 Feb 2020 17:36) (99% - 100%)        CAPILLARY BLOOD GLUCOSE          PHYSICAL EXAM:  General:   HEENT:   Lymph Nodes:  Neck:   Respiratory:   Cardiovascular:   Abdomen:   Extremities:   Skin:   Neurological:  Psychiatry:    HOSPITAL MEDICATIONS:  MEDICATIONS  (STANDING):  cefepime   IVPB 2000 milliGRAM(s) IV Intermittent every 8 hours  lactated ringers Bolus 1000 milliLiter(s) IV Bolus once    MEDICATIONS  (PRN):      LABS:                        11.0   0.54  )-----------( 18       ( 10 Feb 2020 16:30 )             34.6     02-10    136  |  103  |  30<H>  ----------------------------<  166<H>  4.6   |  18<L>  |  1.12    Ca    8.3<L>      10 Feb 2020 16:30    TPro  4.8<L>  /  Alb  2.4<L>  /  TBili  3.1<H>  /  DBili  x   /  AST  25  /  ALT  28  /  AlkPhos  130<H>  02-10    PT/INR - ( 10 Feb 2020 16:30 )   PT: 24.4 SEC;   INR: 2.15          PTT - ( 10 Feb 2020 16:30 )  PTT:41.7 SEC      Venous Blood Gas:  02-10 @ 16:30  7.24/47/< 24/17/14.7  VBG Lactate: 7.4      MICROBIOLOGY:         RADIOLOGY:  [ ] Reviewed and interpreted by me    EKG: CHIEF COMPLAINT:    HPI: 39 year old female PMH metastatic leiomyosarcoma on chemo (onc: Dr. Seb Mancera, last chemo session 2 weeks ago), b/l DVT on lovenox BID, HTN, DM presents with tachypnea and tachycardia. Patient has a R sided pleurex placed for recurrent pleural effusions. Afebrile at home, but patient more fatigued. Many episodes of diarrhea. Denies sick contacts, recent travel. In ER, vitals concerning for tachycardia, tachypnea, febrile, neutropenic. Received vancomycin, zosyn, and cefepime. MICU consulted for tachycardia and tachypnea.     PAST MEDICAL & SURGICAL HISTORY:  Leiomyosarcoma  Uterine fibroid  Hyperlipidemia  Asthma  Diabetes  Hypertension  No significant past surgical history      FAMILY HISTORY:  No pertinent family history in first degree relatives      SOCIAL HISTORY:  Smoking:  EtOH Use:  Marital Status:   Occupation:  Recent Travel:  Country of Birth:  Advance Directives:    Allergies    No Known Drug Allergies  shellfish (Hives)    Intolerances        HOME MEDICATIONS:    REVIEW OF SYSTEMS:  Constitutional: fatigued  Eyes: no visual changes  ENT: no nasal congestion, no sore throat  CV: palpitations, no chest pain  Resp: +tachypneic  GI: +abdominal pain and diarrhea      OBJECTIVE:  ICU Vital Signs Last 24 Hrs  T(C): 36.7 (10 Feb 2020 17:36), Max: 38.7 (10 Feb 2020 15:45)  T(F): 98 (10 Feb 2020 17:36), Max: 101.6 (10 Feb 2020 15:45)  HR: 160 (10 Feb 2020 17:36) (143 - 160)  BP: 136/100 (10 Feb 2020 17:36) (98/66 - 136/100)  BP(mean): --  ABP: --  ABP(mean): --  RR: 28 (10 Feb 2020 17:36) (18 - 28)  SpO2: 100% (10 Feb 2020 17:36) (99% - 100%)        CAPILLARY BLOOD GLUCOSE          PHYSICAL EXAM:  General: patient in respiratory distress, unable to lay flat. Tachypneic to 30s. Anxious  Respiratory: tachypneic,   Cardiovascular: tachycardic, regular rhythm  Abdomen: soft, mildly tender diffusely  Extremities: bilateral lower extremity edema  Skin: warm, dry  Neurological: AOx4    HOSPITAL MEDICATIONS:  MEDICATIONS  (STANDING):  cefepime   IVPB 2000 milliGRAM(s) IV Intermittent every 8 hours  lactated ringers Bolus 1000 milliLiter(s) IV Bolus once    MEDICATIONS  (PRN):      LABS:                        11.0   0.54  )-----------( 18       ( 10 Feb 2020 16:30 )             34.6     02-10    136  |  103  |  30<H>  ----------------------------<  166<H>  4.6   |  18<L>  |  1.12    Ca    8.3<L>      10 Feb 2020 16:30    TPro  4.8<L>  /  Alb  2.4<L>  /  TBili  3.1<H>  /  DBili  x   /  AST  25  /  ALT  28  /  AlkPhos  130<H>  02-10    PT/INR - ( 10 Feb 2020 16:30 )   PT: 24.4 SEC;   INR: 2.15          PTT - ( 10 Feb 2020 16:30 )  PTT:41.7 SEC      Venous Blood Gas:  02-10 @ 16:30  7.24/47/< 24/17/14.7  VBG Lactate: 7.4

## 2020-02-10 NOTE — ED PROVIDER NOTE - NS ED ROS FT
Gen:  myalgias  Eyes: No eye irritation or discharge  ENT: No ear pain, congestion, sore throat  Resp: cough, sob  Cardiovascular: chest pain  Gastroenteric: Diarrhea, abd pain  MS: No joint or muscle pain  Skin: No rashes  Neuro: No headache; no abnormal movements  Remainder negative, except as per the HPI

## 2020-02-10 NOTE — ED ADULT NURSE REASSESSMENT NOTE - NS ED NURSE REASSESS COMMENT FT1
PT A&Ox4, C/O ABD pain unchanged since arriving to ED. MICU at bedside for eval: MICU MD connected R pleur evac drained 250ml fluid. PT family disconnected chest tube drainage. Sterile dressing applied. PT BP 80/54: Md notified and aware. MD advised to administer additional 500 ml fluids. Pt remains tachypneic and tachycardic as noted. Repeat EKG obtained. VS as noted. Family at bedside.  Comfort measures provided. Will continue to monitor for safety and comfort.

## 2020-02-10 NOTE — H&P ADULT - NSHPLABSRESULTS_GEN_ALL_CORE
11.0   0.54  )-----------( 18       ( 10 Feb 2020 16:30 )             34.6     Hgb Trend: 11.0<--  02-10    136  |  103  |  30<H>  ----------------------------<  166<H>  4.6   |  18<L>  |  1.12    Ca    8.3<L>      10 Feb 2020 16:30    TPro  4.8<L>  /  Alb  2.4<L>  /  TBili  3.1<H>  /  DBili  x   /  AST  25  /  ALT  28  /  AlkPhos  130<H>  02-10    Creatinine Trend: 1.12<--, 0.75<--, 0.61<--, 0.64<--, 0.66<--, 0.72<--  PT/INR - ( 10 Feb 2020 16:30 )   PT: 24.4 SEC;   INR: 2.15     PTT - ( 10 Feb 2020 16:30 )  PTT:41.7 SEC    Xray Chest 1 View AP/PA (02.10.20 @ 17:00) >  Moderate right pleural effusion, increased in size compared to 1/7/20.

## 2020-02-10 NOTE — H&P ADULT - NSICDXPASTSURGICALHX_GEN_ALL_CORE_FT
PAST SURGICAL HISTORY:  No significant past surgical history PAST SURGICAL HISTORY:  H/O chest tube placement

## 2020-02-10 NOTE — H&P ADULT - NSHPSOCIALHISTORY_GEN_ALL_CORE
. Lives with spouse. Worked as . Former smoker 18 pack years. No etoh. No recent drugs. Past marihuana use.

## 2020-02-10 NOTE — H&P ADULT - ASSESSMENT
#Neuro    #Resp  - CXR- showing large pleural effusion  - Will place on high flow and wan off as tolerated.     #CV    #GI    #ID  - Neutropenic sepsis     #Renal    #Heme  - Pancytopenia     -   #Endo    #DVT PPx #Neuro    #Resp  - CXR- showing large pleural effusion  - Will place on high flow and wan off as tolerated.     #CV    #GI    #ID  - Neutropenic sepsis.  WBC .54. ANC .02   - C/w vanc and cefepime      #Renal    #Heme  - Pancytopenia   - Active type and screen     -   #Endo    #DVT PPx  - Known DVT will continue on Heparin drip #Neuro    #Resp  - CXR- showing large pleural effusion  - Will place on high flow and wan off as tolerated.     #CV  - C/w phenylephrine gtt  - Goal MAP >65   -    #GI    #ID  - Neutropenic sepsis.  WBC .54. ANC .02   - C/w vanc and cefepime      #Renal    #Heme  - Pancytopenia   - Active type and screen     -   #Endo    #DVT PPx  - Known DVT will continue on Heparin drip 39 year old woman with PMHx HTN, DM, HLD, Uterine fibroids w. 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    #Neuro  -AxO4, mentating well. No neuro deficits.     #Resp  - hx of asthma. c/w Symbicort   - CXR- showing large pleural effusion.   - s/p drainage of 200cc from pleurx catheter.   - Will place on high flow    #CV  - Distributive shock 2/2 sepsis   - Holding home metoprolol.   - C/w phenylephrine gtt  - Goal MAP >65   -    #GI  - c/w pantoprazole     #ID  - Neutropenic sepsis.  WBC .54. ANC .02.   - C/w vanc and cefepime    - Blood culture pending.  - Pleural fluid culture sent.   - RVP neg. C. diff negative.     #Renal  - TEDDY 2/2 shock.   - High anion gap met acidosis appreciated.   - C/w bicarb gtt   - Monitor I/O closely.  - Avoid nephrotoxic agents.     #Heme  - Pancytopenia   - Active type and screen   - Platelets transfusion goal <20 if febrile.   - Will need oncology consult in the am.     #Endo  - A1c ordered.     #DVT PPx  - Known DVT will continue on Heparin drip     #GOC  - Full code.   -  cell # [phone] 39 year old woman with PMHx HTN, DM, HLD, Uterine fibroids w. 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 Found with neutropenic sepsis and hypoxia resp failure initially on NRB, now on high flow. Patient became hypotensive in the emergency room was started on phenylephrine.     #Neuro  -AxO4, mentating well. No neuro deficits.     #Resp  - hx of asthma. c/w Symbicort   - CXR- showing large pleural effusion.   - s/p drainage of 200cc from pleurx catheter.   - Will place on high flow for goal saturation >90%.     #CV  - Distributive shock 2/2 neutropenic sepsis.    - Holding home metoprolol.   - C/w phenylephrine gtt, vasopressin gtt, add on levophed as needed.   - Goal MAP >65     #GI  - c/w pantoprazole     #ID  - Neutropenic sepsis.  WBC .54. ANC .02.   - C/w vanc and cefepime   - Given neutropenic sepsis will add on caspofungin 50mg for prophylaxis.   - Blood culture pending.  - Pleural fluid culture sent.   - RVP neg. C. diff negative.   - Given patient rapid clinical deterioration, unable to obtain CT scans at this time. Will attempt when more stable.     #Renal  - TEDDY 2/2 shock.   - High anion gap met acidosis appreciated.   - C/w bicarb gtt   - Monitor I/O closely.  - Avoid nephrotoxic agents.     #Heme  - Pancytopenia.  - Hyperbilirubinemia appreciated on labs. Normal AST and ALT. Concerns for hemolysis.  Add on haptoglobin, ldh, and fibrinogen.   - Active type and screen   - Platelets transfusion goal <20 if febrile.   - Will need oncology consult in the am.     #Endo  - A1c ordered.   - Monitor FS q6h     #DVT PPx  - Known DVT will continue on Heparin drip     #GOC  - Full code.   -  cell # [phone] 39 year old woman with PMHx HTN, DM, HLD, Uterine fibroids w. 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 Found with neutropenic sepsis and hypoxia resp failure initially on NRB, now on high flow. Patient became hypotensive in the emergency room was started on phenylephrine. Patient was noted to be acidotic and was started on bicarbonate drip. Her hypotension persisted and she required vassopressin, and levophed. She was started on stress dose steroids hydrocortisone given that she continued to deteriorate. Caspofungin was added to vanc, and cefepime to broaden coverage.     #Neuro  -AxO4, mentating well. No neuro deficits.     #Resp  - hx of asthma. c/w Symbicort   - CXR- showing large pleural effusion.   - s/p drainage of 200cc from pleurx catheter.   - Will place on high flow for goal saturation >90%.   - Concerns for Pulmonary emboli, however cannot obtain CT angio of the chest at this time due to critical ill.     #CV  - Distributive shock 2/2 neutropenic sepsis.    - Holding home metoprolol.   - C/w phenylephrine gtt, vasopressin gtt, add on levophed as needed.   - Goal MAP >65     #GI  - c/w pantoprazole     #ID  - Neutropenic sepsis.  WBC .54. ANC .02.   - C/w vanc and cefepime   - Given neutropenic sepsis will add on caspofungin 50mg for prophylaxis.   - Blood culture pending.  - Pleural fluid culture sent.   - RVP neg. C. diff negative.   - Given patient rapid clinical deterioration, unable to obtain CT scans at this time. Will attempt when more stable.     #Renal  - TEDDY 2/2 shock.   - High anion gap met acidosis appreciated.   - C/w bicarb gtt   - Monitor I/O closely.  - Avoid nephrotoxic agents.     #Heme  - Pancytopenia.  - Hyperbilirubinemia appreciated on labs. Normal AST and ALT. Concerns for hemolysis.  Add on haptoglobin, ldh, and fibrinogen.   - Active type and screen   - Platelets transfusion goal <20 if febrile.   - Will need oncology consult in the am.     #Endo  - A1c ordered.   - Monitor FS q6h     #DVT PPx  - Known DVT will continue on Heparin drip     #GOC  - Full code.   -  cell # [phone]

## 2020-02-10 NOTE — ED ADULT NURSE NOTE - BREATHING, MLM
Physical Therapy Discharge Summary     Visit Count: 12  Plan of Care Dates: Initial: 7/24/17 Through: 10/16/17  Insurance Information:   HARD Visit Limit: 80 visits per calendar year            - Combined: Yes (PT/OT/ST/Cardiac/Pulmonary)            - Used: 0  Auth Required: No     Ded: $1100 - $1100 Met  Pays at: 80%  OOP: $4250 - $1288.76 Met  Next Referring Provider Visit: 9/6/17    Referred by: Camden Busby MD  Medical Diagnosis (from order):  Primary osteoarthritis of left knee [M17.12]  - Primary   Insurance: 1. [employer]  2. N/A    Date of Surgery: 7/25/17; surgery performed: left total knee arthroplasty; rehabilitation guidelines: no    Diagnosis Precautions: Weight Bearing As Tolerated Left Lower Extremity  Chart reviewed: Relevant co-morbidities, allergies, tests and medications: see medical chart     SUBJECTIVE   Patient reports that the knee feels stiff. He states it gets stiff after sitting around for awhile.Patient able to walk a few holes of the course during golf. States he was tired, but the knee felt good. He states that going up stairs is easier than going down, but he feels that his ability to do this has improved since beginning. He feels that the [place] is wearing off and that this may be contributing to the pain and stiffness he has been feeling.    Current Pain: 1/10  Functional Change: Able to walk during playing golf; patient vocalizes 80% improvement since surgery, remaining 20% is stiffness and decreased endurance.     OBJECTIVE     Gait Analysis:  Patient ambulates without AD, knee flexion during stance phase on left, good herminia.     Observation:  Incision is completely closed. Clean, dry, intact.      Range of Motion (degrees) Norm Left Right Left Left Left Left   Date   Initial Initial 7/31/17 8/14/17 8/30/17 9/13/17   Knee Flexion 135  130 135 90* 129 129 130   Knee Extension 0-5 Lacking 10 Lacking 7 Lacking 20* Lacking 10 Lacking 8 Lacking 6   standard testing positions  unless otherwise noted; Key: ranges are reported in active range of motion unless noted as AA=active assistive or P=passive range of motion, * denotes pain   Comments: All motions within functional/normal limits except as noted.     Strength (out of 5) Left Right Left Left   Date Initial Initial 7/31/17 8/30/17   Hip Flexion 5/5 5/5 4/5 5/5   Hip Extension         Hip Abduction 5/5 5/5  5/5   Hip Glut Medius         Hip Adduction  5/5 5/5  5/5   Hip Internal Rotation 5/5 5/5  5/5   Hip External Rotation 5/5 5/5  5/5   Knee Flexion 5/5 5/5 4/5* 5/5   Knee Extension 5/5 5/5 4/5* 5/5   Ankle Dorsiflexion         Ankle Plantar flexion         Ankle Inversion         Ankle Eversion         standard testing positions unless otherwise noted,* denotes pain  Comments: Only muscle strength that was assessed are noted.     Outcome Measures: (Outcome Scoring)  7/31/17Lower Extremity Functional Scale: LEFS Calculated Total: 21  (0=extreme difficulty; 80=no difficulty)    7/24/17Lower Extremity Functional Scale: LEFS Calculated Total: 38  (0=extreme difficulty; 80=no difficulty)   8/30/17Lower Extremity Functional Scale: LEFS Calculated Total: 72 (0=extreme difficulty; 80=no difficulty)   9/13/17 Lower Extremity Functional Scale: LEFS Calculated Total: 75 (0=extreme difficulty; 80=no difficulty)     Treatment     Therapeutic Exercise  Upright Bike x 5 minutes level 6.0--obtained subjective information at this time.   PROM knee flexion in supine with hip flexion  PROM knee extension with heel propped  Passive hamstring stretch - left only    Therapeutic Activity:  Obtained objective measurements and reviewed goals at this time.  Discussed exercise recommendations and reviewed compiled HEP.         Current Home Program (not performed this date except as noted above):   Exercise: Date issued Date DC Comments   Eccentric step down 8/14  Edge of step   3-way band with abductor loop  Mini-lunge  Mini squats  Step up      9/6/17 9/13/17  Blue     ASSESSMENT     Galindo has shown great progression through therapy, as he has demonstrated gains in both L knee ROM and strength. His functional ability to stand for prolonged periods of time, ambulate independently, and ascend/descend stairs has improved. He has also been able to get back to participating in the activities he enjoys, including riding his motorcycle and golfing. Galindo has been very compliant with his HEP and remains motivated to continue to improve after being discharged from therapy.     Pain after treatment: no rating given/10.    Compliant with therapy visits and home exercise program: Yes  Progress toward discharge/long term goals: excellent progress    Discharge from skilled therapy with instructions/recommendations: Compiled HEP   Result of above outlined education: Verbalizes understanding    Discharge Measures:   Total Number of Visits: 12  Surgery Date: 7/25/2017  Treatment Category: Total Knee Replacement  Outcome Measure:  Lower Extremity Functional Scale   Initial Outcome Score:  21   Discharge Score Error: None  Discharge Outcome Score: 75  Primary Clinician: Edie Chowdhury  Subjective Percent Improvement With Therapy: 80      Goals:       1. Patient will demonstrate independence with progressed home exercise program. (/17)  2. Patient will decrease involved knee pain to < or equal to 1/10  to aid in normalization of gait for independent living. (MET 8/30/17)  3. Patient will increase involved knee active range of motion to > or equal to 5-120° to aid in completing transfers including low and soft surfaces. (PARTIALLY MET 9/13/17)  4. Patient will increase involved knee strength to 5/5 to aid in stair ambulation. (MET 8/30/17)  5. Patient will be able to ambulate modified independent/independent with no assistive device for 60 minutes with minimal pain/difficulty to improve function in grocery shopping, ambulate to physician appointments and community  interaction. (MET 8/30/17) *some pain in prolonged standing  6. Lower Extremity Functional Scale: Patient will complete form to reflect an improved score from initial score of 21 to greater than or equal to 55 (0=extreme difficulty; 80=no difficulty) to indicate pt reported improvement in function/disability/impairment (minimal detectable change: 9 points).   (MET 8/30/17)    PLAN   Patient being discharged at this time.    THERAPY DAILY BILLING   Primary Insurance: [employer]  Secondary Insurance: N/A    Evaluation Procedures:  No evaluation codes were used on this date of service    Timed Procedures:  Therapeutic Activity, 17 minutes  Therapeutic Exercise, 10 minutes    Untimed Procedures:  No untimed codes were used on this date of service    Total Treatment Time: 27 minutes     My signature indicates that I personally was present during the treatment, directing the student and taking responsibility for the treatment.  Edie Chowdhury, PT   Spontaneous, unlabored and symmetrical

## 2020-02-10 NOTE — ED PROVIDER NOTE - ATTENDING CONTRIBUTION TO CARE
Patient presents to the ed with abdominal pain, diarrhea, and burning in chest. Patient states since yesterday she has been having diffuse crampy abdominal pain intermittently with multiple episodes loose nonbloody watery stools. Also states she has had intermittent burning sensation across epigastric region which radiates to mid chest region-has had this discomfort since starting her Cancer treatments, and is unchanged from that. States that burning is most bothersome on initial evaluation. States she has baseline sob which is somewhat worse than usual. Denies fevers at home, ha, neck pain, vomiting, dysuria, le edema.   exam  GEN - ill appearing, uncomfortable 2/2 pain; A+O x3   HEAD - NC/AT   EYES- PERRL, EOMI  ENT: Airway patent, rivka mm, Oral cavity and pharynx normal. No inflammation, swelling, exudate, or lesions.    NECK: Neck supple, non-tender without lymphadenopathy, no masses.  PULMONARY - CTA b/l, symmetric breath sounds, tachypneic, able to speak in full sentences.   CARDIAC -s1s2, tachycardic, RR, no M,G,R  ABDOMEN - +BS, ND, mild diffuse abd ttp, soft, no guarding, no rebound, no masses   BACK - no CVA tenderness, Normal  spine   EXTREMITIES - FROM, symmetric pulses, capillary refill < 2 seconds, no edema   SKIN - no rash or bruising   NEUROLOGIC - alert, speech clear, no focal deficits  PSYCH -ao3, cooperative, anxious  a/p-patient with h/o leiomyosarcoma on tx presenting with abd pain, diarrhea, chest burning, tachycardic, tachypneic, dry appearing. Will check labs, ua, cultures, ct chest/abdomen/pelvis, stool studies, to eval for elec disturbances, infectious processes including but not limited to pneumonia, uti, colitis, diverticulitis. Lower suspicion for pe but will cta given history and vs. Patient bp ok at time of eval, fluid infusing from ems-approx 250cc given thus far, will continue hydration, empiric abx treatment, monitor. Patient with complex medical history including metastatic leiomoyosarcoma, effusion requiring pleurx, on CA tx, presents to the ed with abdominal pain, diarrhea, and burning in chest. Patient states since yesterday she has been having diffuse crampy abdominal pain intermittently with multiple episodes loose nonbloody watery stools. Also states she has had intermittent burning sensation across epigastric region which radiates to mid chest region-has had this discomfort since starting her Cancer treatments, and is unchanged from that. States that burning is most bothersome on initial evaluation. States she has baseline sob which is somewhat worse than usual. Denies fevers at home, ha, neck pain, vomiting, dysuria, le edema, no recent travel. Unknown if sick contacts.  exam  GEN - ill appearing, uncomfortable 2/2 pain; A+O x3   HEAD - NC/AT   EYES- PERRL, EOMI  ENT: Airway patent, dry mm, Oral cavity and pharynx normal. No inflammation, swelling, exudate, or lesions.    NECK: Neck supple, non-tender without lymphadenopathy, no masses.  PULMONARY - CTA b/l, symmetric breath sounds, tachypneic, able to speak in full sentences, r. pleurx cath in place, surrounding site c/d, no erythema/induration/warmth/drainage.   CARDIAC -s1s2, tachycardic, RR, no M,G,R  ABDOMEN - +BS, ND, mild diffuse abd ttp, soft, no guarding, no rebound, no masses   BACK - no CVA tenderness, Normal  spine   EXTREMITIES - FROM, symmetric pulses, capillary refill < 2 seconds, no edema   SKIN - no rash or bruising   NEUROLOGIC - alert, speech clear, no focal deficits  PSYCH -ao3, cooperative, anxious  a/p-patient with h/o leiomyosarcoma on tx presenting with abd pain, diarrhea, chest burning, tachycardic, tachypneic, dry appearing. Will check labs, ua, cultures, ct chest/abdomen/pelvis, stool studies, to eval for elec disturbances, infectious processes including but not limited to pneumonia, uti, colitis, diverticulitis. Lower suspicion for pe but will cta given history and vs. Patient bp ok at time of eval, fluid infusing from ems-approx 250cc given thus far, will continue hydration, empiric abx treatment, monitor.

## 2020-02-11 DIAGNOSIS — E87.2 ACIDOSIS: ICD-10-CM

## 2020-02-11 DIAGNOSIS — E83.39 OTHER DISORDERS OF PHOSPHORUS METABOLISM: ICD-10-CM

## 2020-02-11 DIAGNOSIS — N17.9 ACUTE KIDNEY FAILURE, UNSPECIFIED: ICD-10-CM

## 2020-02-11 LAB
ALBUMIN SERPL ELPH-MCNC: 1.6 G/DL — LOW (ref 3.3–5)
ALBUMIN SERPL ELPH-MCNC: 2 G/DL — LOW (ref 3.3–5)
ALBUMIN SERPL ELPH-MCNC: 2 G/DL — LOW (ref 3.3–5)
ALBUMIN SERPL ELPH-MCNC: 2.1 G/DL — LOW (ref 3.3–5)
ALP SERPL-CCNC: 83 U/L — SIGNIFICANT CHANGE UP (ref 40–120)
ALP SERPL-CCNC: 85 U/L — SIGNIFICANT CHANGE UP (ref 40–120)
ALP SERPL-CCNC: 88 U/L — SIGNIFICANT CHANGE UP (ref 40–120)
ALP SERPL-CCNC: 95 U/L — SIGNIFICANT CHANGE UP (ref 40–120)
ALT FLD-CCNC: 34 U/L — HIGH (ref 4–33)
ALT FLD-CCNC: 612 U/L — HIGH (ref 4–33)
ALT FLD-CCNC: 660 U/L — HIGH (ref 4–33)
ALT FLD-CCNC: 76 U/L — HIGH (ref 4–33)
ANION GAP SERPL CALC-SCNC: 25 MMO/L — HIGH (ref 7–14)
ANION GAP SERPL CALC-SCNC: 27 MMO/L — HIGH (ref 7–14)
ANION GAP SERPL CALC-SCNC: 28 MMO/L — HIGH (ref 7–14)
ANION GAP SERPL CALC-SCNC: 33 MMO/L — HIGH (ref 7–14)
ANISOCYTOSIS BLD QL: SIGNIFICANT CHANGE UP
APTT BLD: 54.9 SEC — HIGH (ref 27.5–36.3)
APTT BLD: 55.6 SEC — HIGH (ref 27.5–36.3)
APTT BLD: 61.7 SEC — HIGH (ref 27.5–36.3)
AST SERPL-CCNC: 1307 U/L — HIGH (ref 4–32)
AST SERPL-CCNC: 1342 U/L — HIGH (ref 4–32)
AST SERPL-CCNC: 150 U/L — HIGH (ref 4–32)
AST SERPL-CCNC: 83 U/L — HIGH (ref 4–32)
BASE EXCESS BLDA CALC-SCNC: -16.4 MMOL/L — SIGNIFICANT CHANGE UP
BASE EXCESS BLDA CALC-SCNC: -17.2 MMOL/L — SIGNIFICANT CHANGE UP
BASE EXCESS BLDA CALC-SCNC: -20.7 MMOL/L — SIGNIFICANT CHANGE UP
BASE EXCESS BLDA CALC-SCNC: -21.4 MMOL/L — SIGNIFICANT CHANGE UP
BASE EXCESS BLDV CALC-SCNC: -15.8 MMOL/L — SIGNIFICANT CHANGE UP
BASE EXCESS BLDV CALC-SCNC: -16.4 MMOL/L — SIGNIFICANT CHANGE UP
BASOPHILS # BLD AUTO: 0 K/UL — SIGNIFICANT CHANGE UP (ref 0–0.2)
BASOPHILS NFR BLD AUTO: 0 % — SIGNIFICANT CHANGE UP (ref 0–2)
BASOPHILS NFR SPEC: 0 % — SIGNIFICANT CHANGE UP (ref 0–2)
BILIRUB SERPL-MCNC: 1.9 MG/DL — HIGH (ref 0.2–1.2)
BILIRUB SERPL-MCNC: 2 MG/DL — HIGH (ref 0.2–1.2)
BILIRUB SERPL-MCNC: 2.6 MG/DL — HIGH (ref 0.2–1.2)
BILIRUB SERPL-MCNC: 3 MG/DL — HIGH (ref 0.2–1.2)
BLD GP AB SCN SERPL QL: NEGATIVE — SIGNIFICANT CHANGE UP
BLD GP AB SCN SERPL QL: NEGATIVE — SIGNIFICANT CHANGE UP
BLOOD GAS ARTERIAL - FIO2: 50 — SIGNIFICANT CHANGE UP
BLOOD GAS ARTERIAL - FIO2: 60 — SIGNIFICANT CHANGE UP
BLOOD GAS VENOUS - CREATININE: 1.29 MG/DL — SIGNIFICANT CHANGE UP (ref 0.5–1.3)
BLOOD GAS VENOUS - CREATININE: 1.61 MG/DL — HIGH (ref 0.5–1.3)
BLOOD GAS VENOUS - FIO2: 50 — SIGNIFICANT CHANGE UP
BUN SERPL-MCNC: 19 MG/DL — SIGNIFICANT CHANGE UP (ref 7–23)
BUN SERPL-MCNC: 28 MG/DL — HIGH (ref 7–23)
BUN SERPL-MCNC: 32 MG/DL — HIGH (ref 7–23)
BUN SERPL-MCNC: 32 MG/DL — HIGH (ref 7–23)
BURR CELLS BLD QL SMEAR: SIGNIFICANT CHANGE UP
CALCIUM SERPL-MCNC: 6.7 MG/DL — LOW (ref 8.4–10.5)
CALCIUM SERPL-MCNC: 6.8 MG/DL — LOW (ref 8.4–10.5)
CALCIUM SERPL-MCNC: 7.1 MG/DL — LOW (ref 8.4–10.5)
CALCIUM SERPL-MCNC: 7.6 MG/DL — LOW (ref 8.4–10.5)
CHLORIDE BLDA-SCNC: 108 MMOL/L — SIGNIFICANT CHANGE UP (ref 96–108)
CHLORIDE BLDA-SCNC: 108 MMOL/L — SIGNIFICANT CHANGE UP (ref 96–108)
CHLORIDE BLDV-SCNC: 107 MMOL/L — SIGNIFICANT CHANGE UP (ref 96–108)
CHLORIDE BLDV-SCNC: 107 MMOL/L — SIGNIFICANT CHANGE UP (ref 96–108)
CHLORIDE SERPL-SCNC: 101 MMOL/L — SIGNIFICANT CHANGE UP (ref 98–107)
CHLORIDE SERPL-SCNC: 101 MMOL/L — SIGNIFICANT CHANGE UP (ref 98–107)
CHLORIDE SERPL-SCNC: 102 MMOL/L — SIGNIFICANT CHANGE UP (ref 98–107)
CHLORIDE SERPL-SCNC: 99 MMOL/L — SIGNIFICANT CHANGE UP (ref 98–107)
CK SERPL-CCNC: 529 U/L — HIGH (ref 25–170)
CO2 SERPL-SCNC: 12 MMOL/L — LOW (ref 22–31)
CO2 SERPL-SCNC: 6 MMOL/L — CRITICAL LOW (ref 22–31)
CO2 SERPL-SCNC: 9 MMOL/L — CRITICAL LOW (ref 22–31)
CO2 SERPL-SCNC: 9 MMOL/L — CRITICAL LOW (ref 22–31)
CORTIS SERPL-MCNC: 147 UG/DL — HIGH (ref 2.7–18.4)
CREAT SERPL-MCNC: 1.16 MG/DL — SIGNIFICANT CHANGE UP (ref 0.5–1.3)
CREAT SERPL-MCNC: 1.44 MG/DL — HIGH (ref 0.5–1.3)
CREAT SERPL-MCNC: 1.44 MG/DL — HIGH (ref 0.5–1.3)
CREAT SERPL-MCNC: 1.62 MG/DL — HIGH (ref 0.5–1.3)
CULTURE - ACID FAST SMEAR CONCENTRATED: SIGNIFICANT CHANGE UP
D DIMER BLD IA.RAPID-MCNC: 1365 NG/ML — SIGNIFICANT CHANGE UP
EOSINOPHIL # BLD AUTO: 0 K/UL — SIGNIFICANT CHANGE UP (ref 0–0.5)
EOSINOPHIL # BLD AUTO: 0 K/UL — SIGNIFICANT CHANGE UP (ref 0–0.5)
EOSINOPHIL # BLD AUTO: 0.01 K/UL — SIGNIFICANT CHANGE UP (ref 0–0.5)
EOSINOPHIL NFR BLD AUTO: 0 % — SIGNIFICANT CHANGE UP (ref 0–6)
EOSINOPHIL NFR BLD AUTO: 0 % — SIGNIFICANT CHANGE UP (ref 0–6)
EOSINOPHIL NFR BLD AUTO: 2.9 % — SIGNIFICANT CHANGE UP (ref 0–6)
EOSINOPHIL NFR FLD: 0 % — SIGNIFICANT CHANGE UP (ref 0–6)
FIBRINOGEN PPP-MCNC: 500 MG/DL — SIGNIFICANT CHANGE UP (ref 350–510)
FIBRINOGEN PPP-MCNC: 510.5 MG/DL — HIGH (ref 350–510)
GAS PNL BLDV: 131 MMOL/L — LOW (ref 136–146)
GAS PNL BLDV: 132 MMOL/L — LOW (ref 136–146)
GLUCOSE BLDA-MCNC: 100 MG/DL — HIGH (ref 70–99)
GLUCOSE BLDA-MCNC: 113 MG/DL — HIGH (ref 70–99)
GLUCOSE BLDA-MCNC: 141 MG/DL — HIGH (ref 70–99)
GLUCOSE BLDA-MCNC: 186 MG/DL — HIGH (ref 70–99)
GLUCOSE BLDC GLUCOMTR-MCNC: 115 MG/DL — HIGH (ref 70–99)
GLUCOSE BLDC GLUCOMTR-MCNC: 142 MG/DL — HIGH (ref 70–99)
GLUCOSE BLDC GLUCOMTR-MCNC: 177 MG/DL — HIGH (ref 70–99)
GLUCOSE BLDC GLUCOMTR-MCNC: 55 MG/DL — LOW (ref 70–99)
GLUCOSE BLDC GLUCOMTR-MCNC: 82 MG/DL — SIGNIFICANT CHANGE UP (ref 70–99)
GLUCOSE BLDC GLUCOMTR-MCNC: 93 MG/DL — SIGNIFICANT CHANGE UP (ref 70–99)
GLUCOSE BLDV-MCNC: 110 MG/DL — HIGH (ref 70–99)
GLUCOSE BLDV-MCNC: 43 MG/DL — CRITICAL LOW (ref 70–99)
GLUCOSE SERPL-MCNC: 106 MG/DL — HIGH (ref 70–99)
GLUCOSE SERPL-MCNC: 116 MG/DL — HIGH (ref 70–99)
GLUCOSE SERPL-MCNC: 190 MG/DL — HIGH (ref 70–99)
GLUCOSE SERPL-MCNC: 44 MG/DL — CRITICAL LOW (ref 70–99)
HAPTOGLOB SERPL-MCNC: 85 MG/DL — SIGNIFICANT CHANGE UP (ref 34–200)
HCO3 BLDA-SCNC: 12 MMOL/L — LOW (ref 22–26)
HCO3 BLDA-SCNC: 12 MMOL/L — LOW (ref 22–26)
HCO3 BLDA-SCNC: 9 MMOL/L — CRITICAL LOW (ref 22–26)
HCO3 BLDA-SCNC: 9 MMOL/L — CRITICAL LOW (ref 22–26)
HCO3 BLDV-SCNC: 11 MMOL/L — LOW (ref 20–27)
HCO3 BLDV-SCNC: 12 MMOL/L — LOW (ref 20–27)
HCT VFR BLD CALC: 31.4 % — LOW (ref 34.5–45)
HCT VFR BLD CALC: 32.4 % — LOW (ref 34.5–45)
HCT VFR BLD CALC: 35.1 % — SIGNIFICANT CHANGE UP (ref 34.5–45)
HCT VFR BLDA CALC: 29.3 % — LOW (ref 34.5–46.5)
HCT VFR BLDA CALC: 29.8 % — LOW (ref 34.5–46.5)
HCT VFR BLDA CALC: 30.6 % — LOW (ref 34.5–46.5)
HCT VFR BLDA CALC: 31.6 % — LOW (ref 34.5–46.5)
HCT VFR BLDV CALC: 30.2 % — LOW (ref 34.5–45)
HCT VFR BLDV CALC: 33.5 % — LOW (ref 34.5–45)
HGB BLD-MCNC: 10 G/DL — LOW (ref 11.5–15.5)
HGB BLD-MCNC: 10.6 G/DL — LOW (ref 11.5–15.5)
HGB BLD-MCNC: 9.5 G/DL — LOW (ref 11.5–15.5)
HGB BLDA-MCNC: 10.2 G/DL — LOW (ref 11.5–15.5)
HGB BLDA-MCNC: 9.5 G/DL — LOW (ref 11.5–15.5)
HGB BLDA-MCNC: 9.6 G/DL — LOW (ref 11.5–15.5)
HGB BLDA-MCNC: 9.9 G/DL — LOW (ref 11.5–15.5)
HGB BLDV-MCNC: 10.8 G/DL — LOW (ref 11.5–15.5)
HGB BLDV-MCNC: 9.8 G/DL — LOW (ref 11.5–15.5)
IMM GRANULOCYTES NFR BLD AUTO: 0 % — SIGNIFICANT CHANGE UP (ref 0–1.5)
IMM GRANULOCYTES NFR BLD AUTO: 0 % — SIGNIFICANT CHANGE UP (ref 0–1.5)
IMM GRANULOCYTES NFR BLD AUTO: 3.6 % — HIGH (ref 0–1.5)
INR BLD: 2.51 — HIGH (ref 0.88–1.17)
INR BLD: 4.05 — HIGH (ref 0.88–1.17)
INR BLD: 4.18 — HIGH (ref 0.88–1.17)
LACTATE BLDA-SCNC: 14.4 MMOL/L — CRITICAL HIGH (ref 0.5–2)
LACTATE BLDA-SCNC: 16 MMOL/L — CRITICAL HIGH (ref 0.5–2)
LACTATE BLDV-MCNC: 15 MMOL/L — CRITICAL HIGH (ref 0.5–2)
LACTATE BLDV-MCNC: 19 MMOL/L — CRITICAL HIGH (ref 0.5–2)
LDH SERPL L TO P-CCNC: 774 U/L — HIGH (ref 135–225)
LYMPHOCYTES # BLD AUTO: 0.19 K/UL — LOW (ref 1–3.3)
LYMPHOCYTES # BLD AUTO: 0.23 K/UL — LOW (ref 1–3.3)
LYMPHOCYTES # BLD AUTO: 0.24 K/UL — LOW (ref 1–3.3)
LYMPHOCYTES # BLD AUTO: 67.6 % — HIGH (ref 13–44)
LYMPHOCYTES # BLD AUTO: 82.6 % — HIGH (ref 13–44)
LYMPHOCYTES # BLD AUTO: 85.7 % — HIGH (ref 13–44)
LYMPHOCYTES NFR SPEC AUTO: 70.5 % — HIGH (ref 13–44)
MAGNESIUM SERPL-MCNC: 1.6 MG/DL — SIGNIFICANT CHANGE UP (ref 1.6–2.6)
MAGNESIUM SERPL-MCNC: 1.9 MG/DL — SIGNIFICANT CHANGE UP (ref 1.6–2.6)
MAGNESIUM SERPL-MCNC: 2 MG/DL — SIGNIFICANT CHANGE UP (ref 1.6–2.6)
MAGNESIUM SERPL-MCNC: 2.1 MG/DL — SIGNIFICANT CHANGE UP (ref 1.6–2.6)
MANUAL SMEAR VERIFICATION: SIGNIFICANT CHANGE UP
MANUAL SMEAR VERIFICATION: SIGNIFICANT CHANGE UP
MCHC RBC-ENTMCNC: 21 PG — LOW (ref 27–34)
MCHC RBC-ENTMCNC: 21.2 PG — LOW (ref 27–34)
MCHC RBC-ENTMCNC: 21.4 PG — LOW (ref 27–34)
MCHC RBC-ENTMCNC: 30.2 % — LOW (ref 32–36)
MCHC RBC-ENTMCNC: 30.3 % — LOW (ref 32–36)
MCHC RBC-ENTMCNC: 30.9 % — LOW (ref 32–36)
MCV RBC AUTO: 69.4 FL — LOW (ref 80–100)
MCV RBC AUTO: 69.5 FL — LOW (ref 80–100)
MCV RBC AUTO: 70.2 FL — LOW (ref 80–100)
METHOD TYPE: SIGNIFICANT CHANGE UP
MICROCYTES BLD QL: SIGNIFICANT CHANGE UP
MONOCYTES # BLD AUTO: 0 K/UL — SIGNIFICANT CHANGE UP (ref 0–0.9)
MONOCYTES # BLD AUTO: 0.01 K/UL — SIGNIFICANT CHANGE UP (ref 0–0.9)
MONOCYTES # BLD AUTO: 0.01 K/UL — SIGNIFICANT CHANGE UP (ref 0–0.9)
MONOCYTES NFR BLD AUTO: 0 % — LOW (ref 2–14)
MONOCYTES NFR BLD AUTO: 3.6 % — SIGNIFICANT CHANGE UP (ref 2–14)
MONOCYTES NFR BLD AUTO: 4.3 % — SIGNIFICANT CHANGE UP (ref 2–14)
MONOCYTES NFR BLD: 11.8 % — HIGH (ref 2–9)
NEUTROPHIL AB SER-ACNC: 17.7 % — LOW (ref 43–77)
NEUTROPHILS # BLD AUTO: 0.02 K/UL — LOW (ref 1.8–7.4)
NEUTROPHILS # BLD AUTO: 0.03 K/UL — LOW (ref 1.8–7.4)
NEUTROPHILS # BLD AUTO: 0.1 K/UL — LOW (ref 1.8–7.4)
NEUTROPHILS NFR BLD AUTO: 13.1 % — LOW (ref 43–77)
NEUTROPHILS NFR BLD AUTO: 29.5 % — LOW (ref 43–77)
NEUTROPHILS NFR BLD AUTO: 7.1 % — LOW (ref 43–77)
NRBC # BLD: 0 /100WBC — SIGNIFICANT CHANGE UP
NRBC # FLD: 0 K/UL — SIGNIFICANT CHANGE UP (ref 0–0)
ORGANISM # SPEC MICROSCOPIC CNT: SIGNIFICANT CHANGE UP
ORGANISM # SPEC MICROSCOPIC CNT: SIGNIFICANT CHANGE UP
PCO2 BLDA: 20 MMHG — LOW (ref 32–48)
PCO2 BLDA: 22 MMHG — LOW (ref 32–48)
PCO2 BLDA: 24 MMHG — LOW (ref 32–48)
PCO2 BLDA: 24 MMHG — LOW (ref 32–48)
PCO2 BLDV: 33 MMHG — LOW (ref 41–51)
PCO2 BLDV: 41 MMHG — SIGNIFICANT CHANGE UP (ref 41–51)
PH BLDA: 7.1 PH — CRITICAL LOW (ref 7.35–7.45)
PH BLDA: 7.15 PH — CRITICAL LOW (ref 7.35–7.45)
PH BLDA: 7.21 PH — LOW (ref 7.35–7.45)
PH BLDA: 7.23 PH — LOW (ref 7.35–7.45)
PH BLDV: 7.08 PH — CRITICAL LOW (ref 7.32–7.43)
PH BLDV: 7.16 PH — CRITICAL LOW (ref 7.32–7.43)
PHOSPHATE SERPL-MCNC: 3.9 MG/DL — SIGNIFICANT CHANGE UP (ref 2.5–4.5)
PHOSPHATE SERPL-MCNC: 4.3 MG/DL — SIGNIFICANT CHANGE UP (ref 2.5–4.5)
PHOSPHATE SERPL-MCNC: 5.5 MG/DL — HIGH (ref 2.5–4.5)
PHOSPHATE SERPL-MCNC: 5.9 MG/DL — HIGH (ref 2.5–4.5)
PLATELET # BLD AUTO: 28 K/UL — LOW (ref 150–400)
PLATELET # BLD AUTO: 4 K/UL — CRITICAL LOW (ref 150–400)
PLATELET # BLD AUTO: 6 K/UL — CRITICAL LOW (ref 150–400)
PLATELET COUNT - ESTIMATE: SIGNIFICANT CHANGE UP
PMV BLD: SIGNIFICANT CHANGE UP FL (ref 7–13)
PO2 BLDA: 192 MMHG — HIGH (ref 83–108)
PO2 BLDA: 246 MMHG — HIGH (ref 83–108)
PO2 BLDA: 249 MMHG — HIGH (ref 83–108)
PO2 BLDA: 255 MMHG — HIGH (ref 83–108)
PO2 BLDV: 203 MMHG — HIGH (ref 35–40)
PO2 BLDV: 35 MMHG — SIGNIFICANT CHANGE UP (ref 35–40)
POIKILOCYTOSIS BLD QL AUTO: SIGNIFICANT CHANGE UP
POTASSIUM BLDA-SCNC: 4.1 MMOL/L — SIGNIFICANT CHANGE UP (ref 3.4–4.5)
POTASSIUM BLDA-SCNC: 4.1 MMOL/L — SIGNIFICANT CHANGE UP (ref 3.4–4.5)
POTASSIUM BLDA-SCNC: 4.3 MMOL/L — SIGNIFICANT CHANGE UP (ref 3.4–4.5)
POTASSIUM BLDA-SCNC: 4.5 MMOL/L — SIGNIFICANT CHANGE UP (ref 3.4–4.5)
POTASSIUM BLDV-SCNC: 4.2 MMOL/L — SIGNIFICANT CHANGE UP (ref 3.4–4.5)
POTASSIUM BLDV-SCNC: 7 MMOL/L — CRITICAL HIGH (ref 3.4–4.5)
POTASSIUM SERPL-MCNC: 4.3 MMOL/L — SIGNIFICANT CHANGE UP (ref 3.5–5.3)
POTASSIUM SERPL-MCNC: 4.3 MMOL/L — SIGNIFICANT CHANGE UP (ref 3.5–5.3)
POTASSIUM SERPL-MCNC: 4.6 MMOL/L — SIGNIFICANT CHANGE UP (ref 3.5–5.3)
POTASSIUM SERPL-MCNC: SIGNIFICANT CHANGE UP MMOL/L (ref 3.5–5.3)
POTASSIUM SERPL-SCNC: 4.3 MMOL/L — SIGNIFICANT CHANGE UP (ref 3.5–5.3)
POTASSIUM SERPL-SCNC: 4.3 MMOL/L — SIGNIFICANT CHANGE UP (ref 3.5–5.3)
POTASSIUM SERPL-SCNC: 4.6 MMOL/L — SIGNIFICANT CHANGE UP (ref 3.5–5.3)
POTASSIUM SERPL-SCNC: SIGNIFICANT CHANGE UP MMOL/L (ref 3.5–5.3)
PROT SERPL-MCNC: 3.8 G/DL — LOW (ref 6–8.3)
PROT SERPL-MCNC: 4.1 G/DL — LOW (ref 6–8.3)
PROT SERPL-MCNC: 4.4 G/DL — LOW (ref 6–8.3)
PROT SERPL-MCNC: 4.6 G/DL — LOW (ref 6–8.3)
PROTHROM AB SERPL-ACNC: 29.5 SEC — HIGH (ref 9.8–13.1)
PROTHROM AB SERPL-ACNC: 47 SEC — HIGH (ref 9.8–13.1)
PROTHROM AB SERPL-ACNC: 49.8 SEC — HIGH (ref 9.8–13.1)
RBC # BLD: 4.52 M/UL — SIGNIFICANT CHANGE UP (ref 3.8–5.2)
RBC # BLD: 4.67 M/UL — SIGNIFICANT CHANGE UP (ref 3.8–5.2)
RBC # BLD: 5 M/UL — SIGNIFICANT CHANGE UP (ref 3.8–5.2)
RBC # FLD: 25.6 % — HIGH (ref 10.3–14.5)
RBC # FLD: 26 % — HIGH (ref 10.3–14.5)
RBC # FLD: 26.6 % — HIGH (ref 10.3–14.5)
RH IG SCN BLD-IMP: POSITIVE — SIGNIFICANT CHANGE UP
RH IG SCN BLD-IMP: POSITIVE — SIGNIFICANT CHANGE UP
SAO2 % BLDA: 98.9 % — SIGNIFICANT CHANGE UP (ref 95–99)
SAO2 % BLDA: 99 % — SIGNIFICANT CHANGE UP (ref 95–99)
SAO2 % BLDA: 99.3 % — HIGH (ref 95–99)
SAO2 % BLDA: 99.6 % — HIGH (ref 95–99)
SAO2 % BLDV: 38.1 % — LOW (ref 60–85)
SAO2 % BLDV: 99 % — HIGH (ref 60–85)
SMUDGE CELLS # BLD: PRESENT — SIGNIFICANT CHANGE UP
SODIUM BLDA-SCNC: 135 MMOL/L — LOW (ref 136–146)
SODIUM BLDA-SCNC: 136 MMOL/L — SIGNIFICANT CHANGE UP (ref 136–146)
SODIUM SERPL-SCNC: 136 MMOL/L — SIGNIFICANT CHANGE UP (ref 135–145)
SODIUM SERPL-SCNC: 138 MMOL/L — SIGNIFICANT CHANGE UP (ref 135–145)
SODIUM SERPL-SCNC: 138 MMOL/L — SIGNIFICANT CHANGE UP (ref 135–145)
SODIUM SERPL-SCNC: 140 MMOL/L — SIGNIFICANT CHANGE UP (ref 135–145)
SPECIMEN SOURCE: SIGNIFICANT CHANGE UP
TROPONIN T, HIGH SENSITIVITY: 35 NG/L — SIGNIFICANT CHANGE UP (ref ?–14)
WBC # BLD: 0.23 K/UL — CRITICAL LOW (ref 3.8–10.5)
WBC # BLD: 0.28 K/UL — CRITICAL LOW (ref 3.8–10.5)
WBC # BLD: 0.34 K/UL — CRITICAL LOW (ref 3.8–10.5)
WBC # FLD AUTO: 0.23 K/UL — CRITICAL LOW (ref 3.8–10.5)
WBC # FLD AUTO: 0.28 K/UL — CRITICAL LOW (ref 3.8–10.5)
WBC # FLD AUTO: 0.34 K/UL — CRITICAL LOW (ref 3.8–10.5)

## 2020-02-11 PROCEDURE — 36800 INSERTION OF CANNULA: CPT | Mod: GC

## 2020-02-11 PROCEDURE — 76937 US GUIDE VASCULAR ACCESS: CPT | Mod: 26,AS

## 2020-02-11 PROCEDURE — 99291 CRITICAL CARE FIRST HOUR: CPT | Mod: 25

## 2020-02-11 PROCEDURE — 71045 X-RAY EXAM CHEST 1 VIEW: CPT | Mod: 26

## 2020-02-11 PROCEDURE — 99223 1ST HOSP IP/OBS HIGH 75: CPT

## 2020-02-11 PROCEDURE — 93308 TTE F-UP OR LMTD: CPT | Mod: 26,GC

## 2020-02-11 PROCEDURE — 99232 SBSQ HOSP IP/OBS MODERATE 35: CPT

## 2020-02-11 PROCEDURE — 99231 SBSQ HOSP IP/OBS SF/LOW 25: CPT | Mod: 25

## 2020-02-11 PROCEDURE — 99222 1ST HOSP IP/OBS MODERATE 55: CPT

## 2020-02-11 PROCEDURE — 76604 US EXAM CHEST: CPT | Mod: 26,GC

## 2020-02-11 PROCEDURE — 36620 INSERTION CATHETER ARTERY: CPT

## 2020-02-11 RX ORDER — DEXTROSE 50 % IN WATER 50 %
50 SYRINGE (ML) INTRAVENOUS ONCE
Refills: 0 | Status: COMPLETED | OUTPATIENT
Start: 2020-02-11 | End: 2020-02-11

## 2020-02-11 RX ORDER — HYDROMORPHONE HYDROCHLORIDE 2 MG/ML
1 INJECTION INTRAMUSCULAR; INTRAVENOUS; SUBCUTANEOUS ONCE
Refills: 0 | Status: DISCONTINUED | OUTPATIENT
Start: 2020-02-11 | End: 2020-02-11

## 2020-02-11 RX ORDER — DEXTROSE 50 % IN WATER 50 %
25 SYRINGE (ML) INTRAVENOUS ONCE
Refills: 0 | Status: COMPLETED | OUTPATIENT
Start: 2020-02-11 | End: 2020-02-11

## 2020-02-11 RX ORDER — PANTOPRAZOLE SODIUM 20 MG/1
40 TABLET, DELAYED RELEASE ORAL DAILY
Refills: 0 | Status: DISCONTINUED | OUTPATIENT
Start: 2020-02-11 | End: 2020-02-13

## 2020-02-11 RX ORDER — FILGRASTIM 480MCG/1.6
480 VIAL (ML) INJECTION DAILY
Refills: 0 | Status: DISCONTINUED | OUTPATIENT
Start: 2020-02-11 | End: 2020-02-13

## 2020-02-11 RX ORDER — NOREPINEPHRINE BITARTRATE/D5W 8 MG/250ML
0.25 PLASTIC BAG, INJECTION (ML) INTRAVENOUS
Qty: 16 | Refills: 0 | Status: DISCONTINUED | OUTPATIENT
Start: 2020-02-11 | End: 2020-02-11

## 2020-02-11 RX ORDER — ASCORBIC ACID 60 MG
1500 TABLET,CHEWABLE ORAL EVERY 6 HOURS
Refills: 0 | Status: DISCONTINUED | OUTPATIENT
Start: 2020-02-11 | End: 2020-02-11

## 2020-02-11 RX ORDER — HYDROCORTISONE 20 MG
100 TABLET ORAL ONCE
Refills: 0 | Status: COMPLETED | OUTPATIENT
Start: 2020-02-11 | End: 2020-02-11

## 2020-02-11 RX ORDER — SODIUM BICARBONATE 1 MEQ/ML
50 SYRINGE (ML) INTRAVENOUS ONCE
Refills: 0 | Status: COMPLETED | OUTPATIENT
Start: 2020-02-11 | End: 2020-02-11

## 2020-02-11 RX ORDER — MORPHINE SULFATE 50 MG/1
2 CAPSULE, EXTENDED RELEASE ORAL ONCE
Refills: 0 | Status: DISCONTINUED | OUTPATIENT
Start: 2020-02-11 | End: 2020-02-11

## 2020-02-11 RX ORDER — METRONIDAZOLE 500 MG
TABLET ORAL
Refills: 0 | Status: DISCONTINUED | OUTPATIENT
Start: 2020-02-11 | End: 2020-02-11

## 2020-02-11 RX ORDER — SODIUM BICARBONATE 1 MEQ/ML
50 SYRINGE (ML) INTRAVENOUS ONCE
Refills: 0 | Status: COMPLETED | OUTPATIENT
Start: 2020-02-11 | End: 2020-02-10

## 2020-02-11 RX ORDER — BUDESONIDE AND FORMOTEROL FUMARATE DIHYDRATE 160; 4.5 UG/1; UG/1
2 AEROSOL RESPIRATORY (INHALATION)
Refills: 0 | Status: DISCONTINUED | OUTPATIENT
Start: 2020-02-11 | End: 2020-02-12

## 2020-02-11 RX ORDER — CHOLECALCIFEROL (VITAMIN D3) 125 MCG
1000 CAPSULE ORAL DAILY
Refills: 0 | Status: DISCONTINUED | OUTPATIENT
Start: 2020-02-11 | End: 2020-02-12

## 2020-02-11 RX ORDER — HYDROCORTISONE 20 MG
50 TABLET ORAL EVERY 6 HOURS
Refills: 0 | Status: DISCONTINUED | OUTPATIENT
Start: 2020-02-11 | End: 2020-02-13

## 2020-02-11 RX ORDER — METRONIDAZOLE 500 MG
500 TABLET ORAL EVERY 8 HOURS
Refills: 0 | Status: DISCONTINUED | OUTPATIENT
Start: 2020-02-11 | End: 2020-02-11

## 2020-02-11 RX ORDER — MEROPENEM 1 G/30ML
1000 INJECTION INTRAVENOUS EVERY 8 HOURS
Refills: 0 | Status: DISCONTINUED | OUTPATIENT
Start: 2020-02-11 | End: 2020-02-13

## 2020-02-11 RX ORDER — NOREPINEPHRINE BITARTRATE/D5W 8 MG/250ML
1 PLASTIC BAG, INJECTION (ML) INTRAVENOUS
Qty: 32 | Refills: 0 | Status: DISCONTINUED | OUTPATIENT
Start: 2020-02-11 | End: 2020-02-13

## 2020-02-11 RX ORDER — METRONIDAZOLE 500 MG
500 TABLET ORAL ONCE
Refills: 0 | Status: COMPLETED | OUTPATIENT
Start: 2020-02-11 | End: 2020-02-11

## 2020-02-11 RX ORDER — CASPOFUNGIN ACETATE 7 MG/ML
70 INJECTION, POWDER, LYOPHILIZED, FOR SOLUTION INTRAVENOUS ONCE
Refills: 0 | Status: COMPLETED | OUTPATIENT
Start: 2020-02-11 | End: 2020-02-11

## 2020-02-11 RX ORDER — PANTOPRAZOLE SODIUM 20 MG/1
40 TABLET, DELAYED RELEASE ORAL
Refills: 0 | Status: DISCONTINUED | OUTPATIENT
Start: 2020-02-11 | End: 2020-02-11

## 2020-02-11 RX ORDER — PHENYLEPHRINE HYDROCHLORIDE 10 MG/ML
7 INJECTION INTRAVENOUS
Qty: 160 | Refills: 0 | Status: DISCONTINUED | OUTPATIENT
Start: 2020-02-11 | End: 2020-02-11

## 2020-02-11 RX ORDER — THIAMINE MONONITRATE (VIT B1) 100 MG
200 TABLET ORAL
Refills: 0 | Status: DISCONTINUED | OUTPATIENT
Start: 2020-02-11 | End: 2020-02-11

## 2020-02-11 RX ORDER — DEXTROSE 50 % IN WATER 50 %
25 SYRINGE (ML) INTRAVENOUS ONCE
Refills: 0 | Status: COMPLETED | OUTPATIENT
Start: 2020-02-11 | End: 2020-02-12

## 2020-02-11 RX ORDER — DEXMEDETOMIDINE HYDROCHLORIDE IN 0.9% SODIUM CHLORIDE 4 UG/ML
1.5 INJECTION INTRAVENOUS
Qty: 200 | Refills: 0 | Status: DISCONTINUED | OUTPATIENT
Start: 2020-02-11 | End: 2020-02-11

## 2020-02-11 RX ORDER — ONDANSETRON 8 MG/1
4 TABLET, FILM COATED ORAL ONCE
Refills: 0 | Status: COMPLETED | OUTPATIENT
Start: 2020-02-11 | End: 2020-02-11

## 2020-02-11 RX ORDER — MEROPENEM 1 G/30ML
1000 INJECTION INTRAVENOUS EVERY 8 HOURS
Refills: 0 | Status: DISCONTINUED | OUTPATIENT
Start: 2020-02-11 | End: 2020-02-11

## 2020-02-11 RX ORDER — NOREPINEPHRINE BITARTRATE/D5W 8 MG/250ML
0.1 PLASTIC BAG, INJECTION (ML) INTRAVENOUS
Qty: 32 | Refills: 0 | Status: DISCONTINUED | OUTPATIENT
Start: 2020-02-11 | End: 2020-02-11

## 2020-02-11 RX ORDER — CASPOFUNGIN ACETATE 7 MG/ML
INJECTION, POWDER, LYOPHILIZED, FOR SOLUTION INTRAVENOUS
Refills: 0 | Status: DISCONTINUED | OUTPATIENT
Start: 2020-02-11 | End: 2020-02-11

## 2020-02-11 RX ORDER — HYDROCORTISONE 20 MG
50 TABLET ORAL EVERY 8 HOURS
Refills: 0 | Status: DISCONTINUED | OUTPATIENT
Start: 2020-02-11 | End: 2020-02-11

## 2020-02-11 RX ORDER — MAGNESIUM SULFATE 500 MG/ML
2 VIAL (ML) INJECTION ONCE
Refills: 0 | Status: COMPLETED | OUTPATIENT
Start: 2020-02-11 | End: 2020-02-11

## 2020-02-11 RX ORDER — DEXTROSE 50 % IN WATER 50 %
50 SYRINGE (ML) INTRAVENOUS ONCE
Refills: 0 | Status: DISCONTINUED | OUTPATIENT
Start: 2020-02-11 | End: 2020-02-11

## 2020-02-11 RX ORDER — MEROPENEM 1 G/30ML
1000 INJECTION INTRAVENOUS EVERY 12 HOURS
Refills: 0 | Status: DISCONTINUED | OUTPATIENT
Start: 2020-02-11 | End: 2020-02-11

## 2020-02-11 RX ADMIN — Medication 102 MILLIGRAM(S): at 07:35

## 2020-02-11 RX ADMIN — Medication 50 MILLIGRAM(S): at 06:34

## 2020-02-11 RX ADMIN — Medication 50 MILLIEQUIVALENT(S): at 02:30

## 2020-02-11 RX ADMIN — ONDANSETRON 4 MILLIGRAM(S): 8 TABLET, FILM COATED ORAL at 09:16

## 2020-02-11 RX ADMIN — MORPHINE SULFATE 2 MILLIGRAM(S): 50 CAPSULE, EXTENDED RELEASE ORAL at 15:40

## 2020-02-11 RX ADMIN — Medication 30 MILLILITER(S): at 03:31

## 2020-02-11 RX ADMIN — Medication 50 MILLIEQUIVALENT(S): at 10:57

## 2020-02-11 RX ADMIN — Medication 100 MEQ/KG/HR: at 03:36

## 2020-02-11 RX ADMIN — VASOPRESSIN 2.4 UNIT(S)/MIN: 20 INJECTION INTRAVENOUS at 07:35

## 2020-02-11 RX ADMIN — Medication 480 MICROGRAM(S): at 13:07

## 2020-02-11 RX ADMIN — Medication 103 MILLIGRAM(S): at 08:02

## 2020-02-11 RX ADMIN — CASPOFUNGIN ACETATE 260 MILLIGRAM(S): 7 INJECTION, POWDER, LYOPHILIZED, FOR SOLUTION INTRAVENOUS at 03:34

## 2020-02-11 RX ADMIN — MORPHINE SULFATE 2 MILLIGRAM(S): 50 CAPSULE, EXTENDED RELEASE ORAL at 15:20

## 2020-02-11 RX ADMIN — DEXMEDETOMIDINE HYDROCHLORIDE IN 0.9% SODIUM CHLORIDE 13.82 MICROGRAM(S)/KG/HR: 4 INJECTION INTRAVENOUS at 10:22

## 2020-02-11 RX ADMIN — Medication 50 GRAM(S): at 02:01

## 2020-02-11 RX ADMIN — Medication 50 MILLIGRAM(S): at 11:40

## 2020-02-11 RX ADMIN — Medication 25 MILLILITER(S): at 01:00

## 2020-02-11 RX ADMIN — MEROPENEM 100 MILLIGRAM(S): 1 INJECTION INTRAVENOUS at 21:12

## 2020-02-11 RX ADMIN — MEROPENEM 100 MILLIGRAM(S): 1 INJECTION INTRAVENOUS at 06:21

## 2020-02-11 RX ADMIN — Medication 10.37 MICROGRAM(S)/KG/MIN: at 07:34

## 2020-02-11 RX ADMIN — HYDROMORPHONE HYDROCHLORIDE 1 MILLIGRAM(S): 2 INJECTION INTRAMUSCULAR; INTRAVENOUS; SUBCUTANEOUS at 21:40

## 2020-02-11 RX ADMIN — BUDESONIDE AND FORMOTEROL FUMARATE DIHYDRATE 2 PUFF(S): 160; 4.5 AEROSOL RESPIRATORY (INHALATION) at 20:15

## 2020-02-11 RX ADMIN — PHENYLEPHRINE HYDROCHLORIDE 145.16 MICROGRAM(S)/KG/MIN: 10 INJECTION INTRAVENOUS at 03:33

## 2020-02-11 RX ADMIN — HYDROMORPHONE HYDROCHLORIDE 1 MILLIGRAM(S): 2 INJECTION INTRAMUSCULAR; INTRAVENOUS; SUBCUTANEOUS at 16:40

## 2020-02-11 RX ADMIN — Medication 50 MILLILITER(S): at 02:30

## 2020-02-11 RX ADMIN — MEROPENEM 100 MILLIGRAM(S): 1 INJECTION INTRAVENOUS at 13:07

## 2020-02-11 RX ADMIN — VASOPRESSIN 2.4 UNIT(S)/MIN: 20 INJECTION INTRAVENOUS at 19:59

## 2020-02-11 RX ADMIN — CHLORHEXIDINE GLUCONATE 1 APPLICATION(S): 213 SOLUTION TOPICAL at 07:35

## 2020-02-11 RX ADMIN — Medication 50 MILLIGRAM(S): at 18:01

## 2020-02-11 RX ADMIN — BUDESONIDE AND FORMOTEROL FUMARATE DIHYDRATE 2 PUFF(S): 160; 4.5 AEROSOL RESPIRATORY (INHALATION) at 08:38

## 2020-02-11 RX ADMIN — Medication 100 MILLIGRAM(S): at 06:22

## 2020-02-11 RX ADMIN — Medication 50 MILLIEQUIVALENT(S): at 00:00

## 2020-02-11 RX ADMIN — HYDROMORPHONE HYDROCHLORIDE 1 MILLIGRAM(S): 2 INJECTION INTRAMUSCULAR; INTRAVENOUS; SUBCUTANEOUS at 21:25

## 2020-02-11 RX ADMIN — Medication 10.37 MICROGRAM(S)/KG/MIN: at 03:35

## 2020-02-11 RX ADMIN — PANTOPRAZOLE SODIUM 40 MILLIGRAM(S): 20 TABLET, DELAYED RELEASE ORAL at 03:34

## 2020-02-11 RX ADMIN — Medication 100 MEQ/KG/HR: at 07:35

## 2020-02-11 RX ADMIN — Medication 103.69 MICROGRAM(S)/KG/MIN: at 19:59

## 2020-02-11 RX ADMIN — HYDROMORPHONE HYDROCHLORIDE 1 MILLIGRAM(S): 2 INJECTION INTRAMUSCULAR; INTRAVENOUS; SUBCUTANEOUS at 16:25

## 2020-02-11 RX ADMIN — Medication 50 MILLIEQUIVALENT(S): at 00:30

## 2020-02-11 RX ADMIN — Medication 100 MEQ/KG/HR: at 19:59

## 2020-02-11 RX ADMIN — VASOPRESSIN 2.4 UNIT(S)/MIN: 20 INJECTION INTRAVENOUS at 03:36

## 2020-02-11 RX ADMIN — Medication 100 MILLIGRAM(S): at 02:02

## 2020-02-11 RX ADMIN — Medication 50 MILLILITER(S): at 18:13

## 2020-02-11 NOTE — CHART NOTE - NSCHARTNOTEFT_GEN_A_CORE
CCM Attending:    Pt remains hypotensive on neosynephrine , vasopressin, norepinephrine and   sodium bicarbonate drip .  Pt alert and awake, oriented.  Common law   at bedside,     labs noted , pt with progressive metabolic acidosis with ph 7.13 lactate 13,  Unable to lay flat on high flow 100% .      Pt states she does not want to get on a respirator unless she cant decide  for herself.  Common law  aware of her decision.  I explained  the risk and benefit of intubation and she states she is aware and wishes  to stay on high flow oxygen.      we will continue full supportive care with  iv abx, vanco, cefipime, caspofungin,  blood pressure support.  Critically ill  with metastatic leiomyosarcoma,   severe metabolic acidosis with hypotension. Prognosis is poor. Pt is not   a candidate for crrt  as she is extremely hypotensive.    MERISSA Bingham  [id number]

## 2020-02-11 NOTE — PROCEDURE NOTE - NSPOSTPRCRAD_GEN_A_CORE
central line located in the/no pneumothorax/post-procedure radiography performed/central line located in the superior vena cava/depth of insertion

## 2020-02-11 NOTE — CONSULT NOTE ADULT - SUBJECTIVE AND OBJECTIVE BOX
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 Patient report for past week has had shortness of breath however it has progressively worsening since Saturday after having watery diarrhea (ate burger).  Patient denied fever, chills, sick contacts, melena.  Patient report she taking metformin for DM2.  Patient admitted to MICU for septic/distributive shock requiring vasopressors, AHRF (requiring hiflow) and severe metabolic acidosis.    Nephrology consulted for severe metabolic acidosis.  On admission had lactatte 7.4, vbg pH 7.15/CO2 20/HCO3 9, initially serum HCO3 18 downtrend to 7 and sCr 1.12 on admission 2/10/2020 (baseline 0.7 in Jan 2020), uptrend to 1.6 (2/11/2020).  Patient not making urine at time of evaluation.  Patient consented for HD/CRRT for acidosis.      PAST HISTORY  --------------------------------------------------------------------------------  PAST MEDICAL & SURGICAL HISTORY:  Leiomyosarcoma  Uterine fibroid  Hyperlipidemia  Asthma  Diabetes  Hypertension  H/O chest tube placement    FAMILY HISTORY:  FH: pancreatic cancer: cousin  FH: lung cancer: father  FH: type 2 diabetes: mother  FH: stroke: mother    PAST SOCIAL HISTORY:    ALLERGIES & MEDICATIONS  --------------------------------------------------------------------------------  Allergies    No Known Drug Allergies  shellfish (Hives)    Intolerances      Standing Inpatient Medications  budesonide  80 MICROgram(s)/formoterol 4.5 MICROgram(s) Inhaler 2 Puff(s) Inhalation two times a day  chlorhexidine 4% Liquid 1 Application(s) Topical <User Schedule>  cholecalciferol 1000 Unit(s) Oral daily  CRRT Treatment    <Continuous>  dexMEDEtomidine Infusion 1.5 MICROgram(s)/kG/Hr IV Continuous <Continuous>  hydrocortisone sodium succinate Injectable 50 milliGRAM(s) IV Push every 6 hours  meropenem  IVPB 1000 milliGRAM(s) IV Intermittent every 12 hours  norepinephrine Infusion 1 MICROgram(s)/kG/Min IV Continuous <Continuous>  pantoprazole  Injectable 40 milliGRAM(s) IV Push daily  PrismaSATE Dialysate BGK 4 / 2.5 5000 milliLiter(s) CRRT <Continuous>  PrismaSOL Filtration BGK 4 / 2.5 5000 milliLiter(s) CRRT <Continuous>  PrismaSOL Filtration BGK 4 / 2.5 5000 milliLiter(s) CRRT <Continuous>  sodium bicarbonate  Infusion 0.136 mEq/kG/Hr IV Continuous <Continuous>  vasopressin Infusion 0.04 Unit(s)/Min IV Continuous <Continuous>    PRN Inpatient Medications  aluminum hydroxide/magnesium hydroxide/simethicone Suspension 30 milliLiter(s) Oral every 6 hours PRN      REVIEW OF SYSTEMS  --------------------------------------------------------------------------------  Gen: + fatigue, weakness.  no fever/chills  Skin: No rashes  Respiratory: +dyspnea.  No cough, wheezing, hemoptysis  CV: No chest pain/  +orthopnea  GI: + diarrhea  No abdominal pain, constipation, nausea, vomiting  : No increased frequency, dysuria, hematuria  MSK: no edema      All other systems were reviewed and are negative, except as noted.    VITALS/PHYSICAL EXAM  --------------------------------------------------------------------------------  T(C): 35.9 (02-11-20 @ 08:00), Max: 38.7 (02-10-20 @ 15:45)  HR: 128 (02-11-20 @ 11:00) (114 - 170)  BP: 62/42 (02-11-20 @ 02:30) (49/25 - 136/100)  RR: 30 (02-11-20 @ 11:00) (18 - 42)  SpO2: 94% (02-11-20 @ 11:00) (86% - 100%)  Wt(kg): --  Height (cm): 165.1 (02-10-20 @ 23:45)  Weight (kg): 110.6 (02-10-20 @ 23:45)  BMI (kg/m2): 40.6 (02-10-20 @ 23:45)  BSA (m2): 2.15 (02-10-20 @ 23:45)      02-10-20 @ 07:01  -  02-11-20 @ 07:00  --------------------------------------------------------  IN: 1957.5 mL / OUT: 0 mL / NET: 1957.5 mL    02-11-20 @ 07:01  -  02-11-20 @ 11:13  --------------------------------------------------------  IN: 694.6 mL / OUT: 2050 mL / NET: -1355.4 mL      Physical Exam:  	Gen: young female on hiflow  	ENT: hiflow   	Pulm: CTA b/l, R chest tube   	CV: tachycardic, no murmur appreciated  	Abd: +BS, soft, nontender/nondistended  	: No suprapubic tenderness  	LE: Warm, bilateral lower ext edema +2  	Skin: Warm  	Vascular access:    LABS/STUDIES  --------------------------------------------------------------------------------              10.6   0.34  >-----------<  28       [02-11-20 @ 00:26]              35.1     140  |  101  |  32  ----------------------------<  190      [02-11-20 @ 02:52]  4.3   |  6   |  1.62        Ca     7.1     [02-11-20 @ 02:52]      Mg     2.0     [02-11-20 @ 02:52]      Phos  5.9     [02-11-20 @ 02:52]    TPro  3.8  /  Alb  1.6  /  TBili  1.9  /  DBili  x   /  AST  150  /  ALT  76  /  AlkPhos  88  [02-11-20 @ 02:52]    PT/INR: PT 29.5 , INR 2.51       [02-11-20 @ 00:26]  PTT: 61.7       [02-11-20 @ 00:26]          [02-11-20 @ 00:26]    Creatinine Trend:  SCr 1.62 [02-11 @ 02:52]  SCr 1.44 [02-11 @ 00:26]  SCr 1.43 [02-10 @ 22:15]  SCr 1.12 [02-10 @ 16:30]  SCr 0.75 [01-16 @ 06:35]    Urinalysis - [12-24-19 @ 01:06]      Color Yellow / Appearance Hazy / SG 1.015 / pH 6.0      Gluc NEGATIVE / Ketone NEGATIVE  / Bili Negative / Urobili 0.2       Blood Large / Protein NEGATIVE / Leuk Est Trace / Nitrite NEGATIVE      RBC 5-10 / WBC < 5 / Hyaline  / Gran  / Sq Epi  / Non Sq Epi 5-10 / Bacteria Present      Iron 23, TIBC 303, %sat 8      [12-03-19 @ 22:56]  Ferritin 137      [12-03-19 @ 22:56]  HbA1c 5.8      [12-30-19 @ 07:35]  TSH 0.958      [10-14-19 @ 16:33]    HBsAg Nonreact      [12-19-19 @ 05:47]  HCV 0.15, Nonreact      [12-19-19 @ 05:47]  HIV Nonreact      [12-04-19 @ 10:48]    MARGUERITE: titer Negative, pattern --      [12-04-19 @ 10:55]  Rheumatoid Factor <10      [12-04-19 @ 11:00]  Free Light Chains: kappa 3.38, lambda 2.01, ratio = 1.68      [12-23 @ 11:05]  Immunofixation Serum:   No Monoclonal Band Identified    Reference Range: None Detected      [12-23-19 @ 11:05]  SPEP Interpretation: Normal Electrophoresis Pattern      [12-23-19 @ 11:05] 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 Patient report for past week has had shortness of breath however it has progressively worsening since Saturday after having watery diarrhea (ate burger).  Patient denied fever, chills, sick contacts, melena.  Patient report she taking metformin for DM2.  Patient admitted to MICU for septic/distributive shock requiring vasopressors, AHRF (requiring hiflow) and severe metabolic acidosis.    Nephrology consulted for severe metabolic acidosis.  On admission had lactatte 7.4, vbg pH 7.15/CO2 20/HCO3 9, initially serum HCO3 18 downtrend to 7 and sCr 1.12 on admission 2/10/2020 (baseline 0.7 in Jan 2020), uptrend to 1.6 (2/11/2020).  Patient not making urine at time of evaluation.  Patient consented for HD/CRRT for acidosis.      PAST HISTORY  --------------------------------------------------------------------------------  PAST MEDICAL & SURGICAL HISTORY:  Leiomyosarcoma  Uterine fibroid  Hyperlipidemia  Asthma  Diabetes  Hypertension  H/O chest tube placement    FAMILY HISTORY:  FH: pancreatic cancer: cousin  FH: lung cancer: father  FH: type 2 diabetes: mother  FH: stroke: mother    PAST SOCIAL HISTORY: former smoker     ALLERGIES & MEDICATIONS  --------------------------------------------------------------------------------  Allergies    No Known Drug Allergies  shellfish (Hives)    Intolerances      Standing Inpatient Medications  budesonide  80 MICROgram(s)/formoterol 4.5 MICROgram(s) Inhaler 2 Puff(s) Inhalation two times a day  chlorhexidine 4% Liquid 1 Application(s) Topical <User Schedule>  cholecalciferol 1000 Unit(s) Oral daily  CRRT Treatment    <Continuous>  dexMEDEtomidine Infusion 1.5 MICROgram(s)/kG/Hr IV Continuous <Continuous>  hydrocortisone sodium succinate Injectable 50 milliGRAM(s) IV Push every 6 hours  meropenem  IVPB 1000 milliGRAM(s) IV Intermittent every 12 hours  norepinephrine Infusion 1 MICROgram(s)/kG/Min IV Continuous <Continuous>  pantoprazole  Injectable 40 milliGRAM(s) IV Push daily  PrismaSATE Dialysate BGK 4 / 2.5 5000 milliLiter(s) CRRT <Continuous>  PrismaSOL Filtration BGK 4 / 2.5 5000 milliLiter(s) CRRT <Continuous>  PrismaSOL Filtration BGK 4 / 2.5 5000 milliLiter(s) CRRT <Continuous>  sodium bicarbonate  Infusion 0.136 mEq/kG/Hr IV Continuous <Continuous>  vasopressin Infusion 0.04 Unit(s)/Min IV Continuous <Continuous>    PRN Inpatient Medications  aluminum hydroxide/magnesium hydroxide/simethicone Suspension 30 milliLiter(s) Oral every 6 hours PRN      REVIEW OF SYSTEMS  --------------------------------------------------------------------------------  Gen: + fatigue, weakness.  no fever/chills  Skin: No rashes  Respiratory: +dyspnea.  No cough, wheezing, hemoptysis  CV: No chest pain/  +orthopnea  GI: + diarrhea  No abdominal pain, constipation, nausea, vomiting  : No increased frequency, dysuria, hematuria  MSK: no edema      All other systems were reviewed and are negative, except as noted.    VITALS/PHYSICAL EXAM  --------------------------------------------------------------------------------  T(C): 35.9 (02-11-20 @ 08:00), Max: 38.7 (02-10-20 @ 15:45)  HR: 128 (02-11-20 @ 11:00) (114 - 170)  BP: 62/42 (02-11-20 @ 02:30) (49/25 - 136/100)  RR: 30 (02-11-20 @ 11:00) (18 - 42)  SpO2: 94% (02-11-20 @ 11:00) (86% - 100%)  Wt(kg): --  Height (cm): 165.1 (02-10-20 @ 23:45)  Weight (kg): 110.6 (02-10-20 @ 23:45)  BMI (kg/m2): 40.6 (02-10-20 @ 23:45)  BSA (m2): 2.15 (02-10-20 @ 23:45)      02-10-20 @ 07:01  -  02-11-20 @ 07:00  --------------------------------------------------------  IN: 1957.5 mL / OUT: 0 mL / NET: 1957.5 mL    02-11-20 @ 07:01  -  02-11-20 @ 11:13  --------------------------------------------------------  IN: 694.6 mL / OUT: 2050 mL / NET: -1355.4 mL      Physical Exam:  	Gen: young female on hiflow  	ENT: hiflow   	Pulm: CTA b/l, R chest tube   	CV: tachycardic, no murmur appreciated  	Abd: +BS, soft, nontender/nondistended  	: No suprapubic tenderness  	LE: Warm, bilateral lower ext edema +2  	Skin: Warm  	Vascular access:    LABS/STUDIES  --------------------------------------------------------------------------------              10.6   0.34  >-----------<  28       [02-11-20 @ 00:26]              35.1     140  |  101  |  32  ----------------------------<  190      [02-11-20 @ 02:52]  4.3   |  6   |  1.62        Ca     7.1     [02-11-20 @ 02:52]      Mg     2.0     [02-11-20 @ 02:52]      Phos  5.9     [02-11-20 @ 02:52]    TPro  3.8  /  Alb  1.6  /  TBili  1.9  /  DBili  x   /  AST  150  /  ALT  76  /  AlkPhos  88  [02-11-20 @ 02:52]    PT/INR: PT 29.5 , INR 2.51       [02-11-20 @ 00:26]  PTT: 61.7       [02-11-20 @ 00:26]          [02-11-20 @ 00:26]    Creatinine Trend:  SCr 1.62 [02-11 @ 02:52]  SCr 1.44 [02-11 @ 00:26]  SCr 1.43 [02-10 @ 22:15]  SCr 1.12 [02-10 @ 16:30]  SCr 0.75 [01-16 @ 06:35]    Urinalysis - [12-24-19 @ 01:06]      Color Yellow / Appearance Hazy / SG 1.015 / pH 6.0      Gluc NEGATIVE / Ketone NEGATIVE  / Bili Negative / Urobili 0.2       Blood Large / Protein NEGATIVE / Leuk Est Trace / Nitrite NEGATIVE      RBC 5-10 / WBC < 5 / Hyaline  / Gran  / Sq Epi  / Non Sq Epi 5-10 / Bacteria Present      Iron 23, TIBC 303, %sat 8      [12-03-19 @ 22:56]  Ferritin 137      [12-03-19 @ 22:56]  HbA1c 5.8      [12-30-19 @ 07:35]  TSH 0.958      [10-14-19 @ 16:33]    HBsAg Nonreact      [12-19-19 @ 05:47]  HCV 0.15, Nonreact      [12-19-19 @ 05:47]  HIV Nonreact      [12-04-19 @ 10:48]    MARGUERITE: titer Negative, pattern --      [12-04-19 @ 10:55]  Rheumatoid Factor <10      [12-04-19 @ 11:00]  Free Light Chains: kappa 3.38, lambda 2.01, ratio = 1.68      [12-23 @ 11:05]  Immunofixation Serum:   No Monoclonal Band Identified    Reference Range: None Detected      [12-23-19 @ 11:05]  SPEP Interpretation: Normal Electrophoresis Pattern      [12-23-19 @ 11:05]

## 2020-02-11 NOTE — CONSULT NOTE ADULT - SUBJECTIVE AND OBJECTIVE BOX
Patient is a 39y old  Female who presents with a chief complaint of Neutropenic sepsis (11 Feb 2020 11:13)      HPI:  39 year old woman with PMHx HTN, DM, HLD, Uterine fibroids w. 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 She endorses brown watery diarrhea since saturday after eating burgers. Denied melena and hematochezia. Today, while walking to the bathroom started getting short of breath. This was not improving and they decided to call EMS. Denied fever, sick contacts or recent travel.     In the ED, was found with neutropenic sepsis.  Received vancomycin, zosyn, and cefepime. MICU consulted for tachycardia and tachypnea. Patient was started on pressors in the ED. (10 Feb 2020 21:20)      prior hospital charts reviewed [  ]  primary team notes reviewed [  ]  other consultant notes reviewed [  ]    PAST MEDICAL & SURGICAL HISTORY:  Leiomyosarcoma  Uterine fibroid  Hyperlipidemia  Asthma  Diabetes  Hypertension  H/O chest tube placement      Allergies  No Known Drug Allergies  shellfish (Hives)        ANTIMICROBIALS (past 90 days)  MEDICATIONS  (STANDING):    caspofungin IVPB   260 mL/Hr IV Intermittent (02-11-20 @ 03:34)    cefepime   IVPB   100 mL/Hr IV Intermittent (02-10-20 @ 22:49)    meropenem  IVPB   100 mL/Hr IV Intermittent (02-11-20 @ 06:21)    meropenem  IVPB   100 mL/Hr IV Intermittent (02-11-20 @ 13:07)    metroNIDAZOLE  IVPB   100 mL/Hr IV Intermittent (02-11-20 @ 06:22)    piperacillin/tazobactam IVPB.   200 mL/Hr IV Intermittent (02-10-20 @ 16:45)    vancomycin  IVPB   250 mL/Hr IV Intermittent (02-10-20 @ 17:30)        ANTIMICROBIALS:    meropenem  IVPB 1000 every 8 hours      OTHER MEDS: MEDICATIONS  (STANDING):  aluminum hydroxide/magnesium hydroxide/simethicone Suspension 30 every 6 hours PRN  budesonide  80 MICROgram(s)/formoterol 4.5 MICROgram(s) Inhaler 2 two times a day  filgrastim-sndz Injectable 480 daily  hydrocortisone sodium succinate Injectable 50 every 6 hours  norepinephrine Infusion 1 <Continuous>  pantoprazole  Injectable 40 daily  vasopressin Infusion 0.04 <Continuous>      SOCIAL HISTORY:   hx smoking  non-smoker    FAMILY HISTORY:  FH: pancreatic cancer: cousin  FH: lung cancer: father  FH: type 2 diabetes: mother  FH: stroke: mother      REVIEW OF SYSTEMS  [  ] ROS unobtainable because:    [  ] All other systems negative except as noted below:	    Constitutional:  [ ] fever [ ] chills  [ ] weight loss  [ ] weakness  Skin:  [ ] rash [ ] phlebitis	  Eyes: [ ] icterus [ ] pain  [ ] discharge	  ENMT: [ ] sore throat  [ ] thrush [ ] ulcers [ ] exudates  Respiratory: [ ] dyspnea [ ] hemoptysis [ ] cough [ ] sputum	  Cardiovascular:  [ ] chest pain [ ] palpitations [ ] edema	  Gastrointestinal:  [ ] nausea [ ] vomiting [ ] diarrhea [ ] constipation [ ] pain	  Genitourinary:  [ ] dysuria [ ] frequency [ ] hematuria [ ] discharge [ ] flank pain  [ ] incontinence  Musculoskeletal:  [ ] myalgias [ ] arthralgias [ ] arthritis  [ ] back pain  Neurological:  [ ] headache [ ] seizures  [ ] confusion/altered mental status  Psychiatric:  [ ] anxiety [ ] depression	  Hematology/Lymphatics:  [ ] lymphadenopathy  Endocrine:  [ ] adrenal [ ] thyroid  Allergic/Immunologic:	 [ ] transplant [ ] seasonal    Vital Signs Last 24 Hrs  T(F): 96.8 (02-11-20 @ 12:00), Max: 101.6 (02-10-20 @ 15:45)    Vital Signs Last 24 Hrs  HR: 114 (02-11-20 @ 13:00) (114 - 170)  BP: 62/42 (02-11-20 @ 02:30) (49/25 - 136/100)  RR: 20 (02-11-20 @ 13:00)  SpO2: 99% (02-11-20 @ 13:00) (86% - 100%)  Wt(kg): --    PHYSICAL EXAM:  Constitutional: non-toxic, no distress  HEAD/EYES: anicteric, no conjunctival injection  ENT:  supple, no thrush  Cardiovascular:   normal S1, S2, no murmur, no edema  Respiratory:  clear BS bilaterally, no wheezes, no rales  GI:  soft, non-tender, normal bowel sounds  :  no meeks, no CVA tenderness  Musculoskeletal:  no synovitis, normal ROM  Neurologic: awake and alert, normal strength, no focal findings  Skin:  no rash, no erythema, no phlebitis  Heme/Onc: no lymphadenopathy   Psychiatric:  awake, alert, appropriate mood                            10.6   0.34  )-----------( 28       ( 11 Feb 2020 00:26 )             35.1     02-11    140  |  101  |  32<H>  ----------------------------<  190<H>  4.3   |  6<LL>  |  1.62<H>    Ca    7.1<L>      11 Feb 2020 02:52  Phos  5.9     02-11  Mg     2.0     02-11    TPro  3.8<L>  /  Alb  1.6<L>  /  TBili  1.9<H>  /  DBili  x   /  AST  150<H>  /  ALT  76<H>  /  AlkPhos  88  02-11          MICROBIOLOGY:        Culture - Body Fluid with Gram Stain (collected 10 Feb 2020 22:18)  Source: PLEURAL FLUID  Gram Stain (10 Feb 2020 23:48):    WBC^White Blood Cells    QNTY CELLS IN GRAM STAIN: RARE (1+)    NOS^No Organisms Seen    Culture - Blood (collected 10 Feb 2020 18:08)  Source: Peripheral Site 2    Culture - Blood (collected 10 Feb 2020 18:08)  Source: Peripheral Site 1  Preliminary Report (11 Feb 2020 03:28):    ***Blood Panel PCR results on this specimen are available    approximately 3 hours after the Gram stain result***    Gram stain, PCR, and/or culture results may not always    correspond due to difference in methodologies    ------------------------------------------------------------    This PCR assay was performed using [employer].  The    following targets are tested for:  Enterococcus, vancomycin    resistant enterococci, Listeria monocytogenes,  coagulase    negative staphylococci, S. aureus, methicillin resistant S.    aureus, Streptococcus agalactiae (Group B), S. pneumoniae,    S. pyogenes (Group A), Acinetobacter baumannii, Enterobacter    cloacae, E. coli, Klebsiella oxytoca, K. pneumoniae, Proteus    sp., Serratia marcescens, Haemophilus influenzae, Neisseria    meningitidis, Pseudomonas aeruginosa, Candida albicans, C.    glabrata, C. krusei, C. parapsilosis, C. tropicalis and the    KPC resistance gene.    **NOTE: Due to technical problems, Proteus sp. will NOT be    reported as part of the BCID paneluntil further notice.  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    Enlarging large right pleural effusion.      < end of copied text > Patient is a 39y old  Female who presents with a chief complaint of Neutropenic sepsis (11 Feb 2020 11:13)      HPI:  39 year old woman with PMHx HTN, DM, HLD, Uterine fibroids w. 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 She endorses brown watery diarrhea since saturday after eating burgers. Denied melena and hematochezia. Today, while walking to the bathroom started getting short of breath. This was not improving and they decided to call EMS. Denied fever, sick contacts or recent travel.     In the ED, was found with neutropenic sepsis.  Received vancomycin, zosyn, and cefepime. MICU consulted for tachycardia and tachypnea. Patient was started on pressors in the ED. (10 Feb 2020 21:20)    Above reviewed. Patient reported SOB started on Friday and worsened which is why she presented here.   Her last chemotherapy was Doxorubicin and Derezoxane on 1/31 which she received via port that was placed in january.   Denied cough, abdominal pain, dysuria or diarrhea.     prior hospital charts reviewed [ x ]  primary team notes reviewed [x  ]  other consultant notes reviewed [ x ]    PAST MEDICAL & SURGICAL HISTORY:  Leiomyosarcoma  Uterine fibroid  Hyperlipidemia  Asthma  Diabetes  Hypertension  H/O chest tube placement      Allergies  No Known Drug Allergies  shellfish (Hives)    ANTIMICROBIALS (past 90 days)  MEDICATIONS  (STANDING):    caspofungin IVPB   260 mL/Hr IV Intermittent (02-11-20 @ 03:34)    cefepime   IVPB   100 mL/Hr IV Intermittent (02-10-20 @ 22:49)    meropenem  IVPB   100 mL/Hr IV Intermittent (02-11-20 @ 06:21)    meropenem  IVPB   100 mL/Hr IV Intermittent (02-11-20 @ 13:07)    metroNIDAZOLE  IVPB   100 mL/Hr IV Intermittent (02-11-20 @ 06:22)    piperacillin/tazobactam IVPB.   200 mL/Hr IV Intermittent (02-10-20 @ 16:45)    vancomycin  IVPB   250 mL/Hr IV Intermittent (02-10-20 @ 17:30)        ANTIMICROBIALS:    meropenem  IVPB 1000 every 8 hours      OTHER MEDS: MEDICATIONS  (STANDING):  aluminum hydroxide/magnesium hydroxide/simethicone Suspension 30 every 6 hours PRN  budesonide  80 MICROgram(s)/formoterol 4.5 MICROgram(s) Inhaler 2 two times a day  filgrastim-sndz Injectable 480 daily  hydrocortisone sodium succinate Injectable 50 every 6 hours  norepinephrine Infusion 1 <Continuous>  pantoprazole  Injectable 40 daily  vasopressin Infusion 0.04 <Continuous>      SOCIAL HISTORY:   hx smoking  non-smoker, no alcohol use     FAMILY HISTORY:  FH: pancreatic cancer: cousin  FH: lung cancer: father  FH: type 2 diabetes: mother  FH: stroke: mother      REVIEW OF SYSTEMS  [  ] ROS unobtainable because:    [ x ] All other systems negative except as noted below:	    Constitutional:  [X ] fever [ ] chills  [ ] weight loss  [ ] weakness  Skin:  [ ] rash [ ] phlebitis	  Eyes: [ ] icterus [ ] pain  [ ] discharge	  ENMT: [ ] sore throat  [ ] thrush [ ] ulcers [ ] exudates  Respiratory: [x ] dyspnea [ ] hemoptysis [ ] cough [ ] sputum	  Cardiovascular:  [ ] chest pain [ ] palpitations [ ] edema	  Gastrointestinal:  [ ] nausea [ ] vomiting [ ] diarrhea [ ] constipation [ ] pain	  Genitourinary:  [ ] dysuria [ ] frequency [ ] hematuria [ ] discharge [ ] flank pain  [ ] incontinence  Musculoskeletal:  [ ] myalgias [ ] arthralgias [ ] arthritis  [ ] back pain  Neurological:  [ ] headache [ ] seizures  [ ] confusion/altered mental status  Psychiatric:  [ ] anxiety [ ] depression	  Hematology/Lymphatics:  [ ] lymphadenopathy  Endocrine:  [ ] adrenal [ ] thyroid  Allergic/Immunologic:	 [ ] transplant [ ] seasonal    Vital Signs Last 24 Hrs  T(F): 96.8 (02-11-20 @ 12:00), Max: 101.6 (02-10-20 @ 15:45)    Vital Signs Last 24 Hrs  HR: 114 (02-11-20 @ 13:00) (114 - 170)  BP: 62/42 (02-11-20 @ 02:30) (49/25 - 136/100)  RR: 20 (02-11-20 @ 13:00)  SpO2: 99% (02-11-20 @ 13:00) (86% - 100%)  Wt(kg): --    PHYSICAL EXAM:  Constitutional: on high flow canula, on 2 pressors   AAO*3 able to respond appropriately to commands   Right subclavian port present, right subclavian central line present  Obn CVVHDF  HEAD/EYES: anicteric, no conjunctival injection  ENT:  supple, no thrush  Cardiovascular:   normal S1, S2, no murmur, no edema  Respiratory:  clear BS bilaterally, no wheezes, no rales  Chest tube present on Right side draining sanguinous fluid   GI:  soft, non-tender, normal bowel sounds, rectal tube present   :  meeks present,  no CVA tenderness  Musculoskeletal:  no synovitis, normal ROM  Neurologic: awake and alert, normal strength, no focal findings  Skin:  no rash, no erythema, no phlebitis  Heme/Onc: no lymphadenopathy   Psychiatric:  awake, alert, appropriate mood                            10.6   0.34  )-----------( 28       ( 11 Feb 2020 00:26 )             35.1     02-11    140  |  101  |  32<H>  ----------------------------<  190<H>  4.3   |  6<LL>  |  1.62<H>    Ca    7.1<L>      11 Feb 2020 02:52  Phos  5.9     02-11  Mg     2.0     02-11    TPro  3.8<L>  /  Alb  1.6<L>  /  TBili  1.9<H>  /  DBili  x   /  AST  150<H>  /  ALT  76<H>  /  AlkPhos  88  02-11          MICROBIOLOGY:        Culture - Body Fluid with Gram Stain (collected 10 Feb 2020 22:18)  Source: PLEURAL FLUID  Gram Stain (10 Feb 2020 23:48):    WBC^White Blood Cells    QNTY CELLS IN GRAM STAIN: RARE (1+)    NOS^No Organisms Seen    Culture - Blood (collected 10 Feb 2020 18:08)  Source: Peripheral Site 2    Culture - Blood (collected 10 Feb 2020 18:08)  Source: Peripheral Site 1  Preliminary Report (11 Feb 2020 03:28):    ***Blood Panel PCR results on this specimen are available    approximately 3 hours after the Gram stain result***    Gram stain, PCR, and/or culture results may not always    correspond due to difference in methodologies    ------------------------------------------------------------    This PCR assay was performed using [employer].  The    following targets are tested for:  Enterococcus, vancomycin    resistant enterococci, Listeria monocytogenes,  coagulase    negative staphylococci, S. aureus, methicillin resistant S.    aureus, Streptococcus agalactiae (Group B), S. pneumoniae,    S. pyogenes (Group A), Acinetobacter baumannii, Enterobacter    cloacae, E. coli, Klebsiella oxytoca, K. pneumoniae, Proteus    sp., Serratia marcescens, Haemophilus influenzae, Neisseria    meningitidis, Pseudomonas aeruginosa, Candida albicans, C.    glabrata, C. krusei, C. parapsilosis, C. tropicalis and the    KPC resistance gene.    **NOTE: Due to technical problems, Proteus sp. will NOT be    reported as part of the BCID paneluntil further notice.  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    Enlarging large right pleural effusion.      < end of copied text >

## 2020-02-11 NOTE — CONSULT NOTE ADULT - ATTENDING COMMENTS
39 year old with metastatic leiomyosarcoma, with recent immunosuppressive cheomtherapy, presented with shortness of breath with fever and hypotension    1) E coli bacteremia  associated sepsis    CT a/p had intrahepatic mass with extension to IVC/ right atria    Can check UA but my suspicion higher for ? cholangitis due to biliary obstruct    Continue meropenem pending sensitivities    Repeat blood cultures    2) neutropenia  Likely due to chemotherapy   Continue to monitor ANC     3) Immunosuppression   due to chemo    4) TEDDY  on CVVH
pt is a 38 yo female with metastatic leiomyosarcoma who is on chemotherapy  last 1/31 who presents with tachypnea and resp rate of 35 and heart rate of 160.  pt hypoxemic and hypotensive , admitted to the icu for septic shock, Plan see history   and physical.

## 2020-02-11 NOTE — CONSULT NOTE ADULT - ASSESSMENT
39f with newly diagnosed metastatic leiomyosarcoma, recent admission at [facility] from 1/10-1/16/2020 for chemotherapy initiation. She had port placement and received chemotherapy (doxorubicin and Dexrezoxane) on 1/15/2020 and s/p C2 on 1/31/2020 at [facility]. Her MRI abdomen showed Leiomyosarcoma involving the inferior vena cava, right atrium, right hepatic vein, and right renal vein. Heterogeneous appearance of the liver with indeterminant T1 hyperintense lesion in segment IVb with possible enhancement, hx b/l DVT on lovenox BID, hx of recurrent right pleural effusion w/ PleurX 12/30/2019 presents with worsening dyspnea for 1 day.    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  BCX + GNR    -Appreciate MICU care  -Please start neupogen daily, 480mcg, until ANC>5000  -C/w Vancomycin and cefepime, c/x abx until ANC recovery  -ID consult    -Supportive care, pain control, Nutrition, PT, DVT ppx  -Outpatient oncology f/u    Will follow. Please do not hesitate to call back with questions.     Lucie Montes MD  Medical Oncology Attending  C: [phone] 39f with newly diagnosed metastatic leiomyosarcoma, recent admission at [facility] from 1/10-1/16/2020 for chemotherapy initiation. She had port placement and received chemotherapy (doxorubicin and Dexrezoxane) on 1/15/2020 and s/p C2 on 1/31/2020 at [facility]. Her MRI abdomen showed Leiomyosarcoma involving the inferior vena cava, right atrium, right hepatic vein, and right renal vein. Heterogeneous appearance of the liver with indeterminant T1 hyperintense lesion in segment IVb with possible enhancement, hx b/l DVT on lovenox BID, hx of recurrent right pleural effusion w/ PleurX 12/30/2019 presents with worsening dyspnea for 1 day.  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  BCX + GNR, severe metabolic acidosis, on 2 pressors.      -Appreciate MICU care  -Please start neupogen daily, 480mcg, until ANC>5000  -C/w abx until ANC recovery  -ID consult, renal consult  -Transfuse hgb<7, plt<10 or in case of fever plt<15, and as needed in case of invasive procedures or bleeding  -Supportive care, pain control, Nutrition, PT, DVT ppx  -Outpatient oncology f/u    Will follow. Please do not hesitate to call back with questions.     Lucie Montes MD  Medical Oncology Attending  C: [phone]

## 2020-02-11 NOTE — CONSULT NOTE ADULT - PROBLEM SELECTOR RECOMMENDATION 3
Hyperphosphatemia likely 2/2 TEDDY, on admission serum Phos 4.6, uptrend 5.5  Recommendations  - plan for CRRT  - monitor phos

## 2020-02-11 NOTE — CHART NOTE - NSCHARTNOTEFT_GEN_A_CORE
: Saul Solano     INDICATION: MICU POCUS daily exam     PROCEDURE:  [X ] LIMITED ECHO  [X ] LIMITED CHEST  [ ] LIMITED RETROPERITONEAL  [ ] LIMITED ABDOMINAL  [ ] LIMITED DVT  [ ] NEEDLE GUIDANCE VASCULAR  [ ] NEEDLE GUIDANCE THORACENTESIS  [ ] NEEDLE GUIDANCE PARACENTESIS  [ ] NEEDLE GUIDANCE PERICARDIOCENTESIS  [ ] OTHER    FINDINGS:    Cardiac: grossly normal systolic and diastolic function. No gross motion or valvular abnormalities.     Chest: A line predominant CORRINE. R side pleural effusion.     INTERPRETATION: Patient w grossly normal cardiac function. US Chest demonstrates R pleural effusion c/w previous CXR. : Saul Nguyengloria     INDICATION: MICU POCUS daily exam     PROCEDURE:  [X ] LIMITED ECHO  [X ] LIMITED CHEST  [ ] LIMITED RETROPERITONEAL  [ ] LIMITED ABDOMINAL  [ ] LIMITED DVT  [ ] NEEDLE GUIDANCE VASCULAR  [ ] NEEDLE GUIDANCE THORACENTESIS  [ ] NEEDLE GUIDANCE PARACENTESIS  [ ] NEEDLE GUIDANCE PERICARDIOCENTESIS  [ ] OTHER    FINDINGS:    Cardiac: grossly normal systolic and diastolic function. No gross motion or valvular abnormalities.     Chest: A line predominant CORRINE. R side pleural effusion.     INTERPRETATION: Patient w grossly normal cardiac function. US Chest demonstrates R pleural effusion c/w previous CXR.    Attending note:  Agree with above. At bedside for procedure.   MERISSA Richards DO, FCCP

## 2020-02-11 NOTE — CHART NOTE - NSCHARTNOTEFT_GEN_A_CORE
Blood cultures came back positive in 6hr for GNR. Patient on broadspectrum coverage. Will start patient on marik protocol. Call pharmacy for assistance.     Christian Centeno PGY-3

## 2020-02-11 NOTE — CONSULT NOTE ADULT - SUBJECTIVE AND OBJECTIVE BOX
Patient is a 39y old  Female who presents with a chief complaint of Neutropenic sepsis (10 Feb 2020 21:20)      HPI:  39f with newly diagnosed metastatic leiomyosarcoma, recent admission at [facility] from 1/10-1/16/2020 for chemotherapy initiation. She had port placement and received chemotherapy (doxorubicin and Dexrezoxane) on 1/15/2020 and s/p C2 on 1/31/2020 at [facility]. Her MRI abdomen showed Leiomyosarcoma involving the inferior vena cava, right atrium, right hepatic vein, and right renal vein. Heterogeneous appearance of the liver with indeterminant T1 hyperintense lesion in segment IVb with possible enhancement, hx b/l DVT on lovenox BID, hx of recurrent right pleural effusion w/ PleurX 12/30/2019 presents with worsening dyspnea for 1 day. She endorses brown watery diarrhea since saturday after eating burgers. Denied melena and hematochezia. Today, while walking to the bathroom started getting short of breath. This was not improving and they decided to call EMS. Denied fever, sick contacts or recent travel.     In the ED, was found with neutropenic sepsis.  Received vancomycin, zosyn, and cefepime. MICU consulted for tachycardia and tachypnea. Patient was started on pressors in the ED. (10 Feb 2020 21:20)       ROS: as above     PAST MEDICAL & SURGICAL HISTORY:  Leiomyosarcoma  Uterine fibroid  Hyperlipidemia  Asthma  Diabetes  Hypertension  H/O chest tube placement      SOCIAL HISTORY:    FAMILY HISTORY:  FH: pancreatic cancer: cousin  FH: lung cancer: father  FH: type 2 diabetes: mother  FH: stroke: mother      MEDICATIONS  (STANDING):  budesonide  80 MICROgram(s)/formoterol 4.5 MICROgram(s) Inhaler 2 Puff(s) Inhalation two times a day  chlorhexidine 4% Liquid 1 Application(s) Topical <User Schedule>  cholecalciferol 1000 Unit(s) Oral daily  CRRT Treatment    <Continuous>  hydrocortisone sodium succinate Injectable 50 milliGRAM(s) IV Push every 6 hours  meropenem  IVPB 1000 milliGRAM(s) IV Intermittent every 12 hours  norepinephrine Infusion 0.25 MICROgram(s)/kG/Min (25.922 mL/Hr) IV Continuous <Continuous>  pantoprazole  Injectable 40 milliGRAM(s) IV Push daily  PrismaSATE Dialysate BGK 4 / 2.5 5000 milliLiter(s) (2000 mL/Hr) CRRT <Continuous>  PrismaSOL Filtration BGK 4 / 2.5 5000 milliLiter(s) (1200 mL/Hr) CRRT <Continuous>  PrismaSOL Filtration BGK 4 / 2.5 5000 milliLiter(s) (800 mL/Hr) CRRT <Continuous>  sodium bicarbonate  Infusion 0.136 mEq/kG/Hr (100 mL/Hr) IV Continuous <Continuous>  vasopressin Infusion 0.04 Unit(s)/Min (2.4 mL/Hr) IV Continuous <Continuous>    MEDICATIONS  (PRN):  aluminum hydroxide/magnesium hydroxide/simethicone Suspension 30 milliLiter(s) Oral every 6 hours PRN Dyspepsia      Allergies    No Known Drug Allergies  shellfish (Hives)    Intolerances        Vital Signs Last 24 Hrs  T(C): 35.9 (11 Feb 2020 08:00), Max: 38.7 (10 Feb 2020 15:45)  T(F): 96.7 (11 Feb 2020 08:00), Max: 101.6 (10 Feb 2020 15:45)  HR: 133 (11 Feb 2020 09:28) (131 - 170)  BP: 62/42 (11 Feb 2020 02:30) (49/25 - 136/100)  BP(mean): 51 (11 Feb 2020 02:30) (32 - 72)  RR: 31 (11 Feb 2020 09:28) (18 - 40)  SpO2: 99% (11 Feb 2020 09:28) (86% - 100%)    PHYSICAL EXAM  General: adult in NAD  HEENT: clear oropharynx, anicteric sclera, pink conjunctiva  Neck: supple  CV: normal S1/S2 with no murmur rubs or gallops  Lungs: positive air movement b/l ant lungs, clear to auscultation, no wheezes, no rales  Abdomen: soft non-tender non-distended, no hepatosplenomegaly  Ext: no clubbing cyanosis or edema  Skin: no rashes and no petechiae  Neuro: alert and oriented X 3, none focal    LABS:                          10.6   0.34  )-----------( 28       ( 11 Feb 2020 00:26 )             35.1         Mean Cell Volume : 70.2 fL  Mean Cell Hemoglobin : 21.2 pg  Mean Cell Hemoglobin Concentration : 30.2 %  Auto Neutrophil # : 0.10 K/uL  Auto Lymphocyte # : 0.23 K/uL  Auto Monocyte # : 0.00 K/uL  Auto Eosinophil # : 0.01 K/uL  Auto Basophil # : 0.00 K/uL  Auto Neutrophil % : 29.5 %  Auto Lymphocyte % : 67.6 %  Auto Monocyte % : 0.0 %  Auto Eosinophil % : 2.9 %  Auto Basophil % : 0.0 %      Serial CBC's  02-11 @ 00:26  Hct-35.1 / Hgb-10.6 / Plat-28 / RBC-5.00 / WBC-0.34  Serial CBC's  02-10 @ 22:15  Hct-32.6 / Hgb-10.0 / Plat-11 / RBC-4.71 / WBC-0.37  Serial CBC's  02-10 @ 16:30  Hct-34.6 / Hgb-11.0 / Plat-18 / RBC-5.14 / WBC-0.54      02-11    140  |  101  |  32<H>  ----------------------------<  190<H>  4.3   |  6<LL>  |  1.62<H>    Ca    7.1<L>      11 Feb 2020 02:52  Phos  5.9     02-11  Mg     2.0     02-11    TPro  3.8<L>  /  Alb  1.6<L>  /  TBili  1.9<H>  /  DBili  x   /  AST  150<H>  /  ALT  76<H>  /  AlkPhos  88  02-11      PT/INR - ( 11 Feb 2020 00:26 )   PT: 29.5 SEC;   INR: 2.51          PTT - ( 11 Feb 2020 00:26 )  PTT:61.7 SEC                RADIOLOGY & ADDITIONAL STUDIES: Patient is a 39y old  Female who presents with a chief complaint of Neutropenic sepsis (10 Feb 2020 21:20)      HPI:  39f with newly diagnosed metastatic leiomyosarcoma, recent admission at [facility] from 1/10-1/16/2020 for chemotherapy initiation. She had port placement and received chemotherapy (doxorubicin and Dexrezoxane) on 1/15/2020 and s/p C2 on 1/31/2020 at [facility]. Her MRI abdomen showed Leiomyosarcoma involving the inferior vena cava, right atrium, right hepatic vein, and right renal vein. Heterogeneous appearance of the liver with indeterminant T1 hyperintense lesion in segment IVb with possible enhancement, hx b/l DVT on lovenox BID, hx of recurrent right pleural effusion w/ PleurX 12/30/2019 presents with worsening dyspnea for 1 day. She endorses brown watery diarrhea since saturday after eating burgers. Denied melena and hematochezia. Today, while walking to the bathroom started getting short of breath. This was not improving and they decided to call EMS. Denied fever, sick contacts or recent travel.     In the ED, was found with neutropenic sepsis.  Received vancomycin, zosyn, and cefepime. MICU consulted for tachycardia and tachypnea. Patient was started on pressors in the ED. (10 Feb 2020 21:20)       ROS: as above     PAST MEDICAL & SURGICAL HISTORY:  Leiomyosarcoma  Uterine fibroid  Hyperlipidemia  Asthma  Diabetes  Hypertension  H/O chest tube placement      SOCIAL HISTORY:    FAMILY HISTORY:  FH: pancreatic cancer: cousin  FH: lung cancer: father  FH: type 2 diabetes: mother  FH: stroke: mother      MEDICATIONS  (STANDING):  budesonide  80 MICROgram(s)/formoterol 4.5 MICROgram(s) Inhaler 2 Puff(s) Inhalation two times a day  chlorhexidine 4% Liquid 1 Application(s) Topical <User Schedule>  cholecalciferol 1000 Unit(s) Oral daily  CRRT Treatment    <Continuous>  hydrocortisone sodium succinate Injectable 50 milliGRAM(s) IV Push every 6 hours  meropenem  IVPB 1000 milliGRAM(s) IV Intermittent every 12 hours  norepinephrine Infusion 0.25 MICROgram(s)/kG/Min (25.922 mL/Hr) IV Continuous <Continuous>  pantoprazole  Injectable 40 milliGRAM(s) IV Push daily  PrismaSATE Dialysate BGK 4 / 2.5 5000 milliLiter(s) (2000 mL/Hr) CRRT <Continuous>  PrismaSOL Filtration BGK 4 / 2.5 5000 milliLiter(s) (1200 mL/Hr) CRRT <Continuous>  PrismaSOL Filtration BGK 4 / 2.5 5000 milliLiter(s) (800 mL/Hr) CRRT <Continuous>  sodium bicarbonate  Infusion 0.136 mEq/kG/Hr (100 mL/Hr) IV Continuous <Continuous>  vasopressin Infusion 0.04 Unit(s)/Min (2.4 mL/Hr) IV Continuous <Continuous>    MEDICATIONS  (PRN):  aluminum hydroxide/magnesium hydroxide/simethicone Suspension 30 milliLiter(s) Oral every 6 hours PRN Dyspepsia      Allergies    No Known Drug Allergies  shellfish (Hives)    Intolerances        Vital Signs Last 24 Hrs  T(C): 35.9 (11 Feb 2020 08:00), Max: 38.7 (10 Feb 2020 15:45)  T(F): 96.7 (11 Feb 2020 08:00), Max: 101.6 (10 Feb 2020 15:45)  HR: 133 (11 Feb 2020 09:28) (131 - 170)  BP: 62/42 (11 Feb 2020 02:30) (49/25 - 136/100)  BP(mean): 51 (11 Feb 2020 02:30) (32 - 72)  RR: 31 (11 Feb 2020 09:28) (18 - 40)  SpO2: 99% (11 Feb 2020 09:28) (86% - 100%)    PHYSICAL EXAM  General: AMS 2.2 sedation, on bipap  HEENT: anicteric sclera, pink conjunctiva  Neck: supple  CV: normal S1/S2  Lungs: decreased breath sounds  Abdomen: soft  Ext: no clubbing cyanosis  Skin: no rashes   Neuro: lethargic s/p sedation    LABS:                          10.6   0.34  )-----------( 28       ( 11 Feb 2020 00:26 )             35.1         Mean Cell Volume : 70.2 fL  Mean Cell Hemoglobin : 21.2 pg  Mean Cell Hemoglobin Concentration : 30.2 %  Auto Neutrophil # : 0.10 K/uL  Auto Lymphocyte # : 0.23 K/uL  Auto Monocyte # : 0.00 K/uL  Auto Eosinophil # : 0.01 K/uL  Auto Basophil # : 0.00 K/uL  Auto Neutrophil % : 29.5 %  Auto Lymphocyte % : 67.6 %  Auto Monocyte % : 0.0 %  Auto Eosinophil % : 2.9 %  Auto Basophil % : 0.0 %      Serial CBC's  02-11 @ 00:26  Hct-35.1 / Hgb-10.6 / Plat-28 / RBC-5.00 / WBC-0.34  Serial CBC's  02-10 @ 22:15  Hct-32.6 / Hgb-10.0 / Plat-11 / RBC-4.71 / WBC-0.37  Serial CBC's  02-10 @ 16:30  Hct-34.6 / Hgb-11.0 / Plat-18 / RBC-5.14 / WBC-0.54      02-11    140  |  101  |  32<H>  ----------------------------<  190<H>  4.3   |  6<LL>  |  1.62<H>    Ca    7.1<L>      11 Feb 2020 02:52  Phos  5.9     02-11  Mg     2.0     02-11    TPro  3.8<L>  /  Alb  1.6<L>  /  TBili  1.9<H>  /  DBili  x   /  AST  150<H>  /  ALT  76<H>  /  AlkPhos  88  02-11      PT/INR - ( 11 Feb 2020 00:26 )   PT: 29.5 SEC;   INR: 2.51          PTT - ( 11 Feb 2020 00:26 )  PTT:61.7 SEC        RADIOLOGY & ADDITIONAL STUDIES:

## 2020-02-11 NOTE — CONSULT NOTE ADULT - ASSESSMENT
39 year old woman with PMHx HTN, DM, HLD, Uterine fibroids w. 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 febrile to 101.4, neutropenic, ANC 0 -100, cr 1.12 on presentation but worsened 1.62, (GFR 39-45), pleural fluid analysis appears transudative protein 1.3, cx pending, rVP negative.   CT with large mass centered at the intrahepatic IVC with tumor thrombus extending into the right renal vein and right atrium; probable bland thrombus within the bilateral common and external iliac veins; resultant Budd-Chiari syndrome and severe lower extremity edema. Blood cx with e.coli 39 year old woman with PMHx HTN, DM, HLD, Uterine fibroids w. 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 febrile to 101.4, neutropenic, ANC 0 -100, cr 1.12 on presentation but worsened 1.62, (GFR 39-45), pleural fluid analysis appears transudative protein 1.3, cx pending, rVP negative.   CT with large mass centered at the intrahepatic IVC with tumor thrombus extending into the right renal vein and right atrium; probable bland thrombus within the bilateral common and external iliac veins; resultant Budd-Chiari syndrome and severe lower extremity edema. Blood cx with e.coli       Septic shock from e.coli bacteremia liekly GI source   Neutropenia   Metastatic leiomyosarcoma   Chornic pleural effusion with pleurX  TEDDY   Acute liver injury   DVT       Suggest:    1. Septic shock   c/w pressors,  c/w meropenem 1q8 CVVHDF dose   repeat blood cx       2. Metastatic leiomyosarcoma   invading hepatic vein and IVC- likely source of bacteremia     3. Chronic pleural effusion with pleurX  Pleural fluid analysis reviewed- transudative  unlikely infected     4. TEDDY   On CVVHDF  Will dose abx accordingly     5. Neutropenia   from chemotherapy   Trend CBC with differential to monitor neutrophil counts           d/w team

## 2020-02-11 NOTE — CHART NOTE - NSCHARTNOTEFT_GEN_A_CORE
The patient remained severely acidotic with a rising lactate and no urine output. A decision to put the patient on CVVHD was made to treat both the TEDDY and possible metformin induced lactic acidosis. Platlets were given and a catheter was placed without complication. The patient remains critically ill requiring frequent bedside visits. All details of the case were reviewed with the team. Frequent blood gases and close follow up required. The patient is awake and agrees to all these decisions. Total CC time 35 min.  MERISSA Richards DO, HIRAMP

## 2020-02-11 NOTE — CONSULT NOTE ADULT - ASSESSMENT
39F PMHx metastatic leiomyosarcoma to intrahepatic IVC (dx 12/2019 on chemo since 1/10, last chemo1/31), Uterine fibroids s/p b/l uterine artery embolization on 8/2019, hx bilatral DVT (on lovenox BID), hx of recurrent right pleural effusion w/ PleurX 12/30/2019 admitted to MICU for septic/distributive shock requiring vasopressors, AHRF (requiring hiflow) and severe metabolic acidosis (started on bicarb drip).    Nephrology consulted for severe metabolic acidosis.  On admission had lactatte 7.4, vbg pH 7.15/CO2 20/HCO3 9, sCr 1.12 on admission 2/10/2020 (baseline 0.7 in Jan 2020), uptrend to 1.6 (2/11/2020).  Patient consented for HD/CRRT for acidosis.

## 2020-02-11 NOTE — PROCEDURE NOTE - ADDITIONAL PROCEDURE DETAILS
powerglide midline
Sanamley catheter placed for CRRT/HD. Placed under ultrasound guidance with sterile procedure. Care as per MICU.

## 2020-02-12 DIAGNOSIS — R52 PAIN, UNSPECIFIED: ICD-10-CM

## 2020-02-12 DIAGNOSIS — A41.9 SEPSIS, UNSPECIFIED ORGANISM: ICD-10-CM

## 2020-02-12 DIAGNOSIS — Z71.89 OTHER SPECIFIED COUNSELING: ICD-10-CM

## 2020-02-12 LAB
ALBUMIN SERPL ELPH-MCNC: 1.7 G/DL — LOW (ref 3.3–5)
ALBUMIN SERPL ELPH-MCNC: 1.8 G/DL — LOW (ref 3.3–5)
ALBUMIN SERPL ELPH-MCNC: 1.9 G/DL — LOW (ref 3.3–5)
ALBUMIN SERPL ELPH-MCNC: 2.1 G/DL — LOW (ref 3.3–5)
ALBUMIN SERPL ELPH-MCNC: 2.2 G/DL — LOW (ref 3.3–5)
ALP SERPL-CCNC: 65 U/L — SIGNIFICANT CHANGE UP (ref 40–120)
ALP SERPL-CCNC: 76 U/L — SIGNIFICANT CHANGE UP (ref 40–120)
ALP SERPL-CCNC: 80 U/L — SIGNIFICANT CHANGE UP (ref 40–120)
ALP SERPL-CCNC: 83 U/L — SIGNIFICANT CHANGE UP (ref 40–120)
ALP SERPL-CCNC: 85 U/L — SIGNIFICANT CHANGE UP (ref 40–120)
ALT FLD-CCNC: 582 U/L — HIGH (ref 4–33)
ALT FLD-CCNC: 692 U/L — HIGH (ref 4–33)
ALT FLD-CCNC: 696 U/L — HIGH (ref 4–33)
ALT FLD-CCNC: 763 U/L — HIGH (ref 4–33)
ALT FLD-CCNC: 820 U/L — HIGH (ref 4–33)
ANION GAP SERPL CALC-SCNC: 22 MMO/L — HIGH (ref 7–14)
ANION GAP SERPL CALC-SCNC: 23 MMO/L — HIGH (ref 7–14)
ANION GAP SERPL CALC-SCNC: 24 MMO/L — HIGH (ref 7–14)
ANION GAP SERPL CALC-SCNC: 24 MMO/L — HIGH (ref 7–14)
ANION GAP SERPL CALC-SCNC: 25 MMO/L — HIGH (ref 7–14)
APTT BLD: 52.8 SEC — HIGH (ref 27.5–36.3)
APTT BLD: 57.3 SEC — HIGH (ref 27.5–36.3)
AST SERPL-CCNC: 1108 U/L — HIGH (ref 4–32)
AST SERPL-CCNC: 1331 U/L — HIGH (ref 4–32)
AST SERPL-CCNC: 1417 U/L — HIGH (ref 4–32)
AST SERPL-CCNC: 1546 U/L — HIGH (ref 4–32)
AST SERPL-CCNC: 1673 U/L — HIGH (ref 4–32)
BASE EXCESS BLDA CALC-SCNC: -13.2 MMOL/L — SIGNIFICANT CHANGE UP
BASE EXCESS BLDA CALC-SCNC: -13.7 MMOL/L — SIGNIFICANT CHANGE UP
BASE EXCESS BLDA CALC-SCNC: -14.4 MMOL/L — SIGNIFICANT CHANGE UP
BASE EXCESS BLDA CALC-SCNC: -15.9 MMOL/L — SIGNIFICANT CHANGE UP
BASE EXCESS BLDV CALC-SCNC: -13.5 MMOL/L — SIGNIFICANT CHANGE UP
BASE EXCESS BLDV CALC-SCNC: -14 MMOL/L — SIGNIFICANT CHANGE UP
BASOPHILS # BLD AUTO: 0 K/UL — SIGNIFICANT CHANGE UP (ref 0–0.2)
BASOPHILS NFR BLD AUTO: 0 % — SIGNIFICANT CHANGE UP (ref 0–2)
BILIRUB SERPL-MCNC: 2.9 MG/DL — HIGH (ref 0.2–1.2)
BILIRUB SERPL-MCNC: 3.6 MG/DL — HIGH (ref 0.2–1.2)
BILIRUB SERPL-MCNC: 3.8 MG/DL — HIGH (ref 0.2–1.2)
BILIRUB SERPL-MCNC: 3.9 MG/DL — HIGH (ref 0.2–1.2)
BILIRUB SERPL-MCNC: 4 MG/DL — HIGH (ref 0.2–1.2)
BLOOD GAS VENOUS - CREATININE: 0.92 MG/DL — SIGNIFICANT CHANGE UP (ref 0.5–1.3)
BLOOD GAS VENOUS - CREATININE: 1.17 MG/DL — SIGNIFICANT CHANGE UP (ref 0.5–1.3)
BUN SERPL-MCNC: 10 MG/DL — SIGNIFICANT CHANGE UP (ref 7–23)
BUN SERPL-MCNC: 10 MG/DL — SIGNIFICANT CHANGE UP (ref 7–23)
BUN SERPL-MCNC: 11 MG/DL — SIGNIFICANT CHANGE UP (ref 7–23)
BUN SERPL-MCNC: 15 MG/DL — SIGNIFICANT CHANGE UP (ref 7–23)
BUN SERPL-MCNC: 7 MG/DL — SIGNIFICANT CHANGE UP (ref 7–23)
CALCIUM SERPL-MCNC: 6 MG/DL — CRITICAL LOW (ref 8.4–10.5)
CALCIUM SERPL-MCNC: 6.1 MG/DL — CRITICAL LOW (ref 8.4–10.5)
CALCIUM SERPL-MCNC: 6.2 MG/DL — CRITICAL LOW (ref 8.4–10.5)
CALCIUM SERPL-MCNC: 6.5 MG/DL — CRITICAL LOW (ref 8.4–10.5)
CALCIUM SERPL-MCNC: 6.6 MG/DL — LOW (ref 8.4–10.5)
CHLORIDE BLDV-SCNC: 105 MMOL/L — SIGNIFICANT CHANGE UP (ref 96–108)
CHLORIDE BLDV-SCNC: 108 MMOL/L — SIGNIFICANT CHANGE UP (ref 96–108)
CHLORIDE SERPL-SCNC: 100 MMOL/L — SIGNIFICANT CHANGE UP (ref 98–107)
CHLORIDE SERPL-SCNC: 100 MMOL/L — SIGNIFICANT CHANGE UP (ref 98–107)
CHLORIDE SERPL-SCNC: 104 MMOL/L — SIGNIFICANT CHANGE UP (ref 98–107)
CHLORIDE SERPL-SCNC: 94 MMOL/L — LOW (ref 98–107)
CHLORIDE SERPL-SCNC: 98 MMOL/L — SIGNIFICANT CHANGE UP (ref 98–107)
CO2 SERPL-SCNC: 10 MMOL/L — CRITICAL LOW (ref 22–31)
CO2 SERPL-SCNC: 11 MMOL/L — LOW (ref 22–31)
CO2 SERPL-SCNC: 12 MMOL/L — LOW (ref 22–31)
CREAT SERPL-MCNC: 0.67 MG/DL — SIGNIFICANT CHANGE UP (ref 0.5–1.3)
CREAT SERPL-MCNC: 0.86 MG/DL — SIGNIFICANT CHANGE UP (ref 0.5–1.3)
CREAT SERPL-MCNC: 0.89 MG/DL — SIGNIFICANT CHANGE UP (ref 0.5–1.3)
CREAT SERPL-MCNC: 0.92 MG/DL — SIGNIFICANT CHANGE UP (ref 0.5–1.3)
CREAT SERPL-MCNC: 1.05 MG/DL — SIGNIFICANT CHANGE UP (ref 0.5–1.3)
D DIMER BLD IA.RAPID-MCNC: 1014 NG/ML — SIGNIFICANT CHANGE UP
D DIMER BLD IA.RAPID-MCNC: 1029 NG/ML — SIGNIFICANT CHANGE UP
D DIMER BLD IA.RAPID-MCNC: 1171 NG/ML — SIGNIFICANT CHANGE UP
EOSINOPHIL # BLD AUTO: 0 K/UL — SIGNIFICANT CHANGE UP (ref 0–0.5)
EOSINOPHIL # BLD AUTO: 0.01 K/UL — SIGNIFICANT CHANGE UP (ref 0–0.5)
EOSINOPHIL NFR BLD AUTO: 0 % — SIGNIFICANT CHANGE UP (ref 0–6)
EOSINOPHIL NFR BLD AUTO: 4 % — SIGNIFICANT CHANGE UP (ref 0–6)
FIBRINOGEN PPP-MCNC: 439 MG/DL — SIGNIFICANT CHANGE UP (ref 350–510)
GAS PNL BLDV: 129 MMOL/L — LOW (ref 136–146)
GAS PNL BLDV: 134 MMOL/L — LOW (ref 136–146)
GLUCOSE BLDA-MCNC: 120 MG/DL — HIGH (ref 70–99)
GLUCOSE BLDA-MCNC: 133 MG/DL — HIGH (ref 70–99)
GLUCOSE BLDA-MCNC: 176 MG/DL — HIGH (ref 70–99)
GLUCOSE BLDC GLUCOMTR-MCNC: 113 MG/DL — HIGH (ref 70–99)
GLUCOSE BLDC GLUCOMTR-MCNC: 116 MG/DL — HIGH (ref 70–99)
GLUCOSE BLDC GLUCOMTR-MCNC: 118 MG/DL — HIGH (ref 70–99)
GLUCOSE BLDC GLUCOMTR-MCNC: 120 MG/DL — HIGH (ref 70–99)
GLUCOSE BLDC GLUCOMTR-MCNC: 126 MG/DL — HIGH (ref 70–99)
GLUCOSE BLDC GLUCOMTR-MCNC: 128 MG/DL — HIGH (ref 70–99)
GLUCOSE BLDC GLUCOMTR-MCNC: 137 MG/DL — HIGH (ref 70–99)
GLUCOSE BLDC GLUCOMTR-MCNC: 181 MG/DL — HIGH (ref 70–99)
GLUCOSE BLDC GLUCOMTR-MCNC: 198 MG/DL — HIGH (ref 70–99)
GLUCOSE BLDC GLUCOMTR-MCNC: 44 MG/DL — CRITICAL LOW (ref 70–99)
GLUCOSE BLDC GLUCOMTR-MCNC: 47 MG/DL — LOW (ref 70–99)
GLUCOSE BLDC GLUCOMTR-MCNC: 63 MG/DL — LOW (ref 70–99)
GLUCOSE BLDC GLUCOMTR-MCNC: 99 MG/DL — SIGNIFICANT CHANGE UP (ref 70–99)
GLUCOSE BLDC GLUCOMTR-MCNC: 99 MG/DL — SIGNIFICANT CHANGE UP (ref 70–99)
GLUCOSE BLDC GLUCOMTR-MCNC: <25 MG/DL — CRITICAL LOW (ref 70–99)
GLUCOSE BLDV-MCNC: 130 MG/DL — HIGH (ref 70–99)
GLUCOSE BLDV-MCNC: 71 MG/DL — SIGNIFICANT CHANGE UP (ref 70–99)
GLUCOSE SERPL-MCNC: 1250 MG/DL — CRITICAL HIGH (ref 70–99)
GLUCOSE SERPL-MCNC: 132 MG/DL — HIGH (ref 70–99)
GLUCOSE SERPL-MCNC: 137 MG/DL — HIGH (ref 70–99)
GLUCOSE SERPL-MCNC: 196 MG/DL — HIGH (ref 70–99)
GLUCOSE SERPL-MCNC: 73 MG/DL — SIGNIFICANT CHANGE UP (ref 70–99)
HBA1C BLD-MCNC: 7.7 % — HIGH (ref 4–5.6)
HCO3 BLDA-SCNC: 12 MMOL/L — LOW (ref 22–26)
HCO3 BLDA-SCNC: 13 MMOL/L — LOW (ref 22–26)
HCO3 BLDA-SCNC: 14 MMOL/L — LOW (ref 22–26)
HCO3 BLDA-SCNC: 14 MMOL/L — LOW (ref 22–26)
HCO3 BLDV-SCNC: 13 MMOL/L — LOW (ref 20–27)
HCO3 BLDV-SCNC: 14 MMOL/L — LOW (ref 20–27)
HCT VFR BLD CALC: 25.8 % — LOW (ref 34.5–45)
HCT VFR BLD CALC: 28.7 % — LOW (ref 34.5–45)
HCT VFR BLD CALC: 30.6 % — LOW (ref 34.5–45)
HCT VFR BLD CALC: 31.4 % — LOW (ref 34.5–45)
HCT VFR BLDA CALC: 25 % — LOW (ref 34.5–46.5)
HCT VFR BLDA CALC: 26.9 % — LOW (ref 34.5–46.5)
HCT VFR BLDA CALC: 28.3 % — LOW (ref 34.5–46.5)
HCT VFR BLDV CALC: 28.2 % — LOW (ref 34.5–45)
HCT VFR BLDV CALC: 30.3 % — LOW (ref 34.5–45)
HGB BLD-MCNC: 7.7 G/DL — LOW (ref 11.5–15.5)
HGB BLD-MCNC: 8.5 G/DL — LOW (ref 11.5–15.5)
HGB BLD-MCNC: 9 G/DL — LOW (ref 11.5–15.5)
HGB BLD-MCNC: 9.1 G/DL — LOW (ref 11.5–15.5)
HGB BLDA-MCNC: 8 G/DL — LOW (ref 11.5–15.5)
HGB BLDA-MCNC: 8.7 G/DL — LOW (ref 11.5–15.5)
HGB BLDA-MCNC: 9.1 G/DL — LOW (ref 11.5–15.5)
HGB BLDV-MCNC: 9.1 G/DL — LOW (ref 11.5–15.5)
HGB BLDV-MCNC: 9.8 G/DL — LOW (ref 11.5–15.5)
IMM GRANULOCYTES NFR BLD AUTO: 0 % — SIGNIFICANT CHANGE UP (ref 0–1.5)
IMM GRANULOCYTES NFR BLD AUTO: 3.7 % — HIGH (ref 0–1.5)
IMM GRANULOCYTES NFR BLD AUTO: 4 % — HIGH (ref 0–1.5)
IMM GRANULOCYTES NFR BLD AUTO: 4.8 % — HIGH (ref 0–1.5)
INR BLD: 2.88 — HIGH (ref 0.88–1.17)
INR BLD: 3.18 — HIGH (ref 0.88–1.17)
INR BLD: 3.3 — HIGH (ref 0.88–1.17)
INR BLD: 4.3 — HIGH (ref 0.88–1.17)
LACTATE BLDV-MCNC: 14.9 MMOL/L — CRITICAL HIGH (ref 0.5–2)
LACTATE BLDV-MCNC: 17 MMOL/L — CRITICAL HIGH (ref 0.5–2)
LYMPHOCYTES # BLD AUTO: 0.15 K/UL — LOW (ref 1–3.3)
LYMPHOCYTES # BLD AUTO: 0.18 K/UL — LOW (ref 1–3.3)
LYMPHOCYTES # BLD AUTO: 0.2 K/UL — LOW (ref 1–3.3)
LYMPHOCYTES # BLD AUTO: 0.23 K/UL — LOW (ref 1–3.3)
LYMPHOCYTES # BLD AUTO: 80 % — HIGH (ref 13–44)
LYMPHOCYTES # BLD AUTO: 83.3 % — HIGH (ref 13–44)
LYMPHOCYTES # BLD AUTO: 85.2 % — HIGH (ref 13–44)
LYMPHOCYTES # BLD AUTO: 85.7 % — HIGH (ref 13–44)
MAGNESIUM SERPL-MCNC: 2.2 MG/DL — SIGNIFICANT CHANGE UP (ref 1.6–2.6)
MAGNESIUM SERPL-MCNC: 2.2 MG/DL — SIGNIFICANT CHANGE UP (ref 1.6–2.6)
MAGNESIUM SERPL-MCNC: 2.3 MG/DL — SIGNIFICANT CHANGE UP (ref 1.6–2.6)
MANUAL SMEAR VERIFICATION: SIGNIFICANT CHANGE UP
MANUAL SMEAR VERIFICATION: SIGNIFICANT CHANGE UP
MCHC RBC-ENTMCNC: 20.6 PG — LOW (ref 27–34)
MCHC RBC-ENTMCNC: 20.9 PG — LOW (ref 27–34)
MCHC RBC-ENTMCNC: 21 PG — LOW (ref 27–34)
MCHC RBC-ENTMCNC: 21.2 PG — LOW (ref 27–34)
MCHC RBC-ENTMCNC: 29 % — LOW (ref 32–36)
MCHC RBC-ENTMCNC: 29.4 % — LOW (ref 32–36)
MCHC RBC-ENTMCNC: 29.6 % — LOW (ref 32–36)
MCHC RBC-ENTMCNC: 29.8 % — LOW (ref 32–36)
MCV RBC AUTO: 70.7 FL — LOW (ref 80–100)
MCV RBC AUTO: 71 FL — LOW (ref 80–100)
MCV RBC AUTO: 71.1 FL — LOW (ref 80–100)
MCV RBC AUTO: 71.5 FL — LOW (ref 80–100)
MONOCYTES # BLD AUTO: 0.01 K/UL — SIGNIFICANT CHANGE UP (ref 0–0.9)
MONOCYTES NFR BLD AUTO: 3.7 % — SIGNIFICANT CHANGE UP (ref 2–14)
MONOCYTES NFR BLD AUTO: 4 % — SIGNIFICANT CHANGE UP (ref 2–14)
MONOCYTES NFR BLD AUTO: 4.8 % — SIGNIFICANT CHANGE UP (ref 2–14)
MONOCYTES NFR BLD AUTO: 5.6 % — SIGNIFICANT CHANGE UP (ref 2–14)
NEUTROPHILS # BLD AUTO: 0.01 K/UL — LOW (ref 1.8–7.4)
NEUTROPHILS # BLD AUTO: 0.02 K/UL — LOW (ref 1.8–7.4)
NEUTROPHILS NFR BLD AUTO: 11.1 % — LOW (ref 43–77)
NEUTROPHILS NFR BLD AUTO: 4.7 % — LOW (ref 43–77)
NEUTROPHILS NFR BLD AUTO: 7.4 % — LOW (ref 43–77)
NEUTROPHILS NFR BLD AUTO: 8 % — LOW (ref 43–77)
NRBC # FLD: 0 K/UL — SIGNIFICANT CHANGE UP (ref 0–0)
ORGANISM # SPEC MICROSCOPIC CNT: SIGNIFICANT CHANGE UP
PCO2 BLDA: 31 MMHG — LOW (ref 32–48)
PCO2 BLDA: 37 MMHG — SIGNIFICANT CHANGE UP (ref 32–48)
PCO2 BLDA: 37 MMHG — SIGNIFICANT CHANGE UP (ref 32–48)
PCO2 BLDA: 40 MMHG — SIGNIFICANT CHANGE UP (ref 32–48)
PCO2 BLDV: 36 MMHG — LOW (ref 41–51)
PCO2 BLDV: 37 MMHG — LOW (ref 41–51)
PH BLDA: 7.14 PH — CRITICAL LOW (ref 7.35–7.45)
PH BLDA: 7.17 PH — CRITICAL LOW (ref 7.35–7.45)
PH BLDA: 7.17 PH — CRITICAL LOW (ref 7.35–7.45)
PH BLDA: 7.18 PH — CRITICAL LOW (ref 7.35–7.45)
PH BLDV: 7.17 PH — CRITICAL LOW (ref 7.32–7.43)
PH BLDV: 7.18 PH — CRITICAL LOW (ref 7.32–7.43)
PHOSPHATE SERPL-MCNC: 3.1 MG/DL — SIGNIFICANT CHANGE UP (ref 2.5–4.5)
PHOSPHATE SERPL-MCNC: 3.3 MG/DL — SIGNIFICANT CHANGE UP (ref 2.5–4.5)
PHOSPHATE SERPL-MCNC: 3.3 MG/DL — SIGNIFICANT CHANGE UP (ref 2.5–4.5)
PLATELET # BLD AUTO: 12 K/UL — CRITICAL LOW (ref 150–400)
PLATELET # BLD AUTO: 26 K/UL — LOW (ref 150–400)
PLATELET # BLD AUTO: 29 K/UL — LOW (ref 150–400)
PLATELET # BLD AUTO: 9 K/UL — CRITICAL LOW (ref 150–400)
PMV BLD: SIGNIFICANT CHANGE UP FL (ref 7–13)
PO2 BLDA: 113 MMHG — HIGH (ref 83–108)
PO2 BLDA: 188 MMHG — HIGH (ref 83–108)
PO2 BLDA: 199 MMHG — HIGH (ref 83–108)
PO2 BLDA: 279 MMHG — HIGH (ref 83–108)
PO2 BLDV: 189 MMHG — HIGH (ref 35–40)
PO2 BLDV: 190 MMHG — HIGH (ref 35–40)
POTASSIUM BLDA-SCNC: 4.5 MMOL/L — SIGNIFICANT CHANGE UP (ref 3.4–4.5)
POTASSIUM BLDA-SCNC: 4.7 MMOL/L — HIGH (ref 3.4–4.5)
POTASSIUM BLDA-SCNC: 4.7 MMOL/L — HIGH (ref 3.4–4.5)
POTASSIUM BLDV-SCNC: 4.4 MMOL/L — SIGNIFICANT CHANGE UP (ref 3.4–4.5)
POTASSIUM BLDV-SCNC: 4.6 MMOL/L — HIGH (ref 3.4–4.5)
POTASSIUM SERPL-MCNC: 4.5 MMOL/L — SIGNIFICANT CHANGE UP (ref 3.5–5.3)
POTASSIUM SERPL-MCNC: 4.7 MMOL/L — SIGNIFICANT CHANGE UP (ref 3.5–5.3)
POTASSIUM SERPL-MCNC: 4.8 MMOL/L — SIGNIFICANT CHANGE UP (ref 3.5–5.3)
POTASSIUM SERPL-MCNC: 4.9 MMOL/L — SIGNIFICANT CHANGE UP (ref 3.5–5.3)
POTASSIUM SERPL-MCNC: 5.3 MMOL/L — SIGNIFICANT CHANGE UP (ref 3.5–5.3)
POTASSIUM SERPL-SCNC: 4.5 MMOL/L — SIGNIFICANT CHANGE UP (ref 3.5–5.3)
POTASSIUM SERPL-SCNC: 4.7 MMOL/L — SIGNIFICANT CHANGE UP (ref 3.5–5.3)
POTASSIUM SERPL-SCNC: 4.8 MMOL/L — SIGNIFICANT CHANGE UP (ref 3.5–5.3)
POTASSIUM SERPL-SCNC: 4.9 MMOL/L — SIGNIFICANT CHANGE UP (ref 3.5–5.3)
POTASSIUM SERPL-SCNC: 5.3 MMOL/L — SIGNIFICANT CHANGE UP (ref 3.5–5.3)
PROT SERPL-MCNC: 3.6 G/DL — LOW (ref 6–8.3)
PROT SERPL-MCNC: 3.8 G/DL — LOW (ref 6–8.3)
PROT SERPL-MCNC: 3.9 G/DL — LOW (ref 6–8.3)
PROT SERPL-MCNC: 4.2 G/DL — LOW (ref 6–8.3)
PROT SERPL-MCNC: 4.4 G/DL — LOW (ref 6–8.3)
PROTHROM AB SERPL-ACNC: 33 SEC — HIGH (ref 9.8–13.1)
PROTHROM AB SERPL-ACNC: 37.6 SEC — HIGH (ref 9.8–13.1)
PROTHROM AB SERPL-ACNC: 39 SEC — HIGH (ref 9.8–13.1)
PROTHROM AB SERPL-ACNC: 51.3 SEC — HIGH (ref 9.8–13.1)
RBC # BLD: 3.63 M/UL — LOW (ref 3.8–5.2)
RBC # BLD: 4.06 M/UL — SIGNIFICANT CHANGE UP (ref 3.8–5.2)
RBC # BLD: 4.28 M/UL — SIGNIFICANT CHANGE UP (ref 3.8–5.2)
RBC # BLD: 4.42 M/UL — SIGNIFICANT CHANGE UP (ref 3.8–5.2)
RBC # FLD: 25.7 % — HIGH (ref 10.3–14.5)
RBC # FLD: 25.8 % — HIGH (ref 10.3–14.5)
RBC # FLD: 25.9 % — HIGH (ref 10.3–14.5)
RBC # FLD: 26 % — HIGH (ref 10.3–14.5)
SAO2 % BLDA: 97.2 % — SIGNIFICANT CHANGE UP (ref 95–99)
SAO2 % BLDA: 98.8 % — SIGNIFICANT CHANGE UP (ref 95–99)
SAO2 % BLDA: 98.9 % — SIGNIFICANT CHANGE UP (ref 95–99)
SAO2 % BLDA: 99.7 % — HIGH (ref 95–99)
SAO2 % BLDV: 99.1 % — HIGH (ref 60–85)
SAO2 % BLDV: 99.3 % — HIGH (ref 60–85)
SODIUM BLDA-SCNC: 129 MMOL/L — LOW (ref 136–146)
SODIUM BLDA-SCNC: 131 MMOL/L — LOW (ref 136–146)
SODIUM BLDA-SCNC: 132 MMOL/L — LOW (ref 136–146)
SODIUM SERPL-SCNC: 127 MMOL/L — LOW (ref 135–145)
SODIUM SERPL-SCNC: 134 MMOL/L — LOW (ref 135–145)
SODIUM SERPL-SCNC: 135 MMOL/L — SIGNIFICANT CHANGE UP (ref 135–145)
SODIUM SERPL-SCNC: 136 MMOL/L — SIGNIFICANT CHANGE UP (ref 135–145)
SODIUM SERPL-SCNC: 139 MMOL/L — SIGNIFICANT CHANGE UP (ref 135–145)
WBC # BLD: 0.18 K/UL — CRITICAL LOW (ref 3.8–10.5)
WBC # BLD: 0.21 K/UL — CRITICAL LOW (ref 3.8–10.5)
WBC # BLD: 0.25 K/UL — CRITICAL LOW (ref 3.8–10.5)
WBC # BLD: 0.27 K/UL — CRITICAL LOW (ref 3.8–10.5)
WBC # FLD AUTO: 0.18 K/UL — CRITICAL LOW (ref 3.8–10.5)
WBC # FLD AUTO: 0.21 K/UL — CRITICAL LOW (ref 3.8–10.5)
WBC # FLD AUTO: 0.25 K/UL — CRITICAL LOW (ref 3.8–10.5)
WBC # FLD AUTO: 0.27 K/UL — CRITICAL LOW (ref 3.8–10.5)

## 2020-02-12 PROCEDURE — 99232 SBSQ HOSP IP/OBS MODERATE 35: CPT

## 2020-02-12 PROCEDURE — 99291 CRITICAL CARE FIRST HOUR: CPT | Mod: 25

## 2020-02-12 PROCEDURE — 99233 SBSQ HOSP IP/OBS HIGH 50: CPT

## 2020-02-12 PROCEDURE — 31500 INSERT EMERGENCY AIRWAY: CPT | Mod: GC

## 2020-02-12 PROCEDURE — 71045 X-RAY EXAM CHEST 1 VIEW: CPT | Mod: 26

## 2020-02-12 PROCEDURE — 99223 1ST HOSP IP/OBS HIGH 75: CPT

## 2020-02-12 PROCEDURE — 99233 SBSQ HOSP IP/OBS HIGH 50: CPT | Mod: GC

## 2020-02-12 RX ORDER — SODIUM CHLORIDE 9 MG/ML
1000 INJECTION, SOLUTION INTRAVENOUS
Refills: 0 | Status: DISCONTINUED | OUTPATIENT
Start: 2020-02-12 | End: 2020-02-13

## 2020-02-12 RX ORDER — CHLORHEXIDINE GLUCONATE 213 G/1000ML
15 SOLUTION TOPICAL EVERY 12 HOURS
Refills: 0 | Status: DISCONTINUED | OUTPATIENT
Start: 2020-02-12 | End: 2020-02-13

## 2020-02-12 RX ORDER — PROPOFOL 10 MG/ML
3 INJECTION, EMULSION INTRAVENOUS ONCE
Refills: 0 | Status: COMPLETED | OUTPATIENT
Start: 2020-02-12 | End: 2020-02-12

## 2020-02-12 RX ORDER — DEXTROSE 50 % IN WATER 50 %
50 SYRINGE (ML) INTRAVENOUS ONCE
Refills: 0 | Status: COMPLETED | OUTPATIENT
Start: 2020-02-12 | End: 2020-02-12

## 2020-02-12 RX ORDER — MIDAZOLAM HYDROCHLORIDE 1 MG/ML
4 INJECTION, SOLUTION INTRAMUSCULAR; INTRAVENOUS ONCE
Refills: 0 | Status: DISCONTINUED | OUTPATIENT
Start: 2020-02-12 | End: 2020-02-12

## 2020-02-12 RX ORDER — CALCIUM GLUCONATE 100 MG/ML
1 VIAL (ML) INTRAVENOUS ONCE
Refills: 0 | Status: COMPLETED | OUTPATIENT
Start: 2020-02-12 | End: 2020-02-12

## 2020-02-12 RX ORDER — HYDROMORPHONE HYDROCHLORIDE 2 MG/ML
1 INJECTION INTRAMUSCULAR; INTRAVENOUS; SUBCUTANEOUS ONCE
Refills: 0 | Status: DISCONTINUED | OUTPATIENT
Start: 2020-02-12 | End: 2020-02-12

## 2020-02-12 RX ORDER — SODIUM CHLORIDE 9 MG/ML
1000 INJECTION, SOLUTION INTRAVENOUS
Refills: 0 | Status: DISCONTINUED | OUTPATIENT
Start: 2020-02-12 | End: 2020-02-12

## 2020-02-12 RX ORDER — HYDROMORPHONE HYDROCHLORIDE 2 MG/ML
1 INJECTION INTRAMUSCULAR; INTRAVENOUS; SUBCUTANEOUS
Refills: 0 | Status: DISCONTINUED | OUTPATIENT
Start: 2020-02-12 | End: 2020-02-13

## 2020-02-12 RX ORDER — DEXTROSE 50 % IN WATER 50 %
150 SYRINGE (ML) INTRAVENOUS ONCE
Refills: 0 | Status: COMPLETED | OUTPATIENT
Start: 2020-02-12 | End: 2020-02-12

## 2020-02-12 RX ORDER — PROPOFOL 10 MG/ML
20 INJECTION, EMULSION INTRAVENOUS
Qty: 1000 | Refills: 0 | Status: DISCONTINUED | OUTPATIENT
Start: 2020-02-12 | End: 2020-02-13

## 2020-02-12 RX ORDER — DEXTROSE 50 % IN WATER 50 %
25 SYRINGE (ML) INTRAVENOUS ONCE
Refills: 0 | Status: DISCONTINUED | OUTPATIENT
Start: 2020-02-12 | End: 2020-02-12

## 2020-02-12 RX ADMIN — PROPOFOL 3 MILLIGRAM(S): 10 INJECTION, EMULSION INTRAVENOUS at 15:43

## 2020-02-12 RX ADMIN — HYDROMORPHONE HYDROCHLORIDE 1 MILLIGRAM(S): 2 INJECTION INTRAMUSCULAR; INTRAVENOUS; SUBCUTANEOUS at 11:15

## 2020-02-12 RX ADMIN — Medication 50 MILLIGRAM(S): at 05:18

## 2020-02-12 RX ADMIN — Medication 50 MILLILITER(S): at 08:10

## 2020-02-12 RX ADMIN — PANTOPRAZOLE SODIUM 40 MILLIGRAM(S): 20 TABLET, DELAYED RELEASE ORAL at 11:15

## 2020-02-12 RX ADMIN — Medication 50 MILLIGRAM(S): at 11:15

## 2020-02-12 RX ADMIN — MIDAZOLAM HYDROCHLORIDE 4 MILLIGRAM(S): 1 INJECTION, SOLUTION INTRAMUSCULAR; INTRAVENOUS at 15:42

## 2020-02-12 RX ADMIN — VASOPRESSIN 2.4 UNIT(S)/MIN: 20 INJECTION INTRAVENOUS at 19:45

## 2020-02-12 RX ADMIN — VASOPRESSIN 2.4 UNIT(S)/MIN: 20 INJECTION INTRAVENOUS at 07:46

## 2020-02-12 RX ADMIN — SODIUM CHLORIDE 75 MILLILITER(S): 9 INJECTION, SOLUTION INTRAVENOUS at 11:00

## 2020-02-12 RX ADMIN — Medication 25 MILLILITER(S): at 00:20

## 2020-02-12 RX ADMIN — Medication 150 MILLILITER(S): at 12:11

## 2020-02-12 RX ADMIN — CHLORHEXIDINE GLUCONATE 1 APPLICATION(S): 213 SOLUTION TOPICAL at 07:47

## 2020-02-12 RX ADMIN — MEROPENEM 100 MILLIGRAM(S): 1 INJECTION INTRAVENOUS at 06:02

## 2020-02-12 RX ADMIN — MEROPENEM 100 MILLIGRAM(S): 1 INJECTION INTRAVENOUS at 14:19

## 2020-02-12 RX ADMIN — Medication 50 MILLILITER(S): at 06:05

## 2020-02-12 RX ADMIN — Medication 100 MEQ/KG/HR: at 07:46

## 2020-02-12 RX ADMIN — Medication 103.69 MICROGRAM(S)/KG/MIN: at 19:44

## 2020-02-12 RX ADMIN — Medication 100 GRAM(S): at 05:18

## 2020-02-12 RX ADMIN — MEROPENEM 100 MILLIGRAM(S): 1 INJECTION INTRAVENOUS at 22:41

## 2020-02-12 RX ADMIN — SODIUM CHLORIDE 75 MILLILITER(S): 9 INJECTION, SOLUTION INTRAVENOUS at 07:50

## 2020-02-12 RX ADMIN — Medication 480 MICROGRAM(S): at 12:09

## 2020-02-12 RX ADMIN — Medication 100 MEQ/KG/HR: at 19:45

## 2020-02-12 RX ADMIN — SODIUM CHLORIDE 75 MILLILITER(S): 9 INJECTION, SOLUTION INTRAVENOUS at 19:44

## 2020-02-12 RX ADMIN — HYDROMORPHONE HYDROCHLORIDE 1 MILLIGRAM(S): 2 INJECTION INTRAMUSCULAR; INTRAVENOUS; SUBCUTANEOUS at 11:00

## 2020-02-12 RX ADMIN — Medication 50 MILLIGRAM(S): at 00:20

## 2020-02-12 RX ADMIN — Medication 50 MILLIGRAM(S): at 17:51

## 2020-02-12 RX ADMIN — Medication 103.69 MICROGRAM(S)/KG/MIN: at 07:46

## 2020-02-12 RX ADMIN — CHLORHEXIDINE GLUCONATE 15 MILLILITER(S): 213 SOLUTION TOPICAL at 17:51

## 2020-02-12 NOTE — CONSULT NOTE ADULT - PROBLEM SELECTOR RECOMMENDATION 4
Met family bedside today; overall prognosis is guarded given critically ill condition.   - Started discussing goals of care today with sister and (common law) , will continue to follow and provide support for patient and family

## 2020-02-12 NOTE — CONSULT NOTE ADULT - PROBLEM SELECTOR RECOMMENDATION 9
Metabolic acidosis likely multifactorial including metformin toxicity (while on losartan, active diarrhea), TEDDY.  On admission vbg pH 7.15/CO2 20/HCO3 9, started on bicarb ggt without improvement.  HD/CRRT consented in chart.  Recommendations:  - Plan for CRRT for acidosis (goal net even)  - hold metformin  - monitor pH/HCO3
Pt with metastatic leiomyosarcoma with neutropenic sepsis from E coli bacteremia requiring pressors, CRRT, multiple transfusions, in multiorgan failure. Prognosis is guarded, critically ill.  - c/w Abx, pressors, and bicarb gtt per ID/primary team

## 2020-02-12 NOTE — PROGRESS NOTE ADULT - ASSESSMENT
39 year old woman with PMHx HTN, DM, HLD, Uterine fibroids w. 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 Found with neutropenic sepsis and hypoxia resp failure initially on NRB, now on high flow. On pressors for obstructive vs distributive shock. On CRRT for concern for Metformin induced lactic acidosis with mild improvement in lactic acidosis, with worsening mental status and thrombocytopenia.     #Neuro  -Metabolic encephalopathy: Patient more obtunded. Speaking more nonsensically.      #Resp  - hx of asthma. c/w Symbicort   - CXR- showing large pleural effusion.   - R pleurex has put out 2650cc so far.   - Will place on high flow for goal saturation >90%.   - Likely also with PE, but unstable for CTA, Not receiving AC at this time given elevated INR and thrombocytopenia.     #CV  - Distributive shock 2/2 neutropenic sepsis vs obstructive shock 2/2 clot burden.  - Levophed at 0.4 and Vasopressin   - Goal MAP >65     #GI  - c/w pantoprazole   - NPO     #ID  - Neutropenic sepsis 2/2 E Coli bacteremia, unclear source-potentially GI.  WBC 0.25 ANC 20   - C/w Meropenem  - ID following  - Repeat blood cultures pending.   - RVP neg. C. diff negative.     #Renal  - TEDDY 2/2 shock.   - High anion gap met acidosis, improving. Metformin Induced LA? On CRRT with mild improvement in lactate- 14.9  - C/w bicarb gtt   - Monitor I/O closely.  - Avoid nephrotoxic agents.     #Heme  - Pancytopenia, required 3 units platelets overnight.  - DIC less likely given elevated fibrinogen  - No smear created, but considering TTP?  - Repleting platelets for goal >50.  - S/p 1 dose neupogen.    #Endo  - On stress dose steroids  - Hypoglycemic, started on D5 +LR standing.  - Depleting glycogen stores 2/2 liver involvement?    #DVT PPx  - Known clot burden; holding off on active anticoagulant at this time.     #GOC  - Patient critically ill. Need to have ongoing goals of care conversation.   - Full code.   -  cell # [phone]

## 2020-02-12 NOTE — PROGRESS NOTE ADULT - PROBLEM SELECTOR PLAN 2
TEDDY anuric likely 2/2 ATN in setting shock (distributive/septic), volume depletion ( diarrhea, decrease PO intake).  On admission sCr 1.12 (on 2/10/2020), baseline 0.7 in Jan 2020, uptrend to 1.6 (2/11/2020).  Recommendations:  - continue CRRT  - trend sCr/electrolytes, strict I/O, daily weight  - avoid nephrotoxic agents (ACEI/ARB/NSAIDS), hold metformin  - renally dose medications per eGFR. TEDDY anuric likely 2/2 ATN in setting shock (distributive/septic), volume depletion ( diarrhea, decrease PO intake).  On admission sCr 1.12 (on 2/10/2020), baseline 0.7 in Jan 2020, uptrend to 1.6 (2/11/2020).  Recommendations:  - continue CRRT  - trend sCr/electrolytes, strict I/O, daily weight  - avoid nephrotoxic agents (ACEI/ARB/NSAIDS), hold metformin  - renally dose medications per eGFR.  - goal net even fluid balance for now

## 2020-02-12 NOTE — PROGRESS NOTE ADULT - SUBJECTIVE AND OBJECTIVE BOX
Follow Up:      Inverval History/ROS:Patient is a 39y old  Female who presents with a chief complaint of Neutropenic sepsis (12 Feb 2020 08:44)    Hypothermia  Hypotension requiring pressors  On high flow oxygen  On Cvvh    Allergies    No Known Drug Allergies  shellfish (Hives)    Intolerances        ANTIMICROBIALS:  meropenem  IVPB 1000 every 8 hours      OTHER MEDS:  aluminum hydroxide/magnesium hydroxide/simethicone Suspension 30 milliLiter(s) Oral every 6 hours PRN  budesonide  80 MICROgram(s)/formoterol 4.5 MICROgram(s) Inhaler 2 Puff(s) Inhalation two times a day  chlorhexidine 4% Liquid 1 Application(s) Topical <User Schedule>  CRRT Treatment    <Continuous>  dextrose 5% + lactated ringers. 1000 milliLiter(s) IV Continuous <Continuous>  filgrastim-sndz Injectable 480 MICROGram(s) SubCutaneous daily  hydrocortisone sodium succinate Injectable 50 milliGRAM(s) IV Push every 6 hours  HYDROmorphone  Injectable 1 milliGRAM(s) IV Push once  norepinephrine Infusion 1 MICROgram(s)/kG/Min IV Continuous <Continuous>  pantoprazole  Injectable 40 milliGRAM(s) IV Push daily  PrismaSATE Dialysate BGK 4 / 2.5 5000 milliLiter(s) CRRT <Continuous>  PrismaSOL Filtration BGK 4 / 2.5 5000 milliLiter(s) CRRT <Continuous>  PrismaSOL Filtration BGK 4 / 2.5 5000 milliLiter(s) CRRT <Continuous>  sodium bicarbonate  Infusion 0.136 mEq/kG/Hr IV Continuous <Continuous>  vasopressin Infusion 0.04 Unit(s)/Min IV Continuous <Continuous>      Vital Signs Last 24 Hrs  T(C): 34.5 (12 Feb 2020 08:00), Max: 36.1 (11 Feb 2020 16:00)  T(F): 94.1 (12 Feb 2020 08:00), Max: 97 (11 Feb 2020 16:00)  HR: 101 (12 Feb 2020 09:00) (101 - 136)  BP: 92/73 (12 Feb 2020 08:00) (92/73 - 92/73)  BP(mean): 80 (12 Feb 2020 08:00) (80 - 80)  RR: 10 (12 Feb 2020 09:00) (7 - 42)  SpO2: 99% (12 Feb 2020 06:40) (94% - 100%)    PHYSICAL EXAM:  General: [x ] lethargic, critically ill   HEAD/EYES: [ ] PERRL [ ] white sclera [ ] icterus  ENT:  [ ] normal [x ] supple [ ] thrush [ ] pharyngeal exudate  Cardiovascular:   [ ] murmur [ x] normal [ ] PPM/AICD  Respiratory:  [ x] course rhonchi  GI:  [x ] soft, + tender, normal bowel sounds  :  [ ] meeks [ x] no CVA tenderness   Musculoskeletal:  [ x] no synovitis  Neurologic:  [ x] non-focal exam   Skin:  [x ] no rash  Lymph: [ x] no lymphadenopathy  Psychiatric:  [x ] lethargict [ ] alert & oriented  Lines:  [ x] no phlebitis [x ] central line                                9.1    0.25  )-----------( 29       ( 12 Feb 2020 03:30 )             31.4       02-12    139  |  104  |  15  ----------------------------<  73  4.8   |  12<L>  |  1.05    Ca    6.6<L>      12 Feb 2020 03:30  Phos  3.3     02-12  Mg     2.2     02-12    TPro  4.4<L>  /  Alb  2.2<L>  /  TBili  3.6<H>  /  DBili  x   /  AST  1331<H>  /  ALT  696<H>  /  AlkPhos  80  02-12          MICROBIOLOGY:    RADIOLOGY:

## 2020-02-12 NOTE — PROCEDURE NOTE - NSPROCDETAILS_GEN_ALL_CORE
location identified, draped/prepped, sterile technique used/sterile dressing applied/sterile technique, catheter placed/Trendelenburg position/ultrasound guidance/supine position
patient pre-oxygenated, tube inserted, placement confirmed/connected to ventilator
location identified, draped/prepped, sterile technique used, needle inserted/introduced/connected to a pressurized flush line/positive blood return obtained via catheter/hemostasis with direct pressure, dressing applied/Seldinger technique/ultrasound guidance/sutured in place/all materials/supplies accounted for at end of procedure
guidewire recovered/lumen(s) aspirated and flushed/ultrasound guidance/sterile dressing applied/sterile technique, catheter placed

## 2020-02-12 NOTE — PROCEDURE NOTE - NSINDICATIONS_GEN_A_CORE
venous access
arterial puncture to obtain ABG's/cannulation purposes/critical patient/monitoring purposes
critical patient/airway protection
dialysis/CRRT

## 2020-02-12 NOTE — CHART NOTE - NSCHARTNOTEFT_GEN_A_CORE
NUTRITION SERVICES                                                                                  MALNUTRITION ALERT     Attention Health Care Provider: Upon nutritional assessment by the Registered Dietitian your patient was determined to meet criteria / has evidence of the following diagnosis/diagnoses:    [ ] Mild Protein Calorie Malnutrition   [ ] Moderate Protein Calorie Malnutrition   [ ] Severe Protein Calorie Malnutrition   [ ] Unspecified Protein Calorie Malnutrition   [ ] Underweight / BMI <19  [x ] Morbid Obesity / BMI >40            By signing this assessment you are acknowledging the diagnosis/diagnoses.       PLAN OF CARE: Refer to Initial Dietitian Evaluation or Nutrition Follow-Up Documentation for Nutritional Recommendations.

## 2020-02-12 NOTE — DIETITIAN INITIAL EVALUATION ADULT. - ADD RECOMMEND
Initiate nutrition support via tolerated route consistent with GOC . Consult nutrition if indicated .

## 2020-02-12 NOTE — CONSULT NOTE ADULT - PROBLEM SELECTOR RECOMMENDATION 2
TEDDY anuric likely 2/2 ATN in setting shock (distributive/septic), volume depletion ( diarrhea, decrease PO intake).  On admission sCr 1.12 (on 2/10/2020), baseline 0.7 in Jan 2020, uptrend to 1.6 (2/11/2020)  Recommendations:  - plan for CRRT for acidosis  - trend sCr/electrolytes, strict I/O, daily weight  - hold metformin  - avoid nephrotoxic agents (ACEI/ARB/NSAIDS)  - renally dose medications per eGFR
Patient voices diffuse pain throughout body today  - Recommend 1mg dilaudid q2hrs prn to provide comfort

## 2020-02-12 NOTE — PROGRESS NOTE ADULT - SUBJECTIVE AND OBJECTIVE BOX
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  Nephrology consulted for severe metabolic acidosis (lactate 7.4, vbg pH 7.15/CO2 20/HCO3 9) requiring CRRT on 2/11/2020.    24 hour events/subjective:  - yesterday pt started on CRRT, tolerated well  - overnight hypothermic (92F) tachycardic 100s, titrated down vasopressor, no issues with CRRT per RN.  Total UOP F 88ml and CRRT 3.9L UF (net neg 2.4L).  - pt seen and examined this morning at bedside on hiflow, who report breathing "okay" denies dysnpea.  - lab noted for K 4.8, serum HCO3 12, vBG pH 7.18/37/189/14, sCr 1.05      PAST HISTORY  --------------------------------------------------------------------------------  No significant changes to PMH, PSH, FHx, SHx, unless otherwise noted    ALLERGIES & MEDICATIONS  --------------------------------------------------------------------------------  Allergies    No Known Drug Allergies  shellfish (Hives)    Intolerances      Standing Inpatient Medications  budesonide  80 MICROgram(s)/formoterol 4.5 MICROgram(s) Inhaler 2 Puff(s) Inhalation two times a day  chlorhexidine 4% Liquid 1 Application(s) Topical <User Schedule>  cholecalciferol 1000 Unit(s) Oral daily  CRRT Treatment    <Continuous>  dextrose 5% + lactated ringers. 1000 milliLiter(s) IV Continuous <Continuous>  filgrastim-sndz Injectable 480 MICROGram(s) SubCutaneous daily  hydrocortisone sodium succinate Injectable 50 milliGRAM(s) IV Push every 6 hours  HYDROmorphone  Injectable 1 milliGRAM(s) IV Push once  meropenem  IVPB 1000 milliGRAM(s) IV Intermittent every 8 hours  norepinephrine Infusion 1 MICROgram(s)/kG/Min IV Continuous <Continuous>  pantoprazole  Injectable 40 milliGRAM(s) IV Push daily  PrismaSATE Dialysate BGK 4 / 2.5 5000 milliLiter(s) CRRT <Continuous>  PrismaSOL Filtration BGK 4 / 2.5 5000 milliLiter(s) CRRT <Continuous>  PrismaSOL Filtration BGK 4 / 2.5 5000 milliLiter(s) CRRT <Continuous>  sodium bicarbonate  Infusion 0.136 mEq/kG/Hr IV Continuous <Continuous>  vasopressin Infusion 0.04 Unit(s)/Min IV Continuous <Continuous>    PRN Inpatient Medications  aluminum hydroxide/magnesium hydroxide/simethicone Suspension 30 milliLiter(s) Oral every 6 hours PRN      REVIEW OF SYSTEMS  --------------------------------------------------------------------------------  Gen:  no fever/chills  Skin: No rashes  Respiratory: +dyspnea.  No cough, wheezing, hemoptysis  CV: No chest pain   GI: No abdominal pain, constipation, nausea, vomiting  : No increased frequency, dysuria, hematuria  MSK: no edema  All other systems were reviewed and are negative, except as noted.    VITALS/PHYSICAL EXAM  --------------------------------------------------------------------------------  T(C): 34.5 (02-12-20 @ 08:00), Max: 36.1 (02-11-20 @ 16:00)  HR: 101 (02-12-20 @ 08:20) (101 - 136)  BP: 92/73 (02-12-20 @ 08:00) (92/73 - 92/73)  RR: 11 (02-12-20 @ 08:20) (7 - 42)  SpO2: 99% (02-12-20 @ 06:40) (94% - 100%)  Wt(kg): --  Height (cm): 165.1 (02-10-20 @ 23:45)  Weight (kg): 110.6 (02-10-20 @ 23:45)  BMI (kg/m2): 40.6 (02-10-20 @ 23:45)  BSA (m2): 2.15 (02-10-20 @ 23:45)      02-11-20 @ 07:01  -  02-12-20 @ 07:00  --------------------------------------------------------  IN: 4806.2 mL / OUT: 7675 mL / NET: -2868.8 mL    02-12-20 @ 07:01  -  02-12-20 @ 08:45  --------------------------------------------------------  IN: 218.9 mL / OUT: 0 mL / NET: 218.9 mL      Physical Exam:  	Gen: hiflow, CRRT  	ENT: hiflow   	Pulm: CTA b/l, R chest tube   	CV: tachycardic, no murmur  	Abd: +BS, soft, nontender/nondistended  	: meeks in place  	LE: Warm, bilateral lower ext edema +2  	Skin: Warm  	Vascular access: RIJ hd catheter     LABS/STUDIES  --------------------------------------------------------------------------------              9.1    0.25  >-----------<  29       [02-12-20 @ 03:30]              31.4     139  |  104  |  15  ----------------------------<  73      [02-12-20 @ 03:30]  4.8   |  12  |  1.05        Ca     6.6     [02-12-20 @ 03:30]      Mg     2.2     [02-12-20 @ 03:30]      Phos  3.3     [02-12-20 @ 03:30]    TPro  4.4  /  Alb  2.2  /  TBili  3.6  /  DBili  x   /  AST  1331  /  ALT  696  /  AlkPhos  80  [02-12-20 @ 03:30]    PT/INR: PT 37.6 , INR 3.18       [02-12-20 @ 03:30]  PTT: 52.8       [02-12-20 @ 03:30]          [02-11-20 @ 14:30]        [02-11-20 @ 00:26]    Creatinine Trend:  SCr 1.05 [02-12 @ 03:30]  SCr 1.16 [02-11 @ 20:50]  SCr 1.44 [02-11 @ 14:30]  SCr 1.62 [02-11 @ 02:52]  SCr 1.44 [02-11 @ 00:26]        Iron 23, TIBC 303, %sat 8      [12-03-19 @ 22:56]  Ferritin 137      [12-03-19 @ 22:56]  HbA1c 7.7      [02-12-20 @ 03:30]  TSH 0.958      [10-14-19 @ 16:33]

## 2020-02-12 NOTE — CONSULT NOTE ADULT - ASSESSMENT
39 year old woman with metastatic leiomyosarcoma invading the intrahepatic IVC (dx'ed: 12/2019 on chemo since 1/10, last chemo1/31) hx b/l DVT on lovenox BID on hold given thrombocytopenia, hx of recurrent right pleural effusion w/ PleurX 12/30/2019 admitted for neutropenic sepsis 2/2 EColi bacteremia and hypoxia resp failure initially on NRB, now on high flow, with multiorgan failure requiring pressor support, CRRT, and multiple transfusions. Palliative consulted for GOC discussion.

## 2020-02-12 NOTE — PROGRESS NOTE ADULT - SUBJECTIVE AND OBJECTIVE BOX
CHIEF COMPLAINT: Patient is a 39y old  Female who presents with a chief complaint of Neutropenic sepsis (11 Feb 2020 14:10)    Interval Events: Seen by hematology for concern for DIC. Fibrinogen WNL so less likely. Levophed requirements lower. FS low.     REVIEW OF SYSTEMS:  Constitutional:   Eyes:  ENT:  CV:  Resp:  GI:  :  MSK:  Integumentary:  Neurological:  Psychiatric:  Endocrine:  Hematologic/Lymphatic:  Allergic/Immunologic:  [ ] All other systems negative  [ ] Unable to assess ROS because ________    OBJECTIVE:  ICU Vital Signs Last 24 Hrs  T(C): 33.6 (12 Feb 2020 04:00), Max: 36.1 (11 Feb 2020 16:00)  T(F): 92.4 (12 Feb 2020 04:00), Max: 97 (11 Feb 2020 16:00)  HR: 107 (12 Feb 2020 06:45) (106 - 136)  BP: --  BP(mean): --  ABP: 96/61 (12 Feb 2020 06:45) (67/46 - 118/79)  ABP(mean): 73 (12 Feb 2020 06:45) (55 - 96)  RR: 10 (12 Feb 2020 06:45) (7 - 42)  SpO2: 99% (12 Feb 2020 06:40) (94% - 100%)        02-11 @ 07:01  -  02-12 @ 07:00  --------------------------------------------------------  IN: 4806.2 mL / OUT: 7462 mL / NET: -2655.8 mL      CAPILLARY BLOOD GLUCOSE      POCT Blood Glucose.: 116 mg/dL (12 Feb 2020 06:25)      PHYSICAL EXAM:  General:   HEENT:   Lymph Nodes:  Neck:   Respiratory:   Cardiovascular:   Abdomen:   Extremities:   Skin:   Neurological:  Psychiatry:    HOSPITAL MEDICATIONS:  MEDICATIONS  (STANDING):  budesonide  80 MICROgram(s)/formoterol 4.5 MICROgram(s) Inhaler 2 Puff(s) Inhalation two times a day  chlorhexidine 4% Liquid 1 Application(s) Topical <User Schedule>  cholecalciferol 1000 Unit(s) Oral daily  CRRT Treatment    <Continuous>  filgrastim-sndz Injectable 480 MICROGram(s) SubCutaneous daily  hydrocortisone sodium succinate Injectable 50 milliGRAM(s) IV Push every 6 hours  HYDROmorphone  Injectable 1 milliGRAM(s) IV Push once  meropenem  IVPB 1000 milliGRAM(s) IV Intermittent every 8 hours  norepinephrine Infusion 1 MICROgram(s)/kG/Min (103.688 mL/Hr) IV Continuous <Continuous>  pantoprazole  Injectable 40 milliGRAM(s) IV Push daily  PrismaSATE Dialysate BGK 4 / 2.5 5000 milliLiter(s) (2000 mL/Hr) CRRT <Continuous>  PrismaSOL Filtration BGK 4 / 2.5 5000 milliLiter(s) (1200 mL/Hr) CRRT <Continuous>  PrismaSOL Filtration BGK 4 / 2.5 5000 milliLiter(s) (800 mL/Hr) CRRT <Continuous>  sodium bicarbonate  Infusion 0.136 mEq/kG/Hr (100 mL/Hr) IV Continuous <Continuous>  vasopressin Infusion 0.04 Unit(s)/Min (2.4 mL/Hr) IV Continuous <Continuous>    MEDICATIONS  (PRN):  aluminum hydroxide/magnesium hydroxide/simethicone Suspension 30 milliLiter(s) Oral every 6 hours PRN Dyspepsia      LABS:  (02-12 @ 03:30)                        9.1  0.25 )-----------( 29                 31.4    Neutrophils = 0.02 (8.0%)  Lymphocytes = 0.20 (80.0%)  Eosinophils = 0.01 (4.0%)  Basophils = 0.00 (0.0%)  Monocytes = 0.01 (4.0%)  Bands = --%    WBC Trend: 0.25<--, 0.23<--, 0.28<--  Hb Trend: 9.1<--, 9.5<--, 10.0<--, 10.6<--, 10.0<--  Plt Trend: 29<--, 4<--, 6<--, 28<--, 11<--  02-12    139  |  104  |  15  ----------------------------<  73  4.8   |  12<L>  |  1.05    Ca    6.6<L>      12 Feb 2020 03:30  Phos  3.3     02-12  Mg     2.2     02-12    TPro  4.4<L>  /  Alb  2.2<L>  /  TBili  3.6<H>  /  DBili  x   /  AST  1331<H>  /  ALT  696<H>  /  AlkPhos  80  02-12    Creatinine Trend: 1.05<--, 1.16<--, 1.44<--, 1.62<--, 1.44<--, 1.43<--  PT/INR - ( 12 Feb 2020 03:30 )   PT: 37.6 SEC;   INR: 3.18          PTT - ( 12 Feb 2020 03:30 )  PTT:52.8 SEC    Arterial Blood Gas:  02-12 @ 03:30  7.18/37/188/14/98.8/-13.2  ABG lactate: --  Arterial Blood Gas:  02-11 @ 14:30  7.23/24/255/12/99.6/-16.4  ABG lactate: 14.4  Arterial Blood Gas:  02-11 @ 13:30  7.21/24/246/12/99.3/-17.2  ABG lactate: --  Arterial Blood Gas:  02-11 @ 08:30  7.10/22/192/9/98.9/-21.4  ABG lactate: 16.0  Arterial Blood Gas:  02-11 @ 02:52  7.15/20/249/9/99.0/-20.7  ABG lactate: --    Venous Blood Gas:  02-12 @ 03:30  7.18/37/189/14/99.1  VBG Lactate: 14.9  Venous Blood Gas:  02-11 @ 20:50  7.16/33/203/12/99.0  VBG Lactate: 15.0  Venous Blood Gas:  02-11 @ 00:26  7.08/41/35/11/38.1  VBG Lactate: 19.0  Venous Blood Gas:  02-10 @ 22:15  7.13/31/41/11/50.0  VBG Lactate: 13.0  Venous Blood Gas:  02-10 @ 16:30  7.24/47/< 24/17/14.7  VBG Lactate: 7.4    CARDIAC MARKERS ( 11 Feb 2020 14:30 )  Trop x     /  u/L<H> / CKMB x             MICROBIOLOGY:   Blood Cx:  Urine Cx:  Sputum Cx:  Legionella:  RVP:    RADIOLOGY:  X Ray:  CT:  MRI:  Ultrasound:  [ ] Reviewed and interpreted by me    EKG: CHIEF COMPLAINT: Patient is a 39y old  Female who presents with a chief complaint of Neutropenic sepsis (11 Feb 2020 14:10)    Interval Events: Seen by hematology for concern for DIC. Fibrinogen WNL so less likely. Levophed requirements lower. FS low. Mental status deteriorated this morning, patient more confused. Lying flat without complaints.     REVIEW OF SYSTEMS:  Unable to obtain 2/2 mental status    OBJECTIVE:  ICU Vital Signs Last 24 Hrs  T(C): 33.6 (12 Feb 2020 04:00), Max: 36.1 (11 Feb 2020 16:00)  T(F): 92.4 (12 Feb 2020 04:00), Max: 97 (11 Feb 2020 16:00)  HR: 107 (12 Feb 2020 06:45) (106 - 136)  ABP: 96/61 (12 Feb 2020 06:45) (67/46 - 118/79)  ABP(mean): 73 (12 Feb 2020 06:45) (55 - 96)  RR: 10 (12 Feb 2020 06:45) (7 - 42)  SpO2: 99% (12 Feb 2020 06:40) (94% - 100%)        02-11 @ 07:01  -  02-12 @ 07:00  --------------------------------------------------------  IN: 4806.2 mL / OUT: 7462 mL / NET: -2655.8 mL      CAPILLARY BLOOD GLUCOSE      POCT Blood Glucose.: 116 mg/dL (12 Feb 2020 06:25)      PHYSICAL EXAM:  General: patient appears more confused. appears comfortable.  HEENT: scleral icterus.   Respiratory: Lungs with improved breath sounds on R.   Cardiovascular: Regular rhythm, mildly tachycardic.  Abdomen: Soft, obese. Unable to assess tenderness 2/2 mental status  Extremities: warm b/l. No evidence of ischemia distally (not mottled)  Skin: warm, dry  Neurological: confused, speaking slowly.    HOSPITAL MEDICATIONS:  MEDICATIONS  (STANDING):  budesonide  80 MICROgram(s)/formoterol 4.5 MICROgram(s) Inhaler 2 Puff(s) Inhalation two times a day  chlorhexidine 4% Liquid 1 Application(s) Topical <User Schedule>  cholecalciferol 1000 Unit(s) Oral daily  CRRT Treatment    <Continuous>  filgrastim-sndz Injectable 480 MICROGram(s) SubCutaneous daily  hydrocortisone sodium succinate Injectable 50 milliGRAM(s) IV Push every 6 hours  HYDROmorphone  Injectable 1 milliGRAM(s) IV Push once  meropenem  IVPB 1000 milliGRAM(s) IV Intermittent every 8 hours  norepinephrine Infusion 1 MICROgram(s)/kG/Min (103.688 mL/Hr) IV Continuous <Continuous>  pantoprazole  Injectable 40 milliGRAM(s) IV Push daily  PrismaSATE Dialysate BGK 4 / 2.5 5000 milliLiter(s) (2000 mL/Hr) CRRT <Continuous>  PrismaSOL Filtration BGK 4 / 2.5 5000 milliLiter(s) (1200 mL/Hr) CRRT <Continuous>  PrismaSOL Filtration BGK 4 / 2.5 5000 milliLiter(s) (800 mL/Hr) CRRT <Continuous>  sodium bicarbonate  Infusion 0.136 mEq/kG/Hr (100 mL/Hr) IV Continuous <Continuous>  vasopressin Infusion 0.04 Unit(s)/Min (2.4 mL/Hr) IV Continuous <Continuous>    MEDICATIONS  (PRN):  aluminum hydroxide/magnesium hydroxide/simethicone Suspension 30 milliLiter(s) Oral every 6 hours PRN Dyspepsia      LABS:  (02-12 @ 03:30)                        9.1  0.25 )-----------( 29                 31.4    Neutrophils = 0.02 (8.0%)  Lymphocytes = 0.20 (80.0%)  Eosinophils = 0.01 (4.0%)  Basophils = 0.00 (0.0%)  Monocytes = 0.01 (4.0%)  Bands = --%    WBC Trend: 0.25<--, 0.23<--, 0.28<--  Hb Trend: 9.1<--, 9.5<--, 10.0<--, 10.6<--, 10.0<--  Plt Trend: 29<--, 4<--, 6<--, 28<--, 11<--  02-12    139  |  104  |  15  ----------------------------<  73  4.8   |  12<L>  |  1.05    Ca    6.6<L>      12 Feb 2020 03:30  Phos  3.3     02-12  Mg     2.2     02-12    TPro  4.4<L>  /  Alb  2.2<L>  /  TBili  3.6<H>  /  DBili  x   /  AST  1331<H>  /  ALT  696<H>  /  AlkPhos  80  02-12    Creatinine Trend: 1.05<--, 1.16<--, 1.44<--, 1.62<--, 1.44<--, 1.43<--  PT/INR - ( 12 Feb 2020 03:30 )   PT: 37.6 SEC;   INR: 3.18          PTT - ( 12 Feb 2020 03:30 )  PTT:52.8 SEC    Arterial Blood Gas:  02-12 @ 03:30  7.18/37/188/14/98.8/-13.2  ABG lactate: --  Arterial Blood Gas:  02-11 @ 14:30  7.23/24/255/12/99.6/-16.4  ABG lactate: 14.4  Arterial Blood Gas:  02-11 @ 13:30  7.21/24/246/12/99.3/-17.2  ABG lactate: --  Arterial Blood Gas:  02-11 @ 08:30  7.10/22/192/9/98.9/-21.4  ABG lactate: 16.0  Arterial Blood Gas:  02-11 @ 02:52  7.15/20/249/9/99.0/-20.7  ABG lactate: --    Venous Blood Gas:  02-12 @ 03:30  7.18/37/189/14/99.1  VBG Lactate: 14.9  Venous Blood Gas:  02-11 @ 20:50  7.16/33/203/12/99.0  VBG Lactate: 15.0  Venous Blood Gas:  02-11 @ 00:26  7.08/41/35/11/38.1  VBG Lactate: 19.0  Venous Blood Gas:  02-10 @ 22:15  7.13/31/41/11/50.0  VBG Lactate: 13.0  Venous Blood Gas:  02-10 @ 16:30  7.24/47/< 24/17/14.7  VBG Lactate: 7.4    CARDIAC MARKERS ( 11 Feb 2020 14:30 )  Trop x     /  u/L<H> / CKMB x CHIEF COMPLAINT: Patient is a 39y old  Female who presents with a chief complaint of Neutropenic sepsis (11 Feb 2020 14:10)    Interval Events: Seen by hematology for concern for DIC. Fibrinogen WNL so less likely. Levophed requirements lower. FS low. Mental status deteriorated this morning, patient more confused. Lying flat without complaints.     REVIEW OF SYSTEMS:  Unable to obtain 2/2 mental status    OBJECTIVE:  ICU Vital Signs Last 24 Hrs  T(C): 33.6 (12 Feb 2020 04:00), Max: 36.1 (11 Feb 2020 16:00)  T(F): 92.4 (12 Feb 2020 04:00), Max: 97 (11 Feb 2020 16:00)  HR: 107 (12 Feb 2020 06:45) (106 - 136)  ABP: 96/61 (12 Feb 2020 06:45) (67/46 - 118/79)  ABP(mean): 73 (12 Feb 2020 06:45) (55 - 96)  RR: 10 (12 Feb 2020 06:45) (7 - 42)  SpO2: 99% (12 Feb 2020 06:40) (94% - 100%)        02-11 @ 07:01  -  02-12 @ 07:00  --------------------------------------------------------  IN: 4806.2 mL / OUT: 7462 mL / NET: -2655.8 mL      CAPILLARY BLOOD GLUCOSE      POCT Blood Glucose.: 116 mg/dL (12 Feb 2020 06:25)      PHYSICAL EXAM:  General: patient appears more confused. appears comfortable.  HEENT: scleral icterus.   Respiratory: Lungs with improved breath sounds on R.   Cardiovascular: Regular rhythm, mildly tachycardic.  Abdomen: Soft, obese. Unable to assess tenderness 2/2 mental status  Extremities: bruising over legs with large bullae b/l  Skin: see extremities  Neurological: confused, speaking slowly.    HOSPITAL MEDICATIONS:  MEDICATIONS  (STANDING):  budesonide  80 MICROgram(s)/formoterol 4.5 MICROgram(s) Inhaler 2 Puff(s) Inhalation two times a day  chlorhexidine 4% Liquid 1 Application(s) Topical <User Schedule>  cholecalciferol 1000 Unit(s) Oral daily  CRRT Treatment    <Continuous>  filgrastim-sndz Injectable 480 MICROGram(s) SubCutaneous daily  hydrocortisone sodium succinate Injectable 50 milliGRAM(s) IV Push every 6 hours  HYDROmorphone  Injectable 1 milliGRAM(s) IV Push once  meropenem  IVPB 1000 milliGRAM(s) IV Intermittent every 8 hours  norepinephrine Infusion 1 MICROgram(s)/kG/Min (103.688 mL/Hr) IV Continuous <Continuous>  pantoprazole  Injectable 40 milliGRAM(s) IV Push daily  PrismaSATE Dialysate BGK 4 / 2.5 5000 milliLiter(s) (2000 mL/Hr) CRRT <Continuous>  PrismaSOL Filtration BGK 4 / 2.5 5000 milliLiter(s) (1200 mL/Hr) CRRT <Continuous>  PrismaSOL Filtration BGK 4 / 2.5 5000 milliLiter(s) (800 mL/Hr) CRRT <Continuous>  sodium bicarbonate  Infusion 0.136 mEq/kG/Hr (100 mL/Hr) IV Continuous <Continuous>  vasopressin Infusion 0.04 Unit(s)/Min (2.4 mL/Hr) IV Continuous <Continuous>    MEDICATIONS  (PRN):  aluminum hydroxide/magnesium hydroxide/simethicone Suspension 30 milliLiter(s) Oral every 6 hours PRN Dyspepsia      LABS:  (02-12 @ 03:30)                        9.1  0.25 )-----------( 29                 31.4    Neutrophils = 0.02 (8.0%)  Lymphocytes = 0.20 (80.0%)  Eosinophils = 0.01 (4.0%)  Basophils = 0.00 (0.0%)  Monocytes = 0.01 (4.0%)  Bands = --%    WBC Trend: 0.25<--, 0.23<--, 0.28<--  Hb Trend: 9.1<--, 9.5<--, 10.0<--, 10.6<--, 10.0<--  Plt Trend: 29<--, 4<--, 6<--, 28<--, 11<--  02-12    139  |  104  |  15  ----------------------------<  73  4.8   |  12<L>  |  1.05    Ca    6.6<L>      12 Feb 2020 03:30  Phos  3.3     02-12  Mg     2.2     02-12    TPro  4.4<L>  /  Alb  2.2<L>  /  TBili  3.6<H>  /  DBili  x   /  AST  1331<H>  /  ALT  696<H>  /  AlkPhos  80  02-12    Creatinine Trend: 1.05<--, 1.16<--, 1.44<--, 1.62<--, 1.44<--, 1.43<--  PT/INR - ( 12 Feb 2020 03:30 )   PT: 37.6 SEC;   INR: 3.18          PTT - ( 12 Feb 2020 03:30 )  PTT:52.8 SEC    Arterial Blood Gas:  02-12 @ 03:30  7.18/37/188/14/98.8/-13.2  ABG lactate: --  Arterial Blood Gas:  02-11 @ 14:30  7.23/24/255/12/99.6/-16.4  ABG lactate: 14.4  Arterial Blood Gas:  02-11 @ 13:30  7.21/24/246/12/99.3/-17.2  ABG lactate: --  Arterial Blood Gas:  02-11 @ 08:30  7.10/22/192/9/98.9/-21.4  ABG lactate: 16.0  Arterial Blood Gas:  02-11 @ 02:52  7.15/20/249/9/99.0/-20.7  ABG lactate: --    Venous Blood Gas:  02-12 @ 03:30  7.18/37/189/14/99.1  VBG Lactate: 14.9  Venous Blood Gas:  02-11 @ 20:50  7.16/33/203/12/99.0  VBG Lactate: 15.0  Venous Blood Gas:  02-11 @ 00:26  7.08/41/35/11/38.1  VBG Lactate: 19.0  Venous Blood Gas:  02-10 @ 22:15  7.13/31/41/11/50.0  VBG Lactate: 13.0  Venous Blood Gas:  02-10 @ 16:30  7.24/47/< 24/17/14.7  VBG Lactate: 7.4    CARDIAC MARKERS ( 11 Feb 2020 14:30 )  Trop x     /  u/L<H> / CKMB x

## 2020-02-12 NOTE — PROGRESS NOTE ADULT - PROBLEM SELECTOR PLAN 1
Metabolic acidosis likely multifactorial including metformin toxicity in setting on losartan, active diarrhea and TEDDY.  On admission vbg pH 7.15/CO2 20/HCO3 9, started on bicarb ggt without improvement.  HD/CRRT consented in chart.  Recommendations:  - continue CRRT for acidosis  - hold metformin, d/c bicarb ggt while on CRRT  - monitor pH/HCO3.

## 2020-02-12 NOTE — PROGRESS NOTE ADULT - ASSESSMENT
39f with newly diagnosed metastatic leiomyosarcoma, recent admission at [facility] from 1/10-1/16/2020 for chemotherapy initiation. She had port placement and received chemotherapy (doxorubicin and Dexrezoxane) on 1/15/2020 and s/p C2 on 1/31/2020 at [facility]. Her MRI abdomen showed Leiomyosarcoma involving the inferior vena cava, right atrium, right hepatic vein, and right renal vein. Heterogeneous appearance of the liver with indeterminant T1 hyperintense lesion in segment IVb with possible enhancement, hx b/l DVT on lovenox BID, hx of recurrent right pleural effusion w/ PleurX 12/30/2019 presents with worsening dyspnea for 1 day.  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  BCX +  Ecoli bacteremia, severe metabolic acidosis, on 2 pressors.    -Appreciate MICU care  -Noted to be pancytopenic with a platelet 9 this morning  -Thrombocytopenia in the setting of recent chemo, unlikely HIT  -Continue to hold Lovenox  -Transfuse hgb<7, plt<10 or in case of fever plt<15, and as needed in case of invasive procedures or bleeding  -C/w neupogen daily, 480mcg, until ANC>5000  -C/w abx until ANC recovery  -ID consult, renal consult appreciated  -Supportive care, pain control, Nutrition, PT, DVT ppx  -Outpatient oncology f/u      Oncology will continue to follow    Carlyle Devlin (Joseph)   [facility]- Oncology Physician Assistant  Pager [phone]  [email or web address] 39f with newly diagnosed metastatic leiomyosarcoma, recent admission at [facility] from 1/10-1/16/2020 for chemotherapy initiation. She had port placement and received chemotherapy (doxorubicin and Dexrezoxane) on 1/15/2020 and s/p C2 on 1/31/2020 at [facility]. Her MRI abdomen showed Leiomyosarcoma involving the inferior vena cava, right atrium, right hepatic vein, and right renal vein. Heterogeneous appearance of the liver with indeterminant T1 hyperintense lesion in segment IVb with possible enhancement, hx b/l DVT on lovenox BID, hx of recurrent right pleural effusion w/ PleurX 12/30/2019 presents with worsening dyspnea for 1 day.  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  BCX +  Ecoli bacteremia, severe metabolic acidosis, on 2 pressors.    -Appreciate MICU care  -Noted to be pancytopenic with a platelet 9 this morning  -Thrombocytopenia in the setting of recent chemo, acute illness and sepsis, unlikely HIT  -Continue to hold Lovenox  -Transfuse hgb<7, plt<10 or in case of fever plt<15, and as needed in case of invasive procedures or bleeding  -C/w neupogen daily, 480mcg, until ANC>5000  -C/w abx until ANC recovery  -ID consult, renal consult appreciated  -Supportive care, pain control, Nutrition, PT, DVT ppx    Oncology will continue to follow    Carlyle Devlin (Joseph)   [facility]- Oncology Physician Assistant  Pager [phone]  [email or web address]

## 2020-02-12 NOTE — CONSULT NOTE ADULT - SUBJECTIVE AND OBJECTIVE BOX
HPI:  39 year old woman with PMHx HTN, DM, HLD, Uterine fibroids w. 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 She endorses brown watery diarrhea since saturday after eating burgers. Denied melena and hematochezia. Today, while walking to the bathroom started getting short of breath. This was not improving and they decided to call EMS. Denied fever, sick contacts or recent travel.     In the ED, was found with neutropenic sepsis.  Received vancomycin, zosyn, and cefepime. Admitted to MICU for tachycardia and tachypnea, started on pressors in the ED, continues to be on levophed and vasopressin, and bicarb gtt; getting high dose steroids hydrocortisone 50mg q6hrs (2/11- ) for possible adrenal insufficiency. Pt was found to have E. Coli bacteremia without clear source; unable to get CT. Patient is being treated with meropenem (2/11-), ID following. Chest tube was placed for large R pleural effusion. Patient started on CRRT on 2/11, given her multiorgan failure- liver, kidney, pancytopenia. Patient is critically ill.     Palliative consulted for discussion regarding goals of care, which BF of 18 years (common law , cell # [phone]) and a sister seem receptive to discussing per primary team given her critical condition. Patient currently receiving dilaudid 1mg IVP for pain; started 2/11. Patient seems more obtunded today.     PERTINENT PM/SXH:   Leiomyosarcoma  Uterine fibroid  Hyperlipidemia  Asthma  Diabetes  Hypertension    H/O chest tube placement  No significant past surgical history    FAMILY HISTORY:  FH: pancreatic cancer: cousin  FH: lung cancer: father  FH: type 2 diabetes: mother  FH: stroke: mother      ITEMS NOT CHECKED ARE NOT PRESENT    SOCIAL HISTORY:   Significant other/partner:  [ ]  Children:  [ ]  Buddhism/Spirituality:  Substance hx:  [ ]   Tobacco hx:  [ ]   Alcohol hx: [ ]   Home Opioid hx:  [ ] I-Stop Reference No:  Living Situation: [ ]Home  [ ]Long term care  [ ]Rehab [ ]Other    ADVANCE DIRECTIVES:    DNR  MOLST  [ ]  Living Will  [ ]   DECISION MAKER(s):  [ ] Health Care Proxy(s)  [ ] Surrogate(s)  [ ] Guardian           Name(s): Phone Number(s):    BASELINE (I)ADL(s) (prior to admission):  [place]: [ ]Total  [ ] Moderate [ ]Dependent    Allergies    No Known Drug Allergies  shellfish (Hives)    Intolerances    MEDICATIONS  (STANDING):  chlorhexidine 4% Liquid 1 Application(s) Topical <User Schedule>  CRRT Treatment    <Continuous>  dextrose 5% + lactated ringers. 1000 milliLiter(s) (75 mL/Hr) IV Continuous <Continuous>  filgrastim-sndz Injectable 480 MICROGram(s) SubCutaneous daily  hydrocortisone sodium succinate Injectable 50 milliGRAM(s) IV Push every 6 hours  HYDROmorphone  Injectable 1 milliGRAM(s) IV Push once  meropenem  IVPB 1000 milliGRAM(s) IV Intermittent every 8 hours  norepinephrine Infusion 1 MICROgram(s)/kG/Min (103.688 mL/Hr) IV Continuous <Continuous>  pantoprazole  Injectable 40 milliGRAM(s) IV Push daily  PrismaSATE Dialysate BGK 4 / 2.5 5000 milliLiter(s) (2000 mL/Hr) CRRT <Continuous>  PrismaSOL Filtration BGK 4 / 2.5 5000 milliLiter(s) (1200 mL/Hr) CRRT <Continuous>  PrismaSOL Filtration BGK 4 / 2.5 5000 milliLiter(s) (800 mL/Hr) CRRT <Continuous>  sodium bicarbonate  Infusion 0.136 mEq/kG/Hr (100 mL/Hr) IV Continuous <Continuous>  vasopressin Infusion 0.04 Unit(s)/Min (2.4 mL/Hr) IV Continuous <Continuous>    MEDICATIONS  (PRN):  aluminum hydroxide/magnesium hydroxide/simethicone Suspension 30 milliLiter(s) Oral every 6 hours PRN Dyspepsia      PRESENT SYMPTOMS: [ ]Unable to obtain due to poor mentation   Source if other than patient:  [ ]Family   [ ]Team     Pain (Impact on QOL):    Location -         Minimal acceptable level (0-10 scale):                    Aggravating factors -  Quality -  Radiation -  Severity (0-10 scale) -    Timing -    PAIN AD Score:     [email or web address] (press ctrl +  left click to view)    Dyspnea:                           [ ]Mild [ ]Moderate [ ]Severe  Anxiety:                             [ ]Mild [ ]Moderate [ ]Severe  Fatigue:                             [ ]Mild [ ]Moderate [ ]Severe  Nausea:                             [ ]Mild [ ]Moderate [ ]Severe  Loss of appetite:              [ ]Mild [ ]Moderate [ ]Severe  Constipation:                    [ ]Mild [ ]Moderate [ ]Severe    Other Symptoms:  [ ]All other review of systems negative     Karnofsky Performance Score/Palliative Performance Status Version 2:         %    http://palliative.info/resource_material/[email or web address].pdf    PHYSICAL EXAM:  Vital Signs Last 24 Hrs  T(C): 34.5 (12 Feb 2020 08:00), Max: 36.1 (11 Feb 2020 16:00)  T(F): 94.1 (12 Feb 2020 08:00), Max: 97 (11 Feb 2020 16:00)  HR: 99 (12 Feb 2020 10:00) (99 - 136)  BP: 92/73 (12 Feb 2020 08:00) (92/73 - 92/73)  BP(mean): 80 (12 Feb 2020 08:00) (80 - 80)  RR: 12 (12 Feb 2020 10:00) (7 - 42)  SpO2: 99% (12 Feb 2020 06:40) (94% - 100%) I&O's Summary    11 Feb 2020 07:01  -  12 Feb 2020 07:00  --------------------------------------------------------  IN: 4806.2 mL / OUT: 7871 mL / NET: -3064.8 mL    12 Feb 2020 07:01  -  12 Feb 2020 10:22  --------------------------------------------------------  IN: 656.7 mL / OUT: 301 mL / NET: 355.7 mL    GENERAL:  [ ]Alert  [ ]Oriented x   [ ]Lethargic  [ ]Cachexia  [ ]Unarousable  [ ]Verbal  [ ]Non-Verbal  Behavioral:   [ ] Anxiety  [ ] Delirium [ ] Agitation [ ] Other  HEENT:  [ ]Normal   [ ]Dry mouth   [ ]ET Tube/Trach  [ ]Oral lesions  PULMONARY:   [ ]Clear [ ]Tachypnea  [ ]Audible excessive secretions   [ ]Rhonchi        [ ]Right [ ]Left [ ]Bilateral  [ ]Crackles        [ ]Right [ ]Left [ ]Bilateral  [ ]Wheezing     [ ]Right [ ]Left [ ]Bilateral  CARDIOVASCULAR:    [ ]Regular [ ]Irregular [ ]Tachy  [ ]Isaiah [ ]Murmur [ ]Other  GASTROINTESTINAL:  [ ]Soft  [ ]Distended   [ ]+BS  [ ]Non tender [ ]Tender  [ ]PEG [ ]OGT/ NGT  Last BM:   02-11-20 @ 07:01  -  02-12-20 @ 07:00  --------------------------------------------------------  OUT: 500 mL    02-12-20 @ 07:01  -  02-12-20 @ 10:22  --------------------------------------------------------  OUT: 0 mL    GENITOURINARY:  [ ]Normal [ ] Incontinent   [ ]Oliguria/Anuria   [ ]Wolfe  MUSCULOSKELETAL:   [ ]Normal   [ ]Weakness  [ ]Bed/Wheelchair bound [ ]Edema  NEUROLOGIC:   [ ]No focal deficits  [ ] Cognitive impairment  [ ] Dysphagia [ ]Dysarthria [ ] Paresis [ ]Other   SKIN:   [ ]Normal   [ ]Pressure ulcer(s)  [ ]Rash    CRITICAL CARE:  [ ] Shock Present  [ ]Septic [ ]Cardiogenic [ ]Neurologic [ ]Hypovolemic  [ ]  Vasopressors [ ]  Inotropes   [ ] Respiratory failure present  [ ] Acute  [ ] Chronic [ ] Hypoxic  [ ] Hypercarbic [ ] Other  [ ] Other organ failure     GRIEF  [ ] Yes  [ ] No    LABS:                        9.0    0.27  )-----------( 9 ( 12 Feb 2020 09:30 )             30.6   02-12    139  |  104  |  15  ----------------------------<  73  4.8   |  12<L>  |  1.05    Ca    6.6<L>      12 Feb 2020 03:30  Phos  3.3     02-12  Mg     2.2     02-12    TPro  4.4<L>  /  Alb  2.2<L>  /  TBili  3.6<H>  /  DBili  x   /  AST  1331<H>  /  ALT  696<H>  /  AlkPhos  80  02-12  PT/INR - ( 12 Feb 2020 03:30 )   PT: 37.6 SEC;   INR: 3.18          PTT - ( 12 Feb 2020 03:30 )  PTT:52.8 SEC      RADIOLOGY & ADDITIONAL STUDIES:    PROTEIN CALORIE MALNUTRITION PRESENT: [ ] Yes [ ] No  [ ] PPSV2 < or = to 30% [ ] significant weight loss  [ ] poor nutritional intake [ ] catabolic state [ ] anasarca     Albumin, Serum: 2.2 g/dL (02-12-20 @ 03:30)  Artificial Nutrition [ ]     REFERRALS:   [ ]Chaplaincy  [ ] Hospice  [ ]Child Life  [ ]Social Work  [ ]Case management [ ]Holistic Therapy   Goals of Care Discussion Document: HPI:  39 year old woman with PMHx HTN, DM, HLD, Uterine fibroids w. 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 She endorses brown watery diarrhea since saturday after eating burgers. Denied melena and hematochezia. Today, while walking to the bathroom started getting short of breath. This was not improving and they decided to call EMS. Denied fever, sick contacts or recent travel.     In the ED, was found with neutropenic sepsis.  Received vancomycin, zosyn, and cefepime. Admitted to MICU for tachycardia and tachypnea, started on pressors in the ED, continues to be on levophed and vasopressin, and bicarb gtt; getting high dose steroids hydrocortisone 50mg q6hrs (2/11- ) for possible adrenal insufficiency. Pt was found to have E. Coli bacteremia without clear source; unable to get CT. Patient is being treated with meropenem (2/11-), ID following. Chest tube was placed for large R pleural effusion. Patient started on CRRT on 2/11, given her multiorgan failure- liver, kidney, pancytopenia. Patient is critically ill.     Palliative consulted for discussion regarding goals of care, which BF of 18 years (common law , cell # [phone]) and a sister seem receptive to discussing per primary team given her critical condition. Patient currently receiving dilaudid 1mg IVP for pain; started 2/11. Patient seems more obtunded today.     PERTINENT PM/SXH:   Leiomyosarcoma  Uterine fibroid  Hyperlipidemia  Asthma  Diabetes  Hypertension    H/O chest tube placement  No significant past surgical history    FAMILY HISTORY:  FH: pancreatic cancer: cousin  FH: lung cancer: father  FH: type 2 diabetes: mother  FH: stroke: mother      ITEMS NOT CHECKED ARE NOT PRESENT    SOCIAL HISTORY:   Significant other/partner:  [ ]  Children:  [ ]  Jain/Spirituality:  Substance hx:  [ ]   Tobacco hx:  [ ]   Alcohol hx: [ ]   Home Opioid hx:  [ ] I-Stop Reference No:  Living Situation: [ ]Home  [ ]Long term care  [ ]Rehab [ ]Other    ADVANCE DIRECTIVES:    DNR  MOLST  [ ]  Living Will  [ ]   DECISION MAKER(s):  [ ] Health Care Proxy(s)  [ ] Surrogate(s)  [ ] Guardian           Name(s): Phone Number(s):    BASELINE (I)ADL(s) (prior to admission):  [place]: [ ]Total  [ ] Moderate [ ]Dependent    Allergies    No Known Drug Allergies  shellfish (Hives)    Intolerances    MEDICATIONS  (STANDING):  chlorhexidine 4% Liquid 1 Application(s) Topical <User Schedule>  CRRT Treatment    <Continuous>  dextrose 5% + lactated ringers. 1000 milliLiter(s) (75 mL/Hr) IV Continuous <Continuous>  filgrastim-sndz Injectable 480 MICROGram(s) SubCutaneous daily  hydrocortisone sodium succinate Injectable 50 milliGRAM(s) IV Push every 6 hours  HYDROmorphone  Injectable 1 milliGRAM(s) IV Push once  meropenem  IVPB 1000 milliGRAM(s) IV Intermittent every 8 hours  norepinephrine Infusion 1 MICROgram(s)/kG/Min (103.688 mL/Hr) IV Continuous <Continuous>  pantoprazole  Injectable 40 milliGRAM(s) IV Push daily  PrismaSATE Dialysate BGK 4 / 2.5 5000 milliLiter(s) (2000 mL/Hr) CRRT <Continuous>  PrismaSOL Filtration BGK 4 / 2.5 5000 milliLiter(s) (1200 mL/Hr) CRRT <Continuous>  PrismaSOL Filtration BGK 4 / 2.5 5000 milliLiter(s) (800 mL/Hr) CRRT <Continuous>  sodium bicarbonate  Infusion 0.136 mEq/kG/Hr (100 mL/Hr) IV Continuous <Continuous>  vasopressin Infusion 0.04 Unit(s)/Min (2.4 mL/Hr) IV Continuous <Continuous>    MEDICATIONS  (PRN):  aluminum hydroxide/magnesium hydroxide/simethicone Suspension 30 milliLiter(s) Oral every 6 hours PRN Dyspepsia      PRESENT SYMPTOMS: [ x]Unable to obtain due to poor mentation   Source if other than patient:  [x ]Family   [x ]Team     Pain (Impact on QOL):    Location -       Diffuse  Minimal acceptable level (0-10 scale):                    Aggravating factors -  Quality -  Radiation -  Severity (0-10 scale) -    Timing -    PAIN AD Score:     [email or web address] (press ctrl +  left click to view)    Dyspnea:                           [ ]Mild [ ]Moderate [ ]Severe  Anxiety:                             [ ]Mild [x ]Moderate [ ]Severe  Fatigue:                             [ ]Mild [x ]Moderate [ ]Severe  Nausea:                             [ ]Mild [ ]Moderate [ ]Severe  Loss of appetite:              [ ]Mild [ ]Moderate [ ]Severe  Constipation:                    [ ]Mild [ ]Moderate [ ]Severe    Other Symptoms:  [ ]All other review of systems negative     Karnofsky Performance Score/Palliative Performance Status Version 2:     10-20    %    http://palliative.info/resource_material/[email or web address].pdf    PHYSICAL EXAM:  Vital Signs Last 24 Hrs  T(C): 34.5 (12 Feb 2020 08:00), Max: 36.1 (11 Feb 2020 16:00)  T(F): 94.1 (12 Feb 2020 08:00), Max: 97 (11 Feb 2020 16:00)  HR: 99 (12 Feb 2020 10:00) (99 - 136)  BP: 92/73 (12 Feb 2020 08:00) (92/73 - 92/73)  BP(mean): 80 (12 Feb 2020 08:00) (80 - 80)  RR: 12 (12 Feb 2020 10:00) (7 - 42)  SpO2: 99% (12 Feb 2020 06:40) (94% - 100%) I&O's Summary    11 Feb 2020 07:01  -  12 Feb 2020 07:00  --------------------------------------------------------  IN: 4806.2 mL / OUT: 7871 mL / NET: -3064.8 mL    12 Feb 2020 07:01  -  12 Feb 2020 10:22  --------------------------------------------------------  IN: 656.7 mL / OUT: 301 mL / NET: 355.7 mL    GENERAL:  [x ]Alert  [ ]Oriented x   [ x]Lethargic  [ ]Cachexia  [ ]Unarousable  [x ]Verbal  [ ]Non-Verbal  Behavioral:   [ ] Anxiety  [ ] Delirium [x ] Agitation [ ] Other  HEENT:  [x ]Normal   [ ]Dry mouth   [ ]ET Tube/Trach  [ ]Oral lesions  PULMONARY:   [x ]Clear [ ]Tachypnea  [ ]Audible excessive secretions   [ ]Rhonchi        [ ]Right [ ]Left [ ]Bilateral  [ ]Crackles        [ ]Right [ ]Left [ ]Bilateral  [ ]Wheezing     [ ]Right [ ]Left [ ]Bilateral  CARDIOVASCULAR:    [ ]Regular [ ]Irregular [ ]Tachy  [ ]Isaiah [ ]Murmur [ ]Other  GASTROINTESTINAL:  [ ]Soft  [ ]Distended   [ ]+BS  [ ]Non tender [ ]Tender  [ ]PEG [ ]OGT/ NGT  Last BM:   02-11-20 @ 07:01  -  02-12-20 @ 07:00  --------------------------------------------------------  OUT: 500 mL    02-12-20 @ 07:01  -  02-12-20 @ 10:22  --------------------------------------------------------  OUT: 0 mL    GENITOURINARY:  [ ]Normal [ ] Incontinent   [ ]Oliguria/Anuria   [x ]Wolfe  MUSCULOSKELETAL:   [ ]Normal   [x ]Weakness  [ x]Bed/Wheelchair bound [ ]Edema  NEUROLOGIC:   [ ]No focal deficits  [ ] Cognitive impairment  [ ] Dysphagia [ ]Dysarthria [ ] Paresis [ ]Other   SKIN:   [ ]Normal   [ ]Pressure ulcer(s)  [ ]Rash    CRITICAL CARE:  [x ] Shock Present  [ x]Septic [ ]Cardiogenic [ ]Neurologic [ ]Hypovolemic  [ x]  Vasopressors [ ]  Inotropes   [ ] Respiratory failure present  [x ] Acute  [ ] Chronic [ ] Hypoxic  [ ] Hypercarbic [ ] Other  [ x] Other organ failure     GRIEF  [ ] Yes  [ ] No    LABS:                        9.0    0.27  )-----------( 9        ( 12 Feb 2020 09:30 )             30.6   02-12    139  |  104  |  15  ----------------------------<  73  4.8   |  12<L>  |  1.05    Ca    6.6<L>      12 Feb 2020 03:30  Phos  3.3     02-12  Mg     2.2     02-12    TPro  4.4<L>  /  Alb  2.2<L>  /  TBili  3.6<H>  /  DBili  x   /  AST  1331<H>  /  ALT  696<H>  /  AlkPhos  80  02-12  PT/INR - ( 12 Feb 2020 03:30 )   PT: 37.6 SEC;   INR: 3.18          PTT - ( 12 Feb 2020 03:30 )  PTT:52.8 SEC      RADIOLOGY & ADDITIONAL STUDIES:    PROTEIN CALORIE MALNUTRITION PRESENT: [ ] Yes [ ] No  [ ] PPSV2 < or = to 30% [ ] significant weight loss  [ ] poor nutritional intake [ ] catabolic state [ ] anasarca     Albumin, Serum: 2.2 g/dL (02-12-20 @ 03:30)  Artificial Nutrition [ ]     REFERRALS:   [ ]Chaplaincy  [ ] Hospice  [ ]Child Life  [ ]Social Work  [ ]Case management [ ]Holistic Therapy   Goals of Care Discussion Document: HPI:  39 year old woman with PMHx HTN, DM, HLD, Uterine fibroids w. 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 She endorses brown watery diarrhea since saturday after eating burgers. Denied melena and hematochezia. Today, while walking to the bathroom started getting short of breath. This was not improving and they decided to call EMS. Denied fever, sick contacts or recent travel.     In the ED, was found with neutropenic sepsis.  Received vancomycin, zosyn, and cefepime. Admitted to MICU for tachycardia and tachypnea, started on pressors in the ED, continues to be on levophed and vasopressin, and bicarb gtt; getting high dose steroids hydrocortisone 50mg q6hrs (2/11- ) for possible adrenal insufficiency. Pt was found to have E. Coli bacteremia without clear source; unable to get CT. Patient is being treated with meropenem (2/11-), ID following. Chest tube was placed for large R pleural effusion. Patient started on CRRT on 2/11, given her multiorgan failure- liver, kidney, pancytopenia. Patient is critically ill.     Palliative consulted for discussion regarding goals of care, which BF of 18 years (common law , cell # [phone]) and a sister seem receptive to discussing per primary team given her critical condition. Patient currently receiving dilaudid 1mg IVP for pain; started 2/11. Patient seems more obtunded today.     PERTINENT PM/SXH:   Leiomyosarcoma  Uterine fibroid  Hyperlipidemia  Asthma  Diabetes  Hypertension    H/O chest tube placement  No significant past surgical history    FAMILY HISTORY:  FH: pancreatic cancer: cousin  FH: lung cancer: father  FH: type 2 diabetes: mother  FH: stroke: mother      ITEMS NOT CHECKED ARE NOT PRESENT    SOCIAL HISTORY:   Significant other/partner:  [x ]  Children:  [ x]  Latter-day/Spirituality:  Substance hx:  [ ]   Tobacco hx:  [ ]   Alcohol hx: [ ]   Home Opioid hx:  [ ] I-Stop Reference No:  Living Situation: [x ]Home  [ ]Long term care  [ ]Rehab [ ]Other    ADVANCE DIRECTIVES:    DNR  MOLST  [ ]  Living Will  [ ]   DECISION MAKER(s):  [x ] Health Care Proxy(s)  [ ] Surrogate(s)  [ ] Guardian           Name(s): Phone Number(s):    BASELINE (I)ADL(s) (prior to admission):  [place]: [ ]Total  [ ] Moderate [ ]Dependent    Allergies    No Known Drug Allergies  shellfish (Hives)    Intolerances    MEDICATIONS  (STANDING):  chlorhexidine 4% Liquid 1 Application(s) Topical <User Schedule>  CRRT Treatment    <Continuous>  dextrose 5% + lactated ringers. 1000 milliLiter(s) (75 mL/Hr) IV Continuous <Continuous>  filgrastim-sndz Injectable 480 MICROGram(s) SubCutaneous daily  hydrocortisone sodium succinate Injectable 50 milliGRAM(s) IV Push every 6 hours  HYDROmorphone  Injectable 1 milliGRAM(s) IV Push once  meropenem  IVPB 1000 milliGRAM(s) IV Intermittent every 8 hours  norepinephrine Infusion 1 MICROgram(s)/kG/Min (103.688 mL/Hr) IV Continuous <Continuous>  pantoprazole  Injectable 40 milliGRAM(s) IV Push daily  PrismaSATE Dialysate BGK 4 / 2.5 5000 milliLiter(s) (2000 mL/Hr) CRRT <Continuous>  PrismaSOL Filtration BGK 4 / 2.5 5000 milliLiter(s) (1200 mL/Hr) CRRT <Continuous>  PrismaSOL Filtration BGK 4 / 2.5 5000 milliLiter(s) (800 mL/Hr) CRRT <Continuous>  sodium bicarbonate  Infusion 0.136 mEq/kG/Hr (100 mL/Hr) IV Continuous <Continuous>  vasopressin Infusion 0.04 Unit(s)/Min (2.4 mL/Hr) IV Continuous <Continuous>    MEDICATIONS  (PRN):  aluminum hydroxide/magnesium hydroxide/simethicone Suspension 30 milliLiter(s) Oral every 6 hours PRN Dyspepsia      PRESENT SYMPTOMS: [ x]Unable to obtain due to poor mentation   Source if other than patient:  [x ]Family   [x ]Team     Pain (Impact on QOL):    Location -       Diffuse  Minimal acceptable level (0-10 scale):                    Aggravating factors -  Quality -  Radiation -  Severity (0-10 scale) -    Timing -    PAIN AD Score:     [email or web address] (press ctrl +  left click to view)    Dyspnea:                           [ ]Mild [ ]Moderate [ ]Severe  Anxiety:                             [ ]Mild [x ]Moderate [ ]Severe  Fatigue:                             [ ]Mild [x ]Moderate [ ]Severe  Nausea:                             [ ]Mild [ ]Moderate [ ]Severe  Loss of appetite:              [ ]Mild [ ]Moderate [ ]Severe  Constipation:                    [ ]Mild [ ]Moderate [ ]Severe    Other Symptoms:  [x ]All other review of systems negative     Karnofsky Performance Score/Palliative Performance Status Version 2:     10-20    %    http://palliative.info/resource_material/[email or web address].pdf    PHYSICAL EXAM:  Vital Signs Last 24 Hrs  T(C): 34.5 (12 Feb 2020 08:00), Max: 36.1 (11 Feb 2020 16:00)  T(F): 94.1 (12 Feb 2020 08:00), Max: 97 (11 Feb 2020 16:00)  HR: 99 (12 Feb 2020 10:00) (99 - 136)  BP: 92/73 (12 Feb 2020 08:00) (92/73 - 92/73)  BP(mean): 80 (12 Feb 2020 08:00) (80 - 80)  RR: 12 (12 Feb 2020 10:00) (7 - 42)  SpO2: 99% (12 Feb 2020 06:40) (94% - 100%) I&O's Summary    11 Feb 2020 07:01  -  12 Feb 2020 07:00  --------------------------------------------------------  IN: 4806.2 mL / OUT: 7871 mL / NET: -3064.8 mL    12 Feb 2020 07:01  -  12 Feb 2020 10:22  --------------------------------------------------------  IN: 656.7 mL / OUT: 301 mL / NET: 355.7 mL    GENERAL:  [x ]Alert  [ ]Oriented x   [ x]Lethargic  [ ]Cachexia  [ ]Unarousable  [x ]Verbal  [ ]Non-Verbal  Behavioral:   [ ] Anxiety  [ ] Delirium [x ] Agitation [ ] Other  HEENT:  [x ]Normal   [ ]Dry mouth   [ ]ET Tube/Trach  [ ]Oral lesions  PULMONARY:   [x ]Clear [ ]Tachypnea  [ ]Audible excessive secretions   [ ]Rhonchi        [ ]Right [ ]Left [ ]Bilateral  [ ]Crackles        [ ]Right [ ]Left [ ]Bilateral  [ ]Wheezing     [ ]Right [ ]Left [ ]Bilateral  CARDIOVASCULAR:    [ ]Regular [ ]Irregular [ x]Tachy  [ ]Isaiah [ ]Murmur [ ]Other  GASTROINTESTINAL:  [x ]Soft  [ ]Distended   [x ]+BS  [x ]Non tender [ ]Tender  [ ]PEG [ ]OGT/ NGT  Last BM:   02-11-20 @ 07:01  -  02-12-20 @ 07:00  --------------------------------------------------------  OUT: 500 mL    02-12-20 @ 07:01  -  02-12-20 @ 10:22  --------------------------------------------------------  OUT: 0 mL    GENITOURINARY:  [ ]Normal [ ] Incontinent   [ ]Oliguria/Anuria   [x ]Wolfe  MUSCULOSKELETAL:   [ ]Normal   [x ]Weakness  [ x]Bed/Wheelchair bound [ ]Edema  NEUROLOGIC:   [ ]No focal deficits  [x ] Cognitive impairment  [ ] Dysphagia [ ]Dysarthria [ ] Paresis [ ]Other   SKIN:   [x ]Normal   [ ]Pressure ulcer(s)  [ ]Rash    CRITICAL CARE:  [x ] Shock Present  [ x]Septic [ ]Cardiogenic [ ]Neurologic [ ]Hypovolemic  [ x]  Vasopressors [ ]  Inotropes   [ ] Respiratory failure present  [x ] Acute  [ ] Chronic [ ] Hypoxic  [ ] Hypercarbic [ ] Other  [ x] Other organ failure     GRIEF  [ ] Yes  [ ] No    LABS:                        9.0    0.27  )-----------( 9        ( 12 Feb 2020 09:30 )             30.6   02-12    139  |  104  |  15  ----------------------------<  73  4.8   |  12<L>  |  1.05    Ca    6.6<L>      12 Feb 2020 03:30  Phos  3.3     02-12  Mg     2.2     02-12    TPro  4.4<L>  /  Alb  2.2<L>  /  TBili  3.6<H>  /  DBili  x   /  AST  1331<H>  /  ALT  696<H>  /  AlkPhos  80  02-12  PT/INR - ( 12 Feb 2020 03:30 )   PT: 37.6 SEC;   INR: 3.18          PTT - ( 12 Feb 2020 03:30 )  PTT:52.8 SEC      RADIOLOGY & ADDITIONAL STUDIES:    PROTEIN CALORIE MALNUTRITION PRESENT: [ ] Yes [ ] No  [ ] PPSV2 < or = to 30% [ ] significant weight loss  [ ] poor nutritional intake [ ] catabolic state [ ] anasarca     Albumin, Serum: 2.2 g/dL (02-12-20 @ 03:30)  Artificial Nutrition [ ]     REFERRALS:   [ ]Chaplaincy  [ ] Hospice  [ ]Child Life  [ ]Social Work  [ ]Case management [ ]Holistic Therapy   Goals of Care Discussion Document:

## 2020-02-12 NOTE — DIETITIAN INITIAL EVALUATION ADULT. - FACTORS AFF FOOD INTAKE
other (specify)/pt. intubated , sedated , maintained NPO , no family @ bedside, pt. unable to speak .

## 2020-02-12 NOTE — PROGRESS NOTE ADULT - SUBJECTIVE AND OBJECTIVE BOX
INTERVAL HPI/OVERNIGHT EVENTS:  Patient seen at bedside.  Patient lethargic  Unable to obtain ROS.      VITAL SIGNS:  T(F): 93.4 (02-12-20 @ 12:00)  HR: 102 (02-12-20 @ 13:00)  BP: 92/73 (02-12-20 @ 08:00)  RR: 10 (02-12-20 @ 13:00)  SpO2: 99% (02-12-20 @ 06:40)  Wt(kg): --    PHYSICAL EXAM:  Constitutional: Obtunded, lying in  bed  in NAD  HEENT: +HFNC  Respiratory:  good air entry b/l,   Cardiovascular: RRR,  Gastrointestinal: soft, NTND  Extremities: no edema  Skin: Normal temperature    MEDICATIONS  (STANDING):  chlorhexidine 4% Liquid 1 Application(s) Topical <User Schedule>  CRRT Treatment    <Continuous>  dextrose 5%. 1000 milliLiter(s) (75 mL/Hr) IV Continuous <Continuous>  filgrastim-sndz Injectable 480 MICROGram(s) SubCutaneous daily  hydrocortisone sodium succinate Injectable 50 milliGRAM(s) IV Push every 6 hours  meropenem  IVPB 1000 milliGRAM(s) IV Intermittent every 8 hours  norepinephrine Infusion 1 MICROgram(s)/kG/Min (103.688 mL/Hr) IV Continuous <Continuous>  pantoprazole  Injectable 40 milliGRAM(s) IV Push daily  PrismaSATE Dialysate BGK 4 / 2.5 5000 milliLiter(s) (2000 mL/Hr) CRRT <Continuous>  PrismaSOL Filtration BGK 4 / 2.5 5000 milliLiter(s) (1200 mL/Hr) CRRT <Continuous>  PrismaSOL Filtration BGK 4 / 2.5 5000 milliLiter(s) (800 mL/Hr) CRRT <Continuous>  sodium bicarbonate  Infusion 0.136 mEq/kG/Hr (100 mL/Hr) IV Continuous <Continuous>  vasopressin Infusion 0.04 Unit(s)/Min (2.4 mL/Hr) IV Continuous <Continuous>    MEDICATIONS  (PRN):  aluminum hydroxide/magnesium hydroxide/simethicone Suspension 30 milliLiter(s) Oral every 6 hours PRN Dyspepsia      Allergies    No Known Drug Allergies  shellfish (Hives)    Intolerances        LABS:                        9.0    0.27  )-----------( 9 ( 12 Feb 2020 09:30 )             30.6     02-12    134<L>  |  98  |  11  ----------------------------<  137<H>  4.5   |  12<L>  |  0.92    Ca    6.1<LL>      12 Feb 2020 09:30  Phos  3.3     02-12  Mg     2.2     02-12    TPro  3.8<L>  /  Alb  1.8<L>  /  TBili  3.8<H>  /  DBili  x   /  AST  1417<H>  /  ALT  692<H>  /  AlkPhos  76  02-12    PT/INR - ( 12 Feb 2020 09:30 )   PT: 51.3 SEC;   INR: 4.30          PTT - ( 12 Feb 2020 09:30 )  PTT:57.3 SEC      RADIOLOGY & ADDITIONAL TESTS:  Studies reviewed.

## 2020-02-12 NOTE — DIETITIAN INITIAL EVALUATION ADULT. - ENERGY NEEDS
Estimated nutrition need 1100- 1400 kcal/d ( 11-14 kcal/kg dry wt. -  recommendation per ASPEN / Society of Critical care Medicine for Obese Critical Care Patients   ) Protein 113 gm/d ( 2.0 gm/kg IBW )

## 2020-02-12 NOTE — DIETITIAN INITIAL EVALUATION ADULT. - OTHER INFO
Pt. lethargic, 2+ generalized edema , per nursing pt. for trial of extubation . EMR reviewed & noted the previous admissions , wt. hx. , was on PO diet prior to admission, GOC being addressed w/ HCP . 39 year old woman with metastatic leiomyosarcoma invading the intrahepatic IVC (dx'ed: 12/2019 on chemo since 1/10, last chemo1/31) hx b/l DVT on lovenox BID on hold given thrombocytopenia, hx of recurrent right pleural effusion w/ PleurX 12/30/2019 admitted for neutropenic sepsis 2/2 EColi bacteremia and hypoxia resp failure initially on NRB, now on high flow, with multiorgan failure requiring pressor support, CRRT, and multiple transfusions. Palliative consulted for GOC discussion. Maintained NPO

## 2020-02-12 NOTE — PROGRESS NOTE ADULT - PROBLEM SELECTOR PLAN 3
Hyperphosphatemia likely 2/2 TEDDY, on admission serum Phos 4.6, peaked 5.5.  Today 3.3  Recommendations  - continue CRRT  - monitor phos.

## 2020-02-12 NOTE — CONSULT NOTE ADULT - PROBLEM SELECTOR RECOMMENDATION 3
Met family bedside today; overall prognosis is guarded given critically ill condition.   - Started discussing goals of care today with sister and (common law) , will continue to follow and provide support for patient and family - Patient with stage IV metastatic disease.  - Prognosis is extremely poor.  - Patient is actively dying.

## 2020-02-13 DIAGNOSIS — C49.9 MALIGNANT NEOPLASM OF CONNECTIVE AND SOFT TISSUE, UNSPECIFIED: ICD-10-CM

## 2020-02-13 LAB
-  CEFAZOLIN: SIGNIFICANT CHANGE UP
-  CIPROFLOXACIN: SIGNIFICANT CHANGE UP
-  CLINDAMYCIN: SIGNIFICANT CHANGE UP
-  ERYTHROMYCIN: SIGNIFICANT CHANGE UP
-  GENTAMICIN: SIGNIFICANT CHANGE UP
-  LEVOFLOXACIN: SIGNIFICANT CHANGE UP
-  MOXIFLOXACIN(AEROBIC): SIGNIFICANT CHANGE UP
-  OXACILLIN: SIGNIFICANT CHANGE UP
-  PENICILLIN: SIGNIFICANT CHANGE UP
-  RIFAMPIN.: SIGNIFICANT CHANGE UP
-  TETRACYCLINE: SIGNIFICANT CHANGE UP
-  TRIMETHOPRIM/SULFAMETHOXAZOLE: SIGNIFICANT CHANGE UP
-  VANCOMYCIN: SIGNIFICANT CHANGE UP
ALBUMIN SERPL ELPH-MCNC: 1.5 G/DL — LOW (ref 3.3–5)
ALP SERPL-CCNC: 83 U/L — SIGNIFICANT CHANGE UP (ref 40–120)
ALT FLD-CCNC: 1143 U/L — HIGH (ref 4–33)
ANION GAP SERPL CALC-SCNC: 25 MMO/L — HIGH (ref 7–14)
ANISOCYTOSIS BLD QL: SIGNIFICANT CHANGE UP
APTT BLD: 63.1 SEC — HIGH (ref 27.5–36.3)
AST SERPL-CCNC: 3034 U/L — HIGH (ref 4–32)
BACTERIA BLD CULT: SIGNIFICANT CHANGE UP
BACTERIA FLD CULT: SIGNIFICANT CHANGE UP
BASOPHILS # BLD AUTO: 0 K/UL — SIGNIFICANT CHANGE UP (ref 0–0.2)
BASOPHILS NFR BLD AUTO: 0 % — SIGNIFICANT CHANGE UP (ref 0–2)
BASOPHILS NFR SPEC: 0 % — SIGNIFICANT CHANGE UP (ref 0–2)
BILIRUB SERPL-MCNC: 4 MG/DL — HIGH (ref 0.2–1.2)
BUN SERPL-MCNC: 5 MG/DL — LOW (ref 7–23)
BURR CELLS BLD QL SMEAR: SIGNIFICANT CHANGE UP
CALCIUM SERPL-MCNC: 5.7 MG/DL — CRITICAL LOW (ref 8.4–10.5)
CHLORIDE SERPL-SCNC: 101 MMOL/L — SIGNIFICANT CHANGE UP (ref 98–107)
CO2 SERPL-SCNC: 9 MMOL/L — CRITICAL LOW (ref 22–31)
CREAT SERPL-MCNC: 0.61 MG/DL — SIGNIFICANT CHANGE UP (ref 0.5–1.3)
D DIMER BLD IA.RAPID-MCNC: 1060 NG/ML — SIGNIFICANT CHANGE UP
EOSINOPHIL # BLD AUTO: 0 K/UL — SIGNIFICANT CHANGE UP (ref 0–0.5)
EOSINOPHIL NFR BLD AUTO: 0 % — SIGNIFICANT CHANGE UP (ref 0–6)
EOSINOPHIL NFR FLD: 0 % — SIGNIFICANT CHANGE UP (ref 0–6)
GLUCOSE BLDC GLUCOMTR-MCNC: 103 MG/DL — HIGH (ref 70–99)
GLUCOSE BLDC GLUCOMTR-MCNC: 104 MG/DL — HIGH (ref 70–99)
GLUCOSE BLDC GLUCOMTR-MCNC: 87 MG/DL — SIGNIFICANT CHANGE UP (ref 70–99)
GLUCOSE BLDC GLUCOMTR-MCNC: 88 MG/DL — SIGNIFICANT CHANGE UP (ref 70–99)
GLUCOSE SERPL-MCNC: 106 MG/DL — HIGH (ref 70–99)
GRAM STN FLD: SIGNIFICANT CHANGE UP
HCT VFR BLD CALC: 26 % — LOW (ref 34.5–45)
HGB BLD-MCNC: 7.6 G/DL — LOW (ref 11.5–15.5)
IMM GRANULOCYTES NFR BLD AUTO: 0 % — SIGNIFICANT CHANGE UP (ref 0–1.5)
INR BLD: 4.86 — HIGH (ref 0.88–1.17)
LYMPHOCYTES # BLD AUTO: 0.19 K/UL — LOW (ref 1–3.3)
LYMPHOCYTES # BLD AUTO: 90.5 % — HIGH (ref 13–44)
LYMPHOCYTES NFR SPEC AUTO: 90 % — HIGH (ref 13–44)
MAGNESIUM SERPL-MCNC: 2.2 MG/DL — SIGNIFICANT CHANGE UP (ref 1.6–2.6)
MANUAL SMEAR VERIFICATION: SIGNIFICANT CHANGE UP
MCHC RBC-ENTMCNC: 20.8 PG — LOW (ref 27–34)
MCHC RBC-ENTMCNC: 29.2 % — LOW (ref 32–36)
MCV RBC AUTO: 71 FL — LOW (ref 80–100)
METHOD TYPE: SIGNIFICANT CHANGE UP
MICROCYTES BLD QL: SIGNIFICANT CHANGE UP
MONOCYTES # BLD AUTO: 0 K/UL — SIGNIFICANT CHANGE UP (ref 0–0.9)
MONOCYTES NFR BLD AUTO: 0 % — LOW (ref 2–14)
MONOCYTES NFR BLD: 0 % — LOW (ref 2–9)
NEUTROPHIL AB SER-ACNC: 10 % — LOW (ref 43–77)
NEUTROPHILS # BLD AUTO: 0.02 K/UL — LOW (ref 1.8–7.4)
NEUTROPHILS NFR BLD AUTO: 9.5 % — LOW (ref 43–77)
NRBC # BLD: 0 /100WBC — SIGNIFICANT CHANGE UP
NRBC # FLD: 0 K/UL — SIGNIFICANT CHANGE UP (ref 0–0)
ORGANISM # SPEC MICROSCOPIC CNT: SIGNIFICANT CHANGE UP
ORGANISM # SPEC MICROSCOPIC CNT: SIGNIFICANT CHANGE UP
PH FLD: 7.9 PH — SIGNIFICANT CHANGE UP
PHOSPHATE SERPL-MCNC: 2.9 MG/DL — SIGNIFICANT CHANGE UP (ref 2.5–4.5)
PLATELET # BLD AUTO: 6 K/UL — CRITICAL LOW (ref 150–400)
PLATELET COUNT - ESTIMATE: SIGNIFICANT CHANGE UP
PMV BLD: SIGNIFICANT CHANGE UP FL (ref 7–13)
POIKILOCYTOSIS BLD QL AUTO: SIGNIFICANT CHANGE UP
POTASSIUM SERPL-MCNC: 5.1 MMOL/L — SIGNIFICANT CHANGE UP (ref 3.5–5.3)
POTASSIUM SERPL-SCNC: 5.1 MMOL/L — SIGNIFICANT CHANGE UP (ref 3.5–5.3)
PROT SERPL-MCNC: 3.2 G/DL — LOW (ref 6–8.3)
PROTHROM AB SERPL-ACNC: 56.6 SEC — HIGH (ref 9.8–13.1)
RBC # BLD: 3.66 M/UL — LOW (ref 3.8–5.2)
RBC # FLD: 26.1 % — HIGH (ref 10.3–14.5)
SODIUM SERPL-SCNC: 135 MMOL/L — SIGNIFICANT CHANGE UP (ref 135–145)
SPECIMEN SOURCE: SIGNIFICANT CHANGE UP
SPECIMEN SOURCE: SIGNIFICANT CHANGE UP
WBC # BLD: 0.21 K/UL — CRITICAL LOW (ref 3.8–10.5)
WBC # FLD AUTO: 0.21 K/UL — CRITICAL LOW (ref 3.8–10.5)

## 2020-02-13 PROCEDURE — 99233 SBSQ HOSP IP/OBS HIGH 50: CPT | Mod: GC

## 2020-02-13 PROCEDURE — 99291 CRITICAL CARE FIRST HOUR: CPT

## 2020-02-13 PROCEDURE — 99233 SBSQ HOSP IP/OBS HIGH 50: CPT

## 2020-02-13 PROCEDURE — 99232 SBSQ HOSP IP/OBS MODERATE 35: CPT

## 2020-02-13 RX ADMIN — Medication 50 MILLIGRAM(S): at 05:01

## 2020-02-13 RX ADMIN — CHLORHEXIDINE GLUCONATE 15 MILLILITER(S): 213 SOLUTION TOPICAL at 05:01

## 2020-02-13 RX ADMIN — SODIUM CHLORIDE 75 MILLILITER(S): 9 INJECTION, SOLUTION INTRAVENOUS at 07:30

## 2020-02-13 RX ADMIN — VASOPRESSIN 2.4 UNIT(S)/MIN: 20 INJECTION INTRAVENOUS at 07:30

## 2020-02-13 RX ADMIN — Medication 50 MILLIGRAM(S): at 00:02

## 2020-02-13 RX ADMIN — CHLORHEXIDINE GLUCONATE 1 APPLICATION(S): 213 SOLUTION TOPICAL at 12:52

## 2020-02-13 RX ADMIN — MEROPENEM 100 MILLIGRAM(S): 1 INJECTION INTRAVENOUS at 05:00

## 2020-02-13 RX ADMIN — Medication 103.69 MICROGRAM(S)/KG/MIN: at 07:30

## 2020-02-13 NOTE — PROGRESS NOTE ADULT - PROBLEM SELECTOR PLAN 2
Patient intubated, not on sedation but not alert or oriented. Not in acute distress.  - 1mg dilaudid q2hrs prn to provide comfort

## 2020-02-13 NOTE — PROGRESS NOTE ADULT - ATTENDING COMMENTS
Agree with above. Seen and examined with team on rounds. Critically ill requiring frequent bedside visits. Overall much worse. BP not responding. Not tolerating CVVHD. Will discuss with family GOC. Supportive care.
Agree with above. Seen and examined with team on rounds. Critically ill requiring frequent bedside visits. On CVVHD, continue supportive care. Overall very poor prognosis for functional recovery. Will continue GOC discussions with sister. Supportive care.
saw and evaluated the patient this morning.  she appears to be actively dying.  profoundly low blood pressure.  spoke with team would withdraw CRRT as it will offer no additional benefit.
Patient seen and examined.  Patient is actively dying.  Emotional support provided to the family.

## 2020-02-13 NOTE — PROGRESS NOTE ADULT - SUBJECTIVE AND OBJECTIVE BOX
INTERVAL HPI / OVERNIGHT EVENTS: Patient was intubated 2/12, and continues to be on two pressors no sedation, MAPs in 30-50s. Family beside this morning, requesting privacy; deferring  given father is .    ADVANCE DIRECTIVES:    DNR:  [ ] YES  [x ] NO           PRESENT SYMPTOMS:   SOURCE:  [ ] Patient  [ ] Family  [x ] Team  Pain:  [ ] YES  [ ] NO  Dyspnea:  [ ] YES  [ ] NO  Anxiety:  [ ] YES  [ ] NO  Fatigue:  [ ] YES  [ ] NO  Nausea:  [ ] YES  [ ] NO  Loss of Appetite:  [ ] YES  [ ] NO  Constipation:  [ ] YES  [ ] NO  Other Symptoms:    [ ] All other ROS negative     [x ] Unable to obtain due to poor mentation    MEDICATIONS  (STANDING):  chlorhexidine 0.12% Liquid 15 milliLiter(s) Oral Mucosa every 12 hours  chlorhexidine 4% Liquid 1 Application(s) Topical <User Schedule>  dextrose 5%. 1000 milliLiter(s) (75 mL/Hr) IV Continuous <Continuous>  filgrastim-sndz Injectable 480 MICROGram(s) SubCutaneous daily  hydrocortisone sodium succinate Injectable 50 milliGRAM(s) IV Push every 6 hours  meropenem  IVPB 1000 milliGRAM(s) IV Intermittent every 8 hours  norepinephrine Infusion 1 MICROgram(s)/kG/Min (103.688 mL/Hr) IV Continuous <Continuous>  pantoprazole  Injectable 40 milliGRAM(s) IV Push daily  sodium bicarbonate  Infusion 0.136 mEq/kG/Hr (100 mL/Hr) IV Continuous <Continuous>  vasopressin Infusion 0.04 Unit(s)/Min (2.4 mL/Hr) IV Continuous <Continuous>    MEDICATIONS  (PRN):  aluminum hydroxide/magnesium hydroxide/simethicone Suspension 30 milliLiter(s) Oral every 6 hours PRN Dyspepsia  HYDROmorphone  Injectable 1 milliGRAM(s) IV Push every 2 hours PRN Severe Pain (7 - 10)      Allergies    No Known Drug Allergies  shellfish (Hives)    Intolerances        Karnofsky Performance Score/Palliative Performance Status Version 2:  10 %      Vital Signs Last 24 Hrs  T(C): 33.4 (13 Feb 2020 08:00), Max: 34.5 (13 Feb 2020 04:00)  T(F): 92.1 (13 Feb 2020 08:00), Max: 94.1 (13 Feb 2020 04:00)  HR: 87 (13 Feb 2020 10:13) (87 - 151)  BP: --  BP(mean): --  RR: 20 (13 Feb 2020 10:00) (10 - 25)  SpO2: --    Physical Exam:  General:  [ ] Alert, A&O x   [x ] Lethargic   [ ] Agitated   [ ] Cachectic  HEENT:  [ ] Normal   [ ] Dry mouth   [x ] ET Tube   [ ] Trach  Lungs:  [ ] Clear   [ ] Rhonchi   [ ] Crackles   [ ] Wheezing   [ ] Tachypnea   [ ] Audible secretions  Cardiovascular:  [ ] RRR   [ ] Irregular   [ ] Tachycardia   [ ] Bradycardia  Abdomen:  [ ] Soft   [ ] Distended   [ ] +BS   [ ] Non-tender   [ ] Tender   [ ] PEG   [ ] NGT   Last BM:     Genitourinary:  [ ] Normal   [ ] Incontinent   [ ] Oliguria/Anuria   [ ] Wolfe  Musculoskeletal:  [ ] Normal   [ ] Generalized weakness   [ x] Bedbound  Neurological:  [ ] No focal deficits   [x ] Cognitive impairment  Skin:  [ ] Normal   [ ] Pressure ulcer(s)    LABS:                        7.6    0.21  )-----------( 6        ( 13 Feb 2020 03:00 )             26.0     02-13    135  |  101  |  5<L>  ----------------------------<  106<H>  5.1   |  9<LL>  |  0.61    Ca    5.7<LL>      13 Feb 2020 03:00  Phos  2.9     02-13  Mg     2.2     02-13    TPro  3.2<L>  /  Alb  1.5<L>  /  TBili  4.0<H>  /  DBili  x   /  AST  3034<H>  /  ALT  1143<H>  /  AlkPhos  83  02-13    PT/INR - ( 13 Feb 2020 03:00 )   PT: 56.6 SEC;   INR: 4.86          PTT - ( 13 Feb 2020 03:00 )  PTT:63.1 SEC    I&O's Summary    12 Feb 2020 07:01  -  13 Feb 2020 07:00  --------------------------------------------------------  IN: 9509.7 mL / OUT: 5025 mL / NET: 4484.7 mL    13 Feb 2020 07:01  -  13 Feb 2020 10:52  --------------------------------------------------------  IN: 488.4 mL / OUT: 0 mL / NET: 488.4 mL        RADIOLOGY & ADDITIONAL STUDIES:

## 2020-02-13 NOTE — PROGRESS NOTE ADULT - PROBLEM SELECTOR PLAN 1
Pt with metastatic leiomyosarcoma with neutropenic sepsis from E coli bacteremia requiring pressors, CRRT, multiple transfusions, in multiorgan failure. Prognosis is guarded, critically ill.  - c/w Abx, pressors, and bicarb gtt per ID/primary team

## 2020-02-13 NOTE — PROGRESS NOTE ADULT - PROBLEM SELECTOR PLAN 3
Hyperphosphatemia likely 2/2 TEDDY, on admission serum Phos 4.6, peaked 5.5.  Today 3.3  Recommendations  - continue CRRT  - monitor phos. Hyperphosphatemia likely 2/2 TEDDY, on admission serum Phos 4.6, peaked 5.5.  Today 2.9  Recommendations  - continue CRRT  - monitor phos.

## 2020-02-13 NOTE — PROGRESS NOTE ADULT - SUBJECTIVE AND OBJECTIVE BOX
INTERVAL HPI/OVERNIGHT EVENTS:  Patient seen at bedside.  Family at bedside, aware of poor prognosis.     VITAL SIGNS:  T(F): 92.1 (02-13-20 @ 12:00)  HR: 0 (02-13-20 @ 14:08)  BP: --  RR: 0 (02-13-20 @ 14:08)  SpO2: --  Wt(kg): --    PHYSICAL EXAM:    Constitutional: NAD  Neck: no LAP  Respiratory: mechanical breath sounds  Cardiovascular: RRR  Gastrointestinal: soft  Neurological: AAOx3, non focal  Skin: cold to touch    MEDICATIONS  (STANDING):  chlorhexidine 0.12% Liquid 15 milliLiter(s) Oral Mucosa every 12 hours  chlorhexidine 4% Liquid 1 Application(s) Topical <User Schedule>  norepinephrine Infusion 1 MICROgram(s)/kG/Min (103.688 mL/Hr) IV Continuous <Continuous>  sodium bicarbonate  Infusion 0.136 mEq/kG/Hr (100 mL/Hr) IV Continuous <Continuous>  vasopressin Infusion 0.04 Unit(s)/Min (2.4 mL/Hr) IV Continuous <Continuous>    MEDICATIONS  (PRN):  HYDROmorphone  Injectable 1 milliGRAM(s) IV Push every 2 hours PRN Severe Pain (7 - 10)      Allergies    No Known Drug Allergies  shellfish (Hives)    Intolerances        LABS:                        7.6    0.21  )-----------( 6        ( 13 Feb 2020 03:00 )             26.0     02-13    135  |  101  |  5<L>  ----------------------------<  106<H>  5.1   |  9<LL>  |  0.61    Ca    5.7<LL>      13 Feb 2020 03:00  Phos  2.9     02-13  Mg     2.2     02-13    TPro  3.2<L>  /  Alb  1.5<L>  /  TBili  4.0<H>  /  DBili  x   /  AST  3034<H>  /  ALT  1143<H>  /  AlkPhos  83  02-13    PT/INR - ( 13 Feb 2020 03:00 )   PT: 56.6 SEC;   INR: 4.86          PTT - ( 13 Feb 2020 03:00 )  PTT:63.1 SEC      RADIOLOGY & ADDITIONAL TESTS:  Studies reviewed.

## 2020-02-13 NOTE — PROGRESS NOTE ADULT - SUBJECTIVE AND OBJECTIVE BOX
CHIEF COMPLAINT: Patient is a 39y old  Female who presents with a chief complaint of Neutropenic sepsis (12 Feb 2020 14:13)    Interval Events:  Over the course of the night, patient with increasing pressor requirements. Sedation turned off with no mental status. Had prolonged conversation with patient's family yesterday including her brother and sister who are HCPs. They understand that she is dying, but they want to give her every chance and wanted patient intubated instead of made comfort care. Patient was intubated in the afternoon.    REVIEW OF SYSTEMS:  [X] Unable to assess ROS because metabolic encephalopathy    OBJECTIVE:  ICU Vital Signs Last 24 Hrs  T(C): 34.5 (13 Feb 2020 04:00), Max: 34.5 (12 Feb 2020 08:00)  T(F): 94.1 (13 Feb 2020 04:00), Max: 94.1 (12 Feb 2020 08:00)  HR: 99 (13 Feb 2020 07:00) (96 - 151)  BP: 92/73 (12 Feb 2020 08:00) (92/73 - 92/73)  BP(mean): 80 (12 Feb 2020 08:00) (80 - 80)  ABP: 52/39 (13 Feb 2020 07:00) (52/39 - 142/128)  ABP(mean): 43 (13 Feb 2020 07:00) (43 - 134)  RR: 20 (13 Feb 2020 07:00) (10 - 25)  SpO2: --    Mode: AC/ CMV (Assist Control/ Continuous Mandatory Ventilation), RR (machine): 20, TV (machine): 400, FiO2: 40, PEEP: 8, MAP: 13, PIP: 34    02-12 @ 07:01  -  02-13 @ 07:00  --------------------------------------------------------  IN: 9509.7 mL / OUT: 5025 mL / NET: 4484.7 mL      CAPILLARY BLOOD GLUCOSE      POCT Blood Glucose.: 88 mg/dL (13 Feb 2020 06:46)      PHYSICAL EXAM:  General: patient not responsive to external stimuli  HEENT:  Lymph Nodes:  Neck:   Respiratory:   Cardiovascular:   Abdomen:   Extremities:   Skin:   Neurological:  Psychiatry:    HOSPITAL MEDICATIONS:  MEDICATIONS  (STANDING):  chlorhexidine 0.12% Liquid 15 milliLiter(s) Oral Mucosa every 12 hours  chlorhexidine 4% Liquid 1 Application(s) Topical <User Schedule>  CRRT Treatment    <Continuous>  dextrose 5%. 1000 milliLiter(s) (75 mL/Hr) IV Continuous <Continuous>  filgrastim-sndz Injectable 480 MICROGram(s) SubCutaneous daily  hydrocortisone sodium succinate Injectable 50 milliGRAM(s) IV Push every 6 hours  meropenem  IVPB 1000 milliGRAM(s) IV Intermittent every 8 hours  norepinephrine Infusion 1 MICROgram(s)/kG/Min (103.688 mL/Hr) IV Continuous <Continuous>  pantoprazole  Injectable 40 milliGRAM(s) IV Push daily  PrismaSATE Dialysate BGK 4 / 2.5 5000 milliLiter(s) (2000 mL/Hr) CRRT <Continuous>  PrismaSOL Filtration BGK 4 / 2.5 5000 milliLiter(s) (1200 mL/Hr) CRRT <Continuous>  PrismaSOL Filtration BGK 4 / 2.5 5000 milliLiter(s) (800 mL/Hr) CRRT <Continuous>  sodium bicarbonate  Infusion 0.136 mEq/kG/Hr (100 mL/Hr) IV Continuous <Continuous>  vasopressin Infusion 0.04 Unit(s)/Min (2.4 mL/Hr) IV Continuous <Continuous>    MEDICATIONS  (PRN):  aluminum hydroxide/magnesium hydroxide/simethicone Suspension 30 milliLiter(s) Oral every 6 hours PRN Dyspepsia  HYDROmorphone  Injectable 1 milliGRAM(s) IV Push every 2 hours PRN Severe Pain (7 - 10)      LABS:  (02-13 @ 03:00)                        7.6  0.21 )-----------( 6                 26.0    Neutrophils = 0.02 (9.5%)  Lymphocytes = 0.19 (90.5%)  Eosinophils = 0.00 (0.0%)  Basophils = 0.00 (0.0%)  Monocytes = 0.00 (0.0%)  Bands = --%    WBC Trend: 0.21<--, 0.18<--, 0.21<--  Hb Trend: 7.6<--, 7.7<--, 8.5<--, 9.0<--, 9.1<--  Plt Trend: 6<--, 12<--, 26<--, 9<--, 29<--  02-13    135  |  101  |  5<L>  ----------------------------<  106<H>  5.1   |  9<LL>  |  0.61    Ca    5.7<LL>      13 Feb 2020 03:00  Phos  2.9     02-13  Mg     2.2     02-13    TPro  3.2<L>  /  Alb  1.5<L>  /  TBili  4.0<H>  /  DBili  x   /  AST  3034<H>  /  ALT  1143<H>  /  AlkPhos  83  02-13    Creatinine Trend: 0.61<--, 0.67<--, 0.89<--, 0.86<--, 0.92<--, 1.05<--  PT/INR - ( 13 Feb 2020 03:00 )   PT: 56.6 SEC;   INR: 4.86          PTT - ( 13 Feb 2020 03:00 )  PTT:63.1 SEC    Arterial Blood Gas:  02-12 @ 21:30  7.17/31/113/12/97.2/-15.9  ABG lactate: --  Arterial Blood Gas:  02-12 @ 15:30  7.14/40/279/13/99.7/-14.4  ABG lactate: --  Arterial Blood Gas:  02-12 @ 09:30  7.17/37/199/14/98.9/-13.7  ABG lactate: --  Arterial Blood Gas:  02-12 @ 03:30  7.18/37/188/14/98.8/-13.2  ABG lactate: --  Arterial Blood Gas:  02-11 @ 14:30  7.23/24/255/12/99.6/-16.4  ABG lactate: 14.4  Arterial Blood Gas:  02-11 @ 13:30  7.21/24/246/12/99.3/-17.2  ABG lactate: --  Arterial Blood Gas:  02-11 @ 08:30  7.10/22/192/9/98.9/-21.4  ABG lactate: 16.0    Venous Blood Gas:  02-12 @ 09:30  7.17/36/190/13/99.3  VBG Lactate: 17.0  Venous Blood Gas:  02-12 @ 03:30  7.18/37/189/14/99.1  VBG Lactate: 14.9  Venous Blood Gas:  02-11 @ 20:50  7.16/33/203/12/99.0  VBG Lactate: 15.0    CARDIAC MARKERS ( 11 Feb 2020 14:30 )  Trop x     /  u/L<H> / CKMB x             MICROBIOLOGY:   Blood Cx:  Urine Cx:  Sputum Cx:  Legionella:  RVP:    RADIOLOGY:  X Ray:  CT:  MRI:  Ultrasound:  [ ] Reviewed and interpreted by me    EKG:    + CHIEF COMPLAINT: Patient is a 39y old  Female who presents with a chief complaint of Neutropenic sepsis (12 Feb 2020 14:13)    Interval Events:  Over the course of the night, patient with increasing pressor requirements. Sedation turned off with no mental status. Had prolonged conversation with patient's family yesterday including her brother and sister who are HCPs. They understand that she is dying, but they want to give her every chance and wanted patient intubated instead of made comfort care. Patient was intubated in the afternoon.    REVIEW OF SYSTEMS:  [X] Unable to assess ROS because metabolic encephalopathy    OBJECTIVE:  ICU Vital Signs Last 24 Hrs  T(C): 34.5 (13 Feb 2020 04:00), Max: 34.5 (12 Feb 2020 08:00)  T(F): 94.1 (13 Feb 2020 04:00), Max: 94.1 (12 Feb 2020 08:00)  HR: 99 (13 Feb 2020 07:00) (96 - 151)  BP: 92/73 (12 Feb 2020 08:00) (92/73 - 92/73)  BP(mean): 80 (12 Feb 2020 08:00) (80 - 80)  ABP: 52/39 (13 Feb 2020 07:00) (52/39 - 142/128)  ABP(mean): 43 (13 Feb 2020 07:00) (43 - 134)  RR: 20 (13 Feb 2020 07:00) (10 - 25)  SpO2: --    Mode: AC/ CMV (Assist Control/ Continuous Mandatory Ventilation), RR (machine): 20, TV (machine): 400, FiO2: 40, PEEP: 8, MAP: 13, PIP: 34    02-12 @ 07:01  -  02-13 @ 07:00  --------------------------------------------------------  IN: 9509.7 mL / OUT: 5025 mL / NET: 4484.7 mL      CAPILLARY BLOOD GLUCOSE      POCT Blood Glucose.: 88 mg/dL (13 Feb 2020 06:46)      PHYSICAL EXAM:  General: patient not responsive to external stimuli  HEENT: scleral icterus  Respiratory: clear to auscultation anteriorly   Cardiovascular: regular rhythm, BP requiring pressor support.   Abdomen: soft, obese, non-distended  Extremities: +large bullae covering bilateral lower extremities, some have popped.  Skin: see [facility] [place] exam    HOSPITAL MEDICATIONS:  MEDICATIONS  (STANDING):  chlorhexidine 0.12% Liquid 15 milliLiter(s) Oral Mucosa every 12 hours  chlorhexidine 4% Liquid 1 Application(s) Topical <User Schedule>  CRRT Treatment    <Continuous>  dextrose 5%. 1000 milliLiter(s) (75 mL/Hr) IV Continuous <Continuous>  filgrastim-sndz Injectable 480 MICROGram(s) SubCutaneous daily  hydrocortisone sodium succinate Injectable 50 milliGRAM(s) IV Push every 6 hours  meropenem  IVPB 1000 milliGRAM(s) IV Intermittent every 8 hours  norepinephrine Infusion 1 MICROgram(s)/kG/Min (103.688 mL/Hr) IV Continuous <Continuous>  pantoprazole  Injectable 40 milliGRAM(s) IV Push daily  PrismaSATE Dialysate BGK 4 / 2.5 5000 milliLiter(s) (2000 mL/Hr) CRRT <Continuous>  PrismaSOL Filtration BGK 4 / 2.5 5000 milliLiter(s) (1200 mL/Hr) CRRT <Continuous>  PrismaSOL Filtration BGK 4 / 2.5 5000 milliLiter(s) (800 mL/Hr) CRRT <Continuous>  sodium bicarbonate  Infusion 0.136 mEq/kG/Hr (100 mL/Hr) IV Continuous <Continuous>  vasopressin Infusion 0.04 Unit(s)/Min (2.4 mL/Hr) IV Continuous <Continuous>    MEDICATIONS  (PRN):  aluminum hydroxide/magnesium hydroxide/simethicone Suspension 30 milliLiter(s) Oral every 6 hours PRN Dyspepsia  HYDROmorphone  Injectable 1 milliGRAM(s) IV Push every 2 hours PRN Severe Pain (7 - 10)      LABS:  (02-13 @ 03:00)                        7.6  0.21 )-----------( 6                 26.0    Neutrophils = 0.02 (9.5%)  Lymphocytes = 0.19 (90.5%)  Eosinophils = 0.00 (0.0%)  Basophils = 0.00 (0.0%)  Monocytes = 0.00 (0.0%)  Bands = --%    WBC Trend: 0.21<--, 0.18<--, 0.21<--  Hb Trend: 7.6<--, 7.7<--, 8.5<--, 9.0<--, 9.1<--  Plt Trend: 6<--, 12<--, 26<--, 9<--, 29<--  02-13    135  |  101  |  5<L>  ----------------------------<  106<H>  5.1   |  9<LL>  |  0.61    Ca    5.7<LL>      13 Feb 2020 03:00  Phos  2.9     02-13  Mg     2.2     02-13    TPro  3.2<L>  /  Alb  1.5<L>  /  TBili  4.0<H>  /  DBili  x   /  AST  3034<H>  /  ALT  1143<H>  /  AlkPhos  83  02-13    Creatinine Trend: 0.61<--, 0.67<--, 0.89<--, 0.86<--, 0.92<--, 1.05<--  PT/INR - ( 13 Feb 2020 03:00 )   PT: 56.6 SEC;   INR: 4.86          PTT - ( 13 Feb 2020 03:00 )  PTT:63.1 SEC    Arterial Blood Gas:  02-12 @ 21:30  7.17/31/113/12/97.2/-15.9  ABG lactate: --  Arterial Blood Gas:  02-12 @ 15:30  7.14/40/279/13/99.7/-14.4  ABG lactate: --  Arterial Blood Gas:  02-12 @ 09:30  7.17/37/199/14/98.9/-13.7  ABG lactate: --  Arterial Blood Gas:  02-12 @ 03:30  7.18/37/188/14/98.8/-13.2  ABG lactate: --  Arterial Blood Gas:  02-11 @ 14:30  7.23/24/255/12/99.6/-16.4  ABG lactate: 14.4  Arterial Blood Gas:  02-11 @ 13:30  7.21/24/246/12/99.3/-17.2  ABG lactate: --  Arterial Blood Gas:  02-11 @ 08:30  7.10/22/192/9/98.9/-21.4  ABG lactate: 16.0    Venous Blood Gas:  02-12 @ 09:30  7.17/36/190/13/99.3  VBG Lactate: 17.0  Venous Blood Gas:  02-12 @ 03:30  7.18/37/189/14/99.1  VBG Lactate: 14.9  Venous Blood Gas:  02-11 @ 20:50  7.16/33/203/12/99.0  VBG Lactate: 15.0    CARDIAC MARKERS ( 11 Feb 2020 14:30 )  Trop x     /  u/L<H> / CKMB x

## 2020-02-13 NOTE — PROGRESS NOTE ADULT - ASSESSMENT
Pt informed and verbalized understanding/kmp   39F PMHx metastatic leiomyosarcoma to intrahepatic IVC (dx 12/2019 on chemo since 1/10, last chemo1/31), Uterine fibroids s/p b/l uterine artery embolization on 8/2019, hx bilatral DVT (on lovenox BID), hx of recurrent right pleural effusion w/ PleurX 12/30/2019 admitted to MICU for septic/distributive shock requiring vasopressors, AHRF (requiring hiflow) and severe metabolic acidosis (started on bicarb drip).    Nephrology consulted for severe metabolic acidosis.  On admission had lactatte 7.4, vbg pH 7.15/CO2 20/HCO3 9, sCr 1.12 on admission 2/10/2020 (baseline 0.7 in Jan 2020), uptrend to 1.6 (2/11/2020).  Patient consented for HD/CRRT for acidosis.

## 2020-02-13 NOTE — PROGRESS NOTE ADULT - ASSESSMENT
39 year old with metastatic leiomyosarcoma, with recent immunosuppressive chemotherapy presented with shortness of breath with fever and hypotension  Now with respiratory failure    1) E coli bacteremia  associated sepsis    CT a/p had intrahepatic mass with extension to IVC/ right atria    Can check UA but my suspicion higher for ? cholangitis due to biliary obstruct    Continue meropenem     2) neutropenia  Likely due to chemotherapy   Continue to monitor ANC     3) Immunosuppression   due to chemo    4) TEDDY  on CVVH    5) Respiratory failure -intubated    6) Transaminitis  Hepatic extension of disease and sepsis sara ferguson    Continue to monitor    Prognosis grave

## 2020-02-13 NOTE — DISCHARGE NOTE FOR THE EXPIRED PATIENT - HOSPITAL COURSE
39 year old woman with PMHx HTN, DM, HLD, Uterine fibroids w. 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 She endorses brown watery diarrhea since saturday after eating burgers. Denied melena and hematochezia. Today, while walking to the bathroom started getting short of breath. This was not improving and they decided to call EMS. Denied fever, sick contacts or recent travel.     In the ED, was found with neutropenic sepsis.  Received vancomycin, zosyn, and cefepime. MICU consulted for tachycardia and tachypnea. Patient was started on pressors in the ED.  MICU Course: Patient found to be bacteremic with E. Coli. Required multiple pressors. Started on meropenem. Weaned down to vasopressin and norepinephrine. Lactic acidosis to 19. Started on CRRT for concern for metformin induced lactic acidosis. No improvement in lactic acidosis. Mental status began to deteriorate. Patient developed bullae on arms, legs, and abdomen/chest. Family meeting was called to discuss progression of disease and decision was made to intubate patient. Pressor requirements continued to worsen. BPs began to fall. Patient's family made aware. Patient had no spontaneous respirations, heart sounds, response to any stimulus including noxious stimuli. Patient's pupil were fixed and dilated at 8mm and with no response to light, no corneal reflexes, no gag reflex, and no oculocephalic reflex. Patient was pronounced at 14:08. Patient's family was at bedside and did not desire autopsy,  services were offered, and  arrangements were discussed.

## 2020-02-13 NOTE — PROGRESS NOTE ADULT - ASSESSMENT
39 year old woman with metastatic leiomyosarcoma invading the intrahepatic IVC (dx'ed: 12/2019 on chemo since 1/10, last chemo1/31) hx b/l DVT on lovenox BID on hold given thrombocytopenia, hx of recurrent right pleural effusion w/ PleurX 12/30/2019 admitted for neutropenic sepsis 2/2 EColi bacteremia and hypoxia resp failure initially on NRB, now on high flow, with multiorgan failure requiring pressor support, CRRT, and multiple transfusions, now intubated. Palliative consulted for GOC discussion and support during critical illness.

## 2020-02-13 NOTE — PROGRESS NOTE ADULT - ASSESSMENT
39f with newly diagnosed metastatic leiomyosarcoma, recent admission at [facility] from 1/10-1/16/2020 for chemotherapy initiation. She had port placement and received chemotherapy (doxorubicin and Dexrezoxane) on 1/15/2020 and s/p C2 on 1/31/2020 at [facility]. Her MRI abdomen showed Leiomyosarcoma involving the inferior vena cava, right atrium, right hepatic vein, and right renal vein. Heterogeneous appearance of the liver with indeterminant T1 hyperintense lesion in segment IVb with possible enhancement, hx b/l DVT on lovenox BID, hx of recurrent right pleural effusion w/ PleurX 12/30/2019 presents with worsening dyspnea for 1 day.  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  BCX +  Ecoli bacteremia, severe metabolic acidosis, on pressors, intubated, with multi system organ failure    -Appreciate MICU care  -Grave prognosis  -C/w neupogen daily, 480mcg, until ANC>5000  -Transfuse hgb<7, plt<10 or in case of fever plt<15, and as needed in case of invasive procedures or bleeding  -C/w abx until ANC recovery  -Supportive care, pain control, Nutrition, PT, DVT ppx    Will follow. Please do not hesitate to call back with questions.     Lucie Montes MD  Medical Oncology Attending  C: [phone]

## 2020-02-13 NOTE — PROGRESS NOTE ADULT - SUBJECTIVE AND OBJECTIVE BOX
Follow Up:      Inverval History/ROS:Patient is a 39y old  Female who presents with a chief complaint of Neutropenic sepsis (13 Feb 2020 14:15)  Pt seen at 10:30, note authored at 2:50 pm    Intubated. On pressors.      Allergies    No Known Drug Allergies  shellfish (Hives)    Intolerances        ANTIMICROBIALS:      OTHER MEDS:  chlorhexidine 0.12% Liquid 15 milliLiter(s) Oral Mucosa every 12 hours  chlorhexidine 4% Liquid 1 Application(s) Topical <User Schedule>  HYDROmorphone  Injectable 1 milliGRAM(s) IV Push every 2 hours PRN  norepinephrine Infusion 1 MICROgram(s)/kG/Min IV Continuous <Continuous>  sodium bicarbonate  Infusion 0.136 mEq/kG/Hr IV Continuous <Continuous>  vasopressin Infusion 0.04 Unit(s)/Min IV Continuous <Continuous>      Vital Signs Last 24 Hrs  T(C): 33.4 (13 Feb 2020 12:00), Max: 34.5 (13 Feb 2020 04:00)  T(F): 92.1 (13 Feb 2020 12:00), Max: 94.1 (13 Feb 2020 04:00)  HR: 0 (13 Feb 2020 14:08) (0 - 151)  BP: --  BP(mean): --  RR: 0 (13 Feb 2020 14:08) (0 - 25)  SpO2: --    PHYSICAL EXAM:  General: [x ] critically ill  HEAD/EYES: [ ] PERRL [x ] white sclera [ ] icterus  ENT:  [ ] normal [ x] supple [ ] thrush [ ] pharyngeal exudate  Cardiovascular:   [ ] murmur [x ] normal [ ] PPM/AICD  Respiratory:  [x ] course BS  GI:  [ ] soft, non-tender, normal bowel sounds  :  [ ] meeks [ ] no CVA tenderness   Musculoskeletal:  [ ] no synovitis  Neurologic:  [ ] non-focal exam   Skin:  [ x] no rash  Lymph: x[ ] no lymphadenopathy  Psychiatric:  [ ] appropriate affect [ ] alert & oriented  Lines:  [ x] no phlebitis [ ] central line                                7.6    0.21  )-----------( 6        ( 13 Feb 2020 03:00 )             26.0       02-13    135  |  101  |  5<L>  ----------------------------<  106<H>  5.1   |  9<LL>  |  0.61    Ca    5.7<LL>      13 Feb 2020 03:00  Phos  2.9     02-13  Mg     2.2     02-13    TPro  3.2<L>  /  Alb  1.5<L>  /  TBili  4.0<H>  /  DBili  x   /  AST  3034<H>  /  ALT  1143<H>  /  AlkPhos  83  02-13          MICROBIOLOGY:Culture - Body Fluid:   RARE (02-10-20 @ 22:18)      RADIOLOGY:

## 2020-02-13 NOTE — PROGRESS NOTE ADULT - PROBLEM SELECTOR PLAN 4
Met family bedside today; Family was tearful and voiced understanding of critical condition given low BPs  - Offered support for patient and family, though chaplaincy was declined given father is ; Family requests privacy at this time.

## 2020-02-13 NOTE — PROGRESS NOTE ADULT - SUBJECTIVE AND OBJECTIVE BOX
[facility] DIVISION OF KIDNEY DISEASES AND HYPERTENSION -- FOLLOW UP NOTE  --------------------------------------------------------------------------------  39F PMHx metastatic leiomyosarcoma to intrahepatic IVC (dx 12/2019 on chemo since 1/10, last chemo1/31), Uterine fibroids s/p b/l uterine artery embolization on 8/2019 w/ intermittent vaginal bleeding requiring rbc transfusion, hx bilatral DVT (on lovenox BID), hx of recurrent right pleural effusion w/ PleurX 12/30/2019 presents to ED for progressively worsening dyspnea on exertion - admitted to MICU for septic/distributive shock requiring vasopressors, AHRF (requiring hiflow) and severe metabolic acidosis possibly metformin toxicity.  Nephrology consulted for severe metabolic acidosis (lactate 7.4, vbg pH 7.15/CO2 20/HCO3 9) started on CRRT (2/11/2020).    24 hour events/subjective:  - yesterday patient intubated  - overnight patient require increase vasopressor requirement.  On-going GOC discuss with family and wanted full code.  Total UOP 10 ml meeks and CRRT 4.4L UF (net +4.4L).  - pt seen and examined this morning at bedside this morning intubated/sedated on multiple vasopressors and CRRT  - lab noted for K 4.8, serum HCO3 12, vBG pH 7.18/37/189/14, sCr 1.05      Over the course of the night, patient with increasing pressor requirements. Sedation turned off with no mental status. Had prolonged conversation with patient's family yesterday including her brother and sister who are HCPs. They understand that she is dying, but they want to give her every chance and wanted patient intubated instead of made comfort care. Patient was intubated in the afternoon.        PAST HISTORY  --------------------------------------------------------------------------------  No significant changes to PMH, PSH, FHx, SHx, unless otherwise noted    ALLERGIES & MEDICATIONS  --------------------------------------------------------------------------------  Allergies    No Known Drug Allergies  shellfish (Hives)    Intolerances      Standing Inpatient Medications  chlorhexidine 0.12% Liquid 15 milliLiter(s) Oral Mucosa every 12 hours  chlorhexidine 4% Liquid 1 Application(s) Topical <User Schedule>  CRRT Treatment    <Continuous>  dextrose 5%. 1000 milliLiter(s) IV Continuous <Continuous>  filgrastim-sndz Injectable 480 MICROGram(s) SubCutaneous daily  hydrocortisone sodium succinate Injectable 50 milliGRAM(s) IV Push every 6 hours  meropenem  IVPB 1000 milliGRAM(s) IV Intermittent every 8 hours  norepinephrine Infusion 1 MICROgram(s)/kG/Min IV Continuous <Continuous>  pantoprazole  Injectable 40 milliGRAM(s) IV Push daily  PrismaSATE Dialysate BGK 4 / 2.5 5000 milliLiter(s) CRRT <Continuous>  PrismaSOL Filtration BGK 4 / 2.5 5000 milliLiter(s) CRRT <Continuous>  PrismaSOL Filtration BGK 4 / 2.5 5000 milliLiter(s) CRRT <Continuous>  sodium bicarbonate  Infusion 0.136 mEq/kG/Hr IV Continuous <Continuous>  vasopressin Infusion 0.04 Unit(s)/Min IV Continuous <Continuous>    PRN Inpatient Medications  aluminum hydroxide/magnesium hydroxide/simethicone Suspension 30 milliLiter(s) Oral every 6 hours PRN  HYDROmorphone  Injectable 1 milliGRAM(s) IV Push every 2 hours PRN      REVIEW OF SYSTEMS  unable to obtain due to medical condition intubated/sedated    VITALS/PHYSICAL EXAM  --------------------------------------------------------------------------------  T(C): 33.4 (02-13-20 @ 08:00), Max: 34.5 (02-13-20 @ 04:00)  HR: 98 (02-13-20 @ 08:00) (96 - 151)  BP: --  RR: 20 (02-13-20 @ 08:00) (10 - 25)  SpO2: --  Wt(kg): --        02-12-20 @ 07:01  -  02-13-20 @ 07:00  --------------------------------------------------------  IN: 9509.7 mL / OUT: 5025 mL / NET: 4484.7 mL    02-13-20 @ 07:01  -  02-13-20 @ 10:04  --------------------------------------------------------  IN: 488.4 mL / OUT: 0 mL / NET: 488.4 mL      Physical Exam:  	Gen: intubated/sedated on CRRT  	ENT: intubated  	Pulm: CTA b/l, R chest tube   	CV: S1S2  	Abd: +BS, soft  	: jeanna in place  	LE: Warm, bilateral lower ext edema +2  	Skin: Warm  	Vascular access: RI HD catheter     LABS/STUDIES  --------------------------------------------------------------------------------              7.6    0.21  >-----------<  6        [02-13-20 @ 03:00]              26.0     135  |  101  |  5   ----------------------------<  106      [02-13-20 @ 03:00]  5.1   |  9   |  0.61        Ca     5.7     [02-13-20 @ 03:00]      Mg     2.2     [02-13-20 @ 03:00]      Phos  2.9     [02-13-20 @ 03:00]    TPro  3.2  /  Alb  1.5  /  TBili  4.0  /  DBili  x   /  AST  3034  /  ALT  1143  /  AlkPhos  83  [02-13-20 @ 03:00]    PT/INR: PT 56.6 , INR 4.86       [02-13-20 @ 03:00]  PTT: 63.1       [02-13-20 @ 03:00]          [02-11-20 @ 14:30]    Creatinine Trend:  SCr 0.61 [02-13 @ 03:00]  SCr 0.67 [02-12 @ 21:30]  SCr 0.89 [02-12 @ 15:30]  SCr 0.86 [02-12 @ 12:10]  SCr 0.92 [02-12 @ 09:30]        Iron 23, TIBC 303, %sat 8      [12-03-19 @ 22:56]  Ferritin 137      [12-03-19 @ 22:56]  HbA1c 7.7      [02-12-20 @ 03:30]  TSH 0.958      [10-14-19 @ 16:33] [facility] DIVISION OF KIDNEY DISEASES AND HYPERTENSION -- FOLLOW UP NOTE  --------------------------------------------------------------------------------  39F PMHx metastatic leiomyosarcoma to intrahepatic IVC (dx 12/2019 on chemo since 1/10, last chemo1/31), Uterine fibroids s/p b/l uterine artery embolization on 8/2019 w/ intermittent vaginal bleeding requiring rbc transfusion, hx bilatral DVT (on lovenox BID), hx of recurrent right pleural effusion w/ PleurX 12/30/2019 presents to ED for progressively worsening dyspnea on exertion - admitted to MICU for septic/distributive shock requiring vasopressors, AHRF (requiring hiflow) and severe metabolic acidosis possibly metformin toxicity.  Nephrology consulted for severe metabolic acidosis (lactate 7.4, vbg pH 7.15/CO2 20/HCO3 9) started on CRRT (2/11/2020).    24 hour events/subjective:  - yesterday patient intubated in afternoon  - overnight patient require increase vasopressor requirement.  On-going GOC discuss with family and wanted full code.  Total UOP 10 ml meeks and CRRT 4.4L UF (net +4.4L).  - pt seen and examined this morning at bedside this morning intubated/sedated on multiple vasopressors and CRRT  - lab noted for K5.1 (mild hemolyzed) serum HCO3 9, serum calcium corrected 7.7      PAST HISTORY  --------------------------------------------------------------------------------  No significant changes to PMH, PSH, FHx, SHx, unless otherwise noted    ALLERGIES & MEDICATIONS  --------------------------------------------------------------------------------  Allergies    No Known Drug Allergies  shellfish (Hives)    Intolerances      Standing Inpatient Medications  chlorhexidine 0.12% Liquid 15 milliLiter(s) Oral Mucosa every 12 hours  chlorhexidine 4% Liquid 1 Application(s) Topical <User Schedule>  CRRT Treatment    <Continuous>  dextrose 5%. 1000 milliLiter(s) IV Continuous <Continuous>  filgrastim-sndz Injectable 480 MICROGram(s) SubCutaneous daily  hydrocortisone sodium succinate Injectable 50 milliGRAM(s) IV Push every 6 hours  meropenem  IVPB 1000 milliGRAM(s) IV Intermittent every 8 hours  norepinephrine Infusion 1 MICROgram(s)/kG/Min IV Continuous <Continuous>  pantoprazole  Injectable 40 milliGRAM(s) IV Push daily  PrismaSATE Dialysate BGK 4 / 2.5 5000 milliLiter(s) CRRT <Continuous>  PrismaSOL Filtration BGK 4 / 2.5 5000 milliLiter(s) CRRT <Continuous>  PrismaSOL Filtration BGK 4 / 2.5 5000 milliLiter(s) CRRT <Continuous>  sodium bicarbonate  Infusion 0.136 mEq/kG/Hr IV Continuous <Continuous>  vasopressin Infusion 0.04 Unit(s)/Min IV Continuous <Continuous>    PRN Inpatient Medications  aluminum hydroxide/magnesium hydroxide/simethicone Suspension 30 milliLiter(s) Oral every 6 hours PRN  HYDROmorphone  Injectable 1 milliGRAM(s) IV Push every 2 hours PRN      REVIEW OF SYSTEMS  unable to obtain due to medical condition intubated/sedated    VITALS/PHYSICAL EXAM  --------------------------------------------------------------------------------  T(C): 33.4 (02-13-20 @ 08:00), Max: 34.5 (02-13-20 @ 04:00)  HR: 98 (02-13-20 @ 08:00) (96 - 151)  BP: --  RR: 20 (02-13-20 @ 08:00) (10 - 25)  SpO2: --  Wt(kg): --        02-12-20 @ 07:01  -  02-13-20 @ 07:00  --------------------------------------------------------  IN: 9509.7 mL / OUT: 5025 mL / NET: 4484.7 mL    02-13-20 @ 07:01  -  02-13-20 @ 10:04  --------------------------------------------------------  IN: 488.4 mL / OUT: 0 mL / NET: 488.4 mL      Physical Exam:  	Gen: intubated/sedated on CRRT  	ENT: intubated  	Pulm: CTA b/l, R chest tube   	CV: S1S2  	Abd: +BS, soft  	: meeks in place  	LE: Warm, bilateral lower ext edema +2  	Skin: Warm  	Vascular access: RIJ HD catheter     LABS/STUDIES  --------------------------------------------------------------------------------              7.6    0.21  >-----------<  6        [02-13-20 @ 03:00]              26.0     135  |  101  |  5   ----------------------------<  106      [02-13-20 @ 03:00]  5.1   |  9   |  0.61        Ca     5.7     [02-13-20 @ 03:00]      Mg     2.2     [02-13-20 @ 03:00]      Phos  2.9     [02-13-20 @ 03:00]    TPro  3.2  /  Alb  1.5  /  TBili  4.0  /  DBili  x   /  AST  3034  /  ALT  1143  /  AlkPhos  83  [02-13-20 @ 03:00]    PT/INR: PT 56.6 , INR 4.86       [02-13-20 @ 03:00]  PTT: 63.1       [02-13-20 @ 03:00]          [02-11-20 @ 14:30]    Creatinine Trend:  SCr 0.61 [02-13 @ 03:00]  SCr 0.67 [02-12 @ 21:30]  SCr 0.89 [02-12 @ 15:30]  SCr 0.86 [02-12 @ 12:10]  SCr 0.92 [02-12 @ 09:30]        Iron 23, TIBC 303, %sat 8      [12-03-19 @ 22:56]  Ferritin 137      [12-03-19 @ 22:56]  HbA1c 7.7      [02-12-20 @ 03:30]  TSH 0.958      [10-14-19 @ 16:33]

## 2020-02-13 NOTE — PROGRESS NOTE ADULT - PROBLEM SELECTOR PLAN 2
TEDDY anuric likely 2/2 ATN in setting shock (distributive/septic), volume depletion ( diarrhea, decrease PO intake).  On admission sCr 1.12 (on 2/10/2020), baseline 0.7 in Jan 2020, uptrend to 1.6 (2/11/2020).  Recommendations:  - continue CRRT  - trend sCr/electrolytes, strict I/O, daily weight  - avoid nephrotoxic agents (ACEI/ARB/NSAIDS), hold metformin  - renally dose medications per eGFR.  - goal net even fluid balance for now

## 2020-02-13 NOTE — PROGRESS NOTE ADULT - ASSESSMENT
39 year old woman with PMHx HTN, DM, HLD, Uterine fibroids w. 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 Found with neutropenic sepsis and hypoxia resp failure initially on NRB, now on high flow. On pressors for obstructive vs distributive shock. On CRRT for concern for Metformin induced lactic acidosis with mild improvement in lactic acidosis, with worsening mental status and thrombocytopenia.     #Neuro  -Metabolic encephalopathy: Patient more obtunded. Speaking more nonsensically.      #Resp  - hx of asthma. c/w Symbicort   - CXR- showing large pleural effusion.   - R pleurex has put out 2650cc so far.   - Will place on high flow for goal saturation >90%.   - Likely also with PE, but unstable for CTA, Not receiving AC at this time given elevated INR and thrombocytopenia.     #CV  - Distributive shock 2/2 neutropenic sepsis vs obstructive shock 2/2 clot burden.  - Levophed at 0.4 and Vasopressin   - Goal MAP >65     #GI  - c/w pantoprazole   - NPO     #ID  - Neutropenic sepsis 2/2 E Coli bacteremia, unclear source-potentially GI.  WBC 0.25 ANC 20   - C/w Meropenem  - ID following  - Repeat blood cultures pending.   - RVP neg. C. diff negative.     #Renal  - TEDDY 2/2 shock.   - High anion gap met acidosis, improving. Metformin Induced LA? On CRRT with mild improvement in lactate- 14.9  - C/w bicarb gtt   - Monitor I/O closely.  - Avoid nephrotoxic agents.     #Heme  - Pancytopenia, required 3 units platelets overnight.  - DIC less likely given elevated fibrinogen  - No smear created, but considering TTP?  - Repleting platelets for goal >50.  - S/p 1 dose neupogen.    #Endo  - On stress dose steroids  - Hypoglycemic, started on D5 +LR standing.  - Depleting glycogen stores 2/2 liver involvement?    #DVT PPx  - Known clot burden; holding off on active anticoagulant at this time.     #GOC  - Patient critically ill. Need to have ongoing goals of care conversation.   - Full code.   -  cell # [phone] 39 year old woman with PMHx HTN, DM, HLD, Uterine fibroids w. 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 Found with neutropenic sepsis and hypoxia resp failure initially on NRB, now on high flow. On pressors for obstructive vs distributive shock. On CRRT for concern for Metformin induced lactic acidosis with mild improvement in lactic acidosis, with worsening mental status and thrombocytopenia, now intubated with downtrending bp. Patient actively dying.     #Neuro  -Metabolic encephalopathy.  -Intubated but not sedated      #Resp  - CXR- showing large pleural effusion.   - R pleurex has put out 2650cc so far.   - Will place on high flow for goal saturation >90%.   - Likely also with PE, but unstable for CTA, Not receiving AC at this time given elevated INR and thrombocytopenia.     #CV  - Distributive shock 2/2 neutropenic sepsis vs obstructive shock 2/2 clot burden.  - Levophed and Vasopressin capped.  - BP downtrending.     #GI  - NPO     #ID  - Neutropenic sepsis 2/2 E Coli bacteremia, unclear source-potentially GI.  WBC 0.25 ANC 20   - Pt actively dying. D/c'd antibiotics.     #Renal  - TEDDY 2/2 shock.   - High anion gap met acidosis, improving. Metformin Induced LA? On CRRT with mild improvement in lactate- 14.9  - C/w bicarb gtt   - Monitor I/O closely.  - Avoid nephrotoxic agents.     #Heme  - Pancytopenia, required 3 units platelets overnight.  - DIC less likely given elevated fibrinogen  - No smear created, but considering TTP?  - Repleting platelets for goal >50.  - S/p 1 dose neupogen.    #Endo  - On stress dose steroids  - Hypoglycemic, started on D5 +LR standing.  - Depleting glycogen stores 2/2 liver involvement?    #DVT PPx  - Known clot burden; holding off on active anticoagulant at this time.     #GOC  - Patient critically ill. Need to have ongoing goals of care conversation.   - Full code.   -  cell # [phone] 39 year old woman with PMHx HTN, DM, HLD, Uterine fibroids w. 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 Found with neutropenic sepsis and hypoxia resp failure initially on NRB, now on high flow. On pressors for obstructive vs distributive shock. On CRRT for concern for Metformin induced lactic acidosis with mild improvement in lactic acidosis, with worsening mental status and thrombocytopenia, now intubated with downtrending bp. Patient actively dying.     #Neuro  -Metabolic encephalopathy.  -Intubated but not sedated      #Resp  - CXR- showing large pleural effusion.   - R pleurex has put out 2650cc so far.   - Will place on high flow for goal saturation >90%.   - Likely also with PE, but unstable for CTA, Not receiving AC at this time given elevated INR and thrombocytopenia.     #CV  - Distributive shock 2/2 neutropenic sepsis vs obstructive shock 2/2 clot burden.  - Levophed and Vasopressin capped.  - BP downtrending.     #GI  - NPO     #ID  - Neutropenic sepsis 2/2 E Coli bacteremia, unclear source-potentially GI.  WBC 0.25 ANC 20   - Pt actively dying. D/c'd antibiotics.     #Renal  - TEDDY 2/2 shock.   - High anion gap met acidosis, improving. Metformin Induced LA? On CRRT with mild improvement in lactate- 14.9  - C/w bicarb gtt   - Monitor I/O closely.  - Avoid nephrotoxic agents.     #Heme  - Pancytopenia  - S/p 1 dose neupogen.    #Endo  - S/p stress dose steroids. Holding now in setting of patient actively dying.    #DVT PPx  - Known clot burden; holding off on active anticoagulant at this time.     #GOC  - Patient critically ill. Discussed with family that she is actively dying. Family at bedside. Emotional support provided.  -  cell # [phone]- Denny

## 2020-02-13 NOTE — PROGRESS NOTE ADULT - REASON FOR ADMISSION
Neutropenic sepsis

## 2020-02-14 ENCOUNTER — APPOINTMENT (OUTPATIENT)
Dept: SURGICAL ONCOLOGY | Facility: CLINIC | Age: 40
End: 2020-02-14

## 2020-02-14 ENCOUNTER — APPOINTMENT (OUTPATIENT)
Dept: HEMATOLOGY ONCOLOGY | Facility: CLINIC | Age: 40
End: 2020-02-14

## 2020-02-14 LAB
ADAMTS13 ACTIVITY: 30 % — SIGNIFICANT CHANGE UP
ADAMTS13-COMMENT: SIGNIFICANT CHANGE UP

## 2020-02-17 LAB — BACTERIA BLD CULT: SIGNIFICANT CHANGE UP

## 2020-02-19 ENCOUNTER — APPOINTMENT (OUTPATIENT)
Dept: PULMONOLOGY | Facility: CLINIC | Age: 40
End: 2020-02-19

## 2020-03-10 LAB — FUNGUS SPEC QL CULT: SIGNIFICANT CHANGE UP

## 2020-03-23 LAB — ACID FAST STN FLD: SIGNIFICANT CHANGE UP

## 2020-06-23 NOTE — ED ADULT TRIAGE NOTE - WEIGHT METHOD
Patient found to be SIRS positive overnight  We would like patient to have at least 24 hour period where he is afebrile before we place PEG tube  We have updated primary team and Infectious Disease team about a recommendations  Will hold off on placing PEG tube      Gardenia De Souza MD  Gastroenterology Fellow  [address]  Date: June 23, 2020 stated

## 2020-10-15 NOTE — H&P ADULT - NSICDXPASTMEDICALHX_GEN_ALL_CORE_FT
PAST MEDICAL HISTORY:  Asthma     Diabetes     Hyperlipidemia     Hypertension     Leiomyosarcoma     Uterine fibroid No

## 2020-12-22 NOTE — PROGRESS NOTE ADULT - PROBLEM/PLAN-8
DISPLAY PLAN FREE TEXT Pain Refusal Text: I offered to prescribe pain medication but the patient refused to take this medication.

## 2021-02-02 NOTE — PROGRESS NOTE ADULT - SUBJECTIVE AND OBJECTIVE BOX
INTERVAL HPI/OVERNIGHT EVENTS: NAEO. yesterday afternoon given 4U FFP for INR 5.    SUBJECTIVE: pt seen and examined at bedside this am by surgery team. pt states she feels much better today. Denies f/n/v/cp/sob. This am INR down to 3.38.    MEDICATIONS  (STANDING):  albumin human 25% IVPB 100 milliLiter(s) IV Intermittent every 12 hours  budesonide 160 MICROgram(s)/formoterol 4.5 MICROgram(s) Inhaler 2 Puff(s) Inhalation two times a day  dextrose 5%. 1000 milliLiter(s) (50 mL/Hr) IV Continuous <Continuous>  dextrose 50% Injectable 12.5 Gram(s) IV Push once  dextrose 50% Injectable 25 Gram(s) IV Push once  dextrose 50% Injectable 25 Gram(s) IV Push once  ferrous    sulfate 325 milliGRAM(s) Oral daily  insulin lispro (HumaLOG) corrective regimen sliding scale   SubCutaneous Before meals and at bedtime  lisinopril 20 milliGRAM(s) Oral daily  metoprolol tartrate 50 milliGRAM(s) Oral two times a day  pantoprazole    Tablet 40 milliGRAM(s) Oral before breakfast    MEDICATIONS  (PRN):  dextrose 40% Gel 15 Gram(s) Oral once PRN Blood Glucose LESS THAN 70 milliGRAM(s)/deciliter  glucagon  Injectable 1 milliGRAM(s) IntraMuscular once PRN Glucose LESS THAN 70 milligrams/deciliter  HYDROmorphone  Injectable 0.5 milliGRAM(s) IV Push every 4 hours PRN Severe Pain (7 - 10)  ondansetron Injectable 4 milliGRAM(s) IV Push every 6 hours PRN Nausea      Vital Signs Last 24 Hrs  T(C): 36.6 (19 Dec 2019 05:10), Max: 36.7 (18 Dec 2019 08:31)  T(F): 97.8 (19 Dec 2019 05:10), Max: 98 (18 Dec 2019 08:31)  HR: 72 (19 Dec 2019 02:30) (72 - 122)  BP: 127/84 (19 Dec 2019 02:30) (100/67 - 137/68)  BP(mean): 102 (19 Dec 2019 02:30) (80 - 102)  RR: 17 (19 Dec 2019 02:30) (16 - 20)  SpO2: 99% (19 Dec 2019 02:30) (96% - 100%)    PHYSICAL EXAM:      Constitutional: A&Ox3, NAD    Respiratory: non labored breathing, no respiratory distress    Cardiovascular: NSR, RRR    Gastrointestinal: abdomen soft, obese  minimal distention, mild RUQ and epigastric tenderness, negative Pearson's sign, no rebound, no guarding. Previous lap incision sites C/D/I without erythema, induration or drainage    Extremities: wwp, no calf tenderness or edema. SCDs in place     I&O's Detail    18 Dec 2019 07:01  -  19 Dec 2019 07:00  --------------------------------------------------------  IN:    Plasma: 858 mL  Total IN: 858 mL    OUT:    Voided: 300 mL  Total OUT: 300 mL    Total NET: 558 mL          LABS:                        8.4    13.09 )-----------( 220      ( 19 Dec 2019 05:47 )             29.5     12-19    141  |  102  |  17  ----------------------------<  101<H>  4.8   |  25  |  1.17    Ca    9.2      19 Dec 2019 05:47  Phos  3.6     12-19  Mg     2.1     12-19    TPro  6.7  /  Alb  3.7  /  TBili  1.1  /  DBili  x   /  AST  908<H>  /  ALT  671<H>  /  AlkPhos  151<H>  12-18    PT/INR - ( 19 Dec 2019 05:47 )   PT: 39.7 sec;   INR: 3.38          PTT - ( 19 Dec 2019 05:47 )  PTT:31.0 sec      RADIOLOGY & ADDITIONAL STUDIES: INTERVAL HPI/OVERNIGHT EVENTS: NAEO. yesterday afternoon given 4U FFP for INR 5. AFVSS.    SUBJECTIVE: Pt seen and examined at bedside this am by surgery team. Pt states she feels much better today. Denies f/n/v/cp/sob. This am INR down to 3.38.    MEDICATIONS  (STANDING):  albumin human 25% IVPB 100 milliLiter(s) IV Intermittent every 12 hours  budesonide 160 MICROgram(s)/formoterol 4.5 MICROgram(s) Inhaler 2 Puff(s) Inhalation two times a day  dextrose 5%. 1000 milliLiter(s) (50 mL/Hr) IV Continuous <Continuous>  dextrose 50% Injectable 12.5 Gram(s) IV Push once  dextrose 50% Injectable 25 Gram(s) IV Push once  dextrose 50% Injectable 25 Gram(s) IV Push once  ferrous    sulfate 325 milliGRAM(s) Oral daily  insulin lispro (HumaLOG) corrective regimen sliding scale   SubCutaneous Before meals and at bedtime  lisinopril 20 milliGRAM(s) Oral daily  metoprolol tartrate 50 milliGRAM(s) Oral two times a day  pantoprazole    Tablet 40 milliGRAM(s) Oral before breakfast    MEDICATIONS  (PRN):  dextrose 40% Gel 15 Gram(s) Oral once PRN Blood Glucose LESS THAN 70 milliGRAM(s)/deciliter  glucagon  Injectable 1 milliGRAM(s) IntraMuscular once PRN Glucose LESS THAN 70 milligrams/deciliter  HYDROmorphone  Injectable 0.5 milliGRAM(s) IV Push every 4 hours PRN Severe Pain (7 - 10)  ondansetron Injectable 4 milliGRAM(s) IV Push every 6 hours PRN Nausea      Vital Signs Last 24 Hrs  T(C): 36.6 (19 Dec 2019 05:10), Max: 36.7 (18 Dec 2019 08:31)  T(F): 97.8 (19 Dec 2019 05:10), Max: 98 (18 Dec 2019 08:31)  HR: 72 (19 Dec 2019 02:30) (72 - 122)  BP: 127/84 (19 Dec 2019 02:30) (100/67 - 137/68)  BP(mean): 102 (19 Dec 2019 02:30) (80 - 102)  RR: 17 (19 Dec 2019 02:30) (16 - 20)  SpO2: 99% (19 Dec 2019 02:30) (96% - 100%)    PHYSICAL EXAM:    Constitutional: A&Ox3, NAD    Respiratory: non labored breathing, no respiratory distress    Cardiovascular: NSR, RRR    Gastrointestinal: abdomen soft, obese, minimal distention, mild RUQ and epigastric tenderness, negative Pearson's sign, no rebound, no guarding. Previous lap incision sites C/D/I without erythema, induration or drainage    Extremities: wwp, no calf tenderness or edema. SCDs in place     I&O's Detail    18 Dec 2019 07:01  -  19 Dec 2019 07:00  --------------------------------------------------------  IN:    Plasma: 858 mL  Total IN: 858 mL    OUT:    Voided: 300 mL  Total OUT: 300 mL    Total NET: 558 mL          LABS:                        8.4    13.09 )-----------( 220      ( 19 Dec 2019 05:47 )             29.5     12-19    141  |  102  |  17  ----------------------------<  101<H>  4.8   |  25  |  1.17    Ca    9.2      19 Dec 2019 05:47  Phos  3.6     12-19  Mg     2.1     12-19    TPro  6.7  /  Alb  3.7  /  TBili  1.1  /  DBili  x   /  AST  908<H>  /  ALT  671<H>  /  AlkPhos  151<H>  12-18    PT/INR - ( 19 Dec 2019 05:47 )   PT: 39.7 sec;   INR: 3.38          PTT - ( 19 Dec 2019 05:47 )  PTT:31.0 sec      RADIOLOGY & ADDITIONAL STUDIES: Denies at this time

## 2021-02-19 NOTE — PATIENT PROFILE ADULT - DOES PATIENT HAVE ADVANCE DIRECTIVE
yes [Labia Majora] : normal [Labia Minora] : normal [Normal] : normal [___] : [unfilled] ~Ucm mass on the right [Uterine Adnexae] : normal  [FreeTextEntry5] : cervicitis noted. [FreeTextEntry6] : right sided ovarian cyst noted.  Non tender.

## 2021-09-10 NOTE — PROGRESS NOTE ADULT - ASSESSMENT
39 year old woman history of  HTN, HLD, DM, asthma, fibroids, recently diagnosed leiomyosarcoma and DVT transferred from [facility] for initiation of chemotherapy. 39 year old woman history of  HTN, HLD, DM (A1C 5.8), asthma, fibroids, recently diagnosed leiomyosarcoma and DVT transferred from [facility] for initiation of chemotherapy. negative

## 2021-12-03 NOTE — PROCEDURE NOTE - NSCVLACTUALSITE_VASC_A_CORE
Echo cardiogram showed slight interval increase in aorta measurement from 3.6 to 3.9 cm.    Recommendation: non-emergent cardiology consultation to decide how often and how best to follow( repeat echocardiogram or CT).     right/internal jugular

## 2022-01-05 NOTE — CONSULT NOTE ADULT - SUBJECTIVE AND OBJECTIVE BOX
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 Howe) showing leiomyosarcoma, readmitted with R pleural effusion and no PE s/p bedside chest tube placement 12/20 that has been removed, now presents with gradual increased work of breathing and bilateral leg swelling. The patient denies any chest pain, leg pain, or abdominal pain. She also has been tolerating PO without nausea/vomiting. In the ED, the patient was AVSS and found to have significant right sided pleural effusion on CXR. She was given Lasix 20mg IV x1, with plans by pulmonology to place a PleurX catheter, which she is now amendable to. General surgery consulted to rule out any contraindication to chest tube placement.    PAST MEDICAL HISTORY:  Leiomyosarcoma  Uterine fibroid  Hyperlipidemia  Asthma  Diabetes  Hypertension      MEDICATIONS  (STANDING):  atorvastatin 20 milliGRAM(s) Oral at bedtime  budesonide  80 MICROgram(s)/formoterol 4.5 MICROgram(s) Inhaler 2 Puff(s) Inhalation two times a day  dextrose 5%. 1000 milliLiter(s) (50 mL/Hr) IV Continuous <Continuous>  dextrose 50% Injectable 12.5 Gram(s) IV Push once  insulin lispro (HumaLOG) corrective regimen sliding scale   SubCutaneous three times a day before meals    MEDICATIONS  (PRN):  dextrose 40% Gel 15 Gram(s) Oral once PRN Blood Glucose LESS THAN 70 milliGRAM(s)/deciliter  glucagon  Injectable 1 milliGRAM(s) IntraMuscular once PRN Glucose LESS THAN 70 milligrams/deciliter  ibuprofen  Tablet. 400 milliGRAM(s) Oral every 6 hours PRN Moderate Pain (4 - 6)      Allergies    No Known Drug Allergies  shellfish (Hives)    Intolerances        OBJECTIVE:  Vital Signs Last 24 Hrs  T(C): 36.8 (29 Dec 2019 10:30), Max: 36.8 (29 Dec 2019 10:30)  T(F): 98.2 (29 Dec 2019 10:30), Max: 98.2 (29 Dec 2019 10:30)  HR: 91 (29 Dec 2019 10:30) (80 - 107)  BP: 112/77 (29 Dec 2019 10:30) (104/69 - 124/85)  BP(mean): --  RR: 18 (29 Dec 2019 10:30) (18 - 18)  SpO2: 100% (29 Dec 2019 10:30) (100% - 100%)    I&O's Summary      Physical Exam:  General: NAD, resting comfortably  Respiratory: No accessory muscle use on room air  Abdominal: Soft, NT/ND  Neuro: AAO x 3, no focal deficits      LABS:                        9.1    7.37  )-----------( 204      ( 29 Dec 2019 11:11 )             32.3     12-29    132<L>  |  96  |  9   ----------------------------<  173<H>  4.0   |  21<L>  |  0.80    Ca    8.8      29 Dec 2019 11:11  Phos  2.4     12-29  Mg     1.5     12-29    TPro  6.1  /  Alb  3.2<L>  /  TBili  1.4<H>  /  DBili  x   /  AST  110<H>  /  ALT  259<H>  /  AlkPhos  170<H>  12-29    PT/INR - ( 29 Dec 2019 11:11 )   PT: 22.5 sec;   INR: 1.95          PTT - ( 29 Dec 2019 11:11 )  PTT:28.7 sec    CAPILLARY BLOOD GLUCOSE      POCT Blood Glucose.: 201 mg/dL (29 Dec 2019 12:37)  POCT Blood Glucose.: 191 mg/dL (29 Dec 2019 06:46)    LIVER FUNCTIONS - ( 29 Dec 2019 11:11 )  Alb: 3.2 g/dL / Pro: 6.1 g/dL / ALK PHOS: 170 U/L / ALT: 259 U/L / AST: 110 U/L / GGT: x Equal and normal pulses (carotid, femoral, dorsalis pedis)

## 2022-01-11 NOTE — DISCHARGE NOTE PROVIDER - NSDCMRMEDTOKEN_GEN_ALL_CORE_FT
acetaminophen 325 mg oral tablet: 2 tab(s) orally every 6 hours, As needed, Temp greater or equal to 38C (100.4F), Mild Pain (1 - 3), Moderate Pain (4 - 6)  Advair HFA 45 mcg-21 mcg/inh inhalation aerosol: 2 puff(s) inhaled 2 times a day  apixaban 5 mg oral tablet: 1 tab(s) orally 2 times a day  atorvastatin 20 mg oral tablet: 1 tab(s) orally once a day  Eliquis Starter Pack for Treatment of DVT and PE 5 mg oral tablet: 2 tab(s) orally 2 times a day   ferrous sulfate 325 mg (65 mg elemental iron) oral tablet: 1 tab(s) orally once a day  ibuprofen 600 mg oral tablet: 1 tab(s) orally every 6 hours MDD:2400 mg  lisinopril 20 mg oral tablet: 1 tab(s) orally 2 times a day  metFORMIN 500 mg oral tablet: 1 tab(s) orally 2 times a day  metoprolol tartrate 50 mg oral tablet: 1 tab(s) orally 2 times a day  Oxaydo 5 mg oral tablet: 1 tab(s) orally every 6 hours, As needed, Moderate Pain (4 - 6) MDD:4  Protonix 40 mg oral delayed release tablet: 1 tab(s) orally once a day (before a meal)  Vitamin D3 1000 intl units oral tablet: 1 tab(s) orally once a day pt presented with decreased vision in both eyes, worse in R eye pt presented with decreased vision in both eyes, worse in R eye per baseline

## 2022-04-01 NOTE — PATIENT PROFILE ADULT - ARRIVAL FROM
home medication on januvia and glargine 20u qHs  continue Lantus 15units qHs  Insulin sliding scale  controlled
Home

## 2022-08-07 NOTE — PATIENT PROFILE ADULT - NSPROPTRIGHTBILLOFRIGHTS_GEN_A_NUR
patient [Fatigue] : fatigue [Dyspnea] : dyspnea [Diabetes] : diabetes [Negative] : Psychiatric [Cough] : no cough [Sputum] : no sputum [Wheezing] : no wheezing

## 2022-09-06 NOTE — ED ADULT NURSE NOTE - NSFALLRSKASSESSDT_ED_ALL_ED
Patient given a copy of her Xray along with her AVS. She was also encouraged to think about trying Feldenkraiss therapy in the future.   06-Jan-2020 18:00

## 2022-09-07 NOTE — H&P ADULT - PROBLEM SELECTOR PLAN 1
Patient's wife called and wanted to know if there is a certain time of day that Heather Sydney would like for her to check her 's blood pressure. She states she was told 3-4 times a week. Please advise. - Patient w/ acute VTE of b/l femoral vein. Previously was meant to be discharged on eliquis. - Patient w/ acute VTE of b/l femoral vein. Previously was meant to be discharged on eliquis. She did not take her eliquis, there was confusion as multiple providers had differeing opinions. She now presents with b/l femoral VTE and asymptomatic lower extremity edema.   - lovenox 120 mg bid   - vascular consult

## 2022-10-27 NOTE — PHYSICAL THERAPY INITIAL EVALUATION ADULT - TRANSFER SKILLS, REHAB EVAL
independent Spironolactone Pregnancy And Lactation Text: This medication can cause feminization of the male fetus and should be avoided during pregnancy. The active metabolite is also found in breast milk.

## 2023-03-17 NOTE — PROGRESS NOTE ADULT - PROBLEM SELECTOR PLAN 9
Approved acute on chronic hypoxic respiratory failure due to PE and ICM F: Hypervolemic  E: Replete K>4, Mg>2  N: DASH/TLC/CC   DVTP: SCD's, no AC given possible procedure

## 2023-05-01 NOTE — PATIENT PROFILE ADULT - OVER THE PAST TWO WEEKS HAVE YOU FELT DOWN, DEPRESSED OR HOPELESS?
Dr Wise to address Echocardiogram       Normal LV size, mildly increased wall thickness, normal systolic function, EF 60%.  LV Global longitudinal strain -16.0 %.  Grade II diastolic dysfunction of the left ventricle (pseudonormal filling pattern).  Normal right ventricular size and systolic function.  Mildly increased left atrial chamber size.  Increased right atrial chamber size.  Mild aortic valve regurgitation.  No pericardial effusion.  No significant change since the prior study.   no

## 2024-04-03 NOTE — DISCHARGE NOTE PROVIDER - NSDCMRMEDTOKEN_GEN_ALL_CORE_FT
[FreeTextEntry1] : .  Plan: - Patient has significant nasal obstruction refractory to maximal medical therapy and structural anatomic abnormalities that would benefit from nasal airway surgery. This would comprise open septorhinoplasty with complete anterior septum reconstruction and correction of bony nasal pyramind deviation, possible structural cartilage grafting, and bilateral turbinate reduction. - Discussed in detail the benefits, alternatives, and risks of this procedure which include, but are not limited to, infection, bleeding, scarring, change in appearance, septal perforation, continued nasal obstruction, and need for revision surgery. - The patient reports understanding of these risks, the proposed benefits, and alternatives and wishes to proceed.  We will initiate the authorization/scheduling process.   -- Estelita Rice MD Facial Plastic & Reconstructive Surgery acetaminophen 325 mg oral tablet: 2 tab(s) orally every 6 hours, As needed, Temp greater or equal to 38C (100.4F), Mild Pain (1 - 3), Moderate Pain (4 - 6)  Advair HFA 45 mcg-21 mcg/inh inhalation aerosol: 2 puff(s) inhaled 2 times a day  atorvastatin 20 mg oral tablet: 1 tab(s) orally once a day  ciprofloxacin 500 mg oral tablet: 1 tab(s) orally every 12 hours  ferrous sulfate 325 mg (65 mg elemental iron) oral tablet: 1 tab(s) orally once a day  ibuprofen 600 mg oral tablet: 1 tab(s) orally every 6 hours MDD:2400 mg  lisinopril 20 mg oral tablet: 1 tab(s) orally 2 times a day  metFORMIN 500 mg oral tablet: 1 tab(s) orally 2 times a day  metoprolol tartrate 50 mg oral tablet: 1 tab(s) orally 2 times a day  Oxaydo 5 mg oral tablet: 1 tab(s) orally every 6 hours, As needed, Moderate Pain (4 - 6) MDD:4  Protonix 40 mg oral delayed release tablet: 1 tab(s) orally once a day (before a meal)  Vitamin D3 1000 intl units oral tablet: 1 tab(s) orally once a day acetaminophen 325 mg oral tablet: 2 tab(s) orally every 6 hours, As needed, Temp greater or equal to 38C (100.4F), Mild Pain (1 - 3), Moderate Pain (4 - 6)  Advair HFA 45 mcg-21 mcg/inh inhalation aerosol: 2 puff(s) inhaled 2 times a day  atorvastatin 20 mg oral tablet: 1 tab(s) orally once a day  Cipro 250 mg oral tablet: 2 tab(s) orally 2 times a day   ferrous sulfate 325 mg (65 mg elemental iron) oral tablet: 1 tab(s) orally once a day  ibuprofen 600 mg oral tablet: 1 tab(s) orally every 6 hours MDD:2400 mg  lisinopril 20 mg oral tablet: 1 tab(s) orally 2 times a day  metFORMIN 500 mg oral tablet: 1 tab(s) orally 2 times a day  metoprolol tartrate 50 mg oral tablet: 1 tab(s) orally 2 times a day  Oxaydo 5 mg oral tablet: 1 tab(s) orally every 6 hours, As needed, Moderate Pain (4 - 6) MDD:4  Protonix 40 mg oral delayed release tablet: 1 tab(s) orally once a day (before a meal)  Vitamin D3 1000 intl units oral tablet: 1 tab(s) orally once a day

## 2024-04-06 NOTE — PROGRESS NOTE ADULT - PROBLEM SELECTOR PLAN 3
Patient with stage IV metastatic disease.  - Prognosis is extremely poor  - Patient is actively dying No

## 2024-04-26 NOTE — PATIENT PROFILE ADULT - NSASFALLATTEMPTOOB_GEN_A_NUR
A [facility] message has been sent to the patient for PATIENT ROUNDING with [facility].    no

## 2024-05-18 NOTE — PROGRESS NOTE ADULT - ASSESSMENT
39 year old with metastatic leiomyosarcoma, with recent immunosuppressive chemotherapy presented with shortness of breath with fever and hypotension    1) E coli bacteremia  associated sepsis    CT a/p had intrahepatic mass with extension to IVC/ right atria    Can check UA but my suspicion higher for ? 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

## 2024-09-12 NOTE — ED PROVIDER NOTE - CROS ED CARDIOVAS ALL NEG
Care Due:                  Date            Visit Type   Department     Provider  --------------------------------------------------------------------------------                                EP -                              PRIMARY      [facility] INTERNAL  Last Visit: 04-      CARE ([facility])   MEDICINE II    Jake Rai                              EP -                              PRIMARY      [facility] INTERNAL  Next Visit: 10-      CARE ([facility])   MEDICINE II    Jake Rai                                                            Last  Test          Frequency    Reason                     Performed    Due Date  --------------------------------------------------------------------------------    HBA1C.......  6 months...  metFORMIN................  04-   10-    [facility] Embedded Care Due Messages. Reference number: [id number].   9/12/2024 7:12:26 AM CDT   - - -

## 2024-09-24 NOTE — PROGRESS NOTE ADULT - PROBLEM/PLAN-4
Follow up in cardiology clinic in 4 months or sooner if needed   Follow up with PCP as directed   Please notify clinic if any new concerns or change in symptoms  
DISPLAY PLAN FREE TEXT

## 2024-10-02 NOTE — PATIENT PROFILE ADULT - NSPROPOAPRESSUREINJURY_GEN_A_NUR
10/24 AT 93  
CALLED [phone] and was told this was the [facility]    Writer then left message for daughter, Mili, to call to schedule    Annita will do but needs 60 minutes and must be after 10/20/24 due to 2 procedures  One on 11/5 and 2nd on 11/19  
Ok to book with me, 60 minutes appointments   
Patient is scheduled for cataract surgery for 11/5 and 11/19 and needs a pre-op. Please advise if this patient could be scheduled with Annita?  
no

## 2025-05-20 NOTE — PROGRESS NOTE ADULT - SUBJECTIVE AND OBJECTIVE BOX
"Notified by safety sitter that pt made comments after making a phone call saying he \"will murder everyone\". Notified physiatric APN and hospitalist. New order received from hospitalist to take away hospital phone and call light privileges and to get a security sitter. All potential safety risk items removed from room. Pt saline locked for adequate oral intake.     Also notified hospitalist that pt has been refused oxygen despite education. Pt keeps removing pulse oximeter despite education, reinforcement needed. Oxygen saturation in the 80s to low 90s on room air.   " [facility]  Division of Hospital Medicine  Yashira Milner MD  Pager: [phone]      Patient is a 39y old  Female who presents with a chief complaint of Chemotherapy (15 Scott 2020 13:01)      SUBJECTIVE / OVERNIGHT EVENTS: Patient underwent MRI of abdomen. This am, no complaints. Requesting compression stocking and flexeril PRN for pain.   ADDITIONAL REVIEW OF SYSTEMS:    MEDICATIONS  (STANDING):  atorvastatin 20 milliGRAM(s) Oral at bedtime  budesonide  80 MICROgram(s)/formoterol 4.5 MICROgram(s) Inhaler 2 Puff(s) Inhalation two times a day  chlorhexidine 4% Liquid 1 Application(s) Topical daily  dextrose 50% Injectable 12.5 Gram(s) IV Push once  dextrose 50% Injectable 25 Gram(s) IV Push once  dextrose 50% Injectable 25 Gram(s) IV Push once  enoxaparin Injectable 120 milliGRAM(s) SubCutaneous two times a day  magnesium oxide 400 milliGRAM(s) Oral daily  melatonin 3 milliGRAM(s) Oral at bedtime  metoprolol tartrate 50 milliGRAM(s) Oral two times a day  pantoprazole    Tablet 40 milliGRAM(s) Oral before breakfast    MEDICATIONS  (PRN):  acetaminophen   Tablet .. 650 milliGRAM(s) Oral every 6 hours PRN Mild Pain (1 - 3), Moderate Pain (4 - 6)  cyclobenzaprine 10 milliGRAM(s) Oral three times a day PRN Muscle Spasm  dextrose 40% Gel 15 Gram(s) Oral once PRN Blood Glucose LESS THAN 70 milliGRAM(s)/deciliter  glucagon  Injectable 1 milliGRAM(s) IntraMuscular once PRN Glucose LESS THAN 70 milligrams/deciliter  polyethylene glycol 3350 17 Gram(s) Oral every 12 hours PRN Constipation  senna 2 Tablet(s) Oral daily PRN Constipation      CAPILLARY BLOOD GLUCOSE        I&O's Summary      PHYSICAL EXAM:  Vital Signs Last 24 Hrs  T(C): 36.3 (16 Jan 2020 05:21), Max: 37.2 (15 Scott 2020 21:05)  T(F): 97.4 (16 Jan 2020 05:21), Max: 98.9 (15 Scott 2020 21:05)  HR: 88 (16 Jan 2020 05:21) (88 - 110)  BP: 132/85 (16 Jan 2020 05:21) (132/85 - 132/89)  BP(mean): --  RR: 18 (16 Jan 2020 05:21) (18 - 20)  SpO2: 100% (16 Jan 2020 05:21) (100% - 100%)  CONSTITUTIONAL: Young woman, obese, NAD, well-developed, well-groomed  EYES: PERRLA; conjunctiva and sclera clear  ENMT: Moist oral mucosa, no pharyngeal injection or exudates; normal dentition  NECK: Supple, no palpable masses; no thyromegaly  RESPIRATORY: Normal respiratory effort; lungs are clear to auscultation bilaterally  CARDIOVASCULAR: Regular rate and rhythm, normal S1 and S2, no murmur/rub/gallop; 2+ pitting  lower extremity edema; Peripheral pulses are 2+ bilaterally  ABDOMEN: Nontender to palpation, normoactive bowel sounds, no rebound/guarding; No hepatosplenomegaly  MUSCULOSKELETAL:  Normal gait; no clubbing or cyanosis of digits; no joint swelling or tenderness to palpation  PSYCH: A+O to person, place, and time; affect appropriate  NEUROLOGY: CN 2-12 are intact and symmetric; no gross sensory deficits   SKIN: R sided pleurx catheter c/d/i; Right port C/D/I     LABS:                        9.2    4.39  )-----------( 114      ( 16 Jan 2020 06:35 )             30.3     01-16    133<L>  |  101  |  11  ----------------------------<  225<H>  4.5   |  21<L>  |  0.75    Ca    8.6      16 Jan 2020 06:35  Phos  2.9     01-16  Mg     1.5     01-16    TPro  5.5<L>  /  Alb  2.7<L>  /  TBili  1.2  /  DBili  x   /  AST  29  /  ALT  31  /  AlkPhos  128<H>  01-16    PT/INR - ( 15 Scott 2020 06:45 )   PT: 17.1 SEC;   INR: 1.52          PTT - ( 15 Scott 2020 00:25 )  PTT:89.0 SEC            RADIOLOGY & ADDITIONAL TESTS:  Results Reviewed:   Imaging Personally Reviewed: < from: MR Abdomen w/ IV Cont (01.16.20 @ 12:07) >  IMPRESSION:     Leiomyosarcoma involving the inferior vena cava, right atrium, right hepatic vein, and right renal vein.    Heterogeneous appearance of the liver with indeterminant T1 hyperintense lesion in segment IVb with possible enhancement. Follow-up contrast-enhanced MRI abdomen is recommended in 3months.      < end of copied text >    Electrocardiogram Personally Reviewed:    COORDINATION OF CARE:  Care Discussed with Consultants/Other Providers [Y/N]:  Prior or Outpatient Records Reviewed [Y/N]: